# Patient Record
Sex: FEMALE | Race: BLACK OR AFRICAN AMERICAN | NOT HISPANIC OR LATINO | Employment: FULL TIME | URBAN - METROPOLITAN AREA
[De-identification: names, ages, dates, MRNs, and addresses within clinical notes are randomized per-mention and may not be internally consistent; named-entity substitution may affect disease eponyms.]

---

## 2017-01-06 ENCOUNTER — TRANSCRIBE ORDERS (OUTPATIENT)
Dept: ADMINISTRATIVE | Facility: HOSPITAL | Age: 47
End: 2017-01-06

## 2017-01-17 ENCOUNTER — TRANSCRIBE ORDERS (OUTPATIENT)
Dept: ADMINISTRATIVE | Facility: HOSPITAL | Age: 47
End: 2017-01-17

## 2017-01-17 DIAGNOSIS — Z12.31 ENCOUNTER FOR SCREENING MAMMOGRAM FOR MALIGNANT NEOPLASM OF BREAST: Primary | ICD-10-CM

## 2017-01-18 ENCOUNTER — HOSPITAL ENCOUNTER (OUTPATIENT)
Dept: MAMMOGRAPHY | Facility: HOSPITAL | Age: 47
Discharge: HOME | End: 2017-01-18
Attending: OBSTETRICS & GYNECOLOGY
Payer: COMMERCIAL

## 2017-01-18 DIAGNOSIS — Z12.31 ENCOUNTER FOR SCREENING MAMMOGRAM FOR MALIGNANT NEOPLASM OF BREAST: ICD-10-CM

## 2017-01-18 PROCEDURE — G0202 SCR MAMMO BI INCL CAD: HCPCS

## 2017-03-09 ENCOUNTER — ALLSCRIPTS OFFICE VISIT (OUTPATIENT)
Dept: OTHER | Facility: OTHER | Age: 47
End: 2017-03-09

## 2017-03-09 DIAGNOSIS — R14.2 ERUCTATION: ICD-10-CM

## 2017-03-09 DIAGNOSIS — R10.9 ABDOMINAL PAIN: ICD-10-CM

## 2017-03-30 ENCOUNTER — ANESTHESIA EVENT (OUTPATIENT)
Dept: GASTROENTEROLOGY | Facility: HOSPITAL | Age: 47
End: 2017-03-30
Payer: COMMERCIAL

## 2017-03-31 ENCOUNTER — GENERIC CONVERSION - ENCOUNTER (OUTPATIENT)
Dept: OTHER | Facility: OTHER | Age: 47
End: 2017-03-31

## 2017-03-31 ENCOUNTER — ANESTHESIA (OUTPATIENT)
Dept: GASTROENTEROLOGY | Facility: HOSPITAL | Age: 47
End: 2017-03-31
Payer: COMMERCIAL

## 2017-03-31 ENCOUNTER — LAB CONVERSION - ENCOUNTER (OUTPATIENT)
Dept: OTHER | Facility: OTHER | Age: 47
End: 2017-03-31

## 2017-03-31 ENCOUNTER — HOSPITAL ENCOUNTER (OUTPATIENT)
Facility: HOSPITAL | Age: 47
Setting detail: OUTPATIENT SURGERY
Discharge: HOME | End: 2017-03-31
Attending: INTERNAL MEDICINE | Admitting: INTERNAL MEDICINE
Payer: COMMERCIAL

## 2017-03-31 VITALS
TEMPERATURE: 97 F | HEART RATE: 65 BPM | HEIGHT: 68 IN | SYSTOLIC BLOOD PRESSURE: 119 MMHG | OXYGEN SATURATION: 100 % | BODY MASS INDEX: 24.06 KG/M2 | RESPIRATION RATE: 20 BRPM | WEIGHT: 158.73 LBS | DIASTOLIC BLOOD PRESSURE: 62 MMHG

## 2017-03-31 DIAGNOSIS — R10.9 ABDOMINAL PAIN: ICD-10-CM

## 2017-03-31 DIAGNOSIS — R14.2 BELCHING: ICD-10-CM

## 2017-03-31 LAB — EXT PREGNANCY TEST URINE: NORMAL

## 2017-03-31 PROCEDURE — 88342 IMHCHEM/IMCYTCHM 1ST ANTB: CPT | Performed by: INTERNAL MEDICINE

## 2017-03-31 PROCEDURE — 88305 TISSUE EXAM BY PATHOLOGIST: CPT | Performed by: INTERNAL MEDICINE

## 2017-03-31 PROCEDURE — 81025 URINE PREGNANCY TEST: CPT | Performed by: STUDENT IN AN ORGANIZED HEALTH CARE EDUCATION/TRAINING PROGRAM

## 2017-03-31 RX ORDER — OMEPRAZOLE 40 MG/1
40 CAPSULE, DELAYED RELEASE ORAL DAILY
Status: ON HOLD | COMMUNITY
End: 2018-05-14 | Stop reason: ALTCHOICE

## 2017-03-31 RX ORDER — GLYCOPYRROLATE 0.2 MG/ML
INJECTION INTRAMUSCULAR; INTRAVENOUS AS NEEDED
Status: DISCONTINUED | OUTPATIENT
Start: 2017-03-31 | End: 2017-03-31 | Stop reason: SURG

## 2017-03-31 RX ORDER — LIDOCAINE HYDROCHLORIDE 10 MG/ML
INJECTION, SOLUTION INFILTRATION; PERINEURAL AS NEEDED
Status: DISCONTINUED | OUTPATIENT
Start: 2017-03-31 | End: 2017-03-31 | Stop reason: SURG

## 2017-03-31 RX ORDER — SODIUM CHLORIDE, SODIUM LACTATE, POTASSIUM CHLORIDE, CALCIUM CHLORIDE 600; 310; 30; 20 MG/100ML; MG/100ML; MG/100ML; MG/100ML
100 INJECTION, SOLUTION INTRAVENOUS CONTINUOUS
Status: DISCONTINUED | OUTPATIENT
Start: 2017-03-31 | End: 2017-03-31 | Stop reason: HOSPADM

## 2017-03-31 RX ORDER — ACETAMINOPHEN AND CODEINE PHOSPHATE 120; 12 MG/5ML; MG/5ML
1 SOLUTION ORAL
COMMUNITY
End: 2021-11-17

## 2017-03-31 RX ORDER — PROPOFOL 10 MG/ML
INJECTION, EMULSION INTRAVENOUS AS NEEDED
Status: DISCONTINUED | OUTPATIENT
Start: 2017-03-31 | End: 2017-03-31 | Stop reason: SURG

## 2017-03-31 RX ADMIN — PROPOFOL 50 MG: 10 INJECTION, EMULSION INTRAVENOUS at 10:52

## 2017-03-31 RX ADMIN — PROPOFOL 100 MG: 10 INJECTION, EMULSION INTRAVENOUS at 10:51

## 2017-03-31 RX ADMIN — PROPOFOL 50 MG: 10 INJECTION, EMULSION INTRAVENOUS at 10:54

## 2017-03-31 RX ADMIN — PROPOFOL 50 MG: 10 INJECTION, EMULSION INTRAVENOUS at 10:53

## 2017-03-31 RX ADMIN — PROPOFOL 50 MG: 10 INJECTION, EMULSION INTRAVENOUS at 10:55

## 2017-03-31 RX ADMIN — LIDOCAINE HYDROCHLORIDE 50 MG: 10 INJECTION, SOLUTION INFILTRATION; PERINEURAL at 10:51

## 2017-03-31 RX ADMIN — SODIUM CHLORIDE, SODIUM LACTATE, POTASSIUM CHLORIDE, AND CALCIUM CHLORIDE: .6; .31; .03; .02 INJECTION, SOLUTION INTRAVENOUS at 09:53

## 2017-03-31 RX ADMIN — GLYCOPYRROLATE 0.1 MG: 0.2 INJECTION INTRAMUSCULAR; INTRAVENOUS at 10:49

## 2017-04-01 ENCOUNTER — GENERIC CONVERSION - ENCOUNTER (OUTPATIENT)
Dept: OTHER | Facility: OTHER | Age: 47
End: 2017-04-01

## 2017-04-07 ENCOUNTER — GENERIC CONVERSION - ENCOUNTER (OUTPATIENT)
Dept: OTHER | Facility: OTHER | Age: 47
End: 2017-04-07

## 2017-05-21 ENCOUNTER — APPOINTMENT (EMERGENCY)
Dept: CT IMAGING | Facility: HOSPITAL | Age: 47
End: 2017-05-21
Payer: COMMERCIAL

## 2017-05-21 ENCOUNTER — HOSPITAL ENCOUNTER (EMERGENCY)
Facility: HOSPITAL | Age: 47
Discharge: HOME | End: 2017-05-21
Attending: EMERGENCY MEDICINE
Payer: COMMERCIAL

## 2017-05-21 VITALS
SYSTOLIC BLOOD PRESSURE: 99 MMHG | RESPIRATION RATE: 18 BRPM | HEART RATE: 80 BPM | OXYGEN SATURATION: 99 % | TEMPERATURE: 98.2 F | DIASTOLIC BLOOD PRESSURE: 61 MMHG

## 2017-05-21 DIAGNOSIS — D21.9 FIBROIDS: ICD-10-CM

## 2017-05-21 DIAGNOSIS — R10.9 ABDOMINAL PAIN: Primary | ICD-10-CM

## 2017-05-21 LAB
ANION GAP SERPL CALCULATED.3IONS-SCNC: 8 MMOL/L (ref 4–13)
BASOPHILS # BLD AUTO: 0.02 THOUSANDS/ΜL (ref 0–0.1)
BASOPHILS NFR BLD AUTO: 0 % (ref 0–1)
BUN SERPL-MCNC: 15 MG/DL (ref 5–25)
CALCIUM SERPL-MCNC: 8.7 MG/DL (ref 8.3–10.1)
CHLORIDE SERPL-SCNC: 106 MMOL/L (ref 100–108)
CO2 SERPL-SCNC: 28 MMOL/L (ref 21–32)
CREAT SERPL-MCNC: 1.02 MG/DL (ref 0.6–1.3)
EOSINOPHIL # BLD AUTO: 0.17 THOUSAND/ΜL (ref 0–0.61)
EOSINOPHIL NFR BLD AUTO: 3 % (ref 0–6)
ERYTHROCYTE [DISTWIDTH] IN BLOOD BY AUTOMATED COUNT: 12.1 % (ref 11.6–15.1)
EXT BILIRUBIN, UA: NORMAL
EXT BLOOD URINE: NORMAL
EXT GLUCOSE, UA: NORMAL
EXT KETONES: NORMAL
EXT NITRITE, UA: NORMAL
EXT PH, UA: 8.5
EXT PROTEIN, UA: NORMAL
EXT SPECIFIC GRAVITY, UA: 1005
EXT UROBILINOGEN: NORMAL
GFR SERPL CREATININE-BSD FRML MDRD: >60 ML/MIN/1.73SQ M
GLUCOSE SERPL-MCNC: 84 MG/DL (ref 65–140)
HCG UR QL: NEGATIVE
HCT VFR BLD AUTO: 37.9 % (ref 34.8–46.1)
HGB BLD-MCNC: 12.2 G/DL (ref 11.5–15.4)
LYMPHOCYTES # BLD AUTO: 2.14 THOUSANDS/ΜL (ref 0.6–4.47)
LYMPHOCYTES NFR BLD AUTO: 34 % (ref 14–44)
MCH RBC QN AUTO: 28.4 PG (ref 26.8–34.3)
MCHC RBC AUTO-ENTMCNC: 32.2 G/DL (ref 31.4–37.4)
MCV RBC AUTO: 88 FL (ref 82–98)
MONOCYTES # BLD AUTO: 0.64 THOUSAND/ΜL (ref 0.17–1.22)
MONOCYTES NFR BLD AUTO: 10 % (ref 4–12)
NEUTROPHILS # BLD AUTO: 3.4 THOUSANDS/ΜL (ref 1.85–7.62)
NEUTS SEG NFR BLD AUTO: 53 % (ref 43–75)
PLATELET # BLD AUTO: 252 THOUSANDS/UL (ref 149–390)
PMV BLD AUTO: 12 FL (ref 8.9–12.7)
POTASSIUM SERPL-SCNC: 3.8 MMOL/L (ref 3.5–5.3)
RBC # BLD AUTO: 4.29 MILLION/UL (ref 3.81–5.12)
SODIUM SERPL-SCNC: 142 MMOL/L (ref 136–145)
WBC # BLD AUTO: 6.37 THOUSAND/UL (ref 4.31–10.16)
WBC # BLD EST: NORMAL 10*3/UL

## 2017-05-21 PROCEDURE — 80048 BASIC METABOLIC PNL TOTAL CA: CPT | Performed by: EMERGENCY MEDICINE

## 2017-05-21 PROCEDURE — 96361 HYDRATE IV INFUSION ADD-ON: CPT

## 2017-05-21 PROCEDURE — 99284 EMERGENCY DEPT VISIT MOD MDM: CPT

## 2017-05-21 PROCEDURE — 81025 URINE PREGNANCY TEST: CPT | Performed by: EMERGENCY MEDICINE

## 2017-05-21 PROCEDURE — 36415 COLL VENOUS BLD VENIPUNCTURE: CPT | Performed by: EMERGENCY MEDICINE

## 2017-05-21 PROCEDURE — 74177 CT ABD & PELVIS W/CONTRAST: CPT

## 2017-05-21 PROCEDURE — 85025 COMPLETE CBC W/AUTO DIFF WBC: CPT | Performed by: EMERGENCY MEDICINE

## 2017-05-21 PROCEDURE — 96374 THER/PROPH/DIAG INJ IV PUSH: CPT

## 2017-05-21 PROCEDURE — 81002 URINALYSIS NONAUTO W/O SCOPE: CPT | Performed by: EMERGENCY MEDICINE

## 2017-05-21 RX ORDER — KETOROLAC TROMETHAMINE 30 MG/ML
30 INJECTION, SOLUTION INTRAMUSCULAR; INTRAVENOUS ONCE
Status: COMPLETED | OUTPATIENT
Start: 2017-05-21 | End: 2017-05-21

## 2017-05-21 RX ADMIN — SODIUM CHLORIDE 1000 ML: 0.9 INJECTION, SOLUTION INTRAVENOUS at 16:55

## 2017-05-21 RX ADMIN — KETOROLAC TROMETHAMINE 30 MG: 30 INJECTION, SOLUTION INTRAMUSCULAR at 16:54

## 2017-05-21 RX ADMIN — IOHEXOL 100 ML: 350 INJECTION, SOLUTION INTRAVENOUS at 18:02

## 2018-01-11 NOTE — RESULT NOTES
Message      Gastric biopsies came back as benign and negative for H  pylori  Patient to continue PPI and call for follow-up office visit if symptoms persist or PRN  Left message  CS     Discussed results  CS             Verified Results  (1) TISSUE EXAM [id number] 10:55AM Genette Box     Test Name Result Flag Reference   LAB AP CASE REPORT (Report)     Surgical Pathology Report             Case: [id number]                   Authorizing Provider: Khari Zhang MD     Collected:      03/31/2017 1055        Ordering Location:   [facility]    Received:      03/31/2017 [facility]                               Pathologist:      Yenni Calles MD                              Specimen:  Stomach, gastric body   LAB AP FINAL DIAGNOSIS (Report)     A  Stomach, body, biopsy:        - Oxyntic mucosa with chronic inactive gastritis  - No Helicobacter pylori organisms are identified on the   immunohistochemical stain, performed with an appropriate positive control         - No intestinal metaplasia, dysplasia or malignancy is   identified  Interpretation performed at , [address]   Electronically signed by Yenni Calles MD on 4/4/2017 at 9:35 AM   LAB AP SURGICAL ADDITIONAL INFORMATION (Report)     These tests were developed and their performance characteristics   determined by [facility] or [facility]  They may not be cleared or approved by the [place]  Food and   Drug Administration  The FDA has determined that such clearance or   approval is not necessary  These tests are used for clinical purposes  They should not be regarded as investigational or for research  This   laboratory has been approved by CLIA 88, designated as a high-complexity   laboratory and is qualified to perform these tests  LAB AP GROSS DESCRIPTION (Report)     A   The specimen is received in formalin, labeled with the patient's name   and hospital number, and is designated gastric body, are two   irregularly shaped fragments of tan-red soft tissue each measuring 0 3 cm   in greatest dimension  Entirely submitted  One cassette  Note: The estimated total formalin fixation time based upon information   provided by the submitting clinician and the standard processing schedule   is 17 5 hours        RLR   LAB AP CLINICAL INFORMATION R/o h pylori     R/o h pylori

## 2018-01-16 NOTE — CONSULTS
Assessment    1  Abdominal pain (789 00) (R10 9)   2  Belching (787 3) (R14 2)    Plan  Abdominal pain, Belching    · (1) HELICOBACTER PYLORI IGG; Status:Active; Requested MQD:[id number];    Perform:[facility]; TJX:[id number]; Ordered; For:Abdominal pain, Belching; Ordered By:Crystal Lacey;   · (1) HELICOBACTER PYLORI, IGM; Status:Active; Requested IUE:[id number];    Perform:[facility]; ANJ:[id number]; Ordered; For:Abdominal pain, Belching; Ordered By:Crystal Lacey;   · EGD; Status:Hold For - Scheduling; Requested CMW:[id number];    Perform:[facility]; MALAVE:[id number];AGTARNP; For:Abdominal pain, Belching; Ordered By:Crystal aLcey;   · (1) CELIAC DISEASE AB PROFILE; Status:Active; Requested HGE:[id number];    Perform:[facility]; UHR:[id number]; Ordered; For:Abdominal pain, Belching; Ordered By:Crystal Lacey; Discussion/Summary  Discussion Summary:     ASSESSMENT:    #1  Dyspepsia and belching- possible from peptic ulcer disease or gastric erosions  Rule out H  pylori gastritis  Also consider food intolerance, rule out celiac disease  Doubt for biliary pathology  PLAN:    #1  Schedule for upper endoscopy    #2  Continue omeprazole    #3  Patient was explained about the lifestyle and dietary modifications  Advised to avoid fatty foods, chocolates, caffeine, alcohol and any other triggering foods  Avoid eating for at least 3 hours before going to bed  #4  Check the H  pylori antibodies and celiac disease panel    #5  Advised patient to get ultrasound results from her PMD    #6  Patient was explained about the risks and benefits of the procedure  Risks including but not limited to bleeding, infection, perforation were explained in detail  Also explained about less than 100% sensitivity with the exam and other alternatives          Counseling Documentation With Imm: The patient was counseled regarding instructions for management, risk factor reductions, patient and family education, risks and benefits of treatment options, importance of compliance with treatment  Chief Complaint  Chief Complaint Free Text Note Form:   Abdominal pain and belching      History of Present Illness  HPI:   75-year-old female with no significant past medical history reports having episodes of epigastric pain  She had a bad episode about a month ago that she describes as severe pain in the epigastric area  Denies any nausea and vomiting  She was seen by her PMD and was started on omeprazole and Mylanta  She stopped Mylanta because of diarrhea with improvement  She denies any more abdominal pain but complains about belching and burping  She takes ibuprofen for menstrual cramping and headaches  Good appetite, no recent weight loss  Regular bowel movements, denies any blood or mucus in the stool  She reports having an ultrasound done but hasn't had back from her PMD yet  History Reviewed: The history was obtained today from the patient and I agree with the documented history  Review of Systems  Complete-Female GI Adult:   Constitutional: No fever, no chills, feels well, no tiredness, no recent weight gain or weight loss  Eyes: No complaints of eye pain, no red eyes, no eyesight problems, no discharge, no dry eyes, no itching of eyes  ENT: no complaints of earache, no loss of hearing, no nose bleeds, no nasal discharge, no sore throat, no hoarseness  Cardiovascular: No complaints of slow heart rate, no fast heart rate, no chest pain, no palpitations, no leg claudication, no lower extremity edema  Respiratory: No complaints of shortness of breath, no wheezing, no cough, no SOB on exertion, no orthopnea, no PND  Gastrointestinal: as noted in HPI  Genitourinary: No complaints of dysuria, no incontinence, no pelvic pain, no dysmenorrhea, no vaginal discharge or bleeding     Musculoskeletal: No complaints of arthralgias, no myalgias, no joint swelling or stiffness, no limb pain or swelling  Integumentary: No complaints of skin rash or lesions, no itching, no skin wounds, no breast pain or lump  Neurological: No complaints of headache, no confusion, no convulsions, no numbness, no dizziness or fainting, no tingling, no limb weakness, no difficulty walking  Psychiatric: Not suicidal, no sleep disturbance, no anxiety or depression, no change in personality, no emotional problems  Endocrine: No complaints of proptosis, no hot flashes, no muscle weakness, no deepening of the voice, no feelings of weakness  Hematologic/Lymphatic: No complaints of swollen glands, no swollen glands in the neck, does not bleed easily, does not bruise easily  Active Problems    1  Fibrocystic breast disease, unspecified laterality (610 1) (N60 19)   2  Lump of right breast (611 72) (V03)    Past Medical History  Active Problems And Past Medical History Reviewed: The active problems and past medical history were reviewed and updated today  Surgical History    1  History of  Section   2  History of Hand Surgery  Surgical History Reviewed: The surgical history was reviewed and updated today  Family History  Family History Reviewed: The family history was reviewed and updated today  Social History    · Denied: History of Drug use   · Never a smoker   · Social alcohol use (Z78 9)  Social History Reviewed: The social history was reviewed and updated today  The social history was reviewed and is unchanged  Current Meds   1  Biotin 1000 MCG Oral Tablet; TAKE AS DIRECTED; Therapy: (Recorded:2017) to Recorded   2  [employer] Vitamin C 1000 MG Oral Tablet; TAKE 1 TABLET DAILY; Therapy: (Recorded:2017) to Recorded   3  Fish Oil CAPS; take 1 capsule daily; Therapy: (Recorded:2017) to Recorded   4  Omeprazole TBEC; Take 1 tablet daily; Therapy: (Recorded:2017) to Recorded   5  Probiotic CAPS; TAKE AS DIRECTED; Therapy: (Recorded:2017) to Recorded   6  Vitamin D (Cholecalciferol) 400 UNIT Oral Tablet Chewable; CHEW 1 TABLET DAILY; Therapy: (Recorded:09Mar2017) to Recorded  Medication List Reviewed: The medication list was reviewed and updated today  Allergies    1  No Known Drug Allergies    2  No Known Environmental Allergies   3  No Known Food Allergies    Vitals  Vital Signs    Recorded: 65MNT1526 09:46AM   Temperature 97 3 F   Heart Rate 83   Respiration 16   Systolic 547   Diastolic 70   Height 5 ft 6 in   Weight 159 lb 6 oz   BMI Calculated 25 72   BSA Calculated 1 82   O2 Saturation 98     Physical Exam    Constitutional   General appearance: No acute distress, well appearing and well nourished  Eyes   Conjunctiva and lids: No swelling, erythema or discharge  Pupils and irises: Equal, round and reactive to light  Ears, Nose, Mouth, and Throat   External inspection of ears and nose: Normal     Oropharynx: Normal with no erythema, edema, exudate or lesions  Pulmonary   Respiratory effort: No increased work of breathing or signs of respiratory distress  Auscultation of lungs: Clear to auscultation  Cardiovascular   Palpation of heart: Normal PMI, no thrills  Auscultation of heart: Normal rate and rhythm, normal S1 and S2, without murmurs  Examination of extremities for edema and/or varicosities: Normal     Carotid pulses: Normal     Abdomen   Abdomen: Non-tender, no masses  Liver and spleen: No hepatomegaly or splenomegaly  Lymphatic   Palpation of lymph nodes in neck: No lymphadenopathy  Musculoskeletal   Gait and station: Normal     Digits and nails: Normal without clubbing or cyanosis  Inspection/palpation of joints, bones, and muscles: Normal     Skin   Skin and subcutaneous tissue: Normal without rashes or lesions      Psychiatric   Orientation to person, place, and time: Normal     Mood and affect: Normal          Signatures   Electronically signed by : JOSE Franklin ; Mar  9 2017 10:17AM EST (Author)

## 2018-01-17 NOTE — RESULT NOTES
Message    Stool for H  pylori antigen came back as negative  left message to call back  CS    Discussed results with patient  CS             Verified Results  (Q) HELICOBACTER PYLORI AG, [place], STOOL [id number] 06:30PM Faye Rebolledo   REPORT COMMENT:  FASTING:NO     Test Name Result Flag Reference   HELICOBACTER PYLORI AG,$EIA, STOOL      HELICOBACTER PYLORI AG, EIA, STOOL         MICRO NUMBER:      [id number]    TEST STATUS:       FINAL    SPECIMEN SOURCE:   STOOL    SPECIMEN QUALITY:  ADEQUATE    RESULT:            Not Detected                                               Antimicrobials, proton pump inhibitors, and                       bismuth preparations inhibit H  pylori and                       ingestion up to two weeks prior to testing may                       cause false negative results  If clinically                       indicated the test should be repeated on a new                       specimen obtained two weeks after discontinuing                       treatment

## 2018-01-22 VITALS
WEIGHT: 159.38 LBS | DIASTOLIC BLOOD PRESSURE: 70 MMHG | HEART RATE: 83 BPM | HEIGHT: 66 IN | BODY MASS INDEX: 25.61 KG/M2 | TEMPERATURE: 97.3 F | OXYGEN SATURATION: 98 % | RESPIRATION RATE: 16 BRPM | SYSTOLIC BLOOD PRESSURE: 110 MMHG

## 2018-04-26 ENCOUNTER — TELEPHONE (OUTPATIENT)
Dept: GASTROENTEROLOGY | Facility: CLINIC | Age: 48
End: 2018-04-26

## 2018-04-26 NOTE — TELEPHONE ENCOUNTER
Aminata Avilez  1970  [address]  [facility] [id number] [phone]  Cell Phone     Screened by: [ Cally Tucker ]    Referring Dr :     Pre- Screening:   Has patient been referred for a routine screening Colonoscopy? yes  Is the patient over 48years of age? yes    If the answer is YES to both questions, proceed to the medical questions  Do you have any of the following symptoms? Have you had a coronary or vascular stent within the last year? no    Have you had a heart attack or stroke in the last 6 months? no    Have you had intestinal surgery in the last 3 months? no    Do you have problems with:    Do you use:  Oxygen no  CPAP/BiPAP no    Have you been hospitalized in the last Month? no    Have you been diagnosed with a bleeding disorder or anemia? no    Have you had chest pain (angina) or breathing problems  (COPD) in the last 3 months? no     Do you have any difficulty walking up a flight of stairs? no    Have you had Kidney failure or insufficiency? no    Have you had heart valve surgery? no    Are you confined to a wheelchair? no    Do you take     Do you take insulin for Diabetes no  Name of medication:    : If patient answers NO to medical questions, then schedule procedure  If patient answers YES to medical questions, then schedule office appointment  Previous Colonoscopy no   (if yes) Date and Facility of last colonoscopy? Patient scheduled for procedure:   Scheduled by:     Time:   Provider:   Location:     Insurance: [address] [place] [address]  Referral Required? Were instructions Mailed? Were [email or web address]:   Was the prep sent to Pharmacy?      Comments: [ Tania Mcclellan ]/ xfer to procedure line

## 2018-04-27 ENCOUNTER — PREP FOR PROCEDURE (OUTPATIENT)
Dept: GASTROENTEROLOGY | Facility: AMBULARY SURGERY CENTER | Age: 48
End: 2018-04-27

## 2018-04-27 DIAGNOSIS — Z12.11 COLON CANCER SCREENING: Primary | ICD-10-CM

## 2018-05-02 ENCOUNTER — ANESTHESIA EVENT (OUTPATIENT)
Dept: PERIOP | Facility: AMBULARY SURGERY CENTER | Age: 48
End: 2018-05-02
Payer: COMMERCIAL

## 2018-05-10 ENCOUNTER — TELEPHONE (OUTPATIENT)
Dept: GASTROENTEROLOGY | Facility: CLINIC | Age: 48
End: 2018-05-10

## 2018-05-10 NOTE — TELEPHONE ENCOUNTER
Dr Krish Adams pt    Pt called in to discuss procedure prep   She is scheduled on 5/14 for a colonoscopy  [phone]

## 2018-05-11 DIAGNOSIS — Z12.11 COLON CANCER SCREENING: Primary | ICD-10-CM

## 2018-05-14 ENCOUNTER — ANESTHESIA (OUTPATIENT)
Dept: PERIOP | Facility: AMBULARY SURGERY CENTER | Age: 48
End: 2018-05-14
Payer: COMMERCIAL

## 2018-05-14 ENCOUNTER — HOSPITAL ENCOUNTER (OUTPATIENT)
Facility: AMBULARY SURGERY CENTER | Age: 48
Setting detail: OUTPATIENT SURGERY
Discharge: HOME | End: 2018-05-14
Attending: INTERNAL MEDICINE | Admitting: INTERNAL MEDICINE
Payer: COMMERCIAL

## 2018-05-14 VITALS
HEIGHT: 68 IN | WEIGHT: 152 LBS | RESPIRATION RATE: 18 BRPM | SYSTOLIC BLOOD PRESSURE: 107 MMHG | BODY MASS INDEX: 23.04 KG/M2 | TEMPERATURE: 97.8 F | OXYGEN SATURATION: 100 % | HEART RATE: 65 BPM | DIASTOLIC BLOOD PRESSURE: 66 MMHG

## 2018-05-14 LAB — EXT PREGNANCY TEST URINE: NEGATIVE

## 2018-05-14 PROCEDURE — G0121 COLON CA SCRN NOT HI RSK IND: HCPCS | Performed by: INTERNAL MEDICINE

## 2018-05-14 PROCEDURE — 81025 URINE PREGNANCY TEST: CPT | Performed by: ANESTHESIOLOGY

## 2018-05-14 RX ORDER — PROPOFOL 10 MG/ML
INJECTION, EMULSION INTRAVENOUS AS NEEDED
Status: DISCONTINUED | OUTPATIENT
Start: 2018-05-14 | End: 2018-05-14 | Stop reason: SURG

## 2018-05-14 RX ORDER — SODIUM CHLORIDE 9 MG/ML
125 INJECTION, SOLUTION INTRAVENOUS CONTINUOUS
Status: DISCONTINUED | OUTPATIENT
Start: 2018-05-14 | End: 2018-05-14 | Stop reason: HOSPADM

## 2018-05-14 RX ADMIN — PROPOFOL 150 MG: 10 INJECTION, EMULSION INTRAVENOUS at 13:11

## 2018-05-14 RX ADMIN — PROPOFOL 180 MG: 10 INJECTION, EMULSION INTRAVENOUS at 13:17

## 2018-05-14 RX ADMIN — SODIUM CHLORIDE: 0.9 INJECTION, SOLUTION INTRAVENOUS at 13:08

## 2018-05-14 RX ADMIN — SODIUM CHLORIDE: 0.9 INJECTION, SOLUTION INTRAVENOUS at 13:07

## 2018-05-14 NOTE — ANESTHESIA POSTPROCEDURE EVALUATION
Post-Op Assessment Note      CV Status:  Stable    Mental Status:  Lethargic and somnolent    Hydration Status:  Euvolemic    PONV Controlled:  Controlled    Airway Patency:  Patent    Post Op Vitals Reviewed: Yes          Staff: CRNA           BP      Temp      Pulse     Resp      SpO2

## 2018-05-14 NOTE — ANESTHESIA PREPROCEDURE EVALUATION
Review of Systems/Medical History  Patient summary reviewed  Chart reviewed      Cardiovascular  Negative cardio ROS    Pulmonary  Negative pulmonary ROS        GI/Hepatic    GERD well controlled,        Negative  ROS        Endo/Other  Negative endo/other ROS      GYN  Negative gynecology ROS          Hematology  Negative hematology ROS      Musculoskeletal  Negative musculoskeletal ROS        Neurology  Negative neurology ROS      Psychology   Negative psychology ROS              Physical Exam    Airway    Mallampati score: II  TM Distance: >3 FB  Neck ROM: full     Dental   No notable dental hx     Cardiovascular  Comment: Negative ROS, Cardiovascular exam normal    Pulmonary  Pulmonary exam normal     Other Findings        Anesthesia Plan  ASA Score- 2     Anesthesia Type- IV sedation with anesthesia with ASA Monitors  Additional Monitors:   Airway Plan:         Plan Factors-  Patient did not smoke on day of surgery  Induction- intravenous  Postoperative Plan-     Informed Consent- Anesthetic plan and risks discussed with patient  I personally reviewed this patient with the CRNA  Discussed and agreed on the Anesthesia Plan with the CRNA  Carmita Morrison

## 2018-05-14 NOTE — OP NOTE
COLONOSCOPY    PROCEDURE: Colonoscopy    INDICATIONS: Screening for Colon Cancer    POST-OP DIAGNOSIS: See the impression below    SEDATION: Monitored anesthesia care, check anesthesia records    PHYSICAL EXAM:    /67   Pulse 78   Temp 99 6 °F (37 6 °C) (Temporal)   Resp 18   Ht 5' 8" (1 727 m)   Wt 68 9 kg (152 lb)   SpO2 100%   BMI 23 11 kg/m²   Body mass index is 23 11 kg/m²  General: NAD  Heart: S1 & S2 normal, RRR  Lungs: CTA, No rales or rhonchi  Abdomen: Soft, nontender, nondistended, good bowel sounds    CONSENT:  Informed consent was obtained for the procedure, including sedation after explaining the risks and benefits of the procedure  Risks including but not limited to bleeding, perforation, infection, aspiration were discussed in detail  Also explained about less than 100%$ sensitivity with the exam and other alternatives  PREPARATION:   EKG tracing, pulse oximetry, blood pressure were monitored throughout the procedure  Patient was identified by myself both verbally and by visual inspection of ID band  DESCRIPTION:   Patient was placed in the left lateral decubitus position and was sedated with the above medication  Digital rectal examination was performed  The colonoscope was introduced in to the anal canal and advanced up to cecum, which was identified by the appendiceal orifice and IC valve  A careful inspection was made as the colonoscope was withdrawn, including a retroflexed view of the rectum; findings and interventions are described below  Appropriate photodocumentation was obtained  The quality of the colonic preparation was adequate  FINDINGS:    1  Cecum and cecal valve-normal mucosa    2  Some liquid stool was washed off and suctioned and had good visualization of the underlying mucosa  3    Internal hemorrhoids         IMPRESSIONS:      As above    RECOMMENDATIONS:    Next colonoscopy in 5 years    COMPLICATIONS:  None; patient tolerated the procedure well     DISPOSITION: PACU           CONDITION: Stable

## 2018-05-14 NOTE — H&P
History and Physical - [facility]  Nikolas Ernesto 46 y o  female MRN: [id number]        HPI:  47-year-old female with history of GERD was referred for screening colonoscopy  Patient has regular bowel movements and denies any blood or mucus in the stool  Appetite is good and denies any recent weight loss  Denies any abdominal pain, nausea, or vomiting  Has no heartburn or acid reflux  Denies any difficulty swallowing  Historical Information   Past Medical History:   Diagnosis Date    Gastritis     GERD (gastroesophageal reflux disease)      Past Surgical History:   Procedure Laterality Date     SECTION      HAND SURGERY Left     Left pinky tendon repair    NM ESOPHAGOGASTRODUODENOSCOPY TRANSORAL DIAGNOSTIC N/A 3/31/2017    Procedure: ESOPHAGOGASTRODUODENOSCOPY (EGD); Surgeon: Salvador Fuentes MD;  Location: [facility]; Service: Gastroenterology     Social History   History   Alcohol Use    4 2 oz/week    7 Glasses of wine per week     History   Drug Use No     History   Smoking Status    Never Smoker   Smokeless Tobacco    Never Used     History reviewed  No pertinent family history  Meds/Allergies     Prescriptions Prior to Admission   Medication    norethindrone (MICRONOR) 0 35 MG tablet    BIOTIN 5000 PO    Cholecalciferol (VITAMIN D PO)    KRILL OIL PO    Omega-3 Fatty Acids (FISH OIL PO)       No Known Allergies    Objective     Blood pressure 110/67, pulse 78, temperature 99 6 °F (37 6 °C), temperature source Temporal, resp  rate 18, height 5' 8" (1 727 m), weight 68 9 kg (152 lb), SpO2 100 %      PHYSICAL EXAM:    Gen: NAD  CV: S1 & S2 normal, RRR  CHEST: Clear to auscultate  ABD: soft, NT/ND, good bowel sounds  EXT: no edema    ASSESSMENT:     Screening for colon cancer    PLAN:    Colonoscopy

## 2018-07-12 ENCOUNTER — APPOINTMENT (EMERGENCY)
Dept: RADIOLOGY | Facility: HOSPITAL | Age: 48
End: 2018-07-12
Payer: COMMERCIAL

## 2018-07-12 ENCOUNTER — HOSPITAL ENCOUNTER (EMERGENCY)
Facility: HOSPITAL | Age: 48
Discharge: HOME | End: 2018-07-12
Attending: EMERGENCY MEDICINE | Admitting: EMERGENCY MEDICINE
Payer: COMMERCIAL

## 2018-07-12 VITALS
HEART RATE: 82 BPM | OXYGEN SATURATION: 100 % | SYSTOLIC BLOOD PRESSURE: 113 MMHG | BODY MASS INDEX: 23.04 KG/M2 | DIASTOLIC BLOOD PRESSURE: 56 MMHG | WEIGHT: 152 LBS | TEMPERATURE: 98.1 F | HEIGHT: 68 IN | RESPIRATION RATE: 18 BRPM

## 2018-07-12 DIAGNOSIS — R10.13 EPIGASTRIC PAIN: Primary | ICD-10-CM

## 2018-07-12 LAB
ALBUMIN SERPL BCP-MCNC: 3.4 G/DL (ref 3.5–5)
ALP SERPL-CCNC: 38 U/L (ref 46–116)
ALT SERPL W P-5'-P-CCNC: 20 U/L (ref 12–78)
ANION GAP SERPL CALCULATED.3IONS-SCNC: 10 MMOL/L (ref 4–13)
AST SERPL W P-5'-P-CCNC: 11 U/L (ref 5–45)
ATRIAL RATE: 71 BPM
BASOPHILS # BLD AUTO: 0.02 THOUSANDS/ΜL (ref 0–0.1)
BASOPHILS NFR BLD AUTO: 0 % (ref 0–1)
BILIRUB SERPL-MCNC: 0.4 MG/DL (ref 0.2–1)
BUN SERPL-MCNC: 17 MG/DL (ref 5–25)
CALCIUM SERPL-MCNC: 8.4 MG/DL (ref 8.3–10.1)
CHLORIDE SERPL-SCNC: 103 MMOL/L (ref 100–108)
CO2 SERPL-SCNC: 24 MMOL/L (ref 21–32)
CREAT SERPL-MCNC: 0.77 MG/DL (ref 0.6–1.3)
EOSINOPHIL # BLD AUTO: 0.15 THOUSAND/ΜL (ref 0–0.61)
EOSINOPHIL NFR BLD AUTO: 3 % (ref 0–6)
ERYTHROCYTE [DISTWIDTH] IN BLOOD BY AUTOMATED COUNT: 11.8 % (ref 11.6–15.1)
GFR SERPL CREATININE-BSD FRML MDRD: 104 ML/MIN/1.73SQ M
GLUCOSE SERPL-MCNC: 101 MG/DL (ref 65–140)
HCT VFR BLD AUTO: 36.5 % (ref 34.8–46.1)
HGB BLD-MCNC: 12 G/DL (ref 11.5–15.4)
LIPASE SERPL-CCNC: 135 U/L (ref 73–393)
LYMPHOCYTES # BLD AUTO: 2.24 THOUSANDS/ΜL (ref 0.6–4.47)
LYMPHOCYTES NFR BLD AUTO: 38 % (ref 14–44)
MCH RBC QN AUTO: 29.3 PG (ref 26.8–34.3)
MCHC RBC AUTO-ENTMCNC: 32.9 G/DL (ref 31.4–37.4)
MCV RBC AUTO: 89 FL (ref 82–98)
MONOCYTES # BLD AUTO: 0.78 THOUSAND/ΜL (ref 0.17–1.22)
MONOCYTES NFR BLD AUTO: 13 % (ref 4–12)
NEUTROPHILS # BLD AUTO: 2.76 THOUSANDS/ΜL (ref 1.85–7.62)
NEUTS SEG NFR BLD AUTO: 47 % (ref 43–75)
P AXIS: 70 DEGREES
PLATELET # BLD AUTO: 252 THOUSANDS/UL (ref 149–390)
PMV BLD AUTO: 12 FL (ref 8.9–12.7)
POTASSIUM SERPL-SCNC: 4.1 MMOL/L (ref 3.5–5.3)
PR INTERVAL: 142 MS
PROT SERPL-MCNC: 6.8 G/DL (ref 6.4–8.2)
QRS AXIS: 19 DEGREES
QRSD INTERVAL: 72 MS
QT INTERVAL: 360 MS
QTC INTERVAL: 391 MS
RBC # BLD AUTO: 4.1 MILLION/UL (ref 3.81–5.12)
SODIUM SERPL-SCNC: 137 MMOL/L (ref 136–145)
T WAVE AXIS: 29 DEGREES
VENTRICULAR RATE: 71 BPM
WBC # BLD AUTO: 5.95 THOUSAND/UL (ref 4.31–10.16)

## 2018-07-12 PROCEDURE — 93010 ELECTROCARDIOGRAM REPORT: CPT | Performed by: INTERNAL MEDICINE

## 2018-07-12 PROCEDURE — 85025 COMPLETE CBC W/AUTO DIFF WBC: CPT | Performed by: EMERGENCY MEDICINE

## 2018-07-12 PROCEDURE — C9113 INJ PANTOPRAZOLE SODIUM, VIA: HCPCS | Performed by: EMERGENCY MEDICINE

## 2018-07-12 PROCEDURE — 83690 ASSAY OF LIPASE: CPT | Performed by: EMERGENCY MEDICINE

## 2018-07-12 PROCEDURE — 36415 COLL VENOUS BLD VENIPUNCTURE: CPT | Performed by: EMERGENCY MEDICINE

## 2018-07-12 PROCEDURE — 93005 ELECTROCARDIOGRAM TRACING: CPT

## 2018-07-12 PROCEDURE — 80053 COMPREHEN METABOLIC PANEL: CPT | Performed by: EMERGENCY MEDICINE

## 2018-07-12 PROCEDURE — 71046 X-RAY EXAM CHEST 2 VIEWS: CPT

## 2018-07-12 PROCEDURE — 96374 THER/PROPH/DIAG INJ IV PUSH: CPT

## 2018-07-12 PROCEDURE — 99284 EMERGENCY DEPT VISIT MOD MDM: CPT

## 2018-07-12 PROCEDURE — 96361 HYDRATE IV INFUSION ADD-ON: CPT

## 2018-07-12 RX ORDER — PANTOPRAZOLE SODIUM 40 MG/1
40 INJECTION, POWDER, FOR SOLUTION INTRAVENOUS ONCE
Status: COMPLETED | OUTPATIENT
Start: 2018-07-12 | End: 2018-07-12

## 2018-07-12 RX ORDER — MAGNESIUM HYDROXIDE/ALUMINUM HYDROXICE/SIMETHICONE 120; 1200; 1200 MG/30ML; MG/30ML; MG/30ML
20 SUSPENSION ORAL ONCE
Status: COMPLETED | OUTPATIENT
Start: 2018-07-12 | End: 2018-07-12

## 2018-07-12 RX ADMIN — SODIUM CHLORIDE 1000 ML: 0.9 INJECTION, SOLUTION INTRAVENOUS at 01:08

## 2018-07-12 RX ADMIN — ALUMINUM HYDROXIDE, MAGNESIUM HYDROXIDE, AND SIMETHICONE 20 ML: 200; 200; 20 SUSPENSION ORAL at 00:59

## 2018-07-12 RX ADMIN — PANTOPRAZOLE SODIUM 40 MG: 40 INJECTION, POWDER, FOR SOLUTION INTRAVENOUS at 01:08

## 2018-07-12 NOTE — ED PROVIDER NOTES
History  Chief Complaint   Patient presents with    Abdominal Pain     Pt states RUQ abd pain since about 2pm   Has hx gastritis, has tried mylanta without relief  69-year-old female presents today complaining of right upper quadrant abdominal pain that started a 10 hours ago  States she has a history of gastritis and normally takes Mylanta and Phazyme which relieves her symptoms  Today they did not  Mild nausea  No vomiting or diarrhea  No fever  P o  intake has been normal         History provided by:  Patient  Abdominal Pain   Pain location:  RUQ and epigastric  Pain quality: bloating and pressure    Pain radiates to:  Does not radiate  Pain severity:  Mild  Onset quality:  Sudden  Timing:  Constant  Chronicity:  Recurrent  Relieved by:  Nothing  Worsened by:  Nothing  Ineffective treatments:  Belching, antacids and OTC medications  Associated symptoms: nausea    Associated symptoms: no chest pain, no chills, no fatigue, no fever and no shortness of breath    Risk factors: no alcohol abuse, has not had multiple surgeries, no NSAID use and no recent hospitalization        Prior to Admission Medications   Prescriptions Last Dose Informant Patient Reported? Taking? BIOTIN 5000 PO   Yes No   Sig: Take by mouth   Cholecalciferol (VITAMIN D PO)   Yes No   Sig: Take by mouth   KRILL OIL PO   Yes No   Sig: Take by mouth   Omega-3 Fatty Acids (FISH OIL PO)   Yes No   Sig: Take by mouth   norethindrone (MICRONOR) 0 35 MG tablet   Yes No   Sig: Take 1 tablet by mouth daily      Facility-Administered Medications: None       Past Medical History:   Diagnosis Date    Gastritis     GERD (gastroesophageal reflux disease)        Past Surgical History:   Procedure Laterality Date     SECTION      HAND SURGERY Left     Left pinky tendon repair    CO COLONOSCOPY FLX DX W/COLLJ SPEC WHEN PFRMD N/A 2018    Procedure: COLONOSCOPY;  Surgeon: Cristina Weiss MD;  Location: [facility];   Service: Gastroenterology    [place] ESOPHAGOGASTRODUODENOSCOPY TRANSORAL DIAGNOSTIC N/A 3/31/2017    Procedure: ESOPHAGOGASTRODUODENOSCOPY (EGD); Surgeon: Marianela Moreira MD;  Location: [facility]; Service: Gastroenterology       History reviewed  No pertinent family history  I have reviewed and agree with the history as documented  Social History   Substance Use Topics    Smoking status: Never Smoker    Smokeless tobacco: Never Used    Alcohol use 4 2 oz/week     7 Glasses of wine per week        Review of Systems   Constitutional: Negative for chills, diaphoresis, fatigue and fever  HENT: Negative for congestion  Eyes: Negative for visual disturbance  Respiratory: Negative for chest tightness and shortness of breath  Cardiovascular: Negative for chest pain  Gastrointestinal: Positive for abdominal pain and nausea  Genitourinary: Negative for difficulty urinating  Musculoskeletal: Negative for back pain  Skin: Negative for pallor, rash and wound  Allergic/Immunologic: Negative for immunocompromised state  Psychiatric/Behavioral: Negative for confusion  Physical Exam  Physical Exam   Constitutional: She is oriented to person, place, and time  She appears well-developed and well-nourished  Constant belching   HENT:   Head: Normocephalic and atraumatic  Mouth/Throat: Uvula is midline, oropharynx is clear and moist and mucous membranes are normal  No tonsillar exudate  Eyes: Pupils are equal, round, and reactive to light  Neck: Normal range of motion  Neck supple  Cardiovascular: Normal rate and regular rhythm  Pulmonary/Chest: Effort normal and breath sounds normal    Abdominal: Soft  Bowel sounds are normal  There is no tenderness  There is no rebound and no guarding  Musculoskeletal: Normal range of motion  Neurological: She is alert and oriented to person, place, and time  Patient moving all extremities equally, no focal neuro deficits noted         Skin: Skin is warm and dry    Psychiatric: She has a normal mood and affect  Nursing note and vitals reviewed  Vital Signs  ED Triage Vitals [07/12/18 0006]   Temperature Pulse Respirations Blood Pressure SpO2   98 1 °F (36 7 °C) 77 18 113/50 99 %      Temp src Heart Rate Source Patient Position - Orthostatic VS BP Location FiO2 (%)   -- -- -- -- --      Pain Score       1           Vitals:    07/12/18 0006 07/12/18 0215   BP: 113/50 113/56   Pulse: 77 82       Visual Acuity      ED Medications  Medications   sodium chloride 0 9 % bolus 1,000 mL (1,000 mL Intravenous New Bag 7/12/18 0108)   pantoprazole (PROTONIX) injection 40 mg (40 mg Intravenous Given 7/12/18 0108)   aluminum-magnesium hydroxide-simethicone (MYLANTA) 200-200-20 mg/5 mL oral suspension 20 mL (20 mL Oral Given 7/12/18 0059)       Diagnostic Studies  Results Reviewed     Procedure Component Value Units Date/Time    Comprehensive metabolic panel [[id number]]  (Abnormal) Collected:  07/12/18 0103    Lab Status:  Final result Specimen:  Blood from Arm, Right Updated:  07/12/18 0134     Sodium 137 mmol/L      Potassium 4 1 mmol/L      Chloride 103 mmol/L      CO2 24 mmol/L      Anion Gap 10 mmol/L      BUN 17 mg/dL      Creatinine 0 77 mg/dL      Glucose 101 mg/dL      Calcium 8 4 mg/dL      AST 11 U/L      ALT 20 U/L      Alkaline Phosphatase 38 (L) U/L      Total Protein 6 8 g/dL      Albumin 3 4 (L) g/dL      Total Bilirubin 0 40 mg/dL      eGFR 104 ml/min/1 73sq m     Narrative:         National Kidney Disease Education Program recommendations are as follows:  GFR calculation is accurate only with a steady state creatinine  Chronic Kidney disease less than 60 ml/min/1 73 sq  meters  Kidney failure less than 15 ml/min/1 73 sq  meters      Lipase [[id number]]  (Normal) Collected:  07/12/18 0103    Lab Status:  Final result Specimen:  Blood from Arm, Right Updated:  07/12/18 0134     Lipase 135 u/L     CBC and differential [[id number]]  (Abnormal) Collected:  07/12/18 0103 Lab Status:  Final result Specimen:  Blood from Arm, Right Updated:  07/12/18 0118     WBC 5 95 Thousand/uL      RBC 4 10 Million/uL      Hemoglobin 12 0 g/dL      Hematocrit 36 5 %      MCV 89 fL      MCH 29 3 pg      MCHC 32 9 g/dL      RDW 11 8 %      MPV 12 0 fL      Platelets 276 Thousands/uL      Neutrophils Relative 47 %      Lymphocytes Relative 38 %      Monocytes Relative 13 (H) %      Eosinophils Relative 3 %      Basophils Relative 0 %      Neutrophils Absolute 2 76 Thousands/µL      Lymphocytes Absolute 2 24 Thousands/µL      Monocytes Absolute 0 78 Thousand/µL      Eosinophils Absolute 0 15 Thousand/µL      Basophils Absolute 0 02 Thousands/µL                  XR chest 2 views    (Results Pending)              Procedures  ECG 12 Lead Documentation  Date/Time: 7/12/2018 1:14 AM  Performed by: Claudio Gongora  Authorized by: Claudio Gongora     Indications / Diagnosis:  Abdominal pain  ECG reviewed by me, the ED Provider: yes    Patient location:  ED  Previous ECG:     Previous ECG:  Unavailable  Comments:      Normal sinus rhythm at 71 beats per minute  Normal axis, normal intervals, no ST T wave abnormalities suggestive of ischemia  No old available for comparison  Phone Contacts  ED Phone Contact    ED Course                               MDM  Number of Diagnoses or Management Options  Epigastric pain: new and requires workup  Diagnosis management comments: 2:31 AM  Patient reports feeling much better  Labs and xray unremarkable  Patient is stable for discharge  RTED instructions discussed  Will d/c          Amount and/or Complexity of Data Reviewed  Clinical lab tests: ordered and reviewed  Tests in the radiology section of CPT®: ordered and reviewed  Tests in the medicine section of CPT®: reviewed and ordered  Decide to obtain previous medical records or to obtain history from someone other than the patient: yes  Review and summarize past medical records: yes  Independent visualization of images, tracings, or specimens: yes    Risk of Complications, Morbidity, and/or Mortality  Presenting problems: high  Diagnostic procedures: high  Management options: high    Patient Progress  Patient progress: improved    CritCare Time    Disposition  Final diagnoses:   Epigastric pain     Time reflects when diagnosis was documented in both MDM as applicable and the Disposition within this note     Time User Action Codes Description Comment    7/12/2018  1:20 AM Chrystal Goodwin Po Add [R10 13] Epigastric pain       ED Disposition     ED Disposition Condition Comment    Discharge  Marino Au discharge to home/self care  Condition at discharge: Stable        Follow-up Information     Follow up With Specialties Details Why Contact Info Additional Information    María Mcgowan MD Family Medicine Schedule an appointment as soon as possible for a visit  [address] [facility]       Mike Lucio MD Gastroenterology Schedule an appointment as soon as possible for a visit  [address]  [address] [facility]  [phone]       [facility] Emergency Department Emergency Medicine  If symptoms worsen [address] [facility] [address] [phone] [facility], [address], [place], [address] [facility], [address]          Patient's Medications   Discharge Prescriptions    No medications on file     No discharge procedures on file      ED Provider  Electronically Signed by           Sally Payne DO  07/12/18 0235

## 2018-07-12 NOTE — ED NOTES
Discharged with instructions  Verbalized understanding  No distress at this time       Neymar Godinez RN  07/12/18 2225

## 2018-07-12 NOTE — DISCHARGE INSTRUCTIONS
Abdominal Pain   WHAT YOU NEED TO KNOW:   Abdominal pain can be dull, achy, or sharp  You may have pain in one area of your abdomen, or in your entire abdomen  Your pain may be caused by a condition such as constipation, food sensitivity or poisoning, infection, or a blockage  Abdominal pain can also be from a hernia, appendicitis, or an ulcer  Liver, gallbladder, or kidney conditions can also cause abdominal pain  The cause of your abdominal pain may be unknown  DISCHARGE INSTRUCTIONS:   Return to the emergency department if:   · You have new chest pain or shortness of breath  · You have pulsing pain in your upper abdomen or lower back that suddenly becomes constant  · Your pain is in the right lower abdominal area and worsens with movement  · You have a fever over 100 4°F (38°C) or shaking chills  · You are vomiting and cannot keep food or liquids down  · Your pain does not improve or gets worse over the next 8 to 12 hours  · You see blood in your vomit or bowel movements, or they look black and tarry  · Your skin or the whites of your eyes turn yellow  · You are a woman and have a large amount of vaginal bleeding that is not your monthly period  Contact your healthcare provider if:   · You have pain in your lower back  · You are a man and have pain in your testicles  · You have pain when you urinate  · You have questions or concerns about your condition or care  Follow up with your healthcare provider within 24 hours or as directed:  Write down your questions so you remember to ask them during your visits  Medicines:   · Medicines  may be given to calm your stomach and prevent vomiting or to decrease pain  Ask how to take pain medicine safely  · Take your medicine as directed  Contact your healthcare provider if you think your medicine is not helping or if you have side effects  Tell him of her if you are allergic to any medicine   Keep a list of the medicines, vitamins, and herbs you take  Include the amounts, and when and why you take them  Bring the list or the pill bottles to follow-up visits  Carry your medicine list with you in case of an emergency  © 2017 [employer] Information is for End User's use only and may not be sold, redistributed or otherwise used for commercial purposes  All illustrations and images included in CareNotes® are the copyrighted property of [employer]  or [employer]  The above information is an  only  It is not intended as medical advice for individual conditions or treatments  Talk to your doctor, nurse or pharmacist before following any medical regimen to see if it is safe and effective for you

## 2018-07-13 ENCOUNTER — TELEPHONE (OUTPATIENT)
Dept: GASTROENTEROLOGY | Facility: AMBULARY SURGERY CENTER | Age: 48
End: 2018-07-13

## 2018-07-13 DIAGNOSIS — R10.9 ABDOMINAL CRAMPING: Primary | ICD-10-CM

## 2018-07-13 RX ORDER — DICYCLOMINE HYDROCHLORIDE 10 MG/1
10 CAPSULE ORAL 3 TIMES DAILY PRN
Qty: 90 CAPSULE | Refills: 1 | Status: SHIPPED | OUTPATIENT
Start: 2018-07-13 | End: 2019-09-25

## 2018-07-13 NOTE — TELEPHONE ENCOUNTER
Dr Lacey's pt called stating that she is having gas with severe pain she went to ER  Pt wants to know if the doctor can prescribe her a medication that can get rid of the gas  Pt is sched for an appt 08/07/18   Please call pt to advise pt can be reached at [phone]

## 2018-07-13 NOTE — TELEPHONE ENCOUNTER
Please call patient  Patient can take OTC Gas-X or Beano as needed  I will also order Bentyl as needed for abdominal cramping  She should avoid a lot of dairy, ruffage like salads and vegetables, and beans as well

## 2018-07-13 NOTE — TELEPHONE ENCOUNTER
Pt returned call stating that she has already take OTC gas meds and it does not help  Pt stated that the pain is in her rib cage  Patient would like a call to discuss her symptoms   Please call patient to advise Patient can be reached at [phone]

## 2018-07-16 NOTE — TELEPHONE ENCOUNTER
I called patient back in regard to her symptoms Davis Marcos had tried last Friday but could not reach patient)  Patient advised she saw her primary physician today who made some suggestions  She will call us back if she needs further advice and/or appointment

## 2019-01-26 ENCOUNTER — APPOINTMENT (EMERGENCY)
Dept: CT IMAGING | Facility: HOSPITAL | Age: 49
End: 2019-01-26
Payer: COMMERCIAL

## 2019-01-26 ENCOUNTER — HOSPITAL ENCOUNTER (EMERGENCY)
Facility: HOSPITAL | Age: 49
Discharge: HOME | End: 2019-01-26
Attending: EMERGENCY MEDICINE | Admitting: EMERGENCY MEDICINE
Payer: COMMERCIAL

## 2019-01-26 VITALS
SYSTOLIC BLOOD PRESSURE: 100 MMHG | BODY MASS INDEX: 25.78 KG/M2 | HEART RATE: 66 BPM | TEMPERATURE: 98.1 F | DIASTOLIC BLOOD PRESSURE: 59 MMHG | RESPIRATION RATE: 17 BRPM | WEIGHT: 169.53 LBS | OXYGEN SATURATION: 100 %

## 2019-01-26 DIAGNOSIS — D25.9 FIBROID, UTERINE: ICD-10-CM

## 2019-01-26 DIAGNOSIS — R10.9 ACUTE LEFT FLANK PAIN: Primary | ICD-10-CM

## 2019-01-26 LAB
ALBUMIN SERPL BCP-MCNC: 3.7 G/DL (ref 3.5–5)
ALP SERPL-CCNC: 45 U/L (ref 46–116)
ALT SERPL W P-5'-P-CCNC: 17 U/L (ref 12–78)
ANION GAP SERPL CALCULATED.3IONS-SCNC: 9 MMOL/L (ref 4–13)
AST SERPL W P-5'-P-CCNC: 13 U/L (ref 5–45)
BACTERIA UR QL AUTO: ABNORMAL /HPF
BASOPHILS # BLD AUTO: 0.02 THOUSANDS/ΜL (ref 0–0.1)
BASOPHILS NFR BLD AUTO: 0 % (ref 0–1)
BILIRUB SERPL-MCNC: 0.6 MG/DL (ref 0.2–1)
BILIRUB UR QL STRIP: NEGATIVE
BUN SERPL-MCNC: 12 MG/DL (ref 5–25)
CALCIUM SERPL-MCNC: 8.8 MG/DL (ref 8.3–10.1)
CHLORIDE SERPL-SCNC: 104 MMOL/L (ref 100–108)
CLARITY UR: CLEAR
CO2 SERPL-SCNC: 25 MMOL/L (ref 21–32)
COLOR UR: YELLOW
CREAT SERPL-MCNC: 0.78 MG/DL (ref 0.6–1.3)
EOSINOPHIL # BLD AUTO: 0.1 THOUSAND/ΜL (ref 0–0.61)
EOSINOPHIL NFR BLD AUTO: 2 % (ref 0–6)
ERYTHROCYTE [DISTWIDTH] IN BLOOD BY AUTOMATED COUNT: 11.9 % (ref 11.6–15.1)
GFR SERPL CREATININE-BSD FRML MDRD: 103 ML/MIN/1.73SQ M
GLUCOSE SERPL-MCNC: 83 MG/DL (ref 65–140)
GLUCOSE UR STRIP-MCNC: NEGATIVE MG/DL
HCT VFR BLD AUTO: 41.1 % (ref 34.8–46.1)
HGB BLD-MCNC: 13.1 G/DL (ref 11.5–15.4)
HGB UR QL STRIP.AUTO: ABNORMAL
IMM GRANULOCYTES # BLD AUTO: 0.01 THOUSAND/UL (ref 0–0.2)
IMM GRANULOCYTES NFR BLD AUTO: 0 % (ref 0–2)
KETONES UR STRIP-MCNC: NEGATIVE MG/DL
LEUKOCYTE ESTERASE UR QL STRIP: NEGATIVE
LYMPHOCYTES # BLD AUTO: 1.57 THOUSANDS/ΜL (ref 0.6–4.47)
LYMPHOCYTES NFR BLD AUTO: 33 % (ref 14–44)
MCH RBC QN AUTO: 29.2 PG (ref 26.8–34.3)
MCHC RBC AUTO-ENTMCNC: 31.9 G/DL (ref 31.4–37.4)
MCV RBC AUTO: 92 FL (ref 82–98)
MONOCYTES # BLD AUTO: 0.45 THOUSAND/ΜL (ref 0.17–1.22)
MONOCYTES NFR BLD AUTO: 10 % (ref 4–12)
NEUTROPHILS # BLD AUTO: 2.6 THOUSANDS/ΜL (ref 1.85–7.62)
NEUTS SEG NFR BLD AUTO: 55 % (ref 43–75)
NITRITE UR QL STRIP: NEGATIVE
NON-SQ EPI CELLS URNS QL MICRO: ABNORMAL /HPF
NRBC BLD AUTO-RTO: 0 /100 WBCS
PH UR STRIP.AUTO: 5.5 [PH] (ref 4.5–8)
PLATELET # BLD AUTO: 235 THOUSANDS/UL (ref 149–390)
PMV BLD AUTO: 12.3 FL (ref 8.9–12.7)
POTASSIUM SERPL-SCNC: 3.7 MMOL/L (ref 3.5–5.3)
PROT SERPL-MCNC: 7.5 G/DL (ref 6.4–8.2)
PROT UR STRIP-MCNC: NEGATIVE MG/DL
RBC # BLD AUTO: 4.48 MILLION/UL (ref 3.81–5.12)
RBC #/AREA URNS AUTO: ABNORMAL /HPF
SODIUM SERPL-SCNC: 138 MMOL/L (ref 136–145)
SP GR UR STRIP.AUTO: 1.01 (ref 1–1.03)
UROBILINOGEN UR QL STRIP.AUTO: 0.2 E.U./DL
WBC # BLD AUTO: 4.75 THOUSAND/UL (ref 4.31–10.16)
WBC #/AREA URNS AUTO: ABNORMAL /HPF

## 2019-01-26 PROCEDURE — 81001 URINALYSIS AUTO W/SCOPE: CPT | Performed by: EMERGENCY MEDICINE

## 2019-01-26 PROCEDURE — 96361 HYDRATE IV INFUSION ADD-ON: CPT

## 2019-01-26 PROCEDURE — 99284 EMERGENCY DEPT VISIT MOD MDM: CPT

## 2019-01-26 PROCEDURE — 74176 CT ABD & PELVIS W/O CONTRAST: CPT

## 2019-01-26 PROCEDURE — 96374 THER/PROPH/DIAG INJ IV PUSH: CPT

## 2019-01-26 PROCEDURE — 36415 COLL VENOUS BLD VENIPUNCTURE: CPT | Performed by: EMERGENCY MEDICINE

## 2019-01-26 PROCEDURE — 87086 URINE CULTURE/COLONY COUNT: CPT | Performed by: EMERGENCY MEDICINE

## 2019-01-26 PROCEDURE — 96376 TX/PRO/DX INJ SAME DRUG ADON: CPT

## 2019-01-26 PROCEDURE — 85025 COMPLETE CBC W/AUTO DIFF WBC: CPT | Performed by: EMERGENCY MEDICINE

## 2019-01-26 PROCEDURE — 80053 COMPREHEN METABOLIC PANEL: CPT | Performed by: EMERGENCY MEDICINE

## 2019-01-26 RX ORDER — MORPHINE SULFATE 4 MG/ML
4 INJECTION, SOLUTION INTRAMUSCULAR; INTRAVENOUS ONCE
Status: DISCONTINUED | OUTPATIENT
Start: 2019-01-26 | End: 2019-01-26 | Stop reason: HOSPADM

## 2019-01-26 RX ORDER — ONDANSETRON 2 MG/ML
4 INJECTION INTRAMUSCULAR; INTRAVENOUS ONCE
Status: DISCONTINUED | OUTPATIENT
Start: 2019-01-26 | End: 2019-01-26 | Stop reason: HOSPADM

## 2019-01-26 RX ORDER — CYCLOBENZAPRINE HCL 10 MG
10 TABLET ORAL 3 TIMES DAILY PRN
Qty: 20 TABLET | Refills: 0 | Status: SHIPPED | OUTPATIENT
Start: 2019-01-26 | End: 2019-09-25

## 2019-01-26 RX ORDER — KETOROLAC TROMETHAMINE 30 MG/ML
15 INJECTION, SOLUTION INTRAMUSCULAR; INTRAVENOUS ONCE
Status: COMPLETED | OUTPATIENT
Start: 2019-01-26 | End: 2019-01-26

## 2019-01-26 RX ORDER — NAPROXEN 500 MG/1
500 TABLET ORAL 2 TIMES DAILY WITH MEALS
Qty: 30 TABLET | Refills: 0 | Status: SHIPPED | OUTPATIENT
Start: 2019-01-26 | End: 2019-09-25

## 2019-01-26 RX ADMIN — KETOROLAC TROMETHAMINE 15 MG: 30 INJECTION, SOLUTION INTRAMUSCULAR at 10:17

## 2019-01-26 RX ADMIN — KETOROLAC TROMETHAMINE 15 MG: 30 INJECTION, SOLUTION INTRAMUSCULAR at 08:19

## 2019-01-26 RX ADMIN — SODIUM CHLORIDE 1000 ML: 0.9 INJECTION, SOLUTION INTRAVENOUS at 08:16

## 2019-01-26 NOTE — ED NOTES
When asked about pt's pain scale, pt reported a 10/10 pain  Offered the morphine that was ordered previously, pt denied  Offered to speak with provider regarding other options, pt denied  Instructed pt to utilize call bell if pt decides otherwise       Azucena Chowdary RN  01/26/19 7614

## 2019-01-26 NOTE — ED PROVIDER NOTES
History  Chief Complaint   Patient presents with    Flank Pain     pt reports L flank pain starting last night on drive from home  Pt reports radiates towards groin  denies urinary s/s      57-year-old female presents to the emergency department for evaluation of left-sided flank pain  Patient states that she was standing at a gas pump last night when she had acute onset of left-sided pain  She attempted to stretch the area without relief  The pain radiates into the left lower quadrant  She has not had associated nausea or vomiting  No fevers or chills  Patient states that she was up all night due to pain and she did notice that she was urinating more than normal   She denies hematuria  No history of kidney stones  She does have a history of 2 prior kidney infections, last infection was 1 year ago  Patient does still have a normal menstrual cycle and had menstrual cycle this month she will abnormal vaginal discharge  No change in bowel habits  No cough chest pain or shortness of breath  History provided by:  Patient and medical records   used: No    Flank Pain   Pain location:  L flank  Pain quality: sharp    Pain radiates to:  Groin  Pain severity:  Moderate  Onset quality:  Sudden  Duration:  1 day  Timing:  Constant  Progression:  Unchanged  Chronicity:  New  Context: not awakening from sleep, not previous surgeries, not sick contacts and not trauma    Relieved by:  Nothing  Worsened by:  Nothing  Ineffective treatments:  Position changes  Associated symptoms: no anorexia, no cough, no diarrhea, no dysuria, no fever, no hematuria, no nausea, no vaginal bleeding, no vaginal discharge and no vomiting        Prior to Admission Medications   Prescriptions Last Dose Informant Patient Reported? Taking?    BIOTIN 5000 PO   Yes Yes   Sig: Take by mouth   Cholecalciferol (VITAMIN D PO)   Yes Yes   Sig: Take by mouth   KRILL OIL PO Not Taking at Unknown time  Yes No   Sig: Take by mouth   Omega-3 Fatty Acids (FISH OIL PO) Not Taking at Unknown time  Yes No   Sig: Take by mouth   dicyclomine (BENTYL) 10 mg capsule Not Taking at Unknown time  No No   Sig: Take 1 capsule (10 mg total) by mouth 3 (three) times a day as needed (abdominal cramping)   Patient not taking: Reported on 2019    norethindrone (MICRONOR) 0 35 MG tablet   Yes Yes   Sig: Take 1 tablet by mouth daily      Facility-Administered Medications: None       Past Medical History:   Diagnosis Date    Gastritis     GERD (gastroesophageal reflux disease)        Past Surgical History:   Procedure Laterality Date     SECTION      HAND SURGERY Left     Left pinky tendon repair    [place] COLONOSCOPY FLX DX W/COLLJ SPEC WHEN PFRMD N/A 2018    Procedure: COLONOSCOPY;  Surgeon: Joe Sheppard MD;  Location: [facility]; Service: Gastroenterology    AZ ESOPHAGOGASTRODUODENOSCOPY TRANSORAL DIAGNOSTIC N/A 3/31/2017    Procedure: ESOPHAGOGASTRODUODENOSCOPY (EGD); Surgeon: Joe Shepaprd MD;  Location: [facility]; Service: Gastroenterology       History reviewed  No pertinent family history  I have reviewed and agree with the history as documented  Social History   Substance Use Topics    Smoking status: Never Smoker    Smokeless tobacco: Never Used    Alcohol use 4 2 oz/week     7 Glasses of wine per week      Comment: socially        Review of Systems   Constitutional: Negative for fever  Respiratory: Negative for cough  Gastrointestinal: Negative for anorexia, diarrhea, nausea and vomiting  Genitourinary: Positive for flank pain  Negative for dysuria, hematuria, vaginal bleeding and vaginal discharge  All other systems reviewed and are negative  Physical Exam  Physical Exam   Constitutional: She is oriented to person, place, and time  She appears well-developed and well-nourished  No distress  HENT:   Head: Normocephalic     Nose: Nose normal    Mouth/Throat: Oropharynx is clear and moist  No oropharyngeal exudate  Eyes: Pupils are equal, round, and reactive to light  Conjunctivae and EOM are normal    Neck: Normal range of motion  Neck supple  Cardiovascular: Normal rate, regular rhythm, normal heart sounds and intact distal pulses  Pulmonary/Chest: Effort normal and breath sounds normal    Abdominal: Soft  Bowel sounds are normal  She exhibits no distension  There is no tenderness  There is no rebound and no guarding  Musculoskeletal: Normal range of motion  She exhibits no edema or deformity  Thoracic back: She exhibits tenderness  Back:    Lymphadenopathy:     She has no cervical adenopathy  Neurological: She is alert and oriented to person, place, and time  She has normal strength and normal reflexes  No cranial nerve deficit or sensory deficit  She exhibits normal muscle tone  Coordination and gait normal    Skin: Skin is warm, dry and intact  No rash noted  Psychiatric: She has a normal mood and affect  Her behavior is normal  Judgment and thought content normal    Nursing note and vitals reviewed        Vital Signs  ED Triage Vitals   Temperature Pulse Respirations Blood Pressure SpO2   01/26/19 0728 01/26/19 0727 01/26/19 0727 01/26/19 0727 01/26/19 0727   98 1 °F (36 7 °C) 88 18 107/69 100 %      Temp Source Heart Rate Source Patient Position - Orthostatic VS BP Location FiO2 (%)   01/26/19 0728 01/26/19 0727 01/26/19 0727 01/26/19 0727 --   Oral Monitor Lying Right arm       Pain Score       01/26/19 0727       Worst Possible Pain           Vitals:    01/26/19 0727 01/26/19 0955   BP: 107/69 100/59   Pulse: 88 66   Patient Position - Orthostatic VS: Lying        Visual Acuity      ED Medications  Medications   sodium chloride 0 9 % bolus 1,000 mL (0 mL Intravenous Stopped 1/26/19 1019)   ketorolac (TORADOL) injection 15 mg (15 mg Intravenous Given 1/26/19 0819)   ketorolac (TORADOL) injection 15 mg (15 mg Intravenous Given 1/26/19 1017)       Diagnostic Studies  Results Reviewed     Procedure Component Value Units Date/Time    Urine culture [[id number]] Collected:  01/26/19 0819    Lab Status:  Final result Specimen:  Urine from Urine, Clean Catch Updated:  01/28/19 0904     Urine Culture 20,000-29,000 cfu/ml     Narrative:       Resembling mixed skin and/or urogenital steven    Comprehensive metabolic panel [[id number]]  (Abnormal) Collected:  01/26/19 0926    Lab Status:  Final result Specimen:  Blood from Arm, Left Updated:  01/26/19 0948     Sodium 138 mmol/L      Potassium 3 7 mmol/L      Chloride 104 mmol/L      CO2 25 mmol/L      ANION GAP 9 mmol/L      BUN 12 mg/dL      Creatinine 0 78 mg/dL      Glucose 83 mg/dL      Calcium 8 8 mg/dL      AST 13 U/L      ALT 17 U/L      Alkaline Phosphatase 45 (L) U/L      Total Protein 7 5 g/dL      Albumin 3 7 g/dL      Total Bilirubin 0 60 mg/dL      eGFR 103 ml/min/1 73sq m     Narrative:         National Kidney Disease Education Program recommendations are as follows:  GFR calculation is accurate only with a steady state creatinine  Chronic Kidney disease less than 60 ml/min/1 73 sq  meters  Kidney failure less than 15 ml/min/1 73 sq  meters      CBC and differential [[id number]] Collected:  01/26/19 0926    Lab Status:  Final result Specimen:  Blood from Arm, Left Updated:  01/26/19 0934     WBC 4 75 Thousand/uL      RBC 4 48 Million/uL      Hemoglobin 13 1 g/dL      Hematocrit 41 1 %      MCV 92 fL      MCH 29 2 pg      MCHC 31 9 g/dL      RDW 11 9 %      MPV 12 3 fL      Platelets 168 Thousands/uL      nRBC 0 /100 WBCs      Neutrophils Relative 55 %      Immat GRANS % 0 %      Lymphocytes Relative 33 %      Monocytes Relative 10 %      Eosinophils Relative 2 %      Basophils Relative 0 %      Neutrophils Absolute 2 60 Thousands/µL      Immature Grans Absolute 0 01 Thousand/uL      Lymphocytes Absolute 1 57 Thousands/µL      Monocytes Absolute 0 45 Thousand/µL      Eosinophils Absolute 0 10 Thousand/µL      Basophils Absolute 0 02 Thousands/µL     Urine Microscopic [[id number]]  (Abnormal) Collected:  01/26/19 0819    Lab Status:  Final result Specimen:  Urine from Urine, Clean Catch Updated:  01/26/19 0846     RBC, UA None Seen /hpf      WBC, UA 0-1 (A) /hpf      Epithelial Cells Occasional /hpf      Bacteria, UA Occasional /hpf     UA w Reflex to Microscopic w Reflex to Culture [[id number]]  (Abnormal) Collected:  01/26/19 0819    Lab Status:  Final result Specimen:  Urine from Urine, Clean Catch Updated:  01/26/19 0829     Color, UA Yellow     Clarity, UA Clear     Specific Gravity, UA 1 010     pH, UA 5 5     Leukocytes, UA Negative     Nitrite, UA Negative     Protein, UA Negative mg/dl      Glucose, UA Negative mg/dl      Ketones, UA Negative mg/dl      Urobilinogen, UA 0 2 E U /dl      Bilirubin, UA Negative     Blood, UA Trace-Intact (A)                 CT renal stone study abdomen pelvis without contrast   Final Result by Luca Wyatt MD (01/26 8825)         1  No urinary tract calculi     2   No significant change in 3 5 x 3 2 cm right ovarian cyst   Globular appearance of the uterus likely indicates underlying fibroids             Workstation performed: [id number]                    Procedures  Procedures       Phone Contacts  ED Phone Contact    ED Course                               MDM  Number of Diagnoses or Management Options  Acute left flank pain: new and requires workup  Fibroid, uterine: established and worsening     Amount and/or Complexity of Data Reviewed  Clinical lab tests: ordered and reviewed  Tests in the radiology section of CPT®: ordered and reviewed  Tests in the medicine section of CPT®: ordered and reviewed  Decide to obtain previous medical records or to obtain history from someone other than the patient: yes  Independent visualization of images, tracings, or specimens: yes    Risk of Complications, Morbidity, and/or Mortality  General comments: 57-year-old female presents with 1 day history of left-sided flank pain  Patient has a normal CT scan, no evidence of nephrolithiasis  Urine is clean without signs of infection  Suspect this may be musculoskeletal in nature  The patient was noted to have fibroids on CT scan, she did note that these were present  Patient instructed to take NSAIDs as needed for pain  Discussed signs and symptoms to return to the emergency department  Patient Progress  Patient progress: stable    CritCare Time    Disposition  Final diagnoses:   Acute left flank pain   Fibroid, uterine     Time reflects when diagnosis was documented in both MDM as applicable and the Disposition within this note     Time User Action Codes Description Comment    1/26/2019 10:15 AM Orin Martinez Add [R10 9] Acute left flank pain     1/31/2019 11:58 PM Orin Bustillo Add [D25 9] Fibroid, uterine       ED Disposition     ED Disposition Condition Date/Time Comment    Discharge  Sat Jan 26, 2019 10:15 AM Lynne Lr discharge to home/self care      Condition at discharge: Stable        Follow-up Information     Follow up With Specialties Details Why Contact Info    Shraddha Hughes MD Family Medicine Schedule an appointment as soon as possible for a visit in 2 days For recheck of current symptoms [place] [id number] [address]  [phone]            Discharge Medication List as of 1/26/2019 10:17 AM      START taking these medications    Details   cyclobenzaprine (FLEXERIL) 10 mg tablet Take 1 tablet (10 mg total) by mouth 3 (three) times a day as needed for muscle spasms, Starting Sat 1/26/2019, Print      naproxen (NAPROSYN) 500 mg tablet Take 1 tablet (500 mg total) by mouth 2 (two) times a day with meals, Starting Sat 1/26/2019, Normal         CONTINUE these medications which have NOT CHANGED    Details   BIOTIN 5000 PO Take by mouth, Until Discontinued, Historical Med      Cholecalciferol (VITAMIN D PO) Take by mouth, Until Discontinued, Historical Med norethindrone (MICRONOR) 0 35 MG tablet Take 1 tablet by mouth daily, Until Discontinued, Historical Med      dicyclomine (BENTYL) 10 mg capsule Take 1 capsule (10 mg total) by mouth 3 (three) times a day as needed (abdominal cramping), Starting Fri 7/13/2018, Normal      KRILL OIL PO Take by mouth, Until Discontinued, Historical Med      Omega-3 Fatty Acids (FISH OIL PO) Take by mouth, Until Discontinued, Historical Med           No discharge procedures on file      ED Provider  Electronically Signed by           Pete Navarro DO  01/31/19 5951

## 2019-01-26 NOTE — DISCHARGE INSTRUCTIONS
Flank Pain   AMBULATORY CARE:   Flank pain  is felt in the area below your ribcage and above your hip bones, often in the lower back  Your pain may be dull or so severe that you cannot get comfortable  The pain may stay in one area or radiate to another area  It may worsen and lighten in waves  Flank pain is often a sign of problems with your urinary tract, such as a kidney stone or infection  Seek care immediately if:   · You have a fever  · Your heart is fluttering or jumping  · You see blood in your urine  · Your pain radiates into your lower abdomen and genital area  · You have intense pain in your low back next to your spine  · You are much more tired than usual and have no desire to eat  · You have a headache and your muscles jerk  Contact your healthcare provider if:   · You have an upset stomach and are vomiting  · You have to urinate more often, and with urgency  · Your pain worsens or does not improve, and you cannot get comfortable  · You pass a stone when you urinate  · You have questions or concerns about your condition or care  Treatment for flank pain  may include medicine to decrease pain or treat a bacterial infection  Follow up with your healthcare provider as directed:  Write down your questions so you remember to ask them during your visits  © 2017 [address] Information is for End User's use only and may not be sold, redistributed or otherwise used for commercial purposes  All illustrations and images included in CareNotes® are the copyrighted property of [employer]  or [employer]  The above information is an  only  It is not intended as medical advice for individual conditions or treatments  Talk to your doctor, nurse or pharmacist before following any medical regimen to see if it is safe and effective for you

## 2019-01-28 LAB — BACTERIA UR CULT: NORMAL

## 2019-02-27 ENCOUNTER — OFFICE VISIT (OUTPATIENT)
Dept: OBGYN CLINIC | Facility: CLINIC | Age: 49
End: 2019-02-27

## 2019-02-27 VITALS
HEART RATE: 84 BPM | DIASTOLIC BLOOD PRESSURE: 80 MMHG | BODY MASS INDEX: 25.46 KG/M2 | SYSTOLIC BLOOD PRESSURE: 114 MMHG | HEIGHT: 68 IN | WEIGHT: 168 LBS

## 2019-02-27 DIAGNOSIS — B37.3 YEAST INFECTION OF THE VAGINA: Primary | ICD-10-CM

## 2019-02-27 DIAGNOSIS — N89.8 VAGINA ITCHING: ICD-10-CM

## 2019-02-27 PROBLEM — B37.31 YEAST INFECTION OF THE VAGINA: Status: ACTIVE | Noted: 2019-02-27

## 2019-02-27 LAB
BV WHIFF TEST VAG QL: ABNORMAL
CLUE CELLS SPEC QL WET PREP: NEGATIVE
PH SMN: 4 [PH]
SL AMB POCT WET MOUNT: ABNORMAL
T VAGINALIS VAG QL WET PREP: ABNORMAL
YEAST VAG QL WET PREP: ABNORMAL

## 2019-02-27 PROCEDURE — 87210 SMEAR WET MOUNT SALINE/INK: CPT | Performed by: NURSE PRACTITIONER

## 2019-02-27 PROCEDURE — 99202 OFFICE O/P NEW SF 15 MIN: CPT | Performed by: NURSE PRACTITIONER

## 2019-02-27 RX ORDER — ACETAMINOPHEN AND CODEINE PHOSPHATE 120; 12 MG/5ML; MG/5ML
0.35 SOLUTION ORAL
COMMUNITY
Start: 2018-12-03 | End: 2019-09-25

## 2019-02-27 RX ORDER — FLUCONAZOLE 150 MG/1
150 TABLET ORAL ONCE
Qty: 1 TABLET | Refills: 1 | Status: SHIPPED | OUTPATIENT
Start: 2019-02-27 | End: 2019-02-27

## 2019-02-27 NOTE — PROGRESS NOTES
Assessment/Plan:       Diagnoses and all orders for this visit:    Yeast infection of the vagina  -     fluconazole (DIFLUCAN) 150 mg tablet; Take 1 tablet (150 mg total) by mouth once for 1 dose    Vagina itching  -     POCT wet mount  -     fluconazole (DIFLUCAN) 150 mg tablet; Take 1 tablet (150 mg total) by mouth once for 1 dose    Other orders  -     norethindrone (MICRONOR) 0 35 MG tablet; Take 0 35 mg by mouth      return to office if symptoms not resolved for yeast infection  Annual gyn exam due 2019  Subjective:      Patient ID: Spencer Santa is a 44 y o  female  HPI   49-year-old  new patient here with reports of external vaginal itching  Patient reports when she made her appointment here about 2 weeks at that point she was having symptoms of bacterial vaginosis but that since has cleared  Today she denies any vaginal discharge, she denies any odor or pelvic pain  She reports frequent bacteria vaginosis  She reports since she has been  for 2 years she has noticed this and previously was not sexually active  Reviewed with patient to shower after sexual activity, bathe  with mild soap  She does not douche  She does have history of fibroids  She is on norethindrone daily for history of extended menses  Her last annual gyn exam was 2018 and was negative with negative high-risk HPV        The following portions of the patient's history were reviewed and updated as appropriate: allergies, current medications, past family history, past medical history, past social history, past surgical history and problem list     Review of Systems   Constitutional: Negative  Respiratory: Negative  Cardiovascular: Negative  Genitourinary: Negative for dysuria, pelvic pain, urgency and vaginal discharge  Psychiatric/Behavioral: Negative            Objective:      /80 (BP Location: Left arm, Patient Position: Sitting, Cuff Size: Adult)   Pulse 84   Ht 5' 8" (1 727 m)   Wt 76 2 kg (168 lb)   LMP 02/05/2019   BMI 25 54 kg/m²          Physical Exam   Cardiovascular: Normal rate, regular rhythm and normal heart sounds  Pulmonary/Chest: Effort normal and breath sounds normal    Abdominal: Soft  There is no tenderness  Skin: Skin is warm and dry  external genitalia-no lesions, no erythema  Vagina-small amount thick white discharge  Cervix-no lesions, negative CMT  Uterus-normal size but firm possible with fibroid, nontender  Adnexa-no masses nontender    Wet mount KOH-positive for yeast, negative for BV, negative for trich    PH 4 0

## 2019-02-27 NOTE — PATIENT INSTRUCTIONS
Vulvovaginal Candidiasis   AMBULATORY CARE:   Vulvovaginal candidiasis,  or yeast infection, is a common vaginal infection  Vulvovaginal candidiasis is caused by a fungus, or yeast-like germ  Fungi are normally found in your vagina  When there are too many fungi, it can cause an infection  Seek care immediately if:   · You have fever and chills  · You are bleeding from your vagina and it is not your monthly period  · You develop abdominal or pelvic pain  Contact your healthcare provider if:   · Your signs and symptoms get worse, even after treatment  · You have questions or concerns about your condition or care  Signs and symptoms:   · Thick, white, cheese-like discharge from your vagina    · Itching, swelling, or redness in your vagina    · Burning when you urinate  Treatment for vulvovaginal candidiasis  includes medicines to treat the fungal infection and decrease inflammation  The medicine may be a pill, topical cream, or vaginal suppository  Manage your symptoms:   · Wear cotton underwear  · Keep the vaginal area clean and dry  · Do not have sex until your symptoms are gone  · Do not douche  · Do not use feminine hygiene sprays, powders, or bubble bath  Prevent another infection:   · Take showers instead of baths  · Eat yogurt  · Limit the amount of alcohol you drink  · Control your blood sugar if you are diabetic  · Limit your time in hot tubs  Follow up with your healthcare provider as directed:  Write down your questions so you remember to ask them during your visits  © 2017 [address] Information is for End User's use only and may not be sold, redistributed or otherwise used for commercial purposes  All illustrations and images included in CareNotes® are the copyrighted property of [employer]  or [employer]  The above information is an  only   It is not intended as medical advice for individual conditions or treatments  Talk to your doctor, nurse or pharmacist before following any medical regimen to see if it is safe and effective for you

## 2019-05-16 ENCOUNTER — OFFICE VISIT (OUTPATIENT)
Dept: GASTROENTEROLOGY | Facility: CLINIC | Age: 49
End: 2019-05-16
Payer: COMMERCIAL

## 2019-05-16 VITALS
HEART RATE: 84 BPM | WEIGHT: 168 LBS | BODY MASS INDEX: 25.46 KG/M2 | SYSTOLIC BLOOD PRESSURE: 120 MMHG | DIASTOLIC BLOOD PRESSURE: 80 MMHG | HEIGHT: 68 IN

## 2019-05-16 DIAGNOSIS — Z12.11 SCREENING FOR COLON CANCER: ICD-10-CM

## 2019-05-16 DIAGNOSIS — R14.0 BLOATING: Primary | ICD-10-CM

## 2019-05-16 PROCEDURE — 99214 OFFICE O/P EST MOD 30 MIN: CPT | Performed by: INTERNAL MEDICINE

## 2019-05-17 ENCOUNTER — TELEPHONE (OUTPATIENT)
Dept: GASTROENTEROLOGY | Facility: CLINIC | Age: 49
End: 2019-05-17

## 2019-05-24 ENCOUNTER — HOSPITAL ENCOUNTER (OUTPATIENT)
Dept: RADIOLOGY | Facility: HOSPITAL | Age: 49
Discharge: HOME | End: 2019-05-24
Payer: COMMERCIAL

## 2019-05-24 DIAGNOSIS — R14.0 BLOATING: ICD-10-CM

## 2019-05-24 PROCEDURE — 74249 HB CONTRST X-RAY UPPR GI TRACT (SMALL INTESTINE FOLLOW-THROUGH): CPT

## 2019-06-13 ENCOUNTER — CLINICAL SUPPORT (OUTPATIENT)
Dept: GASTROENTEROLOGY | Facility: CLINIC | Age: 49
End: 2019-06-13
Payer: COMMERCIAL

## 2019-06-13 DIAGNOSIS — K63.89 SMALL INTESTINAL BACTERIAL OVERGROWTH: ICD-10-CM

## 2019-06-13 DIAGNOSIS — R14.0 BLOATING: Primary | ICD-10-CM

## 2019-06-13 PROCEDURE — 91065 BREATH HYDROGEN/METHANE TEST: CPT | Performed by: INTERNAL MEDICINE

## 2019-06-17 RX ORDER — AMOXICILLIN AND CLAVULANATE POTASSIUM 875; 125 MG/1; MG/1
1 TABLET, FILM COATED ORAL EVERY 12 HOURS SCHEDULED
Qty: 14 TABLET | Refills: 0 | Status: SHIPPED | OUTPATIENT
Start: 2019-06-17 | End: 2019-06-24

## 2019-09-25 ENCOUNTER — OFFICE VISIT (OUTPATIENT)
Dept: FAMILY MEDICINE CLINIC | Facility: CLINIC | Age: 49
End: 2019-09-25
Payer: COMMERCIAL

## 2019-09-25 VITALS
RESPIRATION RATE: 16 BRPM | BODY MASS INDEX: 25.76 KG/M2 | WEIGHT: 170 LBS | OXYGEN SATURATION: 98 % | HEIGHT: 68 IN | DIASTOLIC BLOOD PRESSURE: 68 MMHG | HEART RATE: 79 BPM | SYSTOLIC BLOOD PRESSURE: 106 MMHG

## 2019-09-25 DIAGNOSIS — Z00.00 ANNUAL PHYSICAL EXAM: Primary | ICD-10-CM

## 2019-09-25 DIAGNOSIS — Z76.89 ENCOUNTER TO ESTABLISH CARE: ICD-10-CM

## 2019-09-25 DIAGNOSIS — Z23 NEED FOR INFLUENZA VACCINATION: ICD-10-CM

## 2019-09-25 DIAGNOSIS — E66.3 OVERWEIGHT: ICD-10-CM

## 2019-09-25 PROBLEM — R19.4 CHANGE IN BOWEL HABITS: Status: ACTIVE | Noted: 2017-03-09

## 2019-09-25 PROBLEM — R10.9 ABDOMINAL PAIN: Status: ACTIVE | Noted: 2017-03-09

## 2019-09-25 PROBLEM — K21.9 GERD (GASTROESOPHAGEAL REFLUX DISEASE): Status: ACTIVE | Noted: 2017-03-09

## 2019-09-25 PROBLEM — R14.2 BELCHING: Status: ACTIVE | Noted: 2017-03-09

## 2019-09-25 PROCEDURE — 90471 IMMUNIZATION ADMIN: CPT | Performed by: FAMILY MEDICINE

## 2019-09-25 PROCEDURE — 90682 RIV4 VACC RECOMBINANT DNA IM: CPT | Performed by: FAMILY MEDICINE

## 2019-09-25 PROCEDURE — 99386 PREV VISIT NEW AGE 40-64: CPT | Performed by: FAMILY MEDICINE

## 2019-09-25 NOTE — PATIENT INSTRUCTIONS
Gastroesophageal Reflux Disease   AMBULATORY CARE:   Gastroesophageal reflux  reflux occurs when acid and food in the stomach back up into the esophagus  Gastroesophageal reflux disease (GERD) is reflux that occurs more than twice a week for a few weeks  It usually causes heartburn and other symptoms  GERD can cause other health problems over time if it is not treated  Common symptoms include:  Heartburn is the most common symptom of GERD  You may feel burning pain in your chest or below the breast bone  This usually occurs after meals and spreads to your neck, jaw, or shoulder  The pain gets better when you change positions  You may also have any of the following:  · Bitter or acid taste in your mouth    · Dry cough    · Trouble swallowing or pain with swallowing    · Hoarseness or sore throat    · Frequent burping or hiccups    · Feeling of fullness soon after you start eating  Seek care immediately if:  · You feel full and cannot burp or vomit  · You have severe chest pain and sudden trouble breathing  · Your bowel movements are black, bloody, or tarry-looking  · Your vomit looks like coffee grounds or has blood in it  Contact your healthcare provider if:   · You vomit large amounts, or you vomit often  · You have trouble breathing after you vomit  · You have trouble swallowing, or pain with swallowing  · You are losing weight without trying  · Your symptoms get worse or do not improve with treatment  · You have questions or concerns about your condition or care  Treatment for GERD:  Your healthcare provider may prescribe medicine to decrease stomach acid  He may also prescribe medicine that help your esophagus and stomach move food and liquid to your intestines  Surgery may be done if other treatments do not work  You may need surgery to wrap the upper part of the stomach around the esophageal sphincter  This will strengthen the sphincter and prevent reflux     Manage GERD: · Do not have foods or drinks that may increase heartburn  These include chocolate, peppermint, fried or fatty foods, drinks that contain caffeine, or carbonated drinks (soda)  Other foods include spicy foods, onions, tomatoes, and tomato-based foods  Do not have foods or drinks that can irritate your esophagus, such as citrus fruits, juices, and alcohol  · Do not eat large meals  When you eat a lot of food at one time, your stomach needs more acid to digest it  Eat 6 small meals each day instead of 3 large ones, and eat slowly  Do not eat meals 2 to 3 hours before bedtime  · Elevate the head of your bed  Place 6-inch blocks under the head of your bed frame  You may also use more than one pillow under your head and shoulders while you sleep  · Maintain a healthy weight  If you are overweight, weight loss may help relieve symptoms of GERD  · Do not smoke  Smoking weakens the lower esophageal sphincter and increases the risk of GERD  Ask your healthcare provider for information if you currently smoke and need help to quit  E-cigarettes or smokeless tobacco still contain nicotine  Talk to your healthcare provider before you use these products  · Do not wear clothing that is tight around your waist   Tight clothing can put pressure on your stomach and cause or worsen GERD symptoms  Follow up with your healthcare provider as directed:  Write down your questions so you remember to ask them during your visits  © 2017 [employer] Information is for End User's use only and may not be sold, redistributed or otherwise used for commercial purposes  All illustrations and images included in CareNotes® are the copyrighted property of [employer]  or [employer]  The above information is an  only  It is not intended as medical advice for individual conditions or treatments   Talk to your doctor, nurse or pharmacist before following any medical regimen to see if it is safe and effective for you  Wellness Visit for Adults   AMBULATORY CARE:   A wellness visit  is when you see your healthcare provider to get screened for health problems  You can also get advice on how to stay healthy  Write down your questions so you remember to ask them  Ask your healthcare provider how often you should have a wellness visit  What happens at a wellness visit:  Your healthcare provider will ask about your health, and your family history of health problems  This includes high blood pressure, heart disease, and cancer  He or she will ask if you have symptoms that concern you, if you smoke, and about your mood  You may also be asked about your intake of medicines, supplements, food, and alcohol  Any of the following may be done:  · Your weight  will be checked  Your height may also be checked so your body mass index (BMI) can be calculated  Your BMI shows if you are at a healthy weight  · Your blood pressure  and heart rate will be checked  Your temperature may also be checked  · Blood and urine tests  may be done  Blood tests may be done to check your cholesterol levels  Abnormal cholesterol levels increase your risk for heart disease and stroke  You may also need a blood or urine test to check for diabetes if you are at increased risk  Urine tests may be done to look for signs of an infection or kidney disease  · A physical exam  includes checking your heartbeat and lungs with a stethoscope  Your healthcare provider may also check your skin to look for sun damage  · Screening tests  may be recommended  A screening test is done to check for diseases that may not cause symptoms  The screening tests you may need depend on your age, gender, family history, and lifestyle habits  For example, colorectal screening may be recommended if you are 48years old or older  Screening tests you need if you are a woman:   · A Pap smear  is used to screen for cervical cancer   Pap smears are usually done every 3 to 5 years depending on your age  You may need them more often if you have had abnormal Pap smear test results in the past  Ask your healthcare provider how often you should have a Pap smear  · A mammogram  is an x-ray of your breasts to screen for breast cancer  Experts recommend mammograms every 2 years starting at age 48 years  You may need a mammogram at age 52 years or younger if you have an increased risk for breast cancer  Talk to your healthcare provider about when you should start having mammograms and how often you need them  Vaccines you may need:   · Get an influenza vaccine  every year  The influenza vaccine protects you from the flu  Several types of viruses cause the flu  The viruses change over time, so new vaccines are made each year  · Get a tetanus-diphtheria (Td) booster vaccine  every 10 years  This vaccine protects you against tetanus and diphtheria  Tetanus is a severe infection that may cause painful muscle spasms and lockjaw  Diphtheria is a severe bacterial infection that causes a thick covering in the back of your mouth and throat  · Get a human papillomavirus (HPV) vaccine  if you are female and aged 23 to 32 or male 23 to 24 and never received it  This vaccine protects you from HPV infection  HPV is the most common infection spread by sexual contact  HPV may also cause vaginal, penile, and anal cancers  · Get a pneumococcal vaccine  if you are aged 72 years or older  The pneumococcal vaccine is an injection given to protect you from pneumococcal disease  Pneumococcal disease is an infection caused by pneumococcal bacteria  The infection may cause pneumonia, meningitis, or an ear infection  · Get a shingles vaccine  if you are aged 61 or older, even if you have had shingles before  The shingles vaccine is an injection to protect you from the varicella-zoster virus  This is the same virus that causes chickenpox   Shingles is a painful rash that develops in people who had chickenpox or have been exposed to the virus  How to eat healthy:  My Plate is a model for planning healthy meals  It shows the types and amounts of foods that should go on your plate  Fruits and vegetables make up about half of your plate, and grains and protein make up the other half  A serving of dairy is included on the side of your plate  The amount of calories and serving sizes you need depends on your age, gender, weight, and height  Examples of healthy foods are listed below:  · Eat a variety of vegetables  such as dark green, red, and orange vegetables  You can also include canned vegetables low in sodium (salt) and frozen vegetables without added butter or sauces  · Eat a variety of fresh fruits , canned fruit in 100% juice, frozen fruit, and dried fruit  · Include whole grains  At least half of the grains you eat should be whole grains  Examples include whole-wheat bread, wheat pasta, brown rice, and whole-grain cereals such as oatmeal     · Eat a variety of protein foods such as seafood (fish and shellfish), lean meat, and poultry without skin (turkey and chicken)  Examples of lean meats include pork leg, shoulder, or tenderloin, and beef round, sirloin, tenderloin, and extra lean ground beef  Other protein foods include eggs and egg substitutes, beans, peas, soy products, nuts, and seeds  · Choose low-fat dairy products such as skim or 1% milk or low-fat yogurt, cheese, and cottage cheese  · Limit unhealthy fats  such as butter, hard margarine, and shortening  Exercise:  Exercise at least 30 minutes per day on most days of the week  Some examples of exercise include walking, biking, dancing, and swimming  You can also fit in more physical activity by taking the stairs instead of the elevator or parking farther away from stores  Include muscle strengthening activities 2 days each week  Regular exercise provides many health benefits   It helps you manage your weight, and decreases your risk for type 2 diabetes, heart disease, stroke, and high blood pressure  Exercise can also help improve your mood  Ask your healthcare provider about the best exercise plan for you  General health and safety guidelines:   · Do not smoke  Nicotine and other chemicals in cigarettes and cigars can cause lung damage  Ask your healthcare provider for information if you currently smoke and need help to quit  E-cigarettes or smokeless tobacco still contain nicotine  Talk to your healthcare provider before you use these products  · Limit alcohol  A drink of alcohol is 12 ounces of beer, 5 ounces of wine, or 1½ ounces of liquor  · Lose weight, if needed  Being overweight increases your risk of certain health conditions  These include heart disease, high blood pressure, type 2 diabetes, and certain types of cancer  · Protect your skin  Do not sunbathe or use tanning beds  Use sunscreen with a SPF 15 or higher  Apply sunscreen at least 15 minutes before you go outside  Reapply sunscreen every 2 hours  Wear protective clothing, hats, and sunglasses when you are outside  · Drive safely  Always wear your seatbelt  Make sure everyone in your car wears a seatbelt  A seatbelt can save your life if you are in an accident  Do not use your cell phone when you are driving  This could distract you and cause an accident  Pull over if you need to make a call or send a text message  · Practice safe sex  Use latex condoms if are sexually active and have more than one partner  Your healthcare provider may recommend screening tests for sexually transmitted infections (STIs)  · Wear helmets, lifejackets, and protective gear  Always wear a helmet when you ride a bike or motorcycle, go skiing, or play sports that could cause a head injury  Wear protective equipment when you play sports  Wear a lifejacket when you are on a boat or doing water sports    © 2017 [employer] Information is for End User's use only and may not be sold, redistributed or otherwise used for commercial purposes  All illustrations and images included in CareNotes® are the copyrighted property of [employer]  or [employer]  The above information is an  only  It is not intended as medical advice for individual conditions or treatments  Talk to your doctor, nurse or pharmacist before following any medical regimen to see if it is safe and effective for you

## 2019-09-25 NOTE — PROGRESS NOTES
Assessment/Plan:   Diagnoses and all orders for this visit:    Annual physical exam  -     Cranberry 1000 MG CAPS; Take by mouth  Encounter to establish care  - reviewed PMHx, PSHx, Meds, allergies, FHx, Soc Hx and Sexual Hx   - UTD with Tdap and will get Flu vaccine in the office today   - UTD with screening labs - reviewed nml Lipid, CMP, CBC and Vit D from 2019  - UTD with annual GYN exam/PAP - due again in 2019   - UTD with Mammo (2019) - annual f/u recommended  - UTD with Cscope (2018) - 5yr f/u recommended - due again in    - declined STD screening in the office today   - RTO PRN/1yr for annual exam - pt aware and agreeable   Need for influenza vaccination  -     influenza vaccine, 5360-4116, quadrivalent, recombinant, PF, 0 5 mL, for patients 18 yr+ (FLUBLOK)    Overweight  -     Ambulatory referral to Nutrition Services; Future  - discussed diet and encouraged exercise  - educated that it takes 3500 ledy to lose 1lb  - advised to cut down on carbs, 5 servings of fruits/veggies/day, increase water intake (65-80oz/water/day)   - appropriate weight loss goal for women = 0 5-1lb/week  - per the Heart Association - 150mins/exercise/week, but to lose and maintain weight 200-300mins/exercise/week         Subjective:    Patient ID: Shannon Aguilar is a 44 y o  female  Shannon Aguilar is a 44 y o  female who presents to the office to establish care and for an annual exam  - prior PCP: Pajel-Sio, Ebb Duverney, MD    - Specialists: GI  - PMHx: GERD, internal hemorrhoids,  x2   - allergies: NKDA  - Meds: Biotin, Vit D, Cranberry caps, Micronor, Fish Oil   - PSHx: , hand surgery    - FHx: M (Breast Ca - dx at 63yo, Dementia, borderline HL, Parkinsonism), F (Prostatitis, Cataracts), B (Prostate Ca), denies FHx of HTN, DM, Colon/Cervical/Ovarian/Uterine Ca  - Immunizations:  Tdap in , interested in the Flu vaccine   - GYN Hx: [facility] ([facility]), last OB annual was 2018, on OCPs, FDLMP: 9/9/2019   - Hm: EGD/Cscope 5/14/2018 - repeat in 5yrs (2023); Mammo 4/2019 (annual f/u recommended)   - diet/exercise: "I need to exercise", diet: "not bad"   - social: social EtOH, denies tob/illicits   - sexual Hx: active with spouse, declined STD screening   - last vision: does wear glasses, [employer], last Optho was earlier this year   - last dental: June 2019, goes Q6months   - ROS: today in the office pt denies F/C/N/V/HA/visual changes/CP/palpitations/SOB/wheezing/abd pain/D/LE edema or calf tenderness  - reviewed nml Lipid Panel, CMP, CBC, Vit D from 5/2019       The following portions of the patient's history were reviewed and updated as appropriate: allergies, current medications, past family history, past medical history, past social history, past surgical history and problem list     Review of Systems  as per HPI    Objective:  /68   Pulse 79   Resp 16   Ht 5' 8" (1 727 m)   Wt 77 1 kg (170 lb)   SpO2 98%   BMI 25 85 kg/m²    Physical Exam   Constitutional: She is oriented to person, place, and time  She appears well-developed and well-nourished  She is active  No distress  HENT:   Head: Normocephalic and atraumatic  Right Ear: Tympanic membrane, external ear and ear canal normal  No drainage, swelling or tenderness  No middle ear effusion  Left Ear: Tympanic membrane, external ear and ear canal normal  No drainage, swelling or tenderness  No middle ear effusion  Nose: Nose normal  No rhinorrhea or septal deviation  Mouth/Throat: Uvula is midline, oropharynx is clear and moist and mucous membranes are normal  No oral lesions  Normal dentition  No uvula swelling  No oropharyngeal exudate, posterior oropharyngeal edema, posterior oropharyngeal erythema or tonsillar abscesses  Eyes: Pupils are equal, round, and reactive to light  Conjunctivae, EOM and lids are normal  Right eye exhibits no discharge  Left eye exhibits no discharge  No scleral icterus     Neck: Trachea normal and normal range of motion  Neck supple  No tracheal deviation present  Cardiovascular: Normal rate, regular rhythm, S1 normal, S2 normal and normal heart sounds  Exam reveals no gallop and no friction rub  No murmur heard  Pulmonary/Chest: Effort normal and breath sounds normal  No accessory muscle usage or stridor  No respiratory distress  She has no wheezes  She has no rhonchi  She has no rales  Abdominal: Soft  Normal appearance and bowel sounds are normal  She exhibits no distension  There is no tenderness  BMI 25 85   Musculoskeletal: Normal range of motion  She exhibits no edema, tenderness or deformity  Lymphadenopathy:     She has no cervical adenopathy  Neurological: She is alert and oriented to person, place, and time  She has normal strength  No sensory deficit  Coordination and gait normal    Skin: Skin is warm  No rash noted  She is not diaphoretic  No erythema  No pallor  Psychiatric: She has a normal mood and affect  Her speech is normal and behavior is normal  Judgment and thought content normal  Cognition and memory are normal    Vitals reviewed  BMI Counseling: Body mass index is 25 85 kg/m²  The BMI is above normal  Nutrition recommendations include reducing portion sizes, decreasing overall calorie intake, 3-5 servings of fruits/vegetables daily, consuming healthier snacks and moderation in carbohydrate intake  Exercise recommendations include exercising 3-5 times per week  Patient referred to nutritionist due to patient being overweight

## 2019-10-07 ENCOUNTER — OFFICE VISIT (OUTPATIENT)
Dept: FAMILY MEDICINE CLINIC | Facility: CLINIC | Age: 49
End: 2019-10-07
Payer: COMMERCIAL

## 2019-10-07 VITALS
HEART RATE: 92 BPM | WEIGHT: 167.8 LBS | BODY MASS INDEX: 25.43 KG/M2 | TEMPERATURE: 98.1 F | SYSTOLIC BLOOD PRESSURE: 110 MMHG | RESPIRATION RATE: 16 BRPM | DIASTOLIC BLOOD PRESSURE: 70 MMHG | HEIGHT: 68 IN | OXYGEN SATURATION: 98 %

## 2019-10-07 DIAGNOSIS — J01.00 ACUTE NON-RECURRENT MAXILLARY SINUSITIS: Primary | ICD-10-CM

## 2019-10-07 DIAGNOSIS — B37.9 YEAST INFECTION: ICD-10-CM

## 2019-10-07 DIAGNOSIS — R05.9 COUGH: ICD-10-CM

## 2019-10-07 PROCEDURE — 99214 OFFICE O/P EST MOD 30 MIN: CPT | Performed by: NURSE PRACTITIONER

## 2019-10-07 RX ORDER — FLUTICASONE PROPIONATE 50 MCG
1 SPRAY, SUSPENSION (ML) NASAL DAILY
Qty: 1 BOTTLE | Refills: 0 | Status: SHIPPED | OUTPATIENT
Start: 2019-10-07 | End: 2020-10-14 | Stop reason: ALTCHOICE

## 2019-10-07 RX ORDER — FLUCONAZOLE 150 MG/1
150 TABLET ORAL ONCE
Qty: 1 TABLET | Refills: 0 | Status: SHIPPED | OUTPATIENT
Start: 2019-10-07 | End: 2019-10-07

## 2019-10-07 RX ORDER — AMOXICILLIN AND CLAVULANATE POTASSIUM 875; 125 MG/1; MG/1
1 TABLET, FILM COATED ORAL EVERY 12 HOURS SCHEDULED
Qty: 14 TABLET | Refills: 0 | Status: SHIPPED | OUTPATIENT
Start: 2019-10-07 | End: 2019-10-14

## 2019-10-07 NOTE — PROGRESS NOTES
Assessment/Plan:      Diagnoses and all orders for this visit:    Acute non-recurrent maxillary sinusitis  -     fluticasone (FLONASE) 50 mcg/act nasal spray; 1 spray into each nostril daily  -     amoxicillin-clavulanate (AUGMENTIN) 875-125 mg per tablet; Take 1 tablet by mouth every 12 (twelve) hours for 7 days    Discussed with the patient that her symptoms are most likely caused by acute sinusitis  Discussion on acute sinusitis and treatment  Augmentin and flonase prescribed  Medication information and side effects reviewed  Patient instructed to drink plenty of fluids  Cough  Lungs are clear  Discussed with the patient that her cough is most likely caused by post nasal drip  Flonase prescribed  Yeast infection  -     fluconazole (DIFLUCAN) 150 mg tablet; Take 1 tablet (150 mg total) by mouth once for 1 dose  Patient reports that she usually gets yeast infections while on antibiotics  Diflucan prescribed  Medication information and side effects reviewed  Patient instructed to only take the diflucan if she gets a yeast infection  Patient instructed to follow-up if symptoms get worse or do not get better  Subjective:     Patient ID: Dana Campbell is a 44 y o  female  Patient reports a sinus HA for the past 2 days  Patient reports bad sinus pressure  Patient also reports nasal congestion and occasional dry cough for the past 4 days  Denies any fever, wheezing, SOB, sore throat, vomiting, or diarrhea  Patient reports that she tried airborne and cough medicine OTC  Patient reports that her symptoms are not going away  Review of Systems   Constitutional: Negative for chills and fever  HENT:        As noted in HPI  Eyes: Negative for pain, discharge and redness  Respiratory:        As noted in HPI  Cardiovascular: Negative for chest pain  Gastrointestinal: Negative for abdominal pain, diarrhea, nausea and vomiting     Genitourinary: Negative for dysuria, frequency, hematuria and urgency  Musculoskeletal: Negative for neck pain  Skin: Negative for rash  Neurological: Positive for headaches (sinus HAs)  Negative for dizziness, seizures and syncope  Objective:     Physical Exam   Constitutional: She is oriented to person, place, and time  No distress  Patient appears tired  HENT:   Right Ear: External ear normal    Left Ear: External ear normal    Nose: Right sinus exhibits maxillary sinus tenderness  Left sinus exhibits maxillary sinus tenderness  Mouth/Throat: Oropharynx is clear and moist    Tympanic membranes and ear canals wnl  Posterior pharynx wnl  Yellow nasal discharge noted  Eyes: Pupils are equal, round, and reactive to light  Conjunctivae are normal  Right eye exhibits no discharge  Left eye exhibits no discharge  Neck: Normal range of motion  Cardiovascular: Normal rate, regular rhythm and normal heart sounds  Pulmonary/Chest: Effort normal and breath sounds normal  She has no wheezes  No crackles noted  Musculoskeletal:   Gait wnl  Lymphadenopathy:     She has no cervical adenopathy  Neurological: She is alert and oriented to person, place, and time  Skin: No rash noted  Psychiatric: She has a normal mood and affect  Vitals reviewed

## 2019-10-09 ENCOUNTER — CLINICAL SUPPORT (OUTPATIENT)
Dept: NUTRITION | Facility: HOSPITAL | Age: 49
End: 2019-10-09
Payer: COMMERCIAL

## 2019-10-09 ENCOUNTER — TELEPHONE (OUTPATIENT)
Dept: FAMILY MEDICINE CLINIC | Facility: CLINIC | Age: 49
End: 2019-10-09

## 2019-10-09 VITALS — BODY MASS INDEX: 25.27 KG/M2 | WEIGHT: 166.2 LBS

## 2019-10-09 DIAGNOSIS — R05.9 COUGH: Primary | ICD-10-CM

## 2019-10-09 DIAGNOSIS — E66.3 OVERWEIGHT: ICD-10-CM

## 2019-10-09 PROCEDURE — 97802 MEDICAL NUTRITION INDIV IN: CPT | Performed by: DIETITIAN, REGISTERED

## 2019-10-09 RX ORDER — BENZONATATE 100 MG/1
100 CAPSULE ORAL 3 TIMES DAILY PRN
Qty: 20 CAPSULE | Refills: 0 | Status: SHIPPED | OUTPATIENT
Start: 2019-10-09 | End: 2019-10-18

## 2019-10-09 NOTE — TELEPHONE ENCOUNTER
Pt was seen on Monday by [facility] she states that the over the counter cough medicine is not working and wants to know if a cough medicine can be called in for her ?

## 2019-10-09 NOTE — PROGRESS NOTES
Initial Nutrition Assessment Form    Patient Name: Eugenio Levy    YOB: 1970    Sex: Female     Assessment Date: 10/9/2019  Start Time: 9:48 Stop Time: 10:27 Total Minutes: 40 minutes     Data:  Present at session: self   Parent/Patient Concerns: Pt wants to overall feel better   Medical Dx/Reason for Referral: E66 3 Overweight   Past Medical History:   Diagnosis Date    Bacterial vaginitis     Gastritis     GERD (gastroesophageal reflux disease)     Insomnia        Current Outpatient Medications   Medication Sig Dispense Refill    amoxicillin-clavulanate (AUGMENTIN) 875-125 mg per tablet Take 1 tablet by mouth every 12 (twelve) hours for 7 days 14 tablet 0    benzonatate (TESSALON PERLES) 100 mg capsule Take 1 capsule (100 mg total) by mouth 3 (three) times a day as needed for cough 20 capsule 0    BIOTIN 5000 PO Take by mouth      Cholecalciferol (VITAMIN D PO) Take by mouth      Cranberry 1000 MG CAPS Take by mouth      fluticasone (FLONASE) 50 mcg/act nasal spray 1 spray into each nostril daily 1 Bottle 0    norethindrone (MICRONOR) 0 35 MG tablet Take 1 tablet by mouth daily      Omega-3 Fatty Acids (FISH OIL PO) Take by mouth       No current facility-administered medications for this visit  Additional Meds/Supplements:    Special Learning Needs:    Height: HC Readings from Last 3 Encounters:   No data found for [facility]       Weight: Wt Readings from Last 10 Encounters:   10/09/19 75 4 kg (166 lb 3 2 oz)   10/07/19 76 1 kg (167 lb 12 8 oz)   09/25/19 77 1 kg (170 lb)   05/16/19 76 2 kg (168 lb)   02/27/19 76 2 kg (168 lb)   01/26/19 76 9 kg (169 lb 8 5 oz)   07/12/18 68 9 kg (152 lb)   05/14/18 68 9 kg (152 lb)   03/31/17 72 kg (158 lb 11 7 oz)   03/09/17 72 3 kg (159 lb 6 1 oz)     Estimated body mass index is 25 27 kg/m² as calculated from the following:    Height as of 10/7/19: 5' 8" (1 727 m)  Weight as of this encounter: 75 4 kg (166 lb 3 2 oz)     Recent Weight Change: []Yes     [x]No  Amount:       Energy Needs: No calculations performed for this visit   No Known Allergies    Social History     Substance and Sexual Activity   Alcohol Use Yes    Alcohol/week: 7 0 standard drinks    Types: 7 Glasses of wine per week    Frequency: 2-3 times a week    Comment: socially       Social History     Tobacco Use   Smoking Status Never Smoker   Smokeless Tobacco Never Used       Who shops? patient   Who cooks? patient   Exercise: Was doing the total gym  Now not so much   Prior Counseling? []Yes     [x]No  When:      Why:         Diet Hx:  Breakfast:  a m  Did eat egg or egg white omelet/turkey sausage or matta  Or Oatmeal  Now just having coffee or tea   Lunch: 11 - 12 a m /pm when worked it was 1 - 2 pm  [place] salad  Leftovers from home     Dinner: 5 - 6 p m  when worked 6 - 7pm  [place]/Chicken or occasional beef  Some sort of cooked greens and occasional salad       Snacks: PM - Potato chips or cookies - chips shaniceoy or oreos         Nutrition Diagnosis:   Food and nutrition related knowledge deficit  related to Lack of prior exposure to accurate nutrition related information as evidenced by [employer] inaccurate or incomplete information       Medical Nutrition Therapy Intervention:  []Individualized Meal Plan []Understanding Lab Values   []Basic Pathophysiology of Disease []Food/Medication Interactions   []Food Diary []Exercise   []Lifestyle/Behavior Modification Techniques []Medication, Mechanism of Action   []Label Reading []Self Blood Glucose Monitoring   []Weight/BMI Goals []Other -    Other Notes: Pt states that her goal weight is 140 - 145 lbs but really just wants to feel better going up stairs and in general   She states that she has gastritis and GERD for which she has to avoid certain foods which seem to aggravate it such as starchy foods, carbonated beverages and broccoli    She says she has to soak her rice until the water runs clear in order for her to be able to eat it  Pt states that she does tend to drink sweetened iced tea throughout the day  Discussed the increased calorie content of sweetened beverages  Pt also tends to drink [employer] coffees so brought up web site so calorie content could be dicussed  Pt was very surprised at calorie content of many items listed  She also admits to drinking juice at meals so discussed calorie content of juice  Suggested pt journal intake for at least a month which she states she will use an nikita on her phone  Explained this way she could see what may be bothering her with the gastritis/GERD as well  Pt states that she has been out of work for 2 weeks and feels that she can begin exercising again  Suggested that she aim for 30 minutes 3 times per week  Pt feels that this is reasonable  Provided pt with handout on Total Body Diet and discussed overall health as opposed to just weight loss  Pt feels that she is in a place that she can make changes  Comprehension: []Excellent  [x]Very Good  []Good  []Fair   []Poor    Receptivity: []Excellent  [x]Very Good  []Good  []Fair   []Poor    Expected Compliance: []Excellent  [x]Very Good  []Good  []Fair   []Poor        Goals:  1  Journal intake daily for at least 1 month   2  Find substitute for sweet tea that has zero calories   3  Total Gym 3 times per week for 30 minutes  No follow-ups on file    Labs:  CMP  Lab Results   Component Value Date    K 3 7 01/26/2019     01/26/2019    CO2 25 01/26/2019    BUN 12 01/26/2019    CREATININE 0 78 01/26/2019    CALCIUM 8 8 01/26/2019    AST 13 01/26/2019    ALT 17 01/26/2019    ALKPHOS 45 (L) 01/26/2019    EGFR 103 01/26/2019       BMP  Lab Results   Component Value Date    CALCIUM 8 8 01/26/2019    K 3 7 01/26/2019    CO2 25 01/26/2019     01/26/2019    BUN 12 01/26/2019    CREATININE 0 78 01/26/2019       Lipids  No results found for: CHOL  No results found for: HDL  No results found for: LDLCALC  No results found for: TRIG  No results found for: CHOLHDL    Hemoglobin A1C  No results found for: HGBA1C    Fasting Glucose  No results found for: GLUF    Insulin     Thyroid  No results found for: TSH, I8KCNZN, X3RLBTD, THYROIDAB    Hepatic Function Panel  Lab Results   Component Value Date    ALT 17 01/26/2019    AST 13 01/26/2019    ALKPHOS 45 (L) 01/26/2019       Celiac Disease Antibody Panel  No results found for: ENDOMYSIAL IGA, GLIADIN IGA, GLIADIN IGG, IGA, TISSUE TRANSGLUT AB, TTG IGA   Iron  No results found for: IRON, TIBC, FERRITIN    Vitamins  No results found for: VITAMIN B2   No results found for: NICOTINAMIDE, NICOTINIC ACID   No results found for: VITAMINB6  No results found for: HYGMNKWL11  No results found for: VITB5  No results found for: G2QEUPAX  No results found for: THYROGLB  No results found for: VITAMIN K   No results found for: 25-HYDROXY VIT D   No components found for: VITAMINE     Libby Aguila, MS, RDN, LDN  [address] [id number]

## 2019-10-18 ENCOUNTER — OFFICE VISIT (OUTPATIENT)
Dept: FAMILY MEDICINE CLINIC | Facility: CLINIC | Age: 49
End: 2019-10-18
Payer: COMMERCIAL

## 2019-10-18 VITALS
OXYGEN SATURATION: 98 % | HEART RATE: 79 BPM | BODY MASS INDEX: 24.86 KG/M2 | HEIGHT: 68 IN | SYSTOLIC BLOOD PRESSURE: 110 MMHG | WEIGHT: 164 LBS | DIASTOLIC BLOOD PRESSURE: 68 MMHG | RESPIRATION RATE: 16 BRPM

## 2019-10-18 DIAGNOSIS — M54.50 CHRONIC BILATERAL LOW BACK PAIN, UNSPECIFIED WHETHER SCIATICA PRESENT: Primary | ICD-10-CM

## 2019-10-18 DIAGNOSIS — G89.29 CHRONIC BILATERAL LOW BACK PAIN, UNSPECIFIED WHETHER SCIATICA PRESENT: Primary | ICD-10-CM

## 2019-10-18 DIAGNOSIS — M79.645 THUMB PAIN, LEFT: ICD-10-CM

## 2019-10-18 PROCEDURE — 99213 OFFICE O/P EST LOW 20 MIN: CPT | Performed by: FAMILY MEDICINE

## 2019-10-18 PROCEDURE — 3008F BODY MASS INDEX DOCD: CPT | Performed by: FAMILY MEDICINE

## 2019-10-18 RX ORDER — NAPROXEN 500 MG/1
500 TABLET ORAL 2 TIMES DAILY WITH MEALS
Qty: 28 TABLET | Refills: 0 | Status: SHIPPED | OUTPATIENT
Start: 2019-10-18 | End: 2020-05-27

## 2019-10-18 NOTE — PROGRESS NOTES
Assessment/Plan:   Diagnoses and all orders for this visit:    Chronic bilateral low back pain, unspecified whether sciatica present  - intermittent pain - which pt states is a 1/10 on pain scale   - will cont to monitor and advised OTC NSAIDs PRN   - OK to start/continue exercising - advised to stretch prior     Thumb pain, left  -     naproxen (NAPROSYN) 500 mg tablet; Take 1 tablet (500 mg total) by mouth 2 (two) times a day with meals  - nml EMG per Neuro   - has not tried anything for pain   - advised OTC NSAIDs and script for Naproxen also given if OTC Aleve does not provide adequate pain control  - RTO in 2wks if not better - t/c Ortho referral after trying conservative therapy first - pt aware and agreeable         Subjective:    Patient ID: Ofelia Parker is a 44 y o  female    50yo AAF presents to the office for eval of LBP and L-thumb pain   1) LBP   - has been ongoing for the past month   - "feels tired, like I exercised too much"   - 1/10 pain - more an "annoyance"   - has not tried anything for it for far  - not worse with activity or certain movements  - (+) intermittent, not constant, does radiate to the front on the L-side   - denies loss of bowel or bladder function with this   2) L-thumb pain   - "burning, aching" sensation that starts at thumb joint and radiates to knuckle - states 5/10 on pain scale   - (+) weak, hurts to hold a tall glass   - used to type a lot for work - pain started in May   - did see Neuro - nml EMG (per pt)   - has not tried anything for this   - tender all the time, but the burning sensation is sporadic   - of note, does have a PMHx of hand surgery - L-pinklos     The following portions of the patient's history were reviewed and updated as appropriate: allergies, current medications, past family history, past medical history, past social history, past surgical history and problem list     Review of Systems  as per HPI    Objective:  /68   Pulse 79   Resp 16  5' 8" (1 727 m)   Wt 74 4 kg (164 lb)   LMP 10/07/2019   SpO2 98%   BMI 24 94 kg/m²    Physical Exam   Constitutional: She is oriented to person, place, and time  She appears well-developed and well-nourished  No distress  HENT:   Head: Normocephalic and atraumatic  Right Ear: External ear normal    Left Ear: External ear normal    Nose: Nose normal    Eyes: Conjunctivae and EOM are normal  Right eye exhibits no discharge  Left eye exhibits no discharge  No scleral icterus  Neck: Normal range of motion  Neck supple  Musculoskeletal: Normal range of motion  She exhibits no edema, tenderness or deformity  Lspine nml ROM in F/E/SB/R, no paraspinal tenderness appreciated on exam; L-thumb - not swollen, (+) bony tenderness appreciated, NEG Finkelstein test, (+) DEC  strength noted    Neurological: She is alert and oriented to person, place, and time  Skin: Skin is warm  No rash noted  She is not diaphoretic  No erythema  Psychiatric: She has a normal mood and affect  Her behavior is normal    Vitals reviewed

## 2019-10-18 NOTE — PATIENT INSTRUCTIONS
Swollen Joint, Ambulatory Care   GENERAL INFORMATION:   Joint swelling  may occur in one or more joints  You may have other symptoms, such as pain, tenderness, or stiffness  A swollen joint may be caused by a variety of conditions such as arthritis, pseudogout, gout, tendinitis, or injury  Seek immediate care for the following symptoms:   · You cannot move your joint at all  · You have severe pain that does not get better with medicine  Treatment for a swollen joint  depends on the cause of your swollen joint  Your healthcare provider may recommend any of the following:  · Rest  your swollen joint  Avoid activities that make the swelling or pain worse  You may need to avoid putting weight on your joint while you have pain  Crutches or a walker can be used to avoid putting weight on joints in your lower body  · Apply ice  on your swollen joint for 15 to 20 minutes every hour or as directed  Use an ice pack, or put crushed ice in a plastic bag  Cover it with a towel  Ice helps prevent tissue damage and decreases swelling and pain  · Apply heat  on your swollen joint for 20 to 30 minutes every 2 hours for as many days as directed  Heat helps decrease pain  · Elevate  your swollen joint above the level of your heart as often as you can  This will help decrease swelling and pain  Prop your joint on pillows or blankets to keep it elevated comfortably  · NSAIDs  help decrease swelling and pain or fever  This medicine is available with or without a doctor's order  NSAIDs can cause stomach bleeding or kidney problems in certain people  If you take blood thinner medicine, always ask your healthcare provider if NSAIDs are safe for you  Always read the medicine label and follow directions  Follow up with your healthcare provider as directed:  Write down your questions so you remember to ask them during your visits  CARE AGREEMENT:   You have the right to help plan your care   Learn about your health condition and how it may be treated  Discuss treatment options with your caregivers to decide what care you want to receive  You always have the right to refuse treatment  The above information is an  only  It is not intended as medical advice for individual conditions or treatments  Talk to your doctor, nurse or pharmacist before following any medical regimen to see if it is safe and effective for you  © 2014 [address] is for End User's use only and may not be sold, redistributed or otherwise used for commercial purposes  All illustrations and images included in CareNotes® are the copyrighted property of A D A M , Inc  or Allan Person

## 2019-10-31 ENCOUNTER — TELEPHONE (OUTPATIENT)
Dept: FAMILY MEDICINE CLINIC | Facility: CLINIC | Age: 49
End: 2019-10-31

## 2019-10-31 NOTE — TELEPHONE ENCOUNTER
Guy Gosselin is calling to let Dr Smitha Ortiz know that there is no improvement with her thumb pain  States they discussed if not getting better Dr Smitha Ortiz would give her on order to see ortho

## 2019-11-01 DIAGNOSIS — M79.645 THUMB PAIN, LEFT: Primary | ICD-10-CM

## 2019-11-12 ENCOUNTER — APPOINTMENT (OUTPATIENT)
Dept: RADIOLOGY | Facility: CLINIC | Age: 49
End: 2019-11-12
Payer: COMMERCIAL

## 2019-11-12 ENCOUNTER — CONSULT (OUTPATIENT)
Dept: OBGYN CLINIC | Facility: CLINIC | Age: 49
End: 2019-11-12
Payer: COMMERCIAL

## 2019-11-12 VITALS
BODY MASS INDEX: 24.86 KG/M2 | DIASTOLIC BLOOD PRESSURE: 74 MMHG | HEART RATE: 80 BPM | SYSTOLIC BLOOD PRESSURE: 107 MMHG | WEIGHT: 164 LBS | HEIGHT: 68 IN

## 2019-11-12 DIAGNOSIS — M79.645 THUMB PAIN, LEFT: ICD-10-CM

## 2019-11-12 DIAGNOSIS — S63.602A SPRAIN OF LEFT THUMB, UNSPECIFIED SITE OF FINGER, INITIAL ENCOUNTER: Primary | ICD-10-CM

## 2019-11-12 PROCEDURE — 73140 X-RAY EXAM OF FINGER(S): CPT

## 2019-11-12 PROCEDURE — 99243 OFF/OP CNSLTJ NEW/EST LOW 30: CPT | Performed by: ORTHOPAEDIC SURGERY

## 2019-11-12 NOTE — PROGRESS NOTES
Assessment/Plan:  1  Sprain of left thumb, unspecified site of finger, initial encounter  MRI thumb left wo contrast   2  Thumb pain, left  Ambulatory referral to Orthopedic Surgery    XR thumb left first digit-min 2v       Scribe Attestation    I,:   Ana Powers MA am acting as a scribe while in the presence of the attending physician :        I,:   Aminata Aparicio DO personally performed the services described in this documentation    as scribed in my presence :              I discussed with Eliza Flor that her signs and symptoms are consistent with a UCL injury  She is tender to palpation over the UCL  There is some laxity when compared to the opposite side  An mri was ordered today to evaluate for a UCL tear  She was fitted and provided with a thumb spica brace  Patient was instructed to wear the brace for the next 6 weeks  I will call her with the results of the MRI  Subjective:   Ilana Clancy is a 44 y o  female who presents to the office today as a referral from her PCP for evaluation of left thumb pain  Patient states this has been ongoing for approximately 1 year  She notes a burning type of pain to the MCP joint  She states this is increased with picking objects up  She denies any known injury  She denies any locking or triggering  Review of Systems   Constitutional: Negative for chills and fever  HENT: Negative for drooling and sneezing  Eyes: Negative for redness  Respiratory: Negative for cough and wheezing  Gastrointestinal: Negative for nausea and vomiting  Musculoskeletal: Positive for arthralgias  Negative for joint swelling and myalgias  Neurological: Negative for weakness and numbness  Psychiatric/Behavioral: Negative for behavioral problems  The patient is not nervous/anxious            Past Medical History:   Diagnosis Date    Bacterial vaginitis     Fibroids     Gastritis     GERD (gastroesophageal reflux disease)     Insomnia        Past Surgical Received signed and completed form from Physician's Office. Form faxed by patient care   History:   Procedure Laterality Date     SECTION      HAND SURGERY Left     Left pinky tendon repair    NH COLONOSCOPY FLX DX W/COLLJ SPEC WHEN PFRMD N/A 2018    Procedure: COLONOSCOPY;  Surgeon: Tony Broderick MD;  Location: [facility]; Service: Gastroenterology    NH ESOPHAGOGASTRODUODENOSCOPY TRANSORAL DIAGNOSTIC N/A 3/31/2017    Procedure: ESOPHAGOGASTRODUODENOSCOPY (EGD); Surgeon: Tony Broderick MD;  [facility]; Service: Gastroenterology       Family History   Problem Relation Age of Onset   Rosylvia Kugel Breast cancer Mother         stage [de-identified] @ age 67    Dementia Mother     Hyperlipidemia Mother     Parkinsonism Mother     Fibroids Mother    David Frias Prostatitis Father     Cataracts Father     Substance Abuse Sister     COPD Sister     Prostate cancer Brother     Premature birth Daughter     Substance Abuse Brother     No Known Problems Sister     Dementia Maternal Grandmother     Parkinsonism Maternal Grandmother     Fibroids Maternal Grandmother     Dementia Maternal Aunt     Dementia Maternal Aunt     Hypertension Neg Hx     Diabetes Neg Hx     Colon cancer Neg Hx     Ovarian cancer Neg Hx     Uterine cancer Neg Hx     Cervical cancer Neg Hx        Social History     Occupational History    Not on file   Tobacco Use    Smoking status: Never Smoker    Smokeless tobacco: Never Used   Substance and Sexual Activity    Alcohol use:  Yes     Alcohol/week: 7 0 standard drinks     Types: 7 Glasses of wine per week     Frequency: 2-3 times a week     Comment: socially    Drug use: No    Sexual activity: Yes     Partners: Male     Birth control/protection: OCP         Current Outpatient Medications:     BIOTIN 5000 PO, Take by mouth, Disp: , Rfl:     Cholecalciferol (VITAMIN D PO), Take by mouth, Disp: , Rfl:     Cranberry 1000 MG CAPS, Take by mouth, Disp: , Rfl:     fluticasone (FLONASE) 50 mcg/act nasal spray, 1 spray into each nostril daily, Disp: 1 Bottle, Rfl: 0   naproxen (NAPROSYN) 500 mg tablet, Take 1 tablet (500 mg total) by mouth 2 (two) times a day with meals, Disp: 28 tablet, Rfl: 0    norethindrone (MICRONOR) 0 35 MG tablet, Take 1 tablet by mouth daily, Disp: , Rfl:     Omega-3 Fatty Acids (FISH OIL PO), Take by mouth, Disp: , Rfl:     No Known Allergies    Objective:  Vitals:    11/12/19 0816   BP: 107/74   Pulse: 80       Ortho Exam     Left thumb    - tinel's superficial radial nerve  - tinel's median nerve   - ruy's  NTTP 1st dorsal compartment  NTTP thumb CMC  Pain with UCL stress testing  More pain at 0 than 30 with stress  Mild UCL laxity compared to other side  TTP thumb UCL  Compartments soft  Brisk capillary refill  S/m intact median, radial, and ulnar nerve     Physical Exam   Constitutional: She is oriented to person, place, and time  She appears well-developed and well-nourished  HENT:   Head: Normocephalic and atraumatic  Eyes: Conjunctivae are normal  Right eye exhibits no discharge  Left eye exhibits no discharge  Neck: Normal range of motion  Neck supple  Cardiovascular: Normal rate and intact distal pulses  Pulmonary/Chest: Effort normal  No respiratory distress  Musculoskeletal:   As noted in HPI   Neurological: She is alert and oriented to person, place, and time  Skin: Skin is warm and dry  Psychiatric: She has a normal mood and affect  Her behavior is normal  Judgment and thought content normal        I have personally reviewed pertinent films in PACS and my interpretation is as follows:X-ray left thumb performed in the office today demonstrates no osseous abnormalities

## 2019-11-13 ENCOUNTER — OFFICE VISIT (OUTPATIENT)
Dept: FAMILY MEDICINE CLINIC | Facility: CLINIC | Age: 49
End: 2019-11-13
Payer: COMMERCIAL

## 2019-11-13 VITALS
RESPIRATION RATE: 18 BRPM | HEIGHT: 68 IN | OXYGEN SATURATION: 97 % | HEART RATE: 89 BPM | DIASTOLIC BLOOD PRESSURE: 70 MMHG | SYSTOLIC BLOOD PRESSURE: 120 MMHG | BODY MASS INDEX: 25.76 KG/M2 | WEIGHT: 170 LBS | TEMPERATURE: 98 F

## 2019-11-13 DIAGNOSIS — T36.95XA ANTIBIOTIC-INDUCED YEAST INFECTION: ICD-10-CM

## 2019-11-13 DIAGNOSIS — G47.9 SLEEP DISTURBANCE: ICD-10-CM

## 2019-11-13 DIAGNOSIS — M79.645 THUMB PAIN, LEFT: ICD-10-CM

## 2019-11-13 DIAGNOSIS — B37.9 ANTIBIOTIC-INDUCED YEAST INFECTION: ICD-10-CM

## 2019-11-13 DIAGNOSIS — J01.10 ACUTE NON-RECURRENT FRONTAL SINUSITIS: Primary | ICD-10-CM

## 2019-11-13 PROCEDURE — 99214 OFFICE O/P EST MOD 30 MIN: CPT | Performed by: FAMILY MEDICINE

## 2019-11-13 RX ORDER — AMOXICILLIN AND CLAVULANATE POTASSIUM 875; 125 MG/1; MG/1
1 TABLET, FILM COATED ORAL EVERY 12 HOURS SCHEDULED
Qty: 14 TABLET | Refills: 0 | Status: SHIPPED | OUTPATIENT
Start: 2019-11-13 | End: 2019-11-20

## 2019-11-13 RX ORDER — QUETIAPINE FUMARATE 50 MG/1
50 TABLET, FILM COATED ORAL
Qty: 30 TABLET | Refills: 0 | Status: SHIPPED | OUTPATIENT
Start: 2019-11-13 | End: 2019-12-13 | Stop reason: ALTCHOICE

## 2019-11-13 RX ORDER — FLUCONAZOLE 150 MG/1
150 TABLET ORAL ONCE
Qty: 1 TABLET | Refills: 0 | Status: SHIPPED | OUTPATIENT
Start: 2019-11-13 | End: 2019-11-13

## 2019-11-13 NOTE — PROGRESS NOTES
----- Message from Serena Huggins NP sent at 7/3/2018  7:41 AM CDT -----  Delonte Stark. You can continue to receive B12 injections once monthly.   Assessment/Plan:   Diagnoses and all orders for this visit:    Acute non-recurrent frontal sinusitis  -     amoxicillin-clavulanate (AUGMENTIN) 875-125 mg per tablet; Take 1 tablet by mouth every 12 (twelve) hours for 7 days  - advised to take with food  - rest, hydration and frequent hand washing   - flonase and cool mist humidifier - RTO if s/s not resolving/worsening despite abx  Antibiotic-induced yeast infection  -     fluconazole (DIFLUCAN) 150 mg tablet; Take 1 tablet (150 mg total) by mouth once for 1 dose    Thumb pain, left  - follows with Ortho, MRI pending as concerns for UCL injury    Sleep disturbance  -     QUEtiapine (SEROquel) 50 mg tablet; Take 1 tablet (50 mg total) by mouth daily at bedtime          Subjective:    Patient ID: Luz Bui is a 44 y o  female    50yo F presents to the office for eval of sinusitis   - (+) nose and R-side of face pain/pressure, (+) sore throat, HA, N, R-sided facial pain which hurt to talk, runny nose   - started 2days ago   - denies F/C/V/CP/palpitations/SOB/wheezing/abd pain  - has been gargling   - does have relief with nasal spray - Flonase   - HA woke her from sleep last night   - nml appetite   - denies sick contacts   - has been having recurring sleep issue - last time this was an issue was when her Mom passed last year - was taking Seroquel 50mg QHS as prescribed by her former PCP and tolerating it well; has tried Melatonin w/o relief   - L-thumb pain - did see Ortho and due for an MRI for further eval - concerns for a UCL injury      The following portions of the patient's history were reviewed and updated as appropriate: allergies, current medications, past family history, past medical history, past social history, past surgical history and problem list     Review of Systems  as per HPI    Objective:  /70 (BP Location: Left arm, Patient Position: Sitting, Cuff Size: Large)   Pulse 89   Temp 98 °F (36 7 °C)   Resp 18   Ht 5' 8" (1 727 m) Wt 77 1 kg (170 lb)   SpO2 97%   BMI 25 85 kg/m²    Physical Exam   Constitutional: She is oriented to person, place, and time  She appears well-developed and well-nourished  Non-toxic appearance  She does not appear ill  No distress  HENT:   Head: Normocephalic and atraumatic  Right Ear: Ear canal normal  There is tenderness  No drainage or swelling  A middle ear effusion is present  Left Ear: Tympanic membrane and ear canal normal  No drainage, swelling or tenderness  No middle ear effusion  Nose: Rhinorrhea present  Right sinus exhibits maxillary sinus tenderness  Right sinus exhibits no frontal sinus tenderness  Left sinus exhibits no maxillary sinus tenderness and no frontal sinus tenderness  Mouth/Throat: Uvula is midline, oropharynx is clear and moist and mucous membranes are normal  No uvula swelling  No oropharyngeal exudate, posterior oropharyngeal edema, posterior oropharyngeal erythema or tonsillar abscesses  R-occipital tenderness    Eyes: EOM are normal    Neck: Normal range of motion  Neck supple  Cardiovascular: Normal rate, regular rhythm and normal heart sounds  Pulmonary/Chest: Effort normal and breath sounds normal  No stridor  No respiratory distress  She has no wheezes  She has no rhonchi  She has no rales  Abdominal: Soft  Bowel sounds are normal    Lymphadenopathy:     She has no cervical adenopathy  Neurological: She is alert and oriented to person, place, and time  Skin: Skin is warm  Psychiatric: She has a normal mood and affect  Vitals reviewed

## 2019-12-03 ENCOUNTER — TELEPHONE (OUTPATIENT)
Dept: OBGYN CLINIC | Facility: HOSPITAL | Age: 49
End: 2019-12-03

## 2019-12-03 NOTE — TELEPHONE ENCOUNTER
Patient is calling stating that the original brace she had is not helping her so she is wanting to get the brace that the doctor offered her at the office

## 2019-12-05 ENCOUNTER — HOSPITAL ENCOUNTER (OUTPATIENT)
Dept: MRI IMAGING | Facility: HOSPITAL | Age: 49
Discharge: HOME | End: 2019-12-05
Attending: ORTHOPAEDIC SURGERY
Payer: COMMERCIAL

## 2019-12-05 DIAGNOSIS — S63.602A SPRAIN OF LEFT THUMB, UNSPECIFIED SITE OF FINGER, INITIAL ENCOUNTER: ICD-10-CM

## 2019-12-05 PROCEDURE — 73218 MRI UPPER EXTREMITY W/O DYE: CPT

## 2019-12-06 DIAGNOSIS — G47.9 SLEEP DISTURBANCE: ICD-10-CM

## 2019-12-06 RX ORDER — QUETIAPINE FUMARATE 50 MG/1
TABLET, FILM COATED ORAL
Qty: 30 TABLET | Refills: 0 | OUTPATIENT
Start: 2019-12-06

## 2019-12-09 ENCOUNTER — TELEPHONE (OUTPATIENT)
Dept: OBGYN CLINIC | Facility: CLINIC | Age: 49
End: 2019-12-09

## 2019-12-09 NOTE — TELEPHONE ENCOUNTER
Patient called and was wondering if you  Had the results of her mri , if you could call her with them or if she should wait until her appointment on the 26th to get them    Please advise

## 2019-12-13 ENCOUNTER — OFFICE VISIT (OUTPATIENT)
Dept: FAMILY MEDICINE CLINIC | Facility: CLINIC | Age: 49
End: 2019-12-13
Payer: COMMERCIAL

## 2019-12-13 VITALS
BODY MASS INDEX: 26.83 KG/M2 | OXYGEN SATURATION: 98 % | WEIGHT: 177 LBS | RESPIRATION RATE: 16 BRPM | HEIGHT: 68 IN | SYSTOLIC BLOOD PRESSURE: 120 MMHG | DIASTOLIC BLOOD PRESSURE: 70 MMHG | HEART RATE: 92 BPM

## 2019-12-13 DIAGNOSIS — G47.9 SLEEP DISTURBANCE: Primary | ICD-10-CM

## 2019-12-13 PROCEDURE — 1036F TOBACCO NON-USER: CPT | Performed by: FAMILY MEDICINE

## 2019-12-13 PROCEDURE — 99214 OFFICE O/P EST MOD 30 MIN: CPT | Performed by: FAMILY MEDICINE

## 2019-12-13 PROCEDURE — 3008F BODY MASS INDEX DOCD: CPT | Performed by: FAMILY MEDICINE

## 2019-12-13 NOTE — PROGRESS NOTES
Assessment/Plan:   Diagnoses and all orders for this visit:    Sleep disturbance  - discontinue Seroquel   - discussed with pt that this is NOT a PRN sleeping aid  - discussed sleep hygiene - bed for sleep/sex, no screens (laptops, phone) atleast 1hr prior to bed, no liquids 1hr prior to bed, discussed routine/consistency in sleep/wake time, relax with bath or book prior to bed, healthy diet and exercise, cool/comfortable temperature in the room, blackout blinds and not to sleep in a room that faces a noisy street   - RTO in 3months for f/u - pt aware and agreeable         Subjective:    Patient ID: Kaylee Nichols is a 44 y o  female  50yo F presents to the office for f/u of sleep disturbance   - has had "sleep disturbances" since 2017 (Mom passed in 2015)   - got fired in 9/2019 and looking to go back to work   - 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   - goes to bed at [place] and wakes up 5-6AM   - most of the time its restful sleep   - has been using Seroquel 25mg QHS PRN  - was initially prescribed in 2017 by her former PCP = even then was using it on a PRN basis   - is on her phone/laptop prior to bed and has a mobile office/study environment   - has a TV in her room and drinks a glass of water right before bed - when she wakes up to urinate in the middle of the night - has a hard time falling back asleep   - did have MRI of L-hand and due to f/u with Ortho 12/26    The following portions of the patient's history were reviewed and updated as appropriate: allergies, current medications, past family history, past medical history, past social history, past surgical history and problem list     Review of Systems  as per HPI    Objective:  /70   Pulse 92   Resp 16   Ht 5' 8" (1 727 m)   Wt 80 3 kg (177 lb)   SpO2 98%   BMI 26 91 kg/m²    Physical Exam   Constitutional: She is oriented to person, place, and time  She appears well-developed and well-nourished  No distress     HENT: Head: Normocephalic and atraumatic  Right Ear: External ear normal    Left Ear: External ear normal    Nose: Nose normal    Eyes: Conjunctivae and EOM are normal  Right eye exhibits no discharge  Left eye exhibits no discharge  No scleral icterus  Neck: Normal range of motion  Pulmonary/Chest: Effort normal    Musculoskeletal: Normal range of motion  L-forearm in a splint    Neurological: She is alert and oriented to person, place, and time  Skin: Skin is warm  She is not diaphoretic  Psychiatric: She has a normal mood and affect  Her behavior is normal    Vitals reviewed  BMI Counseling: Body mass index is 26 91 kg/m²  The BMI is above normal  Nutrition recommendations include decreasing overall calorie intake, 3-5 servings of fruits/vegetables daily and consuming healthier snacks  Exercise recommendations include joining a gym  I have spent 25 minutes with Patient  today in which greater than 50% of this time was spent in counseling/coordination of care regarding Risks and benefits of tx options, Intructions for management, Patient and family education, Importance of tx compliance, Risk factor reductions and Impressions

## 2019-12-26 ENCOUNTER — OFFICE VISIT (OUTPATIENT)
Dept: OBGYN CLINIC | Facility: CLINIC | Age: 49
End: 2019-12-26
Payer: COMMERCIAL

## 2019-12-26 VITALS
SYSTOLIC BLOOD PRESSURE: 115 MMHG | BODY MASS INDEX: 26.83 KG/M2 | HEIGHT: 68 IN | HEART RATE: 80 BPM | DIASTOLIC BLOOD PRESSURE: 73 MMHG | WEIGHT: 177 LBS

## 2019-12-26 DIAGNOSIS — S63.602A SPRAIN OF LEFT THUMB, UNSPECIFIED SITE OF FINGER, INITIAL ENCOUNTER: Primary | ICD-10-CM

## 2019-12-26 PROCEDURE — 99213 OFFICE O/P EST LOW 20 MIN: CPT | Performed by: ORTHOPAEDIC SURGERY

## 2019-12-26 NOTE — PROGRESS NOTES
Assessment/Plan:  1  Sprain of left thumb, unspecified site of finger, initial encounter         Scribe Attestation    I,:   Ana Powers MA am acting as a scribe while in the presence of the attending physician :        I,:   Patel Bryant DO personally performed the services described in this documentation    as scribed in my presence :              The MRI results were discussed with Bradly Dillardila today which does demonstrate a partial UCL tear without retraction  She is  to palpation over the UCL on exam  There is a firm end point at 0 and 30  Patient was instructed to continue with the use of the thumb spica brace however, she may begin to wean out of the brace  She was advised to use the brace for activities  She was advised to avoid any heavy pinching and gripping  She will follow up in 4 weeks for repeat evaluation  Subjective:   Beka Wu is a 44 y o  female who presents to the office today for follow up evaluation left thumb UCL sprain  Patient states she has been compliant with brace use  She states her pain has improved  She now notes a dull ache to the MCP joint  Patient states she has been cooking and grasping pots and pans with her left hand  An MRI of her thumb was ordered at her last visit to evaluate for a UCL tear  She denies any numbness or tingling  Review of Systems   Constitutional: Negative for chills and fever  HENT: Negative for drooling and sneezing  Eyes: Negative for redness  Respiratory: Negative for cough and wheezing  Gastrointestinal: Negative for nausea and vomiting  Musculoskeletal: Negative for arthralgias, joint swelling and myalgias  Neurological: Negative for weakness and numbness  Psychiatric/Behavioral: Negative for behavioral problems  The patient is not nervous/anxious            Past Medical History:   Diagnosis Date    Bacterial vaginitis     Fibroids     Gastritis     GERD (gastroesophageal reflux disease)     Insomnia        Past Surgical History:   Procedure Laterality Date     SECTION      HAND SURGERY Left     Left pinky tendon repair    [place] COLONOSCOPY FLX DX W/COLLJ SPEC WHEN PFRMD N/A 2018    Procedure: COLONOSCOPY;  Surgeon: Grace Germain MD;  [facility]; Service: Gastroenterology    [place] ESOPHAGOGASTRODUODENOSCOPY TRANSORAL DIAGNOSTIC N/A 3/31/2017    Procedure: ESOPHAGOGASTRODUODENOSCOPY (EGD); Surgeon: Grace Germain MD;  [facility]; Service: Gastroenterology       Family History   Problem Relation Age of Onset   [place] Breast cancer Mother         stage [de-identified] @ age 67    Dementia Mother     Hyperlipidemia Mother     Parkinsonism Mother     Fibroids Mother    [place] Prostatitis Father     Cataracts Father     Substance Abuse Sister     COPD Sister     Prostate cancer Brother     Premature birth Daughter     Substance Abuse Brother     No Known Problems Sister     Dementia Maternal Grandmother     Parkinsonism Maternal Grandmother     Fibroids Maternal Grandmother     Dementia Maternal Aunt     Dementia Maternal Aunt     Hypertension Neg Hx     Diabetes Neg Hx     Colon cancer Neg Hx     Ovarian cancer Neg Hx     Uterine cancer Neg Hx     Cervical cancer Neg Hx        Social History     Occupational History    Not on file   Tobacco Use    Smoking status: Never Smoker    Smokeless tobacco: Never Used   Substance and Sexual Activity    Alcohol use:  Yes     Alcohol/week: 7 0 standard drinks     Types: 7 Glasses of wine per week     Frequency: 2-3 times a week     Comment: socially    Drug use: No    Sexual activity: Yes     Partners: Male     Birth control/protection: OCP         Current Outpatient Medications:     BIOTIN 5000 PO, Take by mouth, Disp: , Rfl:     Cholecalciferol (VITAMIN D PO), Take by mouth, Disp: , Rfl:     Cranberry 1000 MG CAPS, Take by mouth, Disp: , Rfl:     fluticasone (FLONASE) 50 mcg/act nasal spray, 1 spray into each nostril daily, Disp: 1 Bottle, Rfl: 0    naproxen (NAPROSYN) 500 mg tablet, Take 1 tablet (500 mg total) by mouth 2 (two) times a day with meals, Disp: 28 tablet, Rfl: 0    norethindrone (MICRONOR) 0 35 MG tablet, Take 1 tablet by mouth daily, Disp: , Rfl:     Omega-3 Fatty Acids (FISH OIL PO), Take by mouth, Disp: , Rfl:     No Known Allergies    Objective: There were no vitals filed for this visit  Ortho Exam     Left thumb    Firm end point 0 and 30  TTP UCL  No swelling   EPL FPL intact  Compartments soft  Brisk capillary refill  S/m intact median, radial, and ulnar nerve     Physical Exam   Constitutional: She is oriented to person, place, and time  She appears well-developed and well-nourished  HENT:   Head: Normocephalic and atraumatic  Eyes: Conjunctivae are normal  Right eye exhibits no discharge  Left eye exhibits no discharge  Neck: Normal range of motion  Neck supple  Cardiovascular: Normal rate and intact distal pulses  Pulmonary/Chest: Effort normal  No respiratory distress  Musculoskeletal:   As noted in HPI   Neurological: She is alert and oriented to person, place, and time  Skin: Skin is warm and dry  Psychiatric: She has a normal mood and affect  Her behavior is normal  Judgment and thought content normal       I have personally reviewed pertinent films in PACS and my interpretation is as follows:MRI left thumb performed on 12/5/19 demonstrates a partial UCL tear without retraction    No Stener lesion

## 2020-01-27 ENCOUNTER — OFFICE VISIT (OUTPATIENT)
Dept: OBGYN CLINIC | Facility: CLINIC | Age: 50
End: 2020-01-27
Payer: COMMERCIAL

## 2020-01-27 VITALS
DIASTOLIC BLOOD PRESSURE: 79 MMHG | BODY MASS INDEX: 26.37 KG/M2 | HEART RATE: 84 BPM | WEIGHT: 173.4 LBS | SYSTOLIC BLOOD PRESSURE: 112 MMHG

## 2020-01-27 DIAGNOSIS — S63.602A SPRAIN OF LEFT THUMB, UNSPECIFIED SITE OF FINGER, INITIAL ENCOUNTER: Primary | ICD-10-CM

## 2020-01-27 PROCEDURE — 29075 APPL CST ELBW FNGR SHORT ARM: CPT | Performed by: ORTHOPAEDIC SURGERY

## 2020-01-27 PROCEDURE — 99213 OFFICE O/P EST LOW 20 MIN: CPT | Performed by: ORTHOPAEDIC SURGERY

## 2020-01-27 NOTE — PROGRESS NOTES
Assessment/Plan:  1  Sprain of left thumb, unspecified site of finger, initial encounter         Scribe Attestation    I,:   Gila Owusu am acting as a scribe while in the presence of the attending physician :        I,:   Roya Buenrostro DO personally performed the services described in this documentation    as scribed in my presence :              Due to reinjury and return of pain patient is being placed into a thumb spica cast today  Pt understands that she will be in cast for 4 weeks  Pt was advised that she will have some stiffness and require therapy after the cast is removed  Pt was also advised that at this time her right thumb has no anatomical abnormality but if she continues to have pain we may need to consider images  Cast application  Date/Time: 1/27/2020 10:20 AM  Performed by: Roya Buenrostro DO  Authorized by: Roya Buenrostro DO     Consent:     Consent obtained:  Verbal    Consent given by:  Patient  Pre-procedure details:     Sensation:  Normal  Procedure details:     Laterality:  Left    Location:  Hand    Hand:  L handCast type:  Short arm (thumb spica)    Supplies:  Cotton padding and fiberglass  Post-procedure details:     Pain:  Unchanged    Sensation:  Normal    Patient tolerance of procedure: Tolerated well, no immediate complications          Subjective:   Jessica Franklin is a 44 y o  female who presents to the office today for follow-up evaluation of left thumb UCL sprain  MRI was discussed at the last visit on 12/26/19 showing partial ulnar collateral ligament tear without retraction  Pt was first seen in the office 11/12/2019  Where she stated that the pain had been going on for about 1 year  Today patient states that her left thumb pain was almost resolved and she began removing thumb spica splint at home until she picked up her grand daughter last weekend while she was having a temper tantrum  Pt states that her pain increased and is now again resolving   Pt states that her pain is 1/10 today  Patient is concerned the pain returning after recent injury  Patient states she having pain at the MCP joint of her right thumb when she is tired  Patient states that she feels it is from over stressing her right hand due to limiting the use      Review of Systems   Constitutional: Negative for chills, fever and unexpected weight change  HENT: Negative for hearing loss, nosebleeds and sore throat  Eyes: Negative for pain, redness and visual disturbance  Respiratory: Negative for cough, shortness of breath and wheezing  Cardiovascular: Negative for chest pain, palpitations and leg swelling  Gastrointestinal: Negative for abdominal pain, nausea and vomiting  Endocrine: Negative for polydipsia and polyuria  Genitourinary: Negative for dysuria and hematuria  Skin: Negative for rash and wound  Neurological: Negative for dizziness and headaches  Psychiatric/Behavioral: Negative for decreased concentration, dysphoric mood and suicidal ideas  The patient is not nervous/anxious  Past Medical History:   Diagnosis Date    Bacterial vaginitis     Fibroids     Gastritis     GERD (gastroesophageal reflux disease)     Insomnia        Past Surgical History:   Procedure Laterality Date     SECTION      HAND SURGERY Left     Left pinky tendon repair    [place] COLONOSCOPY FLX DX W/COLLJ SPEC WHEN PFRMD N/A 2018    Procedure: COLONOSCOPY;  Surgeon: Kyle Ray MD;  [facility]; Service: Gastroenterology    [place] ESOPHAGOGASTRODUODENOSCOPY TRANSORAL DIAGNOSTIC N/A 3/31/2017    Procedure: ESOPHAGOGASTRODUODENOSCOPY (EGD); Surgeon: Kyle Ray MD;  [facility];   Service: Gastroenterology       Family History   Problem Relation Age of Onset    Breast cancer Mother         stage [de-identified] @ age 67    Dementia Mother     Hyperlipidemia Mother     Parkinsonism Mother     Fibroids Mother     Prostatitis Father     Cataracts Father    Germán Melendrez Substance Abuse Sister     COPD Sister     Prostate cancer Brother     Premature birth Daughter     Substance Abuse Brother     No Known Problems Sister     Dementia Maternal Grandmother     Parkinsonism Maternal Grandmother     Fibroids Maternal Grandmother     Dementia Maternal Aunt     Dementia Maternal Aunt     Hypertension Neg Hx     Diabetes Neg Hx     Colon cancer Neg Hx     Ovarian cancer Neg Hx     Uterine cancer Neg Hx     Cervical cancer Neg Hx        Social History     Occupational History    Not on file   Tobacco Use    Smoking status: Never Smoker    Smokeless tobacco: Never Used   Substance and Sexual Activity    Alcohol use: Yes     Alcohol/week: 7 0 standard drinks     Types: 7 Glasses of wine per week     Frequency: 2-3 times a week     Comment: socially    Drug use: No    Sexual activity: Yes     Partners: Male     Birth control/protection: OCP         Current Outpatient Medications:     BIOTIN 5000 PO, Take by mouth, Disp: , Rfl:     Cholecalciferol (VITAMIN D PO), Take by mouth, Disp: , Rfl:     Cranberry 1000 MG CAPS, Take by mouth, Disp: , Rfl:     fluticasone (FLONASE) 50 mcg/act nasal spray, 1 spray into each nostril daily, Disp: 1 Bottle, Rfl: 0    naproxen (NAPROSYN) 500 mg tablet, Take 1 tablet (500 mg total) by mouth 2 (two) times a day with meals, Disp: 28 tablet, Rfl: 0    norethindrone (MICRONOR) 0 35 MG tablet, Take 1 tablet by mouth daily, Disp: , Rfl:     Omega-3 Fatty Acids (FISH OIL PO), Take by mouth, Disp: , Rfl:     No Known Allergies    Objective: There were no vitals filed for this visit      Ortho Exam     Right thumb  No swelling  No tenderness to palpation over the MCP joint  Full range of motion the thumb    Left thumb  No swelling erythema or ecchymosis  Full range of motion  Mildly tender over the ulnar collateral ligament  Ulnar collateral ligament is stable testing    Physical Exam   Constitutional: She is oriented to person, place, and time  She appears well-developed and well-nourished  HENT:   Head: Normocephalic  Eyes: Conjunctivae are normal    Neck: Normal range of motion  Cardiovascular: Normal rate  Pulmonary/Chest: Effort normal and breath sounds normal    Neurological: She is alert and oriented to person, place, and time  Skin: Skin is warm and dry  Psychiatric: She has a normal mood and affect  Her behavior is normal    Vitals reviewed  I have personally reviewed pertinent films in PACS and my interpretation is as follows:   No new images reviewed today

## 2020-02-24 ENCOUNTER — OFFICE VISIT (OUTPATIENT)
Dept: OBGYN CLINIC | Facility: CLINIC | Age: 50
End: 2020-02-24
Payer: COMMERCIAL

## 2020-02-24 VITALS
BODY MASS INDEX: 26.64 KG/M2 | SYSTOLIC BLOOD PRESSURE: 122 MMHG | HEART RATE: 81 BPM | WEIGHT: 175.8 LBS | DIASTOLIC BLOOD PRESSURE: 74 MMHG | HEIGHT: 68 IN

## 2020-02-24 DIAGNOSIS — S63.602A SPRAIN OF LEFT THUMB, UNSPECIFIED SITE OF FINGER, INITIAL ENCOUNTER: Primary | ICD-10-CM

## 2020-02-24 PROCEDURE — 1036F TOBACCO NON-USER: CPT | Performed by: ORTHOPAEDIC SURGERY

## 2020-02-24 PROCEDURE — 99213 OFFICE O/P EST LOW 20 MIN: CPT | Performed by: ORTHOPAEDIC SURGERY

## 2020-02-24 PROCEDURE — 3008F BODY MASS INDEX DOCD: CPT | Performed by: ORTHOPAEDIC SURGERY

## 2020-02-24 RX ORDER — ACETAMINOPHEN AND CODEINE PHOSPHATE 120; 12 MG/5ML; MG/5ML
SOLUTION ORAL
COMMUNITY
Start: 2020-01-27 | End: 2020-02-24

## 2020-02-24 NOTE — PROGRESS NOTES
Assessment/Plan:  1  Sprain of left thumb, unspecified site of finger, initial encounter       Patient has done well after casting  She has no pain over the UCL of the thumb with palpation  She is negative stress exam   At this point she will wear the brace only during activity  She may be out of the brace otherwise  She will do this for the next 2 weeks  I can see her back in a month for recheck  I do not feel she will need surgery  Patient is aware that if this was not properly care for this can heal in a lengthened position cause chronic pain  Subjective: Follow-up left thumb UCL partial tear mcp    Patient ID: Rk Carrillo is a 44 y o  female  HPI  Patient follows up status post 4 weeks of casting for left thumb UCL partial tear of the MCP joint  He has been compliant with the cast   She claims that wearing the cast was necessary in this did help distal nerve from using the hand  She claims that when she had the splint on she may use the hand and she was supposed to  She claims she has less pain thumb  She has not noted the instability a pain she was having before  He denies any numbness or tingling  She has any cast issues  She denies any wounds  Review of Systems   Constitutional: Positive for activity change  Negative for chills, fever and unexpected weight change  HENT: Negative for hearing loss, nosebleeds and sore throat  Eyes: Negative for pain, redness and visual disturbance  Respiratory: Negative for cough, shortness of breath and wheezing  Cardiovascular: Negative for chest pain, palpitations and leg swelling  Gastrointestinal: Negative for abdominal pain, nausea and vomiting  Endocrine: Negative for polydipsia and polyuria  Genitourinary: Negative for dysuria and hematuria  Musculoskeletal:        See HPI   Skin: Negative for rash and wound  Neurological: Negative for dizziness, numbness and headaches     Psychiatric/Behavioral: Negative for decreased concentration and suicidal ideas  The patient is not nervous/anxious  Past Medical History:   Diagnosis Date    Bacterial vaginitis     Fibroids     Gastritis     GERD (gastroesophageal reflux disease)     Insomnia        Past Surgical History:   Procedure Laterality Date     SECTION      HAND SURGERY Left     Left pinky tendon repair    [place] COLONOSCOPY FLX DX W/COLLJ SPEC WHEN PFRMD N/A 2018    Procedure: COLONOSCOPY;  Surgeon: Dana Stafford MD;  [facility]; Service: Gastroenterology    [place] ESOPHAGOGASTRODUODENOSCOPY TRANSORAL DIAGNOSTIC N/A 3/31/2017    Procedure: ESOPHAGOGASTRODUODENOSCOPY (EGD); Surgeon: Dana Stafford MD;  [facility]; Service: Gastroenterology       Family History   Problem Relation Age of Onset   [facility] Breast cancer Mother         stage [de-identified] @ age 67    Dementia Mother     Hyperlipidemia Mother     Parkinsonism Mother     Fibroids Mother    [facility] Prostatitis Father     Cataracts Father     Substance Abuse Sister     COPD Sister     Prostate cancer Brother     Premature birth Daughter     Substance Abuse Brother     No Known Problems Sister     Dementia Maternal Grandmother     Parkinsonism Maternal Grandmother     Fibroids Maternal Grandmother     Dementia Maternal Aunt     Dementia Maternal Aunt     Hypertension Neg Hx     Diabetes Neg Hx     Colon cancer Neg Hx     Ovarian cancer Neg Hx     Uterine cancer Neg Hx     Cervical cancer Neg Hx        Social History     Occupational History    Not on file   Tobacco Use    Smoking status: Never Smoker    Smokeless tobacco: Never Used   Substance and Sexual Activity    Alcohol use:  Yes     Alcohol/week: 7 0 standard drinks     Types: 7 Glasses of wine per week     Frequency: 2-3 times a week     Comment: socially    Drug use: No    Sexual activity: Yes     Partners: Male     Birth control/protection: OCP         Current Outpatient Medications:     BIOTIN 5000 PO, Take by mouth, Disp: , Rfl:     Cholecalciferol (VITAMIN D PO), Take by mouth, Disp: , Rfl:     Cranberry 1000 MG CAPS, Take by mouth, Disp: , Rfl:     fluticasone (FLONASE) 50 mcg/act nasal spray, 1 spray into each nostril daily, Disp: 1 Bottle, Rfl: 0    naproxen (NAPROSYN) 500 mg tablet, Take 1 tablet (500 mg total) by mouth 2 (two) times a day with meals, Disp: 28 tablet, Rfl: 0    norethindrone (MICRONOR) 0 35 MG tablet, Take 1 tablet by mouth daily, Disp: , Rfl:     Omega-3 Fatty Acids (FISH OIL PO), Take by mouth, Disp: , Rfl:     No Known Allergies    Objective:  Vitals:    02/24/20 0842   BP: 122/74   Pulse: 81       Body mass index is 26 73 kg/m²  Ortho Exam    Left thumb  Firm endpoint at 0 and 30  Nontender over the UCL  No edema  Compartment soft  Near full range of motion of the thumb MCP  Can fully oppose--  5/5 strength      Physical Exam   Constitutional: She is oriented to person, place, and time  She appears well-developed and well-nourished  HENT:   Head: Normocephalic and atraumatic  Eyes: Pupils are equal, round, and reactive to light  Conjunctivae are normal    Neck: Normal range of motion  Neck supple  Cardiovascular: Normal rate and intact distal pulses  Pulmonary/Chest: Effort normal  No respiratory distress  Musculoskeletal:   As noted in HPI   Neurological: She is alert and oriented to person, place, and time  Skin: Skin is warm and dry  Psychiatric: She has a normal mood and affect  Her behavior is normal    Nursing note and vitals reviewed

## 2020-03-20 ENCOUNTER — TELEMEDICINE (OUTPATIENT)
Dept: FAMILY MEDICINE CLINIC | Facility: CLINIC | Age: 50
End: 2020-03-20
Payer: COMMERCIAL

## 2020-03-20 DIAGNOSIS — B97.89 ACUTE VIRAL SINUSITIS: Primary | ICD-10-CM

## 2020-03-20 DIAGNOSIS — J01.90 ACUTE VIRAL SINUSITIS: Primary | ICD-10-CM

## 2020-03-20 PROCEDURE — 99213 OFFICE O/P EST LOW 20 MIN: CPT | Performed by: FAMILY MEDICINE

## 2020-03-20 RX ORDER — FLUTICASONE PROPIONATE 50 MCG
1 SPRAY, SUSPENSION (ML) NASAL DAILY
Qty: 1 BOTTLE | Refills: 2 | Status: SHIPPED | OUTPATIENT
Start: 2020-03-20 | End: 2020-05-27

## 2020-03-20 NOTE — PROGRESS NOTES
Virtual Brief Visit    Reason for visit is sick visit   Encounter provider Cezar Torres DO    Provider located at [address] [place] [address]   [facility] [id number]      Recent Visits  No visits were found meeting these conditions  Showing recent visits within past 7 days and meeting all other requirements     Future Appointments  No visits were found meeting these conditions  Showing future appointments within next 150 days and meeting all other requirements        Patient agrees to participate in a virtual check in via telephone or video visit instead of presenting to the office to address urgent/immediate medical needs  Patient is aware this is a billable service  After connecting through telephone, the patient was identified by name and date of birth  Joann Ramon was informed that this was a telemedicine visit and that the visit is being conducted through telephone which may not be secure and therefore might not be HIPAA-compliant  My office door was closed  No one else was in the room  She acknowledged consent and understanding of privacy and security of the virtual check-in visit  I informed the patient that I have reviewed her record in Epic and presented the opportunity for her to ask any questions regarding the visit today  The patient initiated communication and agreed to participate      Subjective  Joann Ramon is a 44 y o  female who called the office for a sick visit   - of note went to [place] for a  on Wednesday (3/18/2020)   - since then has had nasal and forehead pressure (which has been mildly resolved with Ibuprofen) and a "tickly in my throat" for the past 2days   - denies F/C/N/V/dizziness/earache/sore throat/CP/palpitations/SOB/wheezing  - nml appetite   - has not tried anything else besides Ibuprofen   - no seasonal allergies  - NKDA     Past Medical History:   Diagnosis Date    Bacterial vaginitis     Fibroids     Gastritis     GERD (gastroesophageal reflux disease)     Insomnia        Past Surgical History:   Procedure Laterality Date     SECTION      HAND SURGERY Left     Left pinky tendon repair    [place] COLONOSCOPY FLX DX W/COLLJ SPEC WHEN PFRMD N/A 2018    Procedure: COLONOSCOPY;  Surgeon: Sarina Cerda MD;  [facility]; Service: Gastroenterology    [place] ESOPHAGOGASTRODUODENOSCOPY TRANSORAL DIAGNOSTIC N/A 3/31/2017    Procedure: ESOPHAGOGASTRODUODENOSCOPY (EGD); Surgeon: Sarina Cerda MD;  [facility]; Service: Gastroenterology       Current Outpatient Medications   Medication Sig Dispense Refill    BIOTIN 5000 PO Take by mouth      Cholecalciferol (VITAMIN D PO) Take by mouth      Cranberry 1000 MG CAPS Take by mouth      fluticasone (FLONASE) 50 mcg/act nasal spray 1 spray into each nostril daily 1 Bottle 0    fluticasone (FLONASE) 50 mcg/act nasal spray 1 spray into each nostril daily 1 Bottle 2    naproxen (NAPROSYN) 500 mg tablet Take 1 tablet (500 mg total) by mouth 2 (two) times a day with meals 28 tablet 0    norethindrone (MICRONOR) 0 35 MG tablet Take 1 tablet by mouth daily      Omega-3 Fatty Acids (FISH OIL PO) Take by mouth       No current facility-administered medications for this visit  No Known Allergies    Assessment    [facility] telephone assessment is viral sinusitis    Disposition:    Viral Sinusitis  - advised supportive care with Flonase, Mucinex, Sherice Pot and cool mist humidifier   - encouraged fluids, hot tea with honey to soothe the throat  - educated that can take 7-10days to resolve  - frequent hand-washing to prevent the spread of infection   - advised to call the office IF have a fever >101, cough not improving    I spent 15 minutes with the patient during this virtual check-in visit

## 2020-05-27 ENCOUNTER — OFFICE VISIT (OUTPATIENT)
Dept: FAMILY MEDICINE CLINIC | Facility: CLINIC | Age: 50
End: 2020-05-27
Payer: COMMERCIAL

## 2020-05-27 VITALS
WEIGHT: 178 LBS | SYSTOLIC BLOOD PRESSURE: 110 MMHG | BODY MASS INDEX: 26.98 KG/M2 | HEART RATE: 70 BPM | DIASTOLIC BLOOD PRESSURE: 70 MMHG | HEIGHT: 68 IN | RESPIRATION RATE: 16 BRPM | TEMPERATURE: 98 F

## 2020-05-27 DIAGNOSIS — Z13.220 SCREENING CHOLESTEROL LEVEL: ICD-10-CM

## 2020-05-27 DIAGNOSIS — H81.12 BENIGN PAROXYSMAL POSITIONAL VERTIGO OF LEFT EAR: Primary | ICD-10-CM

## 2020-05-27 DIAGNOSIS — E66.3 OVERWEIGHT: ICD-10-CM

## 2020-05-27 PROCEDURE — 99214 OFFICE O/P EST MOD 30 MIN: CPT | Performed by: FAMILY MEDICINE

## 2020-05-27 PROCEDURE — 1036F TOBACCO NON-USER: CPT | Performed by: FAMILY MEDICINE

## 2020-05-27 PROCEDURE — 3008F BODY MASS INDEX DOCD: CPT | Performed by: FAMILY MEDICINE

## 2020-05-29 ENCOUNTER — EVALUATION (OUTPATIENT)
Dept: PHYSICAL THERAPY | Facility: CLINIC | Age: 50
End: 2020-05-29
Payer: COMMERCIAL

## 2020-05-29 DIAGNOSIS — H81.12 BENIGN PAROXYSMAL POSITIONAL VERTIGO OF LEFT EAR: ICD-10-CM

## 2020-05-29 PROCEDURE — 97162 PT EVAL MOD COMPLEX 30 MIN: CPT | Performed by: PHYSICAL THERAPIST

## 2020-05-31 LAB
BASOPHILS # BLD AUTO: 0 X10E3/UL (ref 0–0.2)
BASOPHILS NFR BLD AUTO: 1 %
BUN SERPL-MCNC: 12 MG/DL (ref 6–24)
BUN/CREAT SERPL: 13 (ref 9–23)
CALCIUM SERPL-MCNC: 9.2 MG/DL (ref 8.7–10.2)
CHLORIDE SERPL-SCNC: 101 MMOL/L (ref 96–106)
CHOLEST SERPL-MCNC: 203 MG/DL (ref 100–199)
CHOLEST/HDLC SERPL: 2.9 RATIO (ref 0–4.4)
CO2 SERPL-SCNC: 21 MMOL/L (ref 20–29)
CREAT SERPL-MCNC: 0.9 MG/DL (ref 0.57–1)
EOSINOPHIL # BLD AUTO: 0.1 X10E3/UL (ref 0–0.4)
EOSINOPHIL NFR BLD AUTO: 3 %
ERYTHROCYTE [DISTWIDTH] IN BLOOD BY AUTOMATED COUNT: 11.7 % (ref 11.7–15.4)
GLUCOSE SERPL-MCNC: 87 MG/DL (ref 65–99)
HCT VFR BLD AUTO: 38.3 % (ref 34–46.6)
HDLC SERPL-MCNC: 71 MG/DL
HGB BLD-MCNC: 12.6 G/DL (ref 11.1–15.9)
IMM GRANULOCYTES # BLD: 0 X10E3/UL (ref 0–0.1)
IMM GRANULOCYTES NFR BLD: 0 %
LDLC SERPL CALC-MCNC: 122 MG/DL (ref 0–99)
LYMPHOCYTES # BLD AUTO: 1.8 X10E3/UL (ref 0.7–3.1)
LYMPHOCYTES NFR BLD AUTO: 37 %
MCH RBC QN AUTO: 28.7 PG (ref 26.6–33)
MCHC RBC AUTO-ENTMCNC: 32.9 G/DL (ref 31.5–35.7)
MCV RBC AUTO: 87 FL (ref 79–97)
MONOCYTES # BLD AUTO: 0.5 X10E3/UL (ref 0.1–0.9)
MONOCYTES NFR BLD AUTO: 11 %
NEUTROPHILS # BLD AUTO: 2.3 X10E3/UL (ref 1.4–7)
NEUTROPHILS NFR BLD AUTO: 48 %
PLATELET # BLD AUTO: 266 X10E3/UL (ref 150–450)
POTASSIUM SERPL-SCNC: 4.4 MMOL/L (ref 3.5–5.2)
RBC # BLD AUTO: 4.39 X10E6/UL (ref 3.77–5.28)
SL AMB EGFR AFRICAN AMERICAN: 85 ML/MIN/1.73
SL AMB EGFR NON AFRICAN AMERICAN: 74 ML/MIN/1.73
SL AMB VLDL CHOLESTEROL CALC: 10 MG/DL (ref 5–40)
SODIUM SERPL-SCNC: 138 MMOL/L (ref 134–144)
TRIGL SERPL-MCNC: 51 MG/DL (ref 0–149)
TSH SERPL DL<=0.005 MIU/L-ACNC: 1.84 UIU/ML (ref 0.45–4.5)
WBC # BLD AUTO: 4.7 X10E3/UL (ref 3.4–10.8)

## 2020-08-19 ENCOUNTER — OFFICE VISIT (OUTPATIENT)
Dept: FAMILY MEDICINE CLINIC | Facility: CLINIC | Age: 50
End: 2020-08-19
Payer: COMMERCIAL

## 2020-08-19 VITALS
SYSTOLIC BLOOD PRESSURE: 102 MMHG | RESPIRATION RATE: 14 BRPM | HEIGHT: 68 IN | DIASTOLIC BLOOD PRESSURE: 60 MMHG | WEIGHT: 176 LBS | BODY MASS INDEX: 26.67 KG/M2 | HEART RATE: 78 BPM

## 2020-08-19 DIAGNOSIS — M25.551 HIP PAIN, CHRONIC, RIGHT: ICD-10-CM

## 2020-08-19 DIAGNOSIS — G89.29 HIP PAIN, CHRONIC, RIGHT: ICD-10-CM

## 2020-08-19 DIAGNOSIS — E78.00 ELEVATED LDL CHOLESTEROL LEVEL: ICD-10-CM

## 2020-08-19 DIAGNOSIS — N60.02 BREAST CYST, LEFT: ICD-10-CM

## 2020-08-19 DIAGNOSIS — M25.561 CHRONIC PAIN OF RIGHT KNEE: Primary | ICD-10-CM

## 2020-08-19 DIAGNOSIS — G89.29 CHRONIC PAIN OF RIGHT KNEE: Primary | ICD-10-CM

## 2020-08-19 PROCEDURE — 1036F TOBACCO NON-USER: CPT | Performed by: FAMILY MEDICINE

## 2020-08-19 PROCEDURE — 99214 OFFICE O/P EST MOD 30 MIN: CPT | Performed by: FAMILY MEDICINE

## 2020-08-19 PROCEDURE — 3008F BODY MASS INDEX DOCD: CPT | Performed by: FAMILY MEDICINE

## 2020-08-19 RX ORDER — CHOLESTEROL
POWDER (GRAM) MISCELLANEOUS
COMMUNITY
End: 2021-09-10

## 2020-08-19 NOTE — PROGRESS NOTES
Assessment/Plan:   Diagnoses and all orders for this visit:    Chronic pain of right knee  -     Ambulatory referral to Physical Therapy; Future  - benign exam   - discussed postural concerns - pt wears 3-4" heels, carries her 2yo granddaughter on R-side   - advised to use Tylenol/Motrin in the short-term and to f/u with PT - pt aware and agreeable  - f/u in 2months when also due for her annual exam - pt aware and agreeable   Hip pain, chronic, right    Breast cyst, left  -     US breast left limited (diagnostic); Future  - pt follows with Ob/Gyn at [facility] and states [facility] with Mammo but needs additional imaging given h/o breast cysts     Elevated LDL cholesterol level  -     Cholesterol POWD; by Does not apply route  -     Lipid panel; Future  -     Lipid panel    Other orders  -     Cancel: Mammo screening bilateral w 3d & cad; Future        Subjective:    Patient ID: Micaela Aragon is a 44 y o  female    43yo F presents to the office with her daughter for eval of R-knee and R-hip pain  - (+) Hip pain for the past 2months and R-knee pain on/off for the past 3yrs   - pain worse when walking down the stairs - feels unstable and has to grab the banister  - no pain when walking across flat surface   - has tried Ibuprofen and massaging knee - does not use regularly   - started 21day challenge last week - has lost 4lbs since last week   - has cut down on cheese, red meat, eating egg whites   - does carry her 2yo granddaugher on R-side  - hip pain mostly with standing   - does chronically wear 3-4" heels  - is R-hand dominant       The following portions of the patient's history were reviewed and updated as appropriate: allergies, current medications, past family history, past medical history, past social history, past surgical history and problem list     Review of Systems  as per HPI    Objective:  /60 (BP Location: Left arm, Patient Position: Sitting, Cuff Size: Large)   Pulse 78   Resp 14   Ht 5' 8" (1 727 m)   Wt 79 8 kg (176 lb)   BMI 26 76 kg/m²    Physical Exam  Vitals signs reviewed  Constitutional:       General: She is not in acute distress  Appearance: Normal appearance  She is not ill-appearing, toxic-appearing or diaphoretic  HENT:      Head: Normocephalic and atraumatic  Right Ear: External ear normal       Left Ear: External ear normal    Eyes:      General: No scleral icterus  Right eye: No discharge  Left eye: No discharge  Extraocular Movements: Extraocular movements intact  Conjunctiva/sclera: Conjunctivae normal    Neck:      Musculoskeletal: Normal range of motion  Abdominal:      Palpations: Abdomen is soft  Musculoskeletal: Normal range of motion  General: No swelling, tenderness, deformity or signs of injury  Right lower leg: No edema  Left lower leg: No edema  Comments: nml ROM of R-hip, nml ROM of R-knee, NEG Anterior/Posterior Drawer Test, NEG Levon, no swelling noted, hip and knee not tender to palpation    Skin:     General: Skin is warm  Coloration: Skin is not pale  Findings: No erythema  Neurological:      General: No focal deficit present  Mental Status: She is alert and oriented to person, place, and time  Psychiatric:         Mood and Affect: Mood normal          Behavior: Behavior normal        BMI Counseling: Body mass index is 26 76 kg/m²  The BMI is above normal  Nutrition recommendations include reducing portion sizes and decreasing overall calorie intake  Exercise recommendations include moderate aerobic physical activity for 150 minutes/week, exercising 3-5 times per week and joining a gym

## 2020-08-27 ENCOUNTER — EVALUATION (OUTPATIENT)
Dept: PHYSICAL THERAPY | Facility: CLINIC | Age: 50
End: 2020-08-27
Payer: COMMERCIAL

## 2020-08-27 DIAGNOSIS — M25.551 CHRONIC RIGHT HIP PAIN: ICD-10-CM

## 2020-08-27 DIAGNOSIS — M25.561 CHRONIC PAIN OF RIGHT KNEE: Primary | ICD-10-CM

## 2020-08-27 DIAGNOSIS — G89.29 CHRONIC PAIN OF RIGHT KNEE: Primary | ICD-10-CM

## 2020-08-27 DIAGNOSIS — G89.29 CHRONIC RIGHT HIP PAIN: ICD-10-CM

## 2020-08-27 PROCEDURE — 97162 PT EVAL MOD COMPLEX 30 MIN: CPT | Performed by: PHYSICAL THERAPIST

## 2020-08-27 NOTE — PROGRESS NOTES
PT Evaluation     Today's date: 2020  Patient name: Micaela Aragon  : 1970  MRN: [id number]  Referring provider: Jerzy Ventura DO  Dx:   Encounter Diagnosis     ICD-10-CM    1  Chronic pain of right knee  M25 561     G89 29    2  Chronic right hip pain  M25 551     G89 29        Start Time: 0840  Stop Time: 0915  Total time in clinic (min): 35 minutes    Assessment  Assessment details: Micaela Aragon is a 44 y o  female who presents to the clinic with complaints of right knee and right hip pain  The patient presents with the above listed impairments  She is very tender to palpation at the right iliac crest and has pain with resisted hip flexion, possibly stemming from her hip flexors  The patient's knee pain is primarily present during squatting and descending stairs  This pain limits her ability to complete these activities  She is eager to decrease her pain and should benefit from skilled physical therapy  Thank you for the referral       Impairments: abnormal muscle firing, abnormal or restricted ROM, activity intolerance, impaired physical strength, lacks appropriate home exercise program and pain with function  Understanding of Dx/Px/POC: good   Prognosis: good    Goals  Impairment Goals:  1  Patient will decrease complaints of pain by 50% at worst in 6 weeks  2  Patient will increase R LE strength to 5/5 globally in 6 weeks    Functional Goals:  1  Patient will be independent with HEP by discharge  2  Patient will increase FOTO to average norms by discharge  3  Patient will be able to complete a squat with decreased pain in 6 weeks  4  Patient will be able to descend stairs with a reciprocal pattern and decreased pain in 6 weeks  5  Patient will have decreased pain in her right hip after waking up in the morning in 6 weeks  6    Patient will return to prior level of function by discharge       Plan  Patient would benefit from: skilled physical therapy  Planned modality interventions: cryotherapy, TENS and thermotherapy: hydrocollator packs  Planned therapy interventions: abdominal trunk stabilization, flexibility, functional ROM exercises, graded activity, graded exercise, home exercise program, therapeutic exercise, stretching, strengthening, therapeutic activities, patient education, joint mobilization, manual therapy, Richey taping, massage and neuromuscular re-education  Frequency: 2x week  Duration in weeks: 6  Treatment plan discussed with: patient        Subjective Evaluation    History of Present Illness  Mechanism of injury: HPI:  Patient reports right knee pain over the past "several years"  She notes that her right hip started bothering her ~2 month months ago  She notes that her pain varies, and notes that she has difficulty with descending stairs  The pain continued to get worse and she went to see her PCP  She was then referred to physical therapy        Pain Location:  Anterior right hip and anterior right knee  Occupation:  Student   Prior Functional Limitations:  Multi year knee pain, unable to run   AGG:  Squats, descending stairs, prolonged ambulation  Ease:  No true easing factors  Patient Goals:  "Figure out how to make the pain go away and to start running again"     Pain  Current pain ratin  At best pain ratin  At worst pain ratin  Quality: sharp          Objective     Concurrent Complaints  Negative for night pain, disturbed sleep, bladder dysfunction, bowel dysfunction and saddle (S4) numbness    Tenderness     Right Hip   Tenderness in the ASIS and iliac crest      Active Range of Motion   Left Hip   Normal active range of motion    Right Hip   Normal active range of motion  Left Knee   Normal active range of motion    Right Knee   Normal active range of motion    Additional Active Range of Motion Details  Lumbar Flexion - Full  Lumbar Extension - Full    Strength/Myotome Testing     Left Hip   Planes of Motion   Flexion: 5  Extension: 4  Abduction: 4+    Right Hip   Planes of Motion   Flexion: 3+  Extension: 4-  Abduction: 4+    Left Knee   Extension: 5    Right Knee   Extension: 5    Left Ankle/Foot   Dorsiflexion: 4+    Right Ankle/Foot   Plantar flexion: 4+    Additional Strength Details  Pain with resisted hip flexion on right     Tests     Right Hip   Negative PADMINI and CURTIS Anaya: Positive       General Comments:      Knee Comments  Patient with pain sub-patella during squats  Slight knee valgus during squats               Diagnosis:    Precautions:    Manuals        PROM        Mobs                                There Ex        Bike        Prone Quad Stretch        Hip Flexor Stretch        Leg Press                                Neruo Re-Ed        The Gt HAWKINS Hip ABD        X-Walks        Leg Press        Rebounder                                                                        Ther Act                                         Modalities             CP PRN

## 2020-09-02 ENCOUNTER — OFFICE VISIT (OUTPATIENT)
Dept: PHYSICAL THERAPY | Facility: CLINIC | Age: 50
End: 2020-09-02
Payer: COMMERCIAL

## 2020-09-02 DIAGNOSIS — M25.551 CHRONIC RIGHT HIP PAIN: ICD-10-CM

## 2020-09-02 DIAGNOSIS — G89.29 CHRONIC RIGHT HIP PAIN: ICD-10-CM

## 2020-09-02 DIAGNOSIS — G89.29 CHRONIC PAIN OF RIGHT KNEE: Primary | ICD-10-CM

## 2020-09-02 DIAGNOSIS — M25.561 CHRONIC PAIN OF RIGHT KNEE: Primary | ICD-10-CM

## 2020-09-02 PROCEDURE — 97110 THERAPEUTIC EXERCISES: CPT | Performed by: PHYSICAL THERAPIST

## 2020-09-02 PROCEDURE — 97140 MANUAL THERAPY 1/> REGIONS: CPT | Performed by: PHYSICAL THERAPIST

## 2020-09-02 PROCEDURE — 97112 NEUROMUSCULAR REEDUCATION: CPT | Performed by: PHYSICAL THERAPIST

## 2020-09-02 NOTE — PROGRESS NOTES
Daily Note     Today's date: 2020  Patient name: Clifford Stoddard  : 1970  MRN: [id number]  Referring provider: Netta Gipson DO  Dx:   Encounter Diagnosis     ICD-10-CM    1  Chronic pain of right knee  M25 561     G89 29    2  Chronic right hip pain  M25 551     G89 29        Start Time: 1700  Stop Time: 1742  Total time in clinic (min): 42 minutes    Subjective: "I'm still feeling it in my hip and knee"      Objective: See treatment diary below      Assessment: Tolerated treatment well  Patient would benefit from continued PT  Patient still very tender to hip flexor insertion  Prone hip flexor stretch with a proper under her thigh brought on a stretch in that exact area  Patient limited with SL hip flexor stretch ROM, but able to tolerate stretch  Patient easily gets fatigued  Needs to work on overall hip and LE strength  Progress as able  Plan: Progress treatment as tolerated         Diagnosis:    Precautions:    Manuals        PROM Prone Quad / SL Hip Flexor       Mobs                                There Ex        Bike 5 min       Prone Quad Stretch 20" x 5       Hip Flexor Stretch        Leg Press                                Neruo Re-Ed        Quad Sets        Clamshells Red 2x10       SL Hip ABD        X-Walks Red 1/2 lap       Leg Press 40# 2x10       Rebounder         SLR x3 - held pain       Hamstring Curls PSB 2x10                                                      Ther Act                                         Modalities             CP PRN

## 2020-09-04 ENCOUNTER — OFFICE VISIT (OUTPATIENT)
Dept: PHYSICAL THERAPY | Facility: CLINIC | Age: 50
End: 2020-09-04
Payer: COMMERCIAL

## 2020-09-04 DIAGNOSIS — M25.551 CHRONIC RIGHT HIP PAIN: ICD-10-CM

## 2020-09-04 DIAGNOSIS — M25.561 CHRONIC PAIN OF RIGHT KNEE: Primary | ICD-10-CM

## 2020-09-04 DIAGNOSIS — G89.29 CHRONIC PAIN OF RIGHT KNEE: Primary | ICD-10-CM

## 2020-09-04 DIAGNOSIS — G89.29 CHRONIC RIGHT HIP PAIN: ICD-10-CM

## 2020-09-04 PROCEDURE — 97110 THERAPEUTIC EXERCISES: CPT | Performed by: PHYSICAL THERAPIST

## 2020-09-04 PROCEDURE — 97112 NEUROMUSCULAR REEDUCATION: CPT | Performed by: PHYSICAL THERAPIST

## 2020-09-04 NOTE — PROGRESS NOTES
Daily Note     Today's date: 2020  Patient name: David Doyle  : 1970  MRN: [id number]  Referring provider: Corie Rios DO  Dx:   Encounter Diagnosis     ICD-10-CM    1  Chronic pain of right knee  M25 561     G89 29    2  Chronic right hip pain  M25 551     G89 29        Start Time: 1016  Stop Time: 1100  Total time in clinic (min): 44 minutes    Subjective: "I could feel it after last time  It was sore"      Objective: See treatment diary below      Assessment: Tolerated treatment well  Patient would benefit from continued PT  Patient able to tolerate light IASTM to the hip flexor today  Still held on SLR as patient is having pain with this  Continuing to work on flexibility of the hip flexors as well strengthening of the hip globally  Plan: Progress treatment as tolerated         Diagnosis:    Precautions:    Manuals       PROM        Mobs        IASTM  Hip Flexor                      There Ex        Bike 5 min       Prone Quad Stretch 20" x 5 20" x 5      Hip Flexor Stretch  10" x 5      Leg Press                                Neruo Re-Ed        Quad Sets        Clamshells Red 2x10 Red 3x10      SL Hip ABD  2x10      X-Walks Red 1/2 lap Red 1/2 lap      Leg Press 40# 2x10 40# 3x10      Rebounder         SLR x3 - held pain       Hamstring Curls PSB 2x10 OSB 2x10                                                     Ther Act                                         Modalities             CP PRN

## 2020-09-09 ENCOUNTER — OFFICE VISIT (OUTPATIENT)
Dept: PHYSICAL THERAPY | Facility: CLINIC | Age: 50
End: 2020-09-09
Payer: COMMERCIAL

## 2020-09-09 DIAGNOSIS — M25.551 CHRONIC RIGHT HIP PAIN: ICD-10-CM

## 2020-09-09 DIAGNOSIS — M25.561 CHRONIC PAIN OF RIGHT KNEE: Primary | ICD-10-CM

## 2020-09-09 DIAGNOSIS — G89.29 CHRONIC RIGHT HIP PAIN: ICD-10-CM

## 2020-09-09 DIAGNOSIS — G89.29 CHRONIC PAIN OF RIGHT KNEE: Primary | ICD-10-CM

## 2020-09-09 PROCEDURE — 97140 MANUAL THERAPY 1/> REGIONS: CPT

## 2020-09-09 PROCEDURE — 97112 NEUROMUSCULAR REEDUCATION: CPT

## 2020-09-09 NOTE — PROGRESS NOTES
Daily Note     Today's date: 2020  Patient name: Dianne Kirkland  : 1970  MRN: [id number]  Referring provider: Linda Estrada DO  Dx:   Encounter Diagnosis     ICD-10-CM    1  Chronic pain of right knee  M25 561     G89 29    2  Chronic right hip pain  M25 551     G89 29                   Subjective: Patient reports, "I am a little sore from doing my exercises at home  I was able to wear heels over the weekend though"  Objective: See treatment diary below      Assessment: Tolerated treatment well  Patient would benefit from continued PT  Continued with outlined program without increased pain  Discomfort along hip flexor with SL hip abd, and x-walks  Improvement with IASTM  Able to perform SL on leg press, pain free  Progress strength as able  Plan: Continue per plan of care        Diagnosis:    Precautions:    Manuals      PROM        Mobs        IASTM  Hip Flexor Hip Flexor                      There Ex        Bike 5 min       Prone Quad Stretch 20" x 5 20" x 5 20" x 5     Hip Flexor Stretch  10" x 5 10" x 5     Leg Press                                Neruo Re-Ed        Quad Sets        Clamshells Red 2x10 Red 3x10 Red, 2x10      SL Hip ABD  2x10 2x10      X-Walks Red 1/2 lap Red 1/2 lap Red, 1 lap      Leg Press 40# 2x10 40# 3x10 45#, 1x10   40#, 2x10   SL: 20#, 10x     Rebounder         SLR x3 - held pain       Hamstring Curls PSB 2x10 OSB 2x10 OSB: 2x10                                                     Ther Act                                         Modalities             CP PRN

## 2020-09-11 ENCOUNTER — TELEPHONE (OUTPATIENT)
Dept: FAMILY MEDICINE CLINIC | Facility: CLINIC | Age: 50
End: 2020-09-11

## 2020-09-11 ENCOUNTER — OFFICE VISIT (OUTPATIENT)
Dept: PHYSICAL THERAPY | Facility: CLINIC | Age: 50
End: 2020-09-11
Payer: COMMERCIAL

## 2020-09-11 DIAGNOSIS — G89.29 CHRONIC PAIN OF RIGHT KNEE: Primary | ICD-10-CM

## 2020-09-11 DIAGNOSIS — M25.551 CHRONIC RIGHT HIP PAIN: ICD-10-CM

## 2020-09-11 DIAGNOSIS — M25.561 CHRONIC PAIN OF RIGHT KNEE: Primary | ICD-10-CM

## 2020-09-11 DIAGNOSIS — G89.29 CHRONIC RIGHT HIP PAIN: ICD-10-CM

## 2020-09-11 PROCEDURE — 97112 NEUROMUSCULAR REEDUCATION: CPT | Performed by: PHYSICAL THERAPIST

## 2020-09-11 PROCEDURE — 97110 THERAPEUTIC EXERCISES: CPT | Performed by: PHYSICAL THERAPIST

## 2020-09-11 NOTE — TELEPHONE ENCOUNTER
Patient called in, she said she saw Dr Bruce Nelson about a month ago and she looked in her ears and said she did not see anything however the patient said she is still having a sensation  She wants to know if she can have an order to ENT?  I offered her an appointment w/ Lukas Hua on Monday but she declined because she lost her job and cannot afford another OV to just be sent to a specialist

## 2020-09-11 NOTE — PROGRESS NOTES
Daily Note     Today's date: 2020  Patient name: Judy Burkitt  : 1970  MRN: [id number]  Referring provider: Jackie Araiza DO  Dx:   Encounter Diagnosis     ICD-10-CM    1  Chronic pain of right knee  M25 561     G89 29    2  Chronic right hip pain  M25 551     G89 29        Start Time: 818  Stop Time: 9  Total time in clinic (min): 40 minutes    Subjective: "Yesterday I did a lot of shopping and it really hurt  This morning though it felt great  I don't feel anything unless I really push on it"      Objective: See treatment diary below      Assessment: Tolerated treatment well  Patient would benefit from continued PT  Manuals held today due to pain being diminished  Able to tolerate an increase in her resistance as well as introduction of bridges and single leg bridges  SLR still painful, but able to complete 2 sets of 5 before the beginning of "pain" started  Continue to progress hip strength as able  Plan: Progress treatment as tolerated         Diagnosis:    Precautions:    Manuals     PROM        Mobs        IASTM  Hip Flexor Hip Flexor                      There Ex        Bike 5 min       Prone Quad Stretch 20" x 5 20" x 5 20" x 5 20" x 6    Hip Flexor Stretch  10" x 5 10" x 5 20" x 6    Leg Press                                Neruo Re-Ed        Quad Sets        Clamshells Red 2x10 Red 3x10 Red, 2x10  Green 2x10    SL Hip ABD  2x10 2x10  2x10    X-Walks Red 1/2 lap Red 1/2 lap Red, 1 lap      Leg Press 40# 2x10 40# 3x10 45#, 1x10   40#, 2x10   SL: 20#, 10x 40# x15 DL  SL 20# 2x10    Rebounder         SLR x3 - held pain       Hamstring Curls PSB 2x10 OSB 2x10 OSB: 2x10  OSB 2x15    Bridges    2x10  1x10 SL on R                                            Ther Act                                         Modalities             CP PRN

## 2020-09-14 ENCOUNTER — OFFICE VISIT (OUTPATIENT)
Dept: PHYSICAL THERAPY | Facility: CLINIC | Age: 50
End: 2020-09-14
Payer: COMMERCIAL

## 2020-09-14 DIAGNOSIS — G89.29 CHRONIC PAIN OF RIGHT KNEE: Primary | ICD-10-CM

## 2020-09-14 DIAGNOSIS — M25.551 CHRONIC RIGHT HIP PAIN: ICD-10-CM

## 2020-09-14 DIAGNOSIS — G89.29 CHRONIC RIGHT HIP PAIN: ICD-10-CM

## 2020-09-14 DIAGNOSIS — M25.561 CHRONIC PAIN OF RIGHT KNEE: Primary | ICD-10-CM

## 2020-09-14 PROCEDURE — 97140 MANUAL THERAPY 1/> REGIONS: CPT

## 2020-09-14 PROCEDURE — 97110 THERAPEUTIC EXERCISES: CPT

## 2020-09-14 PROCEDURE — 97112 NEUROMUSCULAR REEDUCATION: CPT

## 2020-09-14 NOTE — PROGRESS NOTES
Daily Note     Today's date: 2020  Patient name: Paco Villegas  : 1970  MRN: [id number]  Referring provider: Eladia Cuadra DO  Dx:   Encounter Diagnosis     ICD-10-CM    1  Chronic pain of right knee  M25 561     G89 29    2  Chronic right hip pain  M25 551     G89 29                   Subjective: Patent reports, 'I am typically sore after my sessions  But, my hip doesn't hurt today  I was able to jog a little bit on Saturday"  Objective: See treatment diary below      Assessment: Tolerated treatment well  Patient would benefit from continued PT  Patient was able to perform all exercises without increased pain  Hip flexor soreness noted after completing exercises; abolished after manuals  Patient was able to jog over the weekend for shortened periods of time, with minimal soreness  Continue to progress hip strength, and potentially initiate Alter G jogging next session        Plan: Continue per plan of care  Diagnosis:    Precautions:    Manuals      PROM        Mobs        IASTM  Hip Flexor Hip Flexor   Hip flexor                    There Ex        Bike        Prone Quad Stretch  20" x 5 20" x 5 20" x 6 20" x 3    Hip Flexor Stretch  10" x 5 10" x 5 20" x 6 20" x 3    Leg Press                                Neruo Re-Ed        Quad Sets        Clamshells  Red 3x10 Red, 2x10  Green 2x10 Green, 2x12    SL Hip ABD  2x10 2x10  2x10 2x10    X-Walks  Red 1/2 lap Red, 1 lap   Red, 1 lap    Leg Press  40# 3x10 45#, 1x10   40#, 2x10   SL: 20#, 10x 40# x15 DL  SL 20# 2x10    Rebounder         SLR        Hamstring Curls  OSB 2x10 OSB: 2x10  OSB 2x15 OSB 2x15   Bridges    2x10  1x10 SL on R  15x   1x10 SL on R                    Alter G  NV?                        Ther Act                                         Modalities             CP PRN

## 2020-09-16 DIAGNOSIS — H81.12 BENIGN PAROXYSMAL POSITIONAL VERTIGO OF LEFT EAR: Primary | ICD-10-CM

## 2020-09-17 ENCOUNTER — OFFICE VISIT (OUTPATIENT)
Dept: PHYSICAL THERAPY | Facility: CLINIC | Age: 50
End: 2020-09-17
Payer: COMMERCIAL

## 2020-09-17 DIAGNOSIS — M25.551 CHRONIC RIGHT HIP PAIN: ICD-10-CM

## 2020-09-17 DIAGNOSIS — M25.561 CHRONIC PAIN OF RIGHT KNEE: Primary | ICD-10-CM

## 2020-09-17 DIAGNOSIS — G89.29 CHRONIC RIGHT HIP PAIN: ICD-10-CM

## 2020-09-17 DIAGNOSIS — G89.29 CHRONIC PAIN OF RIGHT KNEE: Primary | ICD-10-CM

## 2020-09-17 PROCEDURE — 97112 NEUROMUSCULAR REEDUCATION: CPT

## 2020-09-17 PROCEDURE — 97110 THERAPEUTIC EXERCISES: CPT

## 2020-09-17 NOTE — PROGRESS NOTES
Daily Note     Today's date: 2020  Patient name: Magaly Brito  : 1970  MRN: [id number]  Referring provider: Wicho Sherwood DO  Dx:   Encounter Diagnosis     ICD-10-CM    1  Chronic pain of right knee  M25 561     G89 29    2  Chronic right hip pain  M25 551     G89 29                   Subjective: Patient reports, "I am feeling a little sore today  But, no sharp pains"  Objective: See treatment diary below      Assessment: Tolerated treatment well  Patient exhibited good technique with therapeutic exercises  Patient able to complete all tasks today with minimal hip soreness  Still able to report relief with quad/hip flexor stretching  Introduced Alter G jogging this date, with knee soreness at higher speeds  Minimal hip flexor soreness following today's session  Continue to progress strength as able  Plan: Continue per plan of care  Diagnosis:    Precautions:    Manuals     PROM        Mobs        IASTM   Hip Flexor   Hip flexor                    There Ex        Bike        Prone Quad Stretch 30" x 3   20" x 5 20" x 6 20" x 3    Hip Flexor Stretch 30" x 3   10" x 5 20" x 6 20" x 3    Leg Press                                Neuro Re-Ed        [facility] Green, 2x12  Red, 2x10  Green 2x10 Green, 2x12    SL Hip ABD 2x10  (1# NV)  2x10  2x10 2x10    X-Walks Red, 1 lap   Red, 1 lap   Red, 1 lap    Leg Press   45#, 1x10   40#, 2x10   SL: 20#, 10x 40# x15 DL  SL 20# 2x10    Rebounder         SLR 2x5       Hamstring Curls   OSB: 2x10  OSB 2x15 OSB 2x15   Bridges 10x   15x  SL on R    2x10  1x10 SL on R  15x   1x10 SL on R                    Alter G (Size L)  Time: 18 mins total   WB: 60-80%  80%: walk    60%: jog   MPH: 3 0-5 0                      Ther Act                                      Modalities            CP PRN

## 2020-09-21 ENCOUNTER — OFFICE VISIT (OUTPATIENT)
Dept: PHYSICAL THERAPY | Facility: CLINIC | Age: 50
End: 2020-09-21
Payer: COMMERCIAL

## 2020-09-21 DIAGNOSIS — G89.29 CHRONIC PAIN OF RIGHT KNEE: Primary | ICD-10-CM

## 2020-09-21 DIAGNOSIS — G89.29 CHRONIC RIGHT HIP PAIN: ICD-10-CM

## 2020-09-21 DIAGNOSIS — M25.561 CHRONIC PAIN OF RIGHT KNEE: Primary | ICD-10-CM

## 2020-09-21 DIAGNOSIS — M25.551 CHRONIC RIGHT HIP PAIN: ICD-10-CM

## 2020-09-21 PROCEDURE — 97110 THERAPEUTIC EXERCISES: CPT | Performed by: PHYSICAL THERAPIST

## 2020-09-21 PROCEDURE — 97112 NEUROMUSCULAR REEDUCATION: CPT | Performed by: PHYSICAL THERAPIST

## 2020-09-21 NOTE — PROGRESS NOTES
Daily Note     Today's date: 2020  Patient name: Sadi Yadav  : 1970  MRN: [id number]  Referring provider: Clay Candelario DO  Dx:   Encounter Diagnosis     ICD-10-CM    1  Chronic pain of right knee  M25 561     G89 29    2  Chronic right hip pain  M25 551     G89 29        Start Time: 1445  Stop Time: 1531  Total time in clinic (min): 46 minutes    Subjective:  "I was really sore Friday, but I felt great Saturday, , and today  I did my [employer] on Saturday"      Objective: See treatment diary below      Assessment: Tolerated treatment well  Patient would benefit from continued PT  Patient is doing well with physical therapy  Noting overall decrease in her pain in her hip  Notes that she has been able to have consecutive days with no pain  Patient progressing in the right direction  Continue to focus on strength as able  Plan: Progress treatment as tolerated  Diagnosis:    Precautions:    Manuals     PROM        Mobs        IASTM     Hip flexor                    There Ex        Bike        Prone Quad Stretch 30" x 3  30" x 3  20" x 6 20" x 3    Hip Flexor Stretch 30" x 3  Supine off Table w/ strap 30" x 3  20" x 6 20" x 3    Leg Press                                Neuro Re-Ed        [employer], 2x12 Blue 2x10  Green 2x10 Green, 2x12    SL Hip ABD 2x10  1# 2x10  2x10 2x10    X-Walks Red, 1 lap     Red, 1 lap    Leg Press  50# 2x8  40# x15 DL  SL 20# 2x10    Rebounder   Green x15 firm  Green x15 foam      SLR 2x5       Hamstring Curls    OSB 2x15 OSB 2x15   Bridges 10x   15x  SL on R  10x   15x SL on R  15x Sl on R +foam  2x10  1x10 SL on R  15x   1x10 SL on R    Lateral Tap Down  x5 - discomfort in knee      SLS  Black Dyn[employer] 30" x 2                      Alter G (Size L)  Time: 18 mins total   WB: 60-80%  80%: walk    60%: jog   MPH: 3 0-5 0                     Ther Act                                   Modalities CP PRN

## 2020-09-24 ENCOUNTER — EVALUATION (OUTPATIENT)
Dept: PHYSICAL THERAPY | Facility: CLINIC | Age: 50
End: 2020-09-24
Payer: COMMERCIAL

## 2020-09-24 DIAGNOSIS — G89.29 CHRONIC RIGHT HIP PAIN: ICD-10-CM

## 2020-09-24 DIAGNOSIS — M25.561 CHRONIC PAIN OF RIGHT KNEE: Primary | ICD-10-CM

## 2020-09-24 DIAGNOSIS — M25.551 CHRONIC RIGHT HIP PAIN: ICD-10-CM

## 2020-09-24 DIAGNOSIS — G89.29 CHRONIC PAIN OF RIGHT KNEE: Primary | ICD-10-CM

## 2020-09-24 PROCEDURE — 97112 NEUROMUSCULAR REEDUCATION: CPT | Performed by: PHYSICAL THERAPIST

## 2020-09-24 PROCEDURE — 97140 MANUAL THERAPY 1/> REGIONS: CPT | Performed by: PHYSICAL THERAPIST

## 2020-09-24 NOTE — PROGRESS NOTES
PT Re-Evaluation     Today's date: 2020  Patient name: Andria Rojas  : 1970  MRN: [id number]  Referring provider: Deepali Jaeger DO  Dx:   Encounter Diagnosis     ICD-10-CM    1  Chronic pain of right knee  M25 561     G89 29    2  Chronic right hip pain  M25 551     G89 29        Start Time: 0918  Stop Time: 1000  Total time in clinic (min): 42 minutes    Assessment  Assessment details: Andria Rojas is a 44 y o  female who has been consistent with attending and participating in skilled physical therapy sessions  The patient has been able to note a transition of her pain from sharp to soreness  She has been able to ascend/descend stairs with decreased pain  She has been happy with her progress, but notes that she is still having pain in her hip  She notes most of her pain during strenuous exercises and notes her soreness may last 1-2 days  She will continue to benefit from further physical therapy sessions to decrease her pain and increase her functional mobility  Impairments: abnormal muscle firing, abnormal or restricted ROM, activity intolerance, impaired physical strength, lacks appropriate home exercise program and pain with function  Understanding of Dx/Px/POC: good   Prognosis: good    Goals  Impairment Goals:  1  Patient will decrease complaints of pain by 50% at worst in 6 weeks - PARTIALLY MET  2  Patient will increase R LE strength to 5/5 globally in 6 weeks - PARTIALLY MET    Functional Goals:  1  Patient will be independent with HEP by discharge - PARTIALLY MET  2  Patient will increase FOTO to average norms by discharge - PARTIALLY MET  3  Patient will be able to complete a squat with decreased pain in 6 weeks - PARTIALLY MET  4  Patient will be able to descend stairs with a reciprocal pattern and decreased pain in 6 weeks - MET  5  Patient will have decreased pain in her right hip after waking up in the morning in 6 weeks - MET  6    Patient will return to prior level of function by discharge - PARTIALLY MET       Plan  Patient would benefit from: skilled physical therapy  Planned modality interventions: cryotherapy, TENS and thermotherapy: hydrocollator packs  Planned therapy interventions: abdominal trunk stabilization, flexibility, functional ROM exercises, graded activity, graded exercise, home exercise program, therapeutic exercise, stretching, strengthening, therapeutic activities, patient education, joint mobilization, manual therapy, Richey taping, massage and neuromuscular re-education  Frequency: 2x week  Duration in weeks: 6  Treatment plan discussed with: patient        Subjective Evaluation    History of Present Illness  Mechanism of injury: Patient reports that she has improved "50%" since starting physical therapy  The patient reports she is no longer achy all day and notes that she is now able to get out of bed without a limp  She notes that she can go a few days without feeling pain  She notes that she has been able to wear heels while having less pain  She still notes having pain in her hip  She notes having soreness in her hip after strenuous exercises  HPI:  Patient reports right knee pain over the past "several years"  She notes that her right hip started bothering her ~2 month months ago  She notes that her pain varies, and notes that she has difficulty with descending stairs  The pain continued to get worse and she went to see her PCP  She was then referred to physical therapy        Pain Location:  Anterior right hip and anterior right knee  Occupation:  Student   Prior Functional Limitations:  Multi year knee pain, unable to run   AGG:  Squats, descending stairs, prolonged ambulation  Ease:  No true easing factors  Patient Goals:  "Figure out how to make the pain go away and to start running again"     Pain  Current pain ratin  At best pain ratin  At worst pain ratin  Quality: sharp          Objective     Concurrent Complaints  Negative for night pain, disturbed sleep, bladder dysfunction, bowel dysfunction and saddle (S4) numbness    Tenderness     Right Hip   Tenderness in the ASIS and iliac crest      Active Range of Motion   Left Hip   Normal active range of motion    Right Hip   Normal active range of motion  Left Knee   Normal active range of motion    Right Knee   Normal active range of motion    Additional Active Range of Motion Details  Lumbar Flexion - Full  Lumbar Extension - Full    Strength/Myotome Testing     Left Hip   Planes of Motion   Flexion: 5  Extension: 5  Abduction: 4+    Right Hip   Planes of Motion   Flexion: 4+  Extension: 4  Abduction: 4+    Left Knee   Extension: 5    Right Knee   Extension: 5    Left Ankle/Foot   Dorsiflexion: 4+    Right Ankle/Foot   Dorsiflexion: 5    Additional Strength Details  Slight pain with resisted hip flexion on right     Tests     Right Hip   Negative PADMINI and CURTIS Anaya: Positive       General Comments:      Knee Comments  Patient with pain sub-patella during squats  Slight knee valgus during squats       Flowsheet Rows      Most Recent Value   PT/OT G-Codes   Current Score  84   Projected Score  88              Diagnosis:    Precautions:    Manuals 9/17 9/21 9/24 9/14    PROM        Mobs        IASTM     Hip flexor            Re-Evaluation    RT, PT     There Ex        Bike        Prone Quad Stretch 30" x 3  30" x 3   20" x 3    Hip Flexor Stretch 30" x 3  Supine off Table w/ strap 30" x 3   20" x 3    Leg Press                                Neuro Re-Ed        [employer], 2x12 Blue 2x10   Green, 2x12    SL Hip ABD 2x10  1# 2x10   2x10    X-Walks Red, 1 lap     Red, 1 lap    Leg Press  50# 2x8 50# 2x10     Rebounder   Green x15 firm  Green x15 foam      SLR 2x5       Hamstring Curls     OSB 2x15   Bridges 10x   15x  SL on R  10x   15x SL on R  15x Sl on R +foam 15x SL on R   15x   1x10 SL on R    Lateral Tap Down  x5 - discomfort in knee SLS  Black Dynadisc 30" x 2                      Alter G (Size L)  Time: 18 mins total   WB: 60-80%  80%: walk    60%: jog   MPH: 3 0-5 0                     Ther Act                                   Modalities           CP PRN

## 2020-09-28 ENCOUNTER — OFFICE VISIT (OUTPATIENT)
Dept: PHYSICAL THERAPY | Facility: CLINIC | Age: 50
End: 2020-09-28
Payer: COMMERCIAL

## 2020-09-28 DIAGNOSIS — M25.551 CHRONIC RIGHT HIP PAIN: ICD-10-CM

## 2020-09-28 DIAGNOSIS — G89.29 CHRONIC RIGHT HIP PAIN: ICD-10-CM

## 2020-09-28 DIAGNOSIS — M25.561 CHRONIC PAIN OF RIGHT KNEE: Primary | ICD-10-CM

## 2020-09-28 DIAGNOSIS — G89.29 CHRONIC PAIN OF RIGHT KNEE: Primary | ICD-10-CM

## 2020-09-28 PROCEDURE — 97112 NEUROMUSCULAR REEDUCATION: CPT | Performed by: PHYSICAL THERAPIST

## 2020-09-28 PROCEDURE — 97110 THERAPEUTIC EXERCISES: CPT | Performed by: PHYSICAL THERAPIST

## 2020-09-28 NOTE — PROGRESS NOTES
Daily Note     Today's date: 2020  Patient name: Stalin Castrejon  : 1970  MRN: [id number]  Referring provider: Avinash Cain DO  Dx:   Encounter Diagnosis     ICD-10-CM    1  Chronic pain of right knee  M25 561     G89 29    2  Chronic right hip pain  M25 551     G89 29        Start Time: 1500  Stop Time: 1530  Total time in clinic (min): 30 minutes    Subjective: "I did a [employer] workout on Saturday and my hip and knee didn't hurt"      Objective: See treatment diary below      Assessment: Tolerated treatment well  Patient would benefit from continued PT  Patient was 15 minutes late for therapy - was accommodated  Patient reports that her hip is becoming less and less tender  She notes having no pain in her hip today or when she was working out  Patient seems to have turned a corner on her pain  Progress as able  Plan: Progress treatment as tolerated  Diagnosis:    Precautions:    Manuals     PROM        Mobs        IASTM                Re-Evaluation    RT, PT     There Ex        Bike        Prone Quad Stretch 30" x 3  30" x 3  30" x 3    Hip Flexor Stretch 30" x 3  Supine off Table w/ strap 30" x 3  30" x 3 supine off table     Leg Press                                Neuro Re-Ed        [facility] Green, 2x12 Blue 2x10      SL Hip ABD 2x10  1# 2x10      X-Walks Red, 1 lap    Green 1 laps    Leg Press  50# 2x8 50# 2x10 50# 2x10  SL on R 25# 2x10    Rebounder   Green x15 firm  Green x15 foam      SLR 2x5       Hamstring Curls    OSB 2x12    Bridges 10x   15x  SL on R  10x   15x SL on R  15x Sl on R +foam 15x SL on R  2x10 OSB     Lateral Tap Down  x5 - discomfort in knee      SLS  Black Dynadisc 30" x 2      Lunges    1 lap            Alter G (Size L)  Time: 18 mins total   WB: 60-80%  80%: walk    60%: jog   MPH: 3 0-5 0                    Ther Act                                Modalities          CP PRN

## 2020-09-30 ENCOUNTER — OFFICE VISIT (OUTPATIENT)
Dept: PHYSICAL THERAPY | Facility: CLINIC | Age: 50
End: 2020-09-30
Payer: COMMERCIAL

## 2020-09-30 DIAGNOSIS — G89.29 CHRONIC RIGHT HIP PAIN: ICD-10-CM

## 2020-09-30 DIAGNOSIS — G89.29 CHRONIC PAIN OF RIGHT KNEE: Primary | ICD-10-CM

## 2020-09-30 DIAGNOSIS — M25.551 CHRONIC RIGHT HIP PAIN: ICD-10-CM

## 2020-09-30 DIAGNOSIS — M25.561 CHRONIC PAIN OF RIGHT KNEE: Primary | ICD-10-CM

## 2020-09-30 PROCEDURE — 97112 NEUROMUSCULAR REEDUCATION: CPT

## 2020-09-30 PROCEDURE — 97110 THERAPEUTIC EXERCISES: CPT

## 2020-09-30 NOTE — PROGRESS NOTES
Daily Note     Today's date: 2020  Patient name: Rikki Weldon  : 1970  MRN: [id number]  Referring provider: Kelton Moses DO  Dx:   Encounter Diagnosis     ICD-10-CM    1  Chronic pain of right knee  M25 561     G89 29    2  Chronic right hip pain  M25 551     G89 29                   Subjective: Patient reports, "I was a little stiff this morning, after [place] around the mall all day yesterday "       Objective: See treatment diary below      Assessment: Tolerated treatment well  Patient would benefit from continued PT  Progressed strengthening this session without increased pain  Patient had medial knee pressure with lunges; this was significantly diminished with cuing to correct knee valgus  Hip fatigue by the end of the session  Progress strengthening as able  Plan: Continue per plan of care        Diagnosis:    Precautions:    Manuals      PROM        Mobs        IASTM                Re-Evaluation    RT, PT     There Ex        Bike        Prone Quad Stretch  30" x 3  30" x 3 30" x 3   Hip Flexor Stretch  Supine off Table w/ strap 30" x 3  30" x 3 supine off table  30" x 3 supine off table    Leg Press                                Neuro Re-Ed        Quad Sets        [employer]  Blue 2x10   Elevated: red, 2x10    SL Hip ABD  1# 2x10      X-Walks    Green 1 laps Green 1 laps   Leg Press  50# 2x8 50# 2x10 50# 2x10  SL on R 25# 2x10 50# 2x10  SL on R 25# 2x10   Rebounder   Green x15 firm  Green x15 foam   Green x15 firm  Green x15 foam   SLR        Hamstring Curls    OSB 2x12 OSB 2x10    Bridges  10x   15x SL on R  15x Sl on R +foam 15x SL on R  2x10 OSB  OSB 2x10     Lateral Tap Down  x5 - discomfort in knee   4 in, 5x some pressure in knee   SLS  Black Dynadisc 30" x 2      Lunges    1 lap 2 laps- no valgus           Alter G (Size L)                    Ther Act                             Modalities         CP PRN

## 2020-10-05 ENCOUNTER — OFFICE VISIT (OUTPATIENT)
Dept: PHYSICAL THERAPY | Facility: CLINIC | Age: 50
End: 2020-10-05
Payer: COMMERCIAL

## 2020-10-05 DIAGNOSIS — M25.551 CHRONIC RIGHT HIP PAIN: ICD-10-CM

## 2020-10-05 DIAGNOSIS — G89.29 CHRONIC PAIN OF RIGHT KNEE: Primary | ICD-10-CM

## 2020-10-05 DIAGNOSIS — G89.29 CHRONIC RIGHT HIP PAIN: ICD-10-CM

## 2020-10-05 DIAGNOSIS — M25.561 CHRONIC PAIN OF RIGHT KNEE: Primary | ICD-10-CM

## 2020-10-05 PROCEDURE — 97112 NEUROMUSCULAR REEDUCATION: CPT | Performed by: PHYSICAL THERAPIST

## 2020-10-05 PROCEDURE — 97110 THERAPEUTIC EXERCISES: CPT | Performed by: PHYSICAL THERAPIST

## 2020-10-08 ENCOUNTER — EVALUATION (OUTPATIENT)
Dept: PHYSICAL THERAPY | Facility: CLINIC | Age: 50
End: 2020-10-08
Payer: COMMERCIAL

## 2020-10-08 DIAGNOSIS — M25.551 CHRONIC RIGHT HIP PAIN: ICD-10-CM

## 2020-10-08 DIAGNOSIS — M25.561 CHRONIC PAIN OF RIGHT KNEE: Primary | ICD-10-CM

## 2020-10-08 DIAGNOSIS — G89.29 CHRONIC PAIN OF RIGHT KNEE: Primary | ICD-10-CM

## 2020-10-08 DIAGNOSIS — G89.29 CHRONIC RIGHT HIP PAIN: ICD-10-CM

## 2020-10-08 PROCEDURE — 97140 MANUAL THERAPY 1/> REGIONS: CPT | Performed by: PHYSICAL THERAPIST

## 2020-10-08 PROCEDURE — 97110 THERAPEUTIC EXERCISES: CPT | Performed by: PHYSICAL THERAPIST

## 2020-10-12 ENCOUNTER — APPOINTMENT (OUTPATIENT)
Dept: PHYSICAL THERAPY | Facility: CLINIC | Age: 50
End: 2020-10-12
Payer: COMMERCIAL

## 2020-10-14 ENCOUNTER — OFFICE VISIT (OUTPATIENT)
Dept: FAMILY MEDICINE CLINIC | Facility: CLINIC | Age: 50
End: 2020-10-14
Payer: COMMERCIAL

## 2020-10-14 VITALS
TEMPERATURE: 98.2 F | RESPIRATION RATE: 16 BRPM | WEIGHT: 173.8 LBS | DIASTOLIC BLOOD PRESSURE: 76 MMHG | HEIGHT: 68 IN | SYSTOLIC BLOOD PRESSURE: 118 MMHG | BODY MASS INDEX: 26.34 KG/M2

## 2020-10-14 DIAGNOSIS — Z00.00 ANNUAL PHYSICAL EXAM: Primary | ICD-10-CM

## 2020-10-14 DIAGNOSIS — E66.3 OVERWEIGHT: ICD-10-CM

## 2020-10-14 DIAGNOSIS — E78.00 ELEVATED LDL CHOLESTEROL LEVEL: ICD-10-CM

## 2020-10-14 PROCEDURE — 99396 PREV VISIT EST AGE 40-64: CPT | Performed by: FAMILY MEDICINE

## 2020-10-14 PROCEDURE — 1036F TOBACCO NON-USER: CPT | Performed by: FAMILY MEDICINE

## 2020-10-15 ENCOUNTER — APPOINTMENT (OUTPATIENT)
Dept: PHYSICAL THERAPY | Facility: CLINIC | Age: 50
End: 2020-10-15
Payer: COMMERCIAL

## 2020-10-19 ENCOUNTER — APPOINTMENT (OUTPATIENT)
Dept: PHYSICAL THERAPY | Facility: CLINIC | Age: 50
End: 2020-10-19
Payer: COMMERCIAL

## 2020-10-22 ENCOUNTER — APPOINTMENT (OUTPATIENT)
Dept: PHYSICAL THERAPY | Facility: CLINIC | Age: 50
End: 2020-10-22
Payer: COMMERCIAL

## 2020-11-18 ENCOUNTER — OFFICE VISIT (OUTPATIENT)
Dept: OTOLARYNGOLOGY | Facility: CLINIC | Age: 50
End: 2020-11-18
Payer: COMMERCIAL

## 2020-11-18 ENCOUNTER — OFFICE VISIT (OUTPATIENT)
Dept: AUDIOLOGY | Facility: CLINIC | Age: 50
End: 2020-11-18
Payer: COMMERCIAL

## 2020-11-18 VITALS — BODY MASS INDEX: 25.91 KG/M2 | WEIGHT: 171 LBS | TEMPERATURE: 97.4 F | HEIGHT: 68 IN

## 2020-11-18 DIAGNOSIS — H69.81 ETD (EUSTACHIAN TUBE DYSFUNCTION), RIGHT: Primary | ICD-10-CM

## 2020-11-18 DIAGNOSIS — M26.629 TMJ SYNDROME: ICD-10-CM

## 2020-11-18 DIAGNOSIS — R42 VERTIGO: ICD-10-CM

## 2020-11-18 PROBLEM — H69.91 ETD (EUSTACHIAN TUBE DYSFUNCTION), RIGHT: Status: ACTIVE | Noted: 2020-11-18

## 2020-11-18 PROCEDURE — 1036F TOBACCO NON-USER: CPT | Performed by: NURSE PRACTITIONER

## 2020-11-18 PROCEDURE — 99243 OFF/OP CNSLTJ NEW/EST LOW 30: CPT | Performed by: NURSE PRACTITIONER

## 2020-11-18 PROCEDURE — 92567 TYMPANOMETRY: CPT | Performed by: AUDIOLOGIST

## 2020-11-18 PROCEDURE — 3008F BODY MASS INDEX DOCD: CPT | Performed by: NURSE PRACTITIONER

## 2020-11-18 PROCEDURE — 92557 COMPREHENSIVE HEARING TEST: CPT | Performed by: AUDIOLOGIST

## 2020-11-18 RX ORDER — RIBOFLAVIN (VITAMIN B2) 100 MG
100 TABLET ORAL DAILY
COMMUNITY
End: 2021-08-31 | Stop reason: ALTCHOICE

## 2021-02-10 ENCOUNTER — TELEMEDICINE (OUTPATIENT)
Dept: FAMILY MEDICINE CLINIC | Facility: CLINIC | Age: 51
End: 2021-02-10
Payer: COMMERCIAL

## 2021-02-10 DIAGNOSIS — Z20.822 EXPOSURE TO COVID-19 VIRUS: Primary | ICD-10-CM

## 2021-02-10 PROCEDURE — 99212 OFFICE O/P EST SF 10 MIN: CPT | Performed by: FAMILY MEDICINE

## 2021-02-10 NOTE — PROGRESS NOTES
COVID-19 Virtual Visit     Assessment/Plan:  Problem List Items Addressed This Visit     None      Visit Diagnoses     Exposure to COVID-19 virus    -  Primary  - pt was at her daughter's house this wknd   - daughter's fiance tested COVID POS this AM   - pt currently asymptomatic   - educated on incubation period of virus and advised pt to monitor her s/s and to self-quarantine for 14 days post exposure (2/21/2021)   - does not need COVID screening at this time   - if becomes symptomatic - advised to call the office for further eval - pt aware and agreeable          Disposition:     I recommended self-quarantine for 14 days and to call back for worsening symptoms or development of shortness of breath  I have spent 15 minutes directly with the patient  Greater than 50% of this time was spent in counseling/coordination of care regarding: prognosis, risks and benefits of treatment options, instructions for management, patient and family education, importance of treatment compliance, risk factor reductions and impressions  Encounter provider Genevieve Banks DO  Provider located at [address] [place], [place] [id number]    Recent Visits  No visits were found meeting these conditions  Showing recent visits within past 7 days and meeting all other requirements     Today's Visits  Date Type Provider Dept   02/10/21 Telemedicine Silvia Newton DO [facility]   Showing today's visits and meeting all other requirements     Future Appointments  No visits were found meeting these conditions  Showing future appointments within next 150 days and meeting all other requirements      This virtual check-in was done via [employer] and patient was informed that this is a secure, HIPAA-compliant platform  She agrees to proceed      Patient agrees to participate in a virtual check in via telephone or video visit instead of presenting to the office to address urgent/immediate medical needs  Patient is aware this is a billable service  After connecting through [facility], the patient was identified by name and date of birth  Vandana Church was informed that this was a telemedicine visit and that the exam was being conducted confidentially over secure lines  My office door was closed  No one else was in the room  Vandana Church acknowledged consent and understanding of privacy and security of the telemedicine visit  I informed the patient that I have reviewed her record in Epic and presented the opportunity for her to ask any questions regarding the visit today  The patient agreed to participate  Subjective:   Vandana Church is a 44 y o  female who is concerned about COVID-19  Patient is currently asymptomatic  Patient denies fever, chills, fatigue, malaise, congestion, rhinorrhea, sore throat, anosmia, loss of taste, cough, shortness of breath, chest tightness, abdominal pain, nausea, vomiting, diarrhea, myalgias and headaches         Date of exposure: 2/7/2021    Exposure:   Contact with a person who is under investigation (PUI) for or who is positive for COVID-19 within the last 14 days?: Yes    Hospitalized recently for fever and/or lower respiratory symptoms?: No      Currently a healthcare worker that is involved in direct patient care?: No      Works in a special setting where the risk of COVID-19 transmission may be high? (this may include long-term care, correctional and halfway facilities; homeless shelters; assisted-living facilities and group homes ): No      Resident in a special setting where the risk of COVID-19 transmission may be high? (this may include long-term care, correctional and halfway facilities; homeless shelters; assisted-living facilities and group homes ): No      - daughter and her fiance are both RNs and work on the [employer], pt goes to their house to take care of her granddaughter   - Mon (2/8/2021) - daughter's fiance with s/s (cough, PND); he got tested on 2021 and resulted COVID POS this AM   - pt asymptomatic     No results found for: Verónica Maya  Past Medical History:   Diagnosis Date    Bacterial vaginitis     Fibroids     Gastritis     GERD (gastroesophageal reflux disease)     Insomnia      Past Surgical History:   Procedure Laterality Date     SECTION      HAND SURGERY Left     Left pinky tendon repair    OH COLONOSCOPY FLX DX W/COLLJ SPEC WHEN PFRMD N/A 2018    Procedure: COLONOSCOPY;  Surgeon: Yaya Campbell MD;  Location: [facility]; Service: Gastroenterology    OH ESOPHAGOGASTRODUODENOSCOPY TRANSORAL DIAGNOSTIC N/A 3/31/2017    Procedure: ESOPHAGOGASTRODUODENOSCOPY (EGD); Surgeon: Yaya Campbell MD;  [facility]: [facility]; Service: Gastroenterology     Current Outpatient Medications   Medication Sig Dispense Refill    Ascorbic Acid (vitamin C) 100 MG tablet Take 100 mg by mouth daily      BIOTIN 5000 PO Take by mouth      Cholecalciferol (VITAMIN D PO) Take by mouth      Cholesterol POWD by Does not apply route      Cranberry 1000 MG CAPS Take by mouth      norethindrone (MICRONOR) 0 35 MG tablet Take 1 tablet by mouth daily      Omega-3 Fatty Acids (FISH OIL PO) Take by mouth       No current facility-administered medications for this visit  No Known Allergies    Review of Systems   Constitutional: Negative for chills, fatigue and fever  HENT: Negative for congestion, rhinorrhea and sore throat  Respiratory: Negative for cough, chest tightness and shortness of breath  Gastrointestinal: Negative for abdominal pain, diarrhea, nausea and vomiting  Musculoskeletal: Negative for myalgias  Neurological: Negative for headaches  Objective: There were no vitals filed for this visit      Physical Exam   General: AAOx3, NAD  HEENT: NC/AT, EOMI, clear conjunctiva, nml external ear and nose   Cardio: deferred  Pulm: no acute respiratory distress, able to talk in complete sentences w/o getting short of breath   Abd: deferred   Psych: nml mood/affect/behavior     VIRTUAL VISIT DISCLAIMER    Joi Haroonaung acknowledges that she has consented to an online visit or consultation  She understands that the online visit is based solely on information provided by her, and that, in the absence of a face-to-face physical evaluation by the physician, the diagnosis she receives is both limited and provisional in terms of accuracy and completeness  This is not intended to replace a full medical face-to-face evaluation by the physician  Joi Bolivar understands and accepts these terms

## 2021-03-10 ENCOUNTER — OFFICE VISIT (OUTPATIENT)
Dept: FAMILY MEDICINE CLINIC | Facility: CLINIC | Age: 51
End: 2021-03-10
Payer: COMMERCIAL

## 2021-03-10 VITALS
DIASTOLIC BLOOD PRESSURE: 70 MMHG | RESPIRATION RATE: 16 BRPM | OXYGEN SATURATION: 96 % | HEART RATE: 86 BPM | WEIGHT: 179 LBS | HEIGHT: 68 IN | SYSTOLIC BLOOD PRESSURE: 122 MMHG | BODY MASS INDEX: 27.13 KG/M2

## 2021-03-10 DIAGNOSIS — M54.2 CHRONIC NECK PAIN: Primary | ICD-10-CM

## 2021-03-10 DIAGNOSIS — G89.29 CHRONIC NECK PAIN: Primary | ICD-10-CM

## 2021-03-10 DIAGNOSIS — L81.9 HYPOPIGMENTATION: ICD-10-CM

## 2021-03-10 PROCEDURE — 1036F TOBACCO NON-USER: CPT | Performed by: FAMILY MEDICINE

## 2021-03-10 PROCEDURE — 3008F BODY MASS INDEX DOCD: CPT | Performed by: FAMILY MEDICINE

## 2021-03-10 PROCEDURE — 99213 OFFICE O/P EST LOW 20 MIN: CPT | Performed by: FAMILY MEDICINE

## 2021-03-10 RX ORDER — CYCLOBENZAPRINE HCL 5 MG
5 TABLET ORAL
Qty: 10 TABLET | Refills: 0 | Status: SHIPPED | OUTPATIENT
Start: 2021-03-10 | End: 2021-08-31 | Stop reason: ALTCHOICE

## 2021-03-10 RX ORDER — NAPROXEN 500 MG/1
500 TABLET ORAL 2 TIMES DAILY WITH MEALS
Qty: 28 TABLET | Refills: 0 | Status: SHIPPED | OUTPATIENT
Start: 2021-03-10 | End: 2021-08-31 | Stop reason: ALTCHOICE

## 2021-03-10 NOTE — PROGRESS NOTES
Assessment/Plan:   Diagnoses and all orders for this visit:    Chronic neck pain  -     Ambulatory referral to Physical Therapy; Future  -     naproxen (NAPROSYN) 500 mg tablet; Take 1 tablet (500 mg total) by mouth 2 (two) times a day with meals  -     cyclobenzaprine (FLEXERIL) 5 mg tablet; Take 1 tablet (5 mg total) by mouth daily at bedtime    Hypopigmentation  - no recent steroid usage  - advised to discern FHx   - looks like the start of vitiligo         Subjective:    Patient ID: Rk Carrillo is a 46 y o  female  HPI   50yo F presents to the office for eval of chronic neck and shoulder pain   - gets this 1-2x/year  - generally tends to resolve with massages  - bought a home massager which she has been using very frequently   - likely stress induced and has been under a lot of stress recently - daughter, daughter's fiance and 3yo all had COVID; daughter getting  4/10/2021   - also notes hypopigmentation on her b/l legs  - no FHx of vitiligo       The following portions of the patient's history were reviewed and updated as appropriate: allergies, current medications, past family history, past medical history, past social history, past surgical history and problem list     Review of Systems  as per HPI    Objective:  /70   Pulse 86   Resp 16   Ht 5' 8" (1 727 m)   Wt 81 2 kg (179 lb)   SpO2 96%   BMI 27 22 kg/m²    Physical Exam  Vitals signs reviewed  Constitutional:       General: She is not in acute distress  Appearance: Normal appearance  She is not ill-appearing, toxic-appearing or diaphoretic  HENT:      Head: Normocephalic and atraumatic  Right Ear: External ear normal       Left Ear: External ear normal    Eyes:      General: No scleral icterus  Right eye: No discharge  Left eye: No discharge  Extraocular Movements: Extraocular movements intact        Conjunctiva/sclera: Conjunctivae normal    Neck:      Musculoskeletal: Normal range of motion and neck supple  Cardiovascular:      Rate and Rhythm: Normal rate and regular rhythm  Heart sounds: Normal heart sounds  No murmur  No gallop  Pulmonary:      Effort: Pulmonary effort is normal       Breath sounds: Normal breath sounds  Musculoskeletal:      Right lower leg: No edema  Left lower leg: No edema  Comments: Tight traps and paraspinals b/l    Skin:     General: Skin is warm  Comments: (+) small (<0 5cm) scattered patches of hypopigmentation on b/l LE    Neurological:      General: No focal deficit present  Mental Status: She is alert and oriented to person, place, and time  Psychiatric:         Mood and Affect: Mood normal          Behavior: Behavior normal        BMI Counseling: Body mass index is 27 22 kg/m²  The BMI is above normal  Nutrition recommendations include decreasing overall calorie intake  Exercise recommendations include moderate aerobic physical activity for 150 minutes/week

## 2021-04-21 ENCOUNTER — OFFICE VISIT (OUTPATIENT)
Dept: OTOLARYNGOLOGY | Facility: CLINIC | Age: 51
End: 2021-04-21
Payer: COMMERCIAL

## 2021-04-21 VITALS — BODY MASS INDEX: 27.13 KG/M2 | WEIGHT: 179 LBS | HEIGHT: 68 IN | TEMPERATURE: 97.1 F | HEART RATE: 73 BPM

## 2021-04-21 DIAGNOSIS — H60.8X3 CHRONIC REACTIVE OTITIS EXTERNA OF BOTH EARS: Primary | ICD-10-CM

## 2021-04-21 DIAGNOSIS — R42 VERTIGO: ICD-10-CM

## 2021-04-21 PROCEDURE — 99214 OFFICE O/P EST MOD 30 MIN: CPT | Performed by: NURSE PRACTITIONER

## 2021-04-21 RX ORDER — ALPRAZOLAM 0.25 MG/1
TABLET ORAL
COMMUNITY
Start: 2021-04-13 | End: 2021-08-31 | Stop reason: ALTCHOICE

## 2021-04-21 NOTE — ASSESSMENT & PLAN NOTE
Audiogram today indicating normal bilateral hearing with type A tymps, no conductive hearing loss  Reviewed treatment options including nasal steroids, watchful monitoring for up to 3 months, vs surgical interventions

## 2021-04-21 NOTE — ASSESSMENT & PLAN NOTE
Discussed possible causes of vertigo including neurology, cardiac, autoimmune, Otitis media, sinusitis, and inner ear concerns  Audiogram last visit indicating normal bilateral hearing  [facility] with very subtle nystagmus  Tandem walk normal     Treatment options include at home epley's, lab studies, vestibular therapy,  And further testing  After discussion agreed to consider return to PT  Follow up in 6 to 8 weeks to monitor    If no improvement then consider labs

## 2021-04-21 NOTE — PROGRESS NOTES
Assessment/Plan:    Vertigo  Discussed possible causes of vertigo including neurology, cardiac, autoimmune, Otitis media, sinusitis, and inner ear concerns  Audiogram last visit indicating normal bilateral hearing  [facility] with very subtle nystagmus  Tandem walk normal     Treatment options include at home epley's, lab studies, vestibular therapy,  And further testing  After discussion agreed to consider return to PT  Follow up in 6 to 8 weeks to monitor  If no improvement then consider labs         Chronic reactive otitis externa of both ears  Minimal cerumen bilaterally  Reviewed otitis externa bilaterally causing itching sensation  On exam noted mild irritation of bilateral eac  Discussed natural process of oil and cerumen within the eac and restoring moisture  Reviewed options for treatment including watchful monitoring, hydrocortisone cream to the eac, oil based products, decreased q-tip usage  Agreed to Cortisporin ear drops for one week, and HC cream to ears at bedtime         Diagnoses and all orders for this visit:    Chronic reactive otitis externa of both ears  -     neomycin-polymyxin-hydrocortisone (CORTISPORIN) otic solution; Administer 4 drops into both ears 2 (two) times a day    Vertigo  -     Ambulatory referral to Physical Therapy; Future    Other orders  -     ALPRAZolam (XANAX) 0 25 mg tablet          Subjective:      Patient ID: Arsh Dean is a 46 y o  female  Presents today as a follow up due to vertigo  Prior visit regarding ear pain  Improved over winter  Ache improved  Ears feel wet and itch  Uses q-tips  No otorrhea  But feels has foul odor in ears  Wears ear buds often  No current hearing aids  No prior ear surgery  Vertigo episode earlier in summer but has reoccurred  Occurred while rolling over in bed, sensation of falling  Lasted a couple of hours  Treated at PT with improvement  Since then still feels occasional dizziness    Increased episode of vertigo  Occurs with movement of eyes  No spinning  Notes similar episode two years ago on right ear and flushed ear at urgent care          The following portions of the patient's history were reviewed and updated as appropriate: allergies, current medications, past family history, past medical history, past social history, past surgical history and problem list     Review of Systems   Constitutional: Negative  HENT: Positive for ear pain  Negative for congestion, ear discharge, hearing loss, nosebleeds, postnasal drip, rhinorrhea, sinus pressure, sinus pain, sore throat, tinnitus and voice change  Eyes: Negative  Respiratory: Negative for chest tightness and shortness of breath  Cardiovascular: Negative  Gastrointestinal: Negative  Endocrine: Negative  Musculoskeletal: Negative  Skin: Negative for color change  Neurological: Positive for dizziness  Negative for numbness and headaches  Psychiatric/Behavioral: Negative  Objective:      Pulse 73   Temp (!) 97 1 °F (36 2 °C)   Ht 5' 8" (1 727 m)   Wt 81 2 kg (179 lb)   BMI 27 22 kg/m²          Physical Exam  Constitutional:       Appearance: She is well-developed  HENT:      Head: Normocephalic  Right Ear: Hearing, tympanic membrane, ear canal and external ear normal  No decreased hearing noted  No drainage or tenderness  Tympanic membrane is not perforated or erythematous  Left Ear: Hearing, tympanic membrane, ear canal and external ear normal  No decreased hearing noted  No drainage or tenderness  Tympanic membrane is not perforated or erythematous  Nose: Nose normal  No nasal deformity or septal deviation  Mouth/Throat:      Mouth: Mucous membranes are not pale and not dry  No oral lesions  Dentition: Normal dentition  Pharynx: Uvula midline  No oropharyngeal exudate  Neck:      Musculoskeletal: Full passive range of motion without pain, normal range of motion and neck supple  Trachea: No tracheal deviation  Cardiovascular:      Rate and Rhythm: Normal rate  Pulmonary:      Effort: Pulmonary effort is normal  No accessory muscle usage or respiratory distress  Musculoskeletal:      Right shoulder: She exhibits normal range of motion  Lymphadenopathy:      Cervical: No cervical adenopathy  Skin:     General: Skin is warm and dry  Neurological:      Mental Status: She is alert and oriented to person, place, and time  Cranial Nerves: No cranial nerve deficit  Sensory: No sensory deficit  Psychiatric:         Behavior: Behavior is cooperative

## 2021-04-23 NOTE — ASSESSMENT & PLAN NOTE
Minimal cerumen bilaterally  Reviewed otitis externa bilaterally causing itching sensation  On exam noted mild irritation of bilateral eac  Discussed natural process of oil and cerumen within the eac and restoring moisture  Reviewed options for treatment including watchful monitoring, hydrocortisone cream to the eac, oil based products, decreased q-tip usage     Agreed to Cortisporin ear drops for one week, and HC cream to ears at bedtime

## 2021-04-28 ENCOUNTER — TRANSCRIBE ORDERS (OUTPATIENT)
Dept: ADMINISTRATIVE | Facility: HOSPITAL | Age: 51
End: 2021-04-28

## 2021-04-28 DIAGNOSIS — D21.9 BENIGN NEOPLASM OF CONNECTIVE AND OTHER SOFT TISSUE, UNSPECIFIED: Primary | ICD-10-CM

## 2021-04-29 ENCOUNTER — HOSPITAL ENCOUNTER (OUTPATIENT)
Dept: RADIOLOGY | Age: 51
Discharge: HOME | End: 2021-04-29
Payer: COMMERCIAL

## 2021-04-29 DIAGNOSIS — D21.9 BENIGN NEOPLASM OF CONNECTIVE AND OTHER SOFT TISSUE, UNSPECIFIED: ICD-10-CM

## 2021-04-29 PROCEDURE — 72197 MRI PELVIS W/O & W/DYE: CPT

## 2021-04-29 PROCEDURE — A9585 GADOBUTROL INJECTION: HCPCS | Performed by: RADIOLOGY

## 2021-04-29 RX ADMIN — GADOBUTROL 8 ML: 604.72 INJECTION INTRAVENOUS at 11:49

## 2021-05-03 ENCOUNTER — TELEMEDICINE (OUTPATIENT)
Dept: FAMILY MEDICINE CLINIC | Facility: CLINIC | Age: 51
End: 2021-05-03
Payer: COMMERCIAL

## 2021-05-03 VITALS — WEIGHT: 170 LBS | TEMPERATURE: 97.9 F | HEIGHT: 68 IN | BODY MASS INDEX: 25.76 KG/M2

## 2021-05-03 DIAGNOSIS — R05.9 COUGH: Primary | ICD-10-CM

## 2021-05-03 PROCEDURE — 99213 OFFICE O/P EST LOW 20 MIN: CPT | Performed by: FAMILY MEDICINE

## 2021-05-03 PROCEDURE — 1036F TOBACCO NON-USER: CPT | Performed by: FAMILY MEDICINE

## 2021-05-03 PROCEDURE — 3008F BODY MASS INDEX DOCD: CPT | Performed by: FAMILY MEDICINE

## 2021-05-03 PROCEDURE — 3725F SCREEN DEPRESSION PERFORMED: CPT | Performed by: FAMILY MEDICINE

## 2021-05-03 NOTE — PROGRESS NOTES
Virtual Regular Visit      Assessment/Plan:    Problem List Items Addressed This Visit        Other    Cough - Primary     Mostly dry with PND  No systemic symptoms  Patient declines COVID testing  Will try OTC allegra, with flonase  Fup if symptoms persist                       Reason for visit is   Chief Complaint   Patient presents with    Cough    Virtual Regular Visit        Encounter provider Maxim Houser MD    Provider located at [address] [place] [id number]      Recent Visits  No visits were found meeting these conditions  Showing recent visits within past 7 days and meeting all other requirements     Today's Visits  Date Type Provider Dept   05/03/21 Telemedicine Maxim Houser MD [facility]   Showing today's visits and meeting all other requirements     Future Appointments  No visits were found meeting these conditions  Showing future appointments within next 150 days and meeting all other requirements        The patient was identified by name and date of birth  Buster Mullen was informed that this is a telemedicine visit and that the visit is being conducted through [address] [place] [address] Now and patient was informed that this is a secure, HIPAA-compliant platform  She agrees to proceed     My office door was closed  No one else was in the room  She acknowledged consent and understanding of privacy and security of the video platform  The patient has agreed to participate and understands they can discontinue the visit at any time  Patient is aware this is a billable service  Subjective  Buster Mullen is a 46 y o  female    Cough  This is a new problem  The current episode started in the past 7 days  The problem has been unchanged  The cough is non-productive  Associated symptoms include postnasal drip   Pertinent negatives include no chest pain, chills, ear pain, fever, headaches, heartburn, myalgias, nasal congestion, rash, rhinorrhea, sore throat, shortness of breath or wheezing  The symptoms are aggravated by lying down  Treatments tried: Mucinex  The treatment provided no relief  Her past medical history is significant for environmental allergies  Past Medical History:   Diagnosis Date    Bacterial vaginitis     Fibroids     Gastritis     GERD (gastroesophageal reflux disease)     Insomnia        Past Surgical History:   Procedure Laterality Date     SECTION      HAND SURGERY Left     Left pinky tendon repair    OH COLONOSCOPY FLX DX W/COLLJ SPEC WHEN PFRMD N/A 2018    Procedure: COLONOSCOPY;  Surgeon: Randall Washington MD;  [facility]; Service: Gastroenterology    [place] ESOPHAGOGASTRODUODENOSCOPY TRANSORAL DIAGNOSTIC N/A 3/31/2017    Procedure: ESOPHAGOGASTRODUODENOSCOPY (EGD); Surgeon: Randall Washington MD;  [facility]; Service: Gastroenterology       Current Outpatient Medications   Medication Sig Dispense Refill    Ascorbic Acid (vitamin C) 100 MG tablet Take 100 mg by mouth daily      BIOTIN 5000 PO Take by mouth      Cholecalciferol (VITAMIN D PO) Take by mouth      Cranberry 1000 MG CAPS Take by mouth      neomycin-polymyxin-hydrocortisone (CORTISPORIN) otic solution Administer 4 drops into both ears 2 (two) times a day 10 mL 3    norethindrone (MICRONOR) 0 35 MG tablet Take 1 tablet by mouth daily      Omega-3 Fatty Acids (FISH OIL PO) Take by mouth      ALPRAZolam (XANAX) 0 25 mg tablet       Cholesterol POWD by Does not apply route      cyclobenzaprine (FLEXERIL) 5 mg tablet Take 1 tablet (5 mg total) by mouth daily at bedtime (Patient not taking: Reported on 2021) 10 tablet 0    naproxen (NAPROSYN) 500 mg tablet Take 1 tablet (500 mg total) by mouth 2 (two) times a day with meals (Patient not taking: Reported on 2021) 28 tablet 0     No current facility-administered medications for this visit           No Known Allergies    Review of Systems Constitutional: Negative  Negative for chills and fever  HENT: Positive for postnasal drip  Negative for ear pain, rhinorrhea and sore throat  Respiratory: Positive for cough  Negative for shortness of breath and wheezing  Cardiovascular: Negative  Negative for chest pain and palpitations  Gastrointestinal: Negative for heartburn  Musculoskeletal: Negative for myalgias  Skin: Negative for rash  Allergic/Immunologic: Positive for environmental allergies  Neurological: Negative  Negative for headaches  Psychiatric/Behavioral: Negative  Video Exam    Vitals:    05/03/21 0917   Weight: 77 1 kg (170 lb)   Height: 5' 8" (1 727 m)       Physical Exam  Constitutional:       General: She is not in acute distress  Pulmonary:      Effort: Pulmonary effort is normal  No respiratory distress  Neurological:      Mental Status: She is alert and oriented to person, place, and time  Psychiatric:         Behavior: Behavior normal          Thought Content: Thought content normal          Judgment: Judgment normal           I spent 15 minutes with patient today in which greater than 50% of the time was spent in counseling/coordination of care regarding Management of symptoms  Suggesting treatment options      VIRTUAL VISIT DISCLAIMER    Rikki Christiansons acknowledges that she has consented to an online visit or consultation  She understands that the online visit is based solely on information provided by her, and that, in the absence of a face-to-face physical evaluation by the physician, the diagnosis she receives is both limited and provisional in terms of accuracy and completeness  This is not intended to replace a full medical face-to-face evaluation by the physician  Rikki Weldon understands and accepts these terms

## 2021-05-03 NOTE — ASSESSMENT & PLAN NOTE
Mostly dry with PND  No systemic symptoms  Patient declines COVID testing  Will try OTC allegra, with flonase    Fup if symptoms persist

## 2021-05-12 ENCOUNTER — TELEMEDICINE (OUTPATIENT)
Dept: FAMILY MEDICINE CLINIC | Facility: CLINIC | Age: 51
End: 2021-05-12
Payer: COMMERCIAL

## 2021-05-12 VITALS — HEIGHT: 68 IN | BODY MASS INDEX: 25.76 KG/M2 | WEIGHT: 170 LBS

## 2021-05-12 DIAGNOSIS — R05.9 COUGH: Primary | ICD-10-CM

## 2021-05-12 DIAGNOSIS — R09.82 PND (POST-NASAL DRIP): ICD-10-CM

## 2021-05-12 PROCEDURE — 99213 OFFICE O/P EST LOW 20 MIN: CPT | Performed by: FAMILY MEDICINE

## 2021-05-12 NOTE — PROGRESS NOTES
Virtual Regular Visit    Assessment/Plan:  Problem List Items Addressed This Visit        Other    Cough - Primary  - 2/2 allergies and PND  - Ok to cont OTC Zyrtec and Flonase   - cool mist humidifier in the room at night  - can use Mucinex QHS   - RTO PRN - due for annual in 10/2021 - pt aware and will schedule       Other Visit Diagnoses     PND (post-nasal drip)               Reason for visit is f/u of cough  Chief Complaint   Patient presents with    Follow-up    Cough    Virtual Regular Visit        Encounter provider Kody Gtz DO  Provider located at [address] [id number]      Recent Visits  No visits were found meeting these conditions  Showing recent visits within past 7 days and meeting all other requirements     Today's Visits  Date Type Provider Dept   05/12/21 Telemedicine Silvia Rowan DO [facility]   Showing today's visits and meeting all other requirements     Future Appointments  No visits were found meeting these conditions  Showing future appointments within next 150 days and meeting all other requirements        The patient was identified by name and date of birth  Francisco Bolivar was informed that this is a telemedicine visit and that the visit is being conducted through [facility] and patient was informed that this is a secure, HIPAA-compliant platform  She agrees to proceed     My office door was closed  No one else was in the room  She acknowledged consent and understanding of privacy and security of the video platform  The patient has agreed to participate and understands they can discontinue the visit at any time  Patient is aware this is a billable service  Subjective  Francisco Bolivar is a 46 y o  female who presents virtually for eval of cough       HPI   - 2wks ago choked on something   - Wed 2wks ago - (+) PND and cough (could barely talk)   - had a Telemed visit and was advised to use Allegra and Flonase   - has drastically cut down the cough but it is not gone  - pt has been using Zyrtec and Flonase QD   - cough intermittently does get worse at night   - denies F/C/N/V/HA/sinus pressure/earache/sore throat     Past Medical History:   Diagnosis Date    Bacterial vaginitis     Fibroids     Gastritis     GERD (gastroesophageal reflux disease)     Insomnia        Past Surgical History:   Procedure Laterality Date     SECTION      HAND SURGERY Left     Left pinky tendon repair    [place] COLONOSCOPY FLX DX W/COLLJ SPEC WHEN PFRMD N/A 2018    Procedure: COLONOSCOPY;  Surgeon: Zelalem Montaño MD;  [facility]; Service: Gastroenterology    [place] ESOPHAGOGASTRODUODENOSCOPY TRANSORAL DIAGNOSTIC N/A 3/31/2017    Procedure: ESOPHAGOGASTRODUODENOSCOPY (EGD); Surgeon: Zelalem Montaño MD;  [facility]; Service: Gastroenterology       Current Outpatient Medications   Medication Sig Dispense Refill    ALPRAZolam (XANAX) 0 25 mg tablet       Ascorbic Acid (vitamin C) 100 MG tablet Take 100 mg by mouth daily      BIOTIN 5000 PO Take by mouth      Cholecalciferol (VITAMIN D PO) Take by mouth      Cholesterol POWD by Does not apply route      Cranberry 1000 MG CAPS Take by mouth      cyclobenzaprine (FLEXERIL) 5 mg tablet Take 1 tablet (5 mg total) by mouth daily at bedtime 10 tablet 0    naproxen (NAPROSYN) 500 mg tablet Take 1 tablet (500 mg total) by mouth 2 (two) times a day with meals 28 tablet 0    neomycin-polymyxin-hydrocortisone (CORTISPORIN) otic solution Administer 4 drops into both ears 2 (two) times a day 10 mL 3    norethindrone (MICRONOR) 0 35 MG tablet Take 1 tablet by mouth daily      Omega-3 Fatty Acids (FISH OIL PO) Take by mouth       No current facility-administered medications for this visit           No Known Allergies    Review of Systems as per HPI    Video Exam    Vitals:    21 1237   Weight: 77 1 kg (170 lb)   Height: 5' 8" (1 727 m) Physical Exam   General: AAOx3, NAD  HEENT: NC/AT, EOMI, clear conjunctiva, nml external ear and nose   Cardio: deferred  Pulm: no acute respiratory distress, able to talk in complete sentences w/o getting short of breath   Abd: deferred   Psych: nml mood/affect/behavior     I spent 15 minutes directly with the patient during this visit      [address] Timur Shelby acknowledges that she has consented to an online visit or consultation  She understands that the online visit is based solely on information provided by her, and that, in the absence of a face-to-face physical evaluation by the physician, the diagnosis she receives is both limited and provisional in terms of accuracy and completeness  This is not intended to replace a full medical face-to-face evaluation by the physician  Ari Drake understands and accepts these terms

## 2021-05-19 ENCOUNTER — OFFICE VISIT (OUTPATIENT)
Dept: OTOLARYNGOLOGY | Facility: CLINIC | Age: 51
End: 2021-05-19
Payer: COMMERCIAL

## 2021-05-19 VITALS — WEIGHT: 170 LBS | TEMPERATURE: 98.5 F | HEIGHT: 68 IN | BODY MASS INDEX: 25.76 KG/M2

## 2021-05-19 DIAGNOSIS — R42 VERTIGO: ICD-10-CM

## 2021-05-19 DIAGNOSIS — E07.89 THYROID FULLNESS: ICD-10-CM

## 2021-05-19 DIAGNOSIS — R05.9 COUGH: ICD-10-CM

## 2021-05-19 DIAGNOSIS — H60.8X3 CHRONIC REACTIVE OTITIS EXTERNA OF BOTH EARS: ICD-10-CM

## 2021-05-19 DIAGNOSIS — K21.9 LARYNGOPHARYNGEAL REFLUX (LPR): Primary | ICD-10-CM

## 2021-05-19 DIAGNOSIS — E55.9 VITAMIN D DEFICIENCY: ICD-10-CM

## 2021-05-19 PROCEDURE — 3008F BODY MASS INDEX DOCD: CPT | Performed by: NURSE PRACTITIONER

## 2021-05-19 PROCEDURE — 99214 OFFICE O/P EST MOD 30 MIN: CPT | Performed by: NURSE PRACTITIONER

## 2021-05-19 PROCEDURE — 1036F TOBACCO NON-USER: CPT | Performed by: NURSE PRACTITIONER

## 2021-05-19 RX ORDER — OMEPRAZOLE 40 MG/1
40 CAPSULE, DELAYED RELEASE ORAL DAILY
Qty: 30 CAPSULE | Refills: 4 | Status: SHIPPED | OUTPATIENT
Start: 2021-05-19 | End: 2021-08-09

## 2021-05-19 RX ORDER — FAMOTIDINE 40 MG/1
40 TABLET, FILM COATED ORAL
Qty: 30 TABLET | Refills: 4 | Status: SHIPPED | OUTPATIENT
Start: 2021-05-19

## 2021-05-19 NOTE — PATIENT INSTRUCTIONS
Hydrocortisone cream to both ears as needed for itching  PT for vestibular therapy  Lab studies  Famotidine and Omeprazole for cough

## 2021-05-19 NOTE — PROGRESS NOTES
Assessment/Plan:    Vertigo  Discussed possible causes of vertigo including neurology, cardiac, autoimmune, Otitis media, sinusitis, and inner ear concerns     Audiogram last visit indicating normal bilateral hearing  Treatment options include at home epley's, lab studies, vestibular therapy,  And further testing     After discussion agreed to consider return to PT and lab studies  Follow up in 8 to 12 weeks to monitor  Laryngopharyngeal reflux (LPR)  Discussed nature of chronic cough and may be multi-factorial   Discussed post nasal drip, reflux, vs pulmonary processes that may impact cough  Reviewed options including voice rest, reflux medication therapy, Restech study, consult with GI  Reviewed side effects and action of reflux therapy medications  After discussion agreed to PPI and H2 blockers  Follow up in 8 to 12 weeks to monitor      Chronic reactive otitis externa of both ears  Discussed concerns with itching and odor from ears  No otitis externa noted on exam today  May use HC cream to ears prn         Diagnoses and all orders for this visit:    Laryngopharyngeal reflux (LPR)  -     omeprazole (PriLOSEC) 40 MG capsule; Take 1 capsule (40 mg total) by mouth daily  -     famotidine (PEPCID) 40 MG tablet; Take 1 tablet (40 mg total) by mouth daily at bedtime  -     RIMA Screen w/ Reflex to Titer/Pattern; Future  -     RF Screen w/ Reflex to Titer; Future  -     TSH, 3rd generation with Free T4 reflex; Future  -     T4; Future  -     Vitamin D 25 hydroxy; Future  -     C-reactive protein; Future  -     Sedimentation rate, automated; Future  -     Comprehensive metabolic panel; Future  -     PTH, intact;  Future  -     CBC and differential; Future  -     TSH, 3rd generation with Free T4 reflex  -     T4  -     Vitamin D 25 hydroxy  -     C-reactive protein  -     Sedimentation rate, automated  -     Comprehensive metabolic panel  -     PTH, intact  -     CBC and differential    Chronic reactive otitis externa of both ears  -     RIMA Screen w/ Reflex to Titer/Pattern; Future  -     RF Screen w/ Reflex to Titer; Future  -     TSH, 3rd generation with Free T4 reflex; Future  -     T4; Future  -     Vitamin D 25 hydroxy; Future  -     C-reactive protein; Future  -     Sedimentation rate, automated; Future  -     Comprehensive metabolic panel; Future  -     PTH, intact; Future  -     CBC and differential; Future  -     TSH, 3rd generation with Free T4 reflex  -     T4  -     Vitamin D 25 hydroxy  -     C-reactive protein  -     Sedimentation rate, automated  -     Comprehensive metabolic panel  -     PTH, intact  -     CBC and differential    Vertigo  -     RIMA Screen w/ Reflex to Titer/Pattern; Future  -     RF Screen w/ Reflex to Titer; Future  -     TSH, 3rd generation with Free T4 reflex; Future  -     T4; Future  -     Vitamin D 25 hydroxy; Future  -     C-reactive protein; Future  -     Sedimentation rate, automated; Future  -     Comprehensive metabolic panel; Future  -     PTH, intact; Future  -     CBC and differential; Future  -     TSH, 3rd generation with Free T4 reflex  -     T4  -     Vitamin D 25 hydroxy  -     C-reactive protein  -     Sedimentation rate, automated  -     Comprehensive metabolic panel  -     PTH, intact  -     CBC and differential    Vitamin D deficiency  -     RIMA Screen w/ Reflex to Titer/Pattern; Future  -     RF Screen w/ Reflex to Titer; Future  -     TSH, 3rd generation with Free T4 reflex; Future  -     T4; Future  -     Vitamin D 25 hydroxy; Future  -     C-reactive protein; Future  -     Sedimentation rate, automated; Future  -     Comprehensive metabolic panel; Future  -     PTH, intact;  Future  -     CBC and differential; Future  -     TSH, 3rd generation with Free T4 reflex  -     T4  -     Vitamin D 25 hydroxy  -     C-reactive protein  -     Sedimentation rate, automated  -     Comprehensive metabolic panel  -     PTH, intact  -     CBC and differential    Thyroid fullness  -     TSH, 3rd generation with Free T4 reflex; Future  -     T4; Future  -     TSH, 3rd generation with Free T4 reflex  -     T4    Cough          Subjective:      Patient ID: Buster Mullen is a 46 y o  female  Presents today for follow up due to vertigo and cough  No change in vertigo  Allergies worsened 3 weeks ago and had increased cough  So delayed PT  Taking Zyrtec for symptoms  Tickle in throat, slight post nasal drip  More so while lying down  No nasal congestion or sinus pressure  Frequent throat clearing  No change in voice  No globus sensation  Ears continue with occasional odor  Notes more so when use of ear buds  The following portions of the patient's history were reviewed and updated as appropriate: allergies, current medications, past family history, past medical history, past social history, past surgical history and problem list     Review of Systems   Constitutional: Negative  HENT: Positive for ear discharge  Negative for congestion, ear pain, hearing loss, nosebleeds, postnasal drip, rhinorrhea, sinus pressure, sinus pain, sore throat, tinnitus and voice change  Eyes: Negative  Respiratory: Positive for cough  Negative for chest tightness and shortness of breath  Cardiovascular: Negative  Gastrointestinal: Negative  Endocrine: Negative  Musculoskeletal: Negative  Skin: Negative for color change  Neurological: Positive for dizziness  Negative for numbness and headaches  Psychiatric/Behavioral: Negative  Objective:      Temp 98 5 °F (36 9 °C) (Temporal)   Ht 5' 8" (1 727 m)   Wt 77 1 kg (170 lb)   BMI 25 85 kg/m²          Physical Exam  Constitutional:       Appearance: She is well-developed  HENT:      Head: Normocephalic  Right Ear: Hearing, tympanic membrane, ear canal and external ear normal  No decreased hearing noted  No drainage or tenderness  Tympanic membrane is not perforated or erythematous        Left Ear: Hearing, tympanic membrane, ear canal and external ear normal  No decreased hearing noted  No drainage or tenderness  Tympanic membrane is not perforated or erythematous  Nose: Nose normal  No nasal deformity or septal deviation  Mouth/Throat:      Mouth: Mucous membranes are not pale and not dry  No oral lesions  Dentition: Normal dentition  Pharynx: Uvula midline  No oropharyngeal exudate  Neck:      Musculoskeletal: Full passive range of motion without pain, normal range of motion and neck supple  Trachea: No tracheal deviation  Cardiovascular:      Rate and Rhythm: Normal rate  Pulmonary:      Effort: Pulmonary effort is normal  No accessory muscle usage or respiratory distress  Musculoskeletal:      Right shoulder: She exhibits normal range of motion  Lymphadenopathy:      Cervical: No cervical adenopathy  Skin:     General: Skin is warm and dry  Neurological:      Mental Status: She is alert and oriented to person, place, and time  Cranial Nerves: No cranial nerve deficit  Sensory: No sensory deficit  Psychiatric:         Behavior: Behavior is cooperative

## 2021-05-20 NOTE — ASSESSMENT & PLAN NOTE
Discussed nature of chronic cough and may be multi-factorial   Discussed post nasal drip, reflux, vs pulmonary processes that may impact cough  Reviewed options including voice rest, reflux medication therapy, Restech study, consult with GI  Reviewed side effects and action of reflux therapy medications  After discussion agreed to PPI and H2 blockers    Follow up in 8 to 12 weeks to monitor

## 2021-05-20 NOTE — ASSESSMENT & PLAN NOTE
Discussed possible causes of vertigo including neurology, cardiac, autoimmune, Otitis media, sinusitis, and inner ear concerns     Audiogram last visit indicating normal bilateral hearing  Treatment options include at home epley's, lab studies, vestibular therapy,  And further testing     After discussion agreed to consider return to PT and lab studies  Follow up in 8 to 12 weeks to monitor

## 2021-05-20 NOTE — ASSESSMENT & PLAN NOTE
Discussed concerns with itching and odor from ears  No otitis externa noted on exam today    May use HC cream to ears prn

## 2021-05-21 LAB
25(OH)D3+25(OH)D2 SERPL-MCNC: 61.9 NG/ML (ref 30–100)
ALBUMIN SERPL-MCNC: 4.1 G/DL (ref 3.8–4.9)
ALBUMIN/GLOB SERPL: 1.4 {RATIO} (ref 1.2–2.2)
ALP SERPL-CCNC: 50 IU/L (ref 48–121)
ALT SERPL-CCNC: 10 IU/L (ref 0–32)
AST SERPL-CCNC: 12 IU/L (ref 0–40)
BASOPHILS # BLD AUTO: 0 X10E3/UL (ref 0–0.2)
BASOPHILS NFR BLD AUTO: 1 %
BILIRUB SERPL-MCNC: 0.4 MG/DL (ref 0–1.2)
BUN SERPL-MCNC: 16 MG/DL (ref 6–24)
BUN/CREAT SERPL: 20 (ref 9–23)
CALCIUM SERPL-MCNC: 9.3 MG/DL (ref 8.7–10.2)
CHLORIDE SERPL-SCNC: 103 MMOL/L (ref 96–106)
CO2 SERPL-SCNC: 22 MMOL/L (ref 20–29)
CREAT SERPL-MCNC: 0.81 MG/DL (ref 0.57–1)
CRP SERPL-MCNC: 1 MG/L (ref 0–10)
EOSINOPHIL # BLD AUTO: 0.2 X10E3/UL (ref 0–0.4)
EOSINOPHIL NFR BLD AUTO: 4 %
ERYTHROCYTE [DISTWIDTH] IN BLOOD BY AUTOMATED COUNT: 11.7 % (ref 11.7–15.4)
ERYTHROCYTE [SEDIMENTATION RATE] IN BLOOD BY WESTERGREN METHOD: 27 MM/HR (ref 0–40)
GLOBULIN SER-MCNC: 2.9 G/DL (ref 1.5–4.5)
GLUCOSE SERPL-MCNC: 85 MG/DL (ref 65–99)
HCT VFR BLD AUTO: 37.9 % (ref 34–46.6)
HGB BLD-MCNC: 12.3 G/DL (ref 11.1–15.9)
IMM GRANULOCYTES # BLD: 0 X10E3/UL (ref 0–0.1)
IMM GRANULOCYTES NFR BLD: 0 %
LYMPHOCYTES # BLD AUTO: 1.6 X10E3/UL (ref 0.7–3.1)
LYMPHOCYTES NFR BLD AUTO: 40 %
MCH RBC QN AUTO: 28.2 PG (ref 26.6–33)
MCHC RBC AUTO-ENTMCNC: 32.5 G/DL (ref 31.5–35.7)
MCV RBC AUTO: 87 FL (ref 79–97)
MONOCYTES # BLD AUTO: 0.5 X10E3/UL (ref 0.1–0.9)
MONOCYTES NFR BLD AUTO: 12 %
NEUTROPHILS # BLD AUTO: 1.8 X10E3/UL (ref 1.4–7)
NEUTROPHILS NFR BLD AUTO: 43 %
PLATELET # BLD AUTO: 286 X10E3/UL (ref 150–450)
POTASSIUM SERPL-SCNC: 4.2 MMOL/L (ref 3.5–5.2)
PROT SERPL-MCNC: 7 G/DL (ref 6–8.5)
PTH-INTACT SERPL-MCNC: 25 PG/ML (ref 15–65)
RBC # BLD AUTO: 4.36 X10E6/UL (ref 3.77–5.28)
SL AMB EGFR AFRICAN AMERICAN: 96 ML/MIN/1.73
SL AMB EGFR NON AFRICAN AMERICAN: 83 ML/MIN/1.73
SODIUM SERPL-SCNC: 137 MMOL/L (ref 134–144)
T4 SERPL-MCNC: 6.9 UG/DL (ref 4.5–12)
TSH SERPL DL<=0.005 MIU/L-ACNC: 1.73 UIU/ML (ref 0.45–4.5)
WBC # BLD AUTO: 4.1 X10E3/UL (ref 3.4–10.8)

## 2021-05-24 ENCOUNTER — TELEPHONE (OUTPATIENT)
Dept: OTOLARYNGOLOGY | Facility: CLINIC | Age: 51
End: 2021-05-24

## 2021-06-11 ENCOUNTER — EVALUATION (OUTPATIENT)
Dept: PHYSICAL THERAPY | Facility: CLINIC | Age: 51
End: 2021-06-11
Payer: COMMERCIAL

## 2021-06-11 DIAGNOSIS — R42 VERTIGO: Primary | ICD-10-CM

## 2021-06-11 PROCEDURE — 97162 PT EVAL MOD COMPLEX 30 MIN: CPT | Performed by: PHYSICAL THERAPIST

## 2021-06-11 NOTE — PROGRESS NOTES
PT Evaluation     Today's date: 2021  Patient name: Judy Burkitt  : 1970  MRN: [id number]  Referring provider: VANCE Kinney  Dx:   Encounter Diagnosis     ICD-10-CM    1  Vertigo  R42 Ambulatory referral to Physical Therapy                  Assessment  Assessment details: Patient is a 46 y o  female who presents with s/s consistent with R/L vestibular hypofunction  Patient appears to demonstrate poor ability for any sort of gaze stability such as with prolonged smooth pursuits and saccades and head thrust maneuvers  When going slow, and fixing eyes pt is better able to do this, but with increased head speed and forcing increased work from vestibular system, pt is unable to maintain gaze stability and demonstrates increased dizziness and a loss of control  No sx provocation with posterior or horizontal canal testing today; pt did report she had some the other week when doctor tested her but today she did not present with any positional nystagmus; therefore, did not perform any canal repositioning today, will further assess this in future sessions  Balance dysfunction with EC and FT as well as with head turns; she would benefit from vestibular therapy to address her impairments and dysfunction with vestibular system  Reviewed Cawthorne cooksey exercises she has been trying and will further progress next week  Discussed risks, benefits, and alternatives to treatment, and answered all patient questions to patient satisfaction  Patient would benefit from skilled PT services to address impairments stated above  These deficits are limiting patient's ability to perform all of her ADLs and recreational activities that involve head turns  Patient appears motivated, agrees with the POC, and is a fair/good candidate for skilled PT at this time   Thank you for the referral     Impairments: activity intolerance, impaired balance, lacks appropriate home exercise program and poor posture     Symptom irritability: moderateUnderstanding of Dx/Px/POC: good   Prognosis: fair    Goals  Impairment Goals:   - In 6 weeks, pt will report no greater than a 1-2/10  or less with dizziness during all ADLs  - In 6 weeks, pt will demonstrate improved ability to rotate head when standing and maintain balance   - In 6 weeks, pt will demonstrate improved ability to maintain gaze with smooth pursuits testing for 20 seconds or more at speeds appropriate for testing  - In 6 weeks, pt will be able to demonstrate normative values on FGA testing to demonstrate a decrease in fall risk       Functional Goals:   - By DC, pt will score a 75% or better on FOTO to demonstrate improved functional level   - By DC, pt will be able to perform all household chores involving turning head without having any dizziness episodes for a period of 2 or more weeks    - By DC, pt will be able to maintain balance EC for 45 seconds or longer and maintain this position with EC  - By DC, pt will demonstrate IND and compliance with HEP for improved carryover at home        Plan  Patient would benefit from: skilled physical therapy  Planned therapy interventions: manual therapy, joint mobilization, activity modification, ADL retraining, neuromuscular re-education, balance, balance/weight bearing training, behavior modification, body mechanics training, patient education, postural training, canalith repositioning, strengthening, stretching, therapeutic activities, therapeutic exercise, therapeutic training, home exercise program, gait training, functional ROM exercises and flexibility  Frequency: 2x week  Duration in weeks: 6  Plan of Care beginning date: 6/11/2021  Plan of Care expiration date: 7/11/2021  Treatment plan discussed with: patient        Subjective Evaluation    History of Present Illness  Mechanism of injury: Pt states that she was in therapy in the past year where she has had vertigo and did feel like it helped   Pt states that she has already had little episodes of vertigo over the past several years  She states that soometimes it gets really bad and she has to grab on the wall to secure herself  Pt states her severity with dizziness has not been as much as it was when she first came to therapy  She states her dizziness is more of a shift and sometimes spinning and swooning; she feels that she often gets it when she is doing soemthing involving moving her head  Pt states that the duration of dizziness varies  Pt states she has always had weird things happen with her R ear, pt reports not being sick; pt reports sinus problems and lots of allergies          Recurrent probem    Quality of life: good    Pain  Current pain ratin  At best pain ratin  At worst pain ratin  Aggravating factors: walking  Progression: no change    Social Support  Lives in: multiple-level home    Employment status: not working  Treatments  Previous treatment: physical therapy  Patient Goals  Patient goals for therapy: independence with ADLs/IADLs and return to sport/leisure activities  Patient goal: get rid of spinning and swooning        Objective     Concurrent Complaints  Negative for headaches  Neuro Exam:     Exacerbating factors  Positive for bending over, rolling in bed, looking up, walking and turning head  Headaches   Patient reports headaches: No      Cervical exam   Ligament Laxity Testing   Alar ligament: WNL  Sharp Tigre:  WNL  Modified VBI   Left: asymptomatic  Right: asymptomatic  Seated posture: forward head posture    Oculomotor exam   Oculomotor ROM: diminished  Resting nystagmus: not present   Gaze holding nystagmus: present left and present right  Smooth pursuits: saccadic smooth pursuit  Vertical saccades: hypometric  Horizontal saccades: hypometric   Convergence: abnormal  Head thrust: left abnormal and right abnormal  Dynamic visual acuity: abnormal    Positional testing   Tarlton-Hallpike   Left posterior canal: WNL  Right posterior canal: WNL  Roll test   Left horizontal canal: WNL  Right horizontal canal: WNL  Positional testing comment: Further assess with airex pad NV    Sensation   Light touch LE: left WNL and right WNL    Functional outcomes   Functional outcome comment: Will further assess NV  Functional outcome gait comment: Normal when not having acute vestibular flareup      Balance assessments   MCTSIB   Eyes open level surface: 30  Eyes closed level surface: 20             Diagnosis: vestibular dysfunction    Precautions: when flared up, fall risk   Primary goals: get rid of dizziness    Asterisks next to exercises = provided for patient HEP   Manual Therapy 6/11                                               Exercise Diary         Smooth pursuits reviewed       Head turns Reviewed, start seated                                                                                               education WJB: on vestibular system, POC, initial HEP               Ther Act                                        Modalities

## 2021-06-15 ENCOUNTER — OFFICE VISIT (OUTPATIENT)
Dept: PHYSICAL THERAPY | Facility: CLINIC | Age: 51
End: 2021-06-15
Payer: COMMERCIAL

## 2021-06-15 DIAGNOSIS — R42 VERTIGO: Primary | ICD-10-CM

## 2021-06-15 PROCEDURE — 97112 NEUROMUSCULAR REEDUCATION: CPT | Performed by: PHYSICAL THERAPIST

## 2021-06-15 NOTE — PROGRESS NOTES
Received call from pt  Undergoing PT and they informed her unlikely BPPV and possible nerve cause to vertigo  Recommend evaluation with neurology and pt agreed    Continue PT

## 2021-06-15 NOTE — PROGRESS NOTES
Daily Note     Today's date: 6/15/2021  Patient name: Ari Drake  : 1970  MRN: [id number]  Referring provider: VANCE Day  Dx:   Encounter Diagnosis     ICD-10-CM    1  Vertigo  R42                   Subjective: pt reports no additional flareups since eval      Objective: See treatment diary below      Assessment: Tolerated treatment poor today  Resumed level I Cawthorne exercises in which she tolerated fair/good as well as saccades and convergence with slight aggravation of dizziness t/o  Following that, trialed slow VOR x1, which was emphasizing COR and she became very flared up and nauseous to the point of needing to stop therapy to avoid further aggravation of sx's  Pt then required rest for 10 minutes before feeling like she could drive again due to this flare-up  Recommended pt to reach out to referring provider regarding the potential of providing additional tx for dizziness modulation and potentially meclizine; will reassess pt this coming session  Cont skilled PT  Plan: Continue per plan of care        Diagnosis: vestibular dysfunction    Precautions: when flared up, fall risk   Primary goals: get rid of dizziness    Asterisks next to exercises = provided for patient HEP   Manual Therapy 6/11 6/15      MFR  WJB: upper traps and SO                                      Exercise Diary         Smooth pursuits reviewed       Head turns Reviewed, start seated       Ocular AROM  2x10 up/down and side/side      convergence push ups  7x      saccades  Side/side and up/down 2x20'' ea      VOR x1  2x20'' w/ flareup afterwards      Upper trap stretch  5x10'' ea way                                                      education WJB: on vestibular system, POC, initial HEP WJB: dizziness sx's and rating, discussing other dizziness modulation options with referring provider/PCP              Ther Act                                        Modalities

## 2021-06-16 ENCOUNTER — IMMUNIZATIONS (OUTPATIENT)
Dept: FAMILY MEDICINE CLINIC | Facility: HOSPITAL | Age: 51
End: 2021-06-16

## 2021-06-16 DIAGNOSIS — Z23 ENCOUNTER FOR IMMUNIZATION: Primary | ICD-10-CM

## 2021-06-16 PROCEDURE — 0001A SARS-COV-2 / COVID-19 MRNA VACCINE (PFIZER-BIONTECH) 30 MCG: CPT

## 2021-06-16 PROCEDURE — 91300 SARS-COV-2 / COVID-19 MRNA VACCINE (PFIZER-BIONTECH) 30 MCG: CPT

## 2021-06-17 ENCOUNTER — OFFICE VISIT (OUTPATIENT)
Dept: PHYSICAL THERAPY | Facility: CLINIC | Age: 51
End: 2021-06-17
Payer: COMMERCIAL

## 2021-06-17 DIAGNOSIS — R42 VERTIGO: Primary | ICD-10-CM

## 2021-06-17 PROCEDURE — 97110 THERAPEUTIC EXERCISES: CPT | Performed by: PHYSICAL THERAPIST

## 2021-06-17 PROCEDURE — 97112 NEUROMUSCULAR REEDUCATION: CPT | Performed by: PHYSICAL THERAPIST

## 2021-06-17 NOTE — PROGRESS NOTES
Daily Note     Today's date: 2021  Patient name: Shanae Huber  : 1970  MRN: [id number]  Referring provider: VANCE Boone  Dx:   Encounter Diagnosis     ICD-10-CM    1  Vertigo  R42                   Subjective: pt reports feeling okay after sig flareup last session, did talk to doctor and pt is going to see a neurologist and she is going to schedule to see one      Objective: See treatment diary below      Assessment: Tolerated treatment better than previous session; able to perform more oculomotor exercises as well as neck AROM without fixating gaze on any specific object  Pt was notably challenged by standing balance with EC on airex pad isolating vestibular system in which she will continue to require ongoing training in upcoming sessions  No significant increase in dizziness t/o today's session  Cont skilled PT  Plan: Continue per plan of care        Diagnosis: vestibular dysfunction    Precautions: when flared up, fall risk   Primary goals: get rid of dizziness    Asterisks next to exercises = provided for patient HEP   Manual Therapy 6/11 6/15 6/17     MFR  WJB: upper traps and SO                                      Exercise Diary         UBE   2 5/2 5     Smooth pursuits reviewed       Head turns Reviewed, start seated  Ext: 10x  ROT: R/L 10x     Ocular AROM  2x10 up/down and side/side 2x10 up/down and side/side     convergence push ups  7x      saccades  Side/side and up/down 2x20'' ea Side/side 3x20''     VOR x1  2x20'' w/ flareup afterwards HOLD on for now*     Upper trap stretch  5x10'' ea way 10x5'' ea way             balance   FT firm surface: EC head turns 10x up/down and side/side;   FA EC on airex 3x30'' SBA                                     education WJB: on vestibular system, POC, initial HEP WJB: dizziness sx's and rating, discussing other dizziness modulation options with referring provider/PCP WJB: updated HEP             Ther Act Modalities

## 2021-06-22 ENCOUNTER — OFFICE VISIT (OUTPATIENT)
Dept: PHYSICAL THERAPY | Facility: CLINIC | Age: 51
End: 2021-06-22
Payer: COMMERCIAL

## 2021-06-22 DIAGNOSIS — R42 VERTIGO: Primary | ICD-10-CM

## 2021-06-22 PROCEDURE — 97112 NEUROMUSCULAR REEDUCATION: CPT | Performed by: PHYSICAL THERAPIST

## 2021-06-22 NOTE — PROGRESS NOTES
Daily Note     Today's date: 2021  Patient name: Nelson Sweeney  : 1970  MRN: [id number]  Referring provider: VANCE Akbar  Dx:   Encounter Diagnosis     ICD-10-CM    1  Vertigo  R42                   Subjective: pt reports no dizzy episodes over the weekend      Objective: See treatment diary below      Assessment: Tolerated treatment session well today; pt continues to demonstrate very gradual improvements with exercises tolerance regarding vestibular and eye movement related exercises  Slow head turns appear to be mildly symptomatic at this time while quicker head turns still seem to aggravate pt's overall dizziness; however this has been getting better since starting  Able to tolerate slow VOR cancellation however does complain of busy clinic background also aggravate dizziness at times  Cont skilled PT  Plan: Continue per plan of care        Diagnosis: vestibular dysfunction    Precautions: when flared up, fall risk   Primary goals: get rid of dizziness    Asterisks next to exercises = provided for patient HEP   Manual Therapy 6/11 6/15 6/17 6/22    MFR  WJB: upper traps and SO                                      Exercise Diary         UBE   2 5/2 5 2 5/2 5    Smooth pursuits reviewed   Lateral motions only 5x ea way    Head turns Reviewed, start seated  Ext: 10x  ROT: R/L 10x Flex/ext: 10x ea way  ROT R/L: 10x ea    Ocular AROM  2x10 up/down and side/side 2x10 up/down and side/side 2x10 ea way    convergence push ups  7x      saccades  Side/side and up/down 2x20'' ea Side/side 3x20'' Side/Side  3x20''    VOR x1  2x20'' w/ flareup afterwards HOLD on for now* VOR cancellation 5x    Upper trap stretch  5x10'' ea way 10x5'' ea way 10x ea way; 3'' holds            balance   FT firm surface: EC head turns 10x up/down and side/side;   FA EC on airex 3x30'' SBA FT firm surface EC 2x10 up/down and side/side    FA EC on airex 5x30''                                    education WJB: on vestibular system, POC, initial HEP WJB: dizziness sx's and rating, discussing other dizziness modulation options with referring provider/PCP WJB: updated HEP WJB            Ther Act                                        Modalities

## 2021-06-24 ENCOUNTER — OFFICE VISIT (OUTPATIENT)
Dept: PHYSICAL THERAPY | Facility: CLINIC | Age: 51
End: 2021-06-24
Payer: COMMERCIAL

## 2021-06-24 DIAGNOSIS — R42 VERTIGO: Primary | ICD-10-CM

## 2021-06-24 PROCEDURE — 97112 NEUROMUSCULAR REEDUCATION: CPT | Performed by: PHYSICAL THERAPIST

## 2021-06-24 PROCEDURE — 97110 THERAPEUTIC EXERCISES: CPT | Performed by: PHYSICAL THERAPIST

## 2021-06-24 NOTE — PROGRESS NOTES
Daily Note     Today's date: 2021  Patient name: Francisco Bolivar  : 1970  MRN: [id number]  Referring provider: VANCE Sewell  Dx:   Encounter Diagnosis     ICD-10-CM    1  Vertigo  R42                   Subjective: pt reports no dizzy episodes over the weekend      Objective: See treatment diary below      Assessment: Tolerated treatment session well today; progressed to slightly higher vestibular exercises today including seated ball toss hand to hand and looking down and reaching towards the floor  Most challenged with smooth pursuits with J tracing as this flared up her dizziness most so  She does continue to be slightly progressing  Cont skilled PT  Plan: Continue per plan of care        Diagnosis: vestibular dysfunction    Precautions: when flared up, fall risk   Primary goals: get rid of dizziness    Asterisks next to exercises = provided for patient HEP   Manual Therapy 6/11 6/15 6/17 6/22 6/24   MFR  WJB: upper traps and SO                                      Exercise Diary         UBE   2 5/2 5 2 5/2 5 2 5/2 5   Smooth pursuits reviewed   Lateral motions only 5x ea way J motions 5x ea way   Head turns Reviewed, start seated  Ext: 10x  ROT: R/L 10x Flex/ext: 10x ea way  ROT R/L: 10x ea 20x ea way sitting;   Standing on airex pad with slight tandem 10x ea way   Ocular AROM  2x10 up/down and side/side 2x10 up/down and side/side 2x10 ea way    convergence push ups  7x   10x   saccades  Side/side and up/down 2x20'' ea Side/side 3x20'' Side/Side  3x20''    VOR x1  2x20'' w/ flareup afterwards HOLD on for now* VOR cancellation 5x    Upper trap stretch  5x10'' ea way 10x5'' ea way 10x ea way; 3'' holds            balance   FT firm surface: EC head turns 10x up/down and side/side;   FA EC on airex 3x30'' SBA FT firm surface EC 2x10 up/down and side/side    FA EC on airex 5x30'' Tandem on foam EO 10x head turns ea way w/ busy background behind   Reaching towards floor     2x10 eyes fixed Seated ball toss      2x10 hand to hand                    education WJB: on vestibular system, POC, initial HEP WJB: dizziness sx's and rating, discussing other dizziness modulation options with referring provider/PCP WJB: updated HEP WJB WJB           Ther Act                                        Modalities

## 2021-06-29 ENCOUNTER — OFFICE VISIT (OUTPATIENT)
Dept: PHYSICAL THERAPY | Facility: CLINIC | Age: 51
End: 2021-06-29
Payer: COMMERCIAL

## 2021-06-29 DIAGNOSIS — R42 VERTIGO: Primary | ICD-10-CM

## 2021-06-29 PROCEDURE — 97112 NEUROMUSCULAR REEDUCATION: CPT | Performed by: PHYSICAL THERAPIST

## 2021-06-29 NOTE — PROGRESS NOTES
Daily Note     Today's date: 2021  Patient name: Warner Cheatham  : 1970  MRN: [id number]  Referring provider: VANCE James  Dx:   Encounter Diagnosis     ICD-10-CM    1  Vertigo  R42                   Subjective: pt reports no episodes since last session, she did feel like one episode was coming on but was able to self manage and get it to calm down      Objective: See treatment diary below      Assessment: Tolerated treatment session well today  Progressed to slow speed VOR cancellation with crowed environment behind and she could tolerate for 3-5 reps before needing to take a break due to dizziness; gave pt periodic rest breaks 30 sec or more depending on severity  Trialed nose to knee in seated position which flared up pt notably when looking at pattern on floor and returning to sitting up position  Will revisit this more NV  Will perform reassessment NV  Plan: Continue per plan of care        Diagnosis: vestibular dysfunction    Precautions: when flared up, fall risk   Primary goals: get rid of dizziness    Asterisks next to exercises = provided for patient HEP   Manual Therapy 6/29 6/15 6/17 6/22 6/24   MFR  WJB: upper traps and SO                                      Exercise Diary         UBE 2 5/2 5  2 5/2 5 2 5/2 5 2 5/2 5   Smooth pursuits Lateral movements crowded environment 10x   Lateral motions only 5x ea way J motions 5x ea way   Head turns Standing EC:   FT on airex 10x up/down and side/side  Ext: 10x  ROT: R/L 10x Flex/ext: 10x ea way  ROT R/L: 10x ea 20x ea way sitting;   Standing on airex pad with slight tandem 10x ea way   Ocular AROM  2x10 up/down and side/side 2x10 up/down and side/side 2x10 ea way    convergence push ups     10x   saccades   Side/side 3x20'' Side/Side  3x20''    VOR x1 5x  HOLD on for now* VOR cancellation 5x    Upper trap stretch   10x5'' ea way 10x ea way; 3'' holds    VOR cancellation 2x5 w/ crowded environment        balance   FT firm surface: EC head turns 10x up/down and side/side;   FA EC on airex 3x30'' SBA FT firm surface EC 2x10 up/down and side/side    FA EC on airex 5x30'' Tandem on foam EO 10x head turns ea way w/ busy background behind   Reaching towards floor  10x   2x10 eyes fixed    Seated ball toss      2x10 hand to hand    Nose to knee seated 1x L knee, flareup of sx's, will revisit NV               education WJB: on vestibular system, POC, initial HEP WJB: dizziness sx's, self progression WJB: updated HEP WJB WJB           Ther Act                                        Modalities

## 2021-07-01 ENCOUNTER — APPOINTMENT (OUTPATIENT)
Dept: PHYSICAL THERAPY | Facility: CLINIC | Age: 51
End: 2021-07-01
Payer: COMMERCIAL

## 2021-07-07 ENCOUNTER — EVALUATION (OUTPATIENT)
Dept: PHYSICAL THERAPY | Facility: CLINIC | Age: 51
End: 2021-07-07
Payer: COMMERCIAL

## 2021-07-07 ENCOUNTER — IMMUNIZATIONS (OUTPATIENT)
Dept: FAMILY MEDICINE CLINIC | Facility: HOSPITAL | Age: 51
End: 2021-07-07

## 2021-07-07 ENCOUNTER — TELEPHONE (OUTPATIENT)
Dept: NEUROLOGY | Facility: CLINIC | Age: 51
End: 2021-07-07

## 2021-07-07 DIAGNOSIS — R42 VERTIGO: Primary | ICD-10-CM

## 2021-07-07 DIAGNOSIS — Z23 ENCOUNTER FOR IMMUNIZATION: Primary | ICD-10-CM

## 2021-07-07 PROCEDURE — 91300 SARS-COV-2 / COVID-19 MRNA VACCINE (PFIZER-BIONTECH) 30 MCG: CPT

## 2021-07-07 PROCEDURE — 97112 NEUROMUSCULAR REEDUCATION: CPT | Performed by: PHYSICAL THERAPIST

## 2021-07-07 PROCEDURE — 0002A SARS-COV-2 / COVID-19 MRNA VACCINE (PFIZER-BIONTECH) 30 MCG: CPT

## 2021-07-07 NOTE — PROGRESS NOTES
PT Re-Evaluation     Today's date: 2021  Patient name: Marcela Campoverde  : 1970  MRN: [id number]  Referring provider: Margi Robert DO  Dx:   Encounter Diagnosis     ICD-10-CM    1  Vertigo  R42                   Assessment  Assessment details: Patient is a 46 y o  female who presents with s/s consistent with R/L vestibular hypofunction  Patient appears to demonstrate poor ability for any sort of gaze stability such as with prolonged smooth pursuits and saccades and head thrust maneuvers  When going slow, and fixing eyes pt is better able to do this, but with increased head speed and forcing increased work from vestibular system, pt is unable to maintain gaze stability and demonstrates increased dizziness and a loss of control  This has improved since IE as she was able to tolerate faster head turns and more dynamic gaze stability and continue to progress with habituation exercises t/o sessions in which she continues to gradually tolerate better  Improvement eye movement with head thrust today with no dizziness reported t/o  Was able to perform standing head turns including standing VOR which will continue to be progressed in upcoming sessions  She would continue to benefit from skilled PT for another 3-4 weeks to progress further with vestibular control to facilitate return to all ADLs      Impairments: activity intolerance, impaired balance, lacks appropriate home exercise program and poor posture     Symptom irritability: moderateUnderstanding of Dx/Px/POC: good   Prognosis: fair    Goals  Impairment Goals: all PROGRESSING as of   - In 6 weeks, pt will report no greater than a 1-2/10  or less with dizziness during all ADLs  - In 6 weeks, pt will demonstrate improved ability to rotate head when standing and maintain balance   - In 6 weeks, pt will demonstrate improved ability to maintain gaze with smooth pursuits testing for 20 seconds or more at speeds appropriate for testing  - In 6 weeks, pt will be able to demonstrate normative values on FGA testing to demonstrate a decrease in fall risk       Functional Goals: all PROGRESSING as of 7/7  - By DC, pt will score a 75% or better on FOTO to demonstrate improved functional level   - By DC, pt will be able to perform all household chores involving turning head without having any dizziness episodes for a period of 2 or more weeks    - By DC, pt will be able to maintain balance EC for 45 seconds or longer and maintain this position with EC  - By DC, pt will demonstrate IND and compliance with HEP for improved carryover at home        Plan  Patient would benefit from: skilled physical therapy  Planned therapy interventions: manual therapy, joint mobilization, activity modification, ADL retraining, neuromuscular re-education, balance, balance/weight bearing training, behavior modification, body mechanics training, patient education, postural training, canalith repositioning, strengthening, stretching, therapeutic activities, therapeutic exercise, therapeutic training, home exercise program, gait training, functional ROM exercises and flexibility  Frequency: 2x week  Duration in weeks: 6  Plan of Care beginning date: 6/11/2021  Plan of Care expiration date: 7/11/2021  Treatment plan discussed with: patient        Subjective Evaluation    History of Present Illness  Mechanism of injury: Pt states that she was in therapy in the past year where she has had vertigo and did feel like it helped  Pt states that she has already had little episodes of vertigo over the past several years  She states that soometimes it gets really bad and she has to grab on the wall to secure herself  Pt states her severity with dizziness has not been as much as it was when she first came to therapy  She states her dizziness is more of a shift and sometimes spinning and swooning; she feels that she often gets it when she is doing soemthing involving moving her head   Pt states that the duration of dizziness varies  Pt states she has always had weird things happen with her R ear, pt reports not being sick; pt reports sinus problems and lots of allergies    Re-Eval: Pt states she is about 50% better at this time; her overall episode intensity has greatly reduced since IE; has been doing a lot of the exercises  Overall things have been been getting better overall           Recurrent probem    Quality of life: good    Pain  Current pain ratin  At best pain ratin  At worst pain ratin  Aggravating factors: walking  Progression: no change    Social Support  Lives in: multiple-level home    Employment status: not working  Treatments  Previous treatment: physical therapy  Patient Goals  Patient goals for therapy: independence with ADLs/IADLs and return to sport/leisure activities  Patient goal: get rid of spinning and swooning        Objective     Concurrent Complaints  Negative for headaches  Neuro Exam:     Exacerbating factors  Positive for bending over, rolling in bed, looking up, walking, turning head and walking in busy environment  Headaches   Patient reports headaches: No      Cervical exam   Ligament Laxity Testing   Alar ligament: WNL  Sharp Tigre:  WNL  Modified VBI   Left: asymptomatic  Right: asymptomatic  Seated posture: forward head posture    Oculomotor exam   Oculomotor ROM: diminished  Resting nystagmus: not present   Gaze holding nystagmus: present left and present right  Smooth pursuits: within normal limits and strained but no sx's at reassessment  Vertical saccades: hypometric and normal  Horizontal saccades: hypometric  and normal  Convergence: abnormal  Crossover test: head thrust gaze stability has improved since starting PT  Head thrust: left abnormal and right abnormal  Dynamic visual acuity: abnormal    Positional testing   Felicia-Hallpike   Left posterior canal: WNL  Right posterior canal: WNL  Roll test   Left horizontal canal: WNL  Right horizontal canal: WNL  Positional testing comment: Further assess with airex pad NV    Sensation   Light touch LE: left WNL and right WNL    Functional outcomes   Functional outcome comment: Will further assess NV  Functional outcome gait comment: Normal when not having acute vestibular flareup      Balance assessments   MCTSIB   Eyes open level surface: 30  Eyes open foam surface: 30  Eyes closed level surface: 30  Eyes closed foam surface: 30                Diagnosis: vestibular dysfunction    Precautions: when flared up, fall risk   Primary goals: get rid of dizziness    Asterisks next to exercises = provided for patient HEP   Manual Therapy 6/29 7/7 6/17 6/22 6/24   MFR                                Re-Eval  WJB      Exercise Diary         UBE 2 5/2 5 2 5/2 5 2 5/2 5 2 5/2 5 2 5/2 5   Smooth pursuits Lateral movements crowded environment 10x Assessed for re-eval  Lateral motions only 5x ea way J motions 5x ea way   Head turns Standing EC:   FT on airex 10x up/down and side/side Standing letter on wall 3x20'';  Ext: 10x  ROT: R/L 10x Flex/ext: 10x ea way  ROT R/L: 10x ea 20x ea way sitting;   Standing on airex pad with slight tandem 10x ea way   Ocular AROM  assessed 2x10 up/down and side/side 2x10 ea way    convergence push ups     10x   saccades  assessed Side/side 3x20'' Side/Side  3x20''    VOR x1 5x Standing VOR 3x20'' HOLD on for now* VOR cancellation 5x    Upper trap stretch   10x5'' ea way 10x ea way; 3'' holds    VOR cancellation 2x5 w/ crowded environment        balance  EC on airex FT 30 sec FT firm surface: EC head turns 10x up/down and side/side;   FA EC on airex 3x30'' SBA FT firm surface EC 2x10 up/down and side/side    FA EC on airex 5x30'' Tandem on foam EO 10x head turns ea way w/ busy background behind   Reaching towards floor  10x and up to ceiling    2x10 eyes fixed    Seated ball toss      2x10 hand to hand    Nose to knee seated 1x L knee, flareup of sx's, will revisit NV 10x to R knee, too flared up to do L knee              education WJB: on vestibular system, POC, initial HEP WJB: dizziness sx's, self progression, updated HEP WJB: updated HEP WJB WJB           Ther Act                                        Modalities

## 2021-07-07 NOTE — TELEPHONE ENCOUNTER
Best contact number for patient:  [phone]  Emergency Contact name and number:  Fede-spouse: [phone]  Referring provider and telephone number:  [facility] Provider Name and if affiliated with [facility]:     Reason for Appointment/Dx:vertigo    Have you seen and followed up with a pediatric Neurologist for this disease in the past?      No (If yes ok to schedule with Dr Sirisha Redmond)    Neurology Location patient would like to be seen:    Order received? Yes                                                 Records Received? Yes    Have you ever seen another Neurologist?       No    Insurance Information    Insurance Name:    ID/Policy #:    Secondary Insurance:    ID/Policy#: Workman's Comp/ Accident/ School  Information      Workman's Comp/Accident/School related? No    If yes name of Insurance company:    Claim #:    Date of Injury:    Type of Injury:     Name and Telephone Number:    Notes:                   Appointment date: 10/19/2021 @ 2:10pm, w/Dr Andres Muniz in [facility]  Verified address/insurance/phone-all current  Sent appt details/directions

## 2021-07-13 ENCOUNTER — OFFICE VISIT (OUTPATIENT)
Dept: PHYSICAL THERAPY | Facility: CLINIC | Age: 51
End: 2021-07-13
Payer: COMMERCIAL

## 2021-07-13 DIAGNOSIS — R42 VERTIGO: Primary | ICD-10-CM

## 2021-07-13 PROCEDURE — 97110 THERAPEUTIC EXERCISES: CPT | Performed by: PHYSICAL THERAPIST

## 2021-07-13 PROCEDURE — 97112 NEUROMUSCULAR REEDUCATION: CPT | Performed by: PHYSICAL THERAPIST

## 2021-07-13 NOTE — PROGRESS NOTES
Daily Note     Today's date: 2021  Patient name: Marcia Lopes  : 1970  MRN: [id number]  Referring provider: Manisha Marvin DO  Dx:   Encounter Diagnosis     ICD-10-CM    1  Vertigo  R42                   Subjective: I still have some trouble with nose to knee when I go too fast but overall I have been doing okay       Objective: See treatment diary below      Assessment: Tolerated treatment well today  Progressed to standing VOR cancellation with busy background as well as standing VOR at wall with focus on letter A with and without checkered background; pt was most challenged by up/down VOR today with eye quivering noted t/o due to suspected weakness and limited endurance with gaze  Added c/spine retraction to improve proprioception of head as well as DNF endurance/stability to help with positional awareness with eye/head movements  Cont skilled PT  Plan: Continue per plan of care          Diagnosis: vestibular dysfunction    Precautions: when flared up, fall risk   Primary goals: get rid of dizziness    Asterisks next to exercises = provided for patient HEP   Manual Therapy    MFR                                Re-Eval  WJB      Exercise Diary         UBE 2 5/2 5 2 5/2 5 2 5/2 5 2 5/2 5 2 5/2 5   Smooth pursuits Lateral movements crowded environment 10x Assessed for re-eval  Lateral motions only 5x ea way J motions 5x ea way   C/spine retraction    2x10 w/ cues                     Head turns Standing EC:   FT on airex 10x up/down and side/side Standing letter on wall 3x20'';  Ext: 10x  ROT: R/L 10x Flex/ext: 10x ea way  ROT R/L: 10x ea 20x ea way sitting;   Standing on airex pad with slight tandem 10x ea way   Ocular AROM  assessed Trunk ROT side/side 10x ea 2x10 ea way    convergence push ups     10x   saccades  assessed Near/far 10x ea hand in front Side/Side  3x20''    VOR x1 5x Standing VOR 3x20'' Standing letter "A" 8 feet away 2x30'' up/down and side/side; as well as 1x30'' ea for A w/ checkered background VOR cancellation 5x    Upper trap stretch    10x ea way; 3'' holds    VOR cancellation 2x5 w/ crowded environment   Standing w/ busy background 2x5 ea way     balance  EC on airex FT 30 sec  FT firm surface EC 2x10 up/down and side/side    FA EC on airex 5x30'' Tandem on foam EO 10x head turns ea way w/ busy background behind   Reaching towards floor  10x and up to ceiling  10x floor to ceiling  2x10 eyes fixed    Seated ball toss      2x10 hand to hand    Nose to knee seated 1x L knee, flareup of sx's, will revisit NV 10x to R knee, too flared up to do L knee 2x5 ea way              education WJB: on vestibular system, POC, initial HEP WJB: dizziness sx's, self progression, updated HEP WJB: sx management WJB WJB           Ther Act                                        Modalities

## 2021-07-15 ENCOUNTER — OFFICE VISIT (OUTPATIENT)
Dept: PHYSICAL THERAPY | Facility: CLINIC | Age: 51
End: 2021-07-15
Payer: COMMERCIAL

## 2021-07-15 DIAGNOSIS — R42 VERTIGO: Primary | ICD-10-CM

## 2021-07-15 PROCEDURE — 97110 THERAPEUTIC EXERCISES: CPT | Performed by: PHYSICAL THERAPIST

## 2021-07-15 PROCEDURE — 97112 NEUROMUSCULAR REEDUCATION: CPT | Performed by: PHYSICAL THERAPIST

## 2021-07-15 NOTE — PROGRESS NOTES
Daily Note     Today's date: 7/15/2021  Patient name: Katherine Jackson  : 1970  MRN: [id number]  Referring provider: Miki Contreras DO  Dx:   Encounter Diagnosis     ICD-10-CM    1  Vertigo  R42                   Subjective: I have not had any dizziness since last time I have been feeling pretty good      Objective: See treatment diary below      Assessment: Tolerated treatment well today  She was able to perform standing head turns on airex with EC with notable improvement as compared to previous attempts with previous sessions  Improved control with VOR stability and control, but does demonstrate more difficulty with head turns up/down; as well as quick turns right or left when turning whole body as well as VOR cancellation w/ busy background which did appear to flare up her sx's more so  Cont skilled PT  Plan: Continue per plan of care          Diagnosis: vestibular dysfunction    Precautions: when flared up, fall risk   Primary goals: get rid of dizziness    Asterisks next to exercises = provided for patient HEP   Manual Therapy 6/29 7/7 7/13 7/15 6/24   MFR                                Re-Eval  WJB      Exercise Diary         UBE 2 5/2 5 2 5/2 5 2 5/2 5 2 5/2 5 standing 2 5/2 5   Smooth pursuits Lateral movements crowded environment 10x Assessed for re-eval   J motions 5x ea way   C/spine retraction    2x10 w/ cues 2x10                    Head turns Standing EC:   FT on airex 10x up/down and side/side Standing letter on wall 3x20'';  Ext: 10x  ROT: R/L 10x Flex/ext: 10x ea way  ROT R/L: 10x ea 20x ea way sitting;   Standing on airex pad with slight tandem 10x ea way   Ocular AROM  assessed Trunk ROT side/side 10x ea     convergence push ups     10x   saccades  assessed Near/far 10x ea hand in front     VOR x1 5x Standing VOR 3x20'' Standing letter "A" 8 feet away 2x30'' up/down and side/side; as well as 1x30'' ea for A w/ checkered background Standing:   Letter A w/ checkered background 2x10 up/down and side/side as well as 5x walking    Upper trap stretch        VOR cancellation 2x5 w/ crowded environment   Standing w/ busy background 2x5 ea way VOR cancellation 5x busy background    balance  EC on airex FT 30 sec  EO FT head turns 10x ea way w/ airexpad;   Feet slightly apart w/ EC head turns 20x ea way Tandem on foam EO 10x head turns ea way w/ busy background behind                   Reaching towards floor  10x and up to ceiling  10x floor to ceiling 10x floor to ceiling 2x10 eyes fixed    Seated ball toss     Seated and standing pink ball toss to self 20x ea way 2x10 hand to hand    Nose to knee seated 1x L knee, flareup of sx's, will revisit NV 10x to R knee, too flared up to do L knee 2x5 ea way  2x5 ea way    rebounder    Red med ball:   10x fwd and side/side 5x ea way    education WJB: on vestibular system, POC, initial HEP WJB: dizziness sx's, self progression, updated HEP WJB: sx management WJB WJB           Ther Act                                        Modalities

## 2021-07-22 ENCOUNTER — OFFICE VISIT (OUTPATIENT)
Dept: PHYSICAL THERAPY | Facility: CLINIC | Age: 51
End: 2021-07-22
Payer: COMMERCIAL

## 2021-07-22 ENCOUNTER — VBI (OUTPATIENT)
Dept: ADMINISTRATIVE | Facility: OTHER | Age: 51
End: 2021-07-22

## 2021-07-22 DIAGNOSIS — R42 VERTIGO: Primary | ICD-10-CM

## 2021-07-22 PROCEDURE — 97112 NEUROMUSCULAR REEDUCATION: CPT | Performed by: PHYSICAL THERAPIST

## 2021-07-22 NOTE — PROGRESS NOTES
Daily Note     Today's date: 2021  Patient name: Satish Ervin  : 1970  MRN: [id number]  Referring provider: Tiny Perera DO  Dx:   Encounter Diagnosis     ICD-10-CM    1  Vertigo  R42                   Subjective: I had car problems on Tuesday I was not able to make it to therapy; I did have one episode while I was in the kitchen yesterday when turning my head, it was not as bad as it had been though      Objective: See treatment diary below      Assessment: Tolerated treatment well today  Pt still challenged by VOR up/down and has to really limit head speed w/ these movements; also still aggravated but general head ROT  Pt was not as dizzy with EC head turns even being more brisk indicating when not using VOR reflex less aggravated overall; overall balance control with FT EC is still more challenged t/o and pt has to really take her time with motions in order to maintain balance control  Following these exercises pt trialed nose to knee but flared up her sx's more so to the point pt needed to stop and could not continue PT due to increased dizziness  Will reassess NV  Cont skilled PT  Plan: Continue per plan of care          Diagnosis: vestibular dysfunction    Precautions: when flared up, fall risk   Primary goals: get rid of dizziness    Asterisks next to exercises = provided for patient HEP   Manual Therapy 6/29 7/7 7/13 7/15 7/22   MFR                                Re-Eval  WJB      Exercise Diary         UBE 2 5/2 5 2 5/2 5 2 5/2 5 2 5/2 5 standing 2 5/2 5   Smooth pursuits Lateral movements crowded environment 10x Assessed for re-eval      C/spine retraction    2x10 w/ cues 2x10 Limited due to increased dizziness at end                   Head turns Standing EC:   FT on airex 10x up/down and side/side Standing letter on wall 3x20'';  Ext: 10x  ROT: R/L 10x Flex/ext: 10x ea way  ROT R/L: 10x ea Slightly faster up/down and side/side 20x ea EC on airex pad   Ocular AROM  assessed Trunk ROT side/side 10x ea     convergence push ups        saccades  assessed Near/far 10x ea hand in front     VOR x1 5x Standing VOR 3x20'' Standing letter "A" 8 feet away 2x30'' up/down and side/side; as well as 1x30'' ea for A w/ checkered background Standing:   Letter A w/ checkered background 2x10 up/down and side/side as well as 5x walking Standing:   Letter A w/ checkered background 2x10 up/down and side/side as well as 5x walking   Upper trap stretch        VOR cancellation 2x5 w/ crowded environment   Standing w/ busy background 2x5 ea way VOR cancellation 5x busy background    balance  EC on airex FT 30 sec  EO FT head turns 10x ea way w/ airexpad;   Feet slightly apart w/ EC head turns 20x ea way                     Reaching towards floor  10x and up to ceiling  10x floor to ceiling 10x floor to ceiling 2x10 eyes fixed    Seated ball toss     Seated and standing pink ball toss to self 20x ea way Standing self ball toss 10x as well as walking 4 laps self ball toss   Nose to knee seated 1x L knee, flareup of sx's, will revisit NV 10x to R knee, too flared up to do L knee 2x5 ea way  2x5 ea way 2x unable to do more from dizziness after other exercises   rebounder    Red med ball:   10x fwd and side/side 5x ea way Red tandem 20x ea foot forward    education WJB: on vestibular system, POC, initial HEP WJB: dizziness sx's, self progression, updated HEP WJB: sx management WJB WJB           Ther Act                                        Modalities

## 2021-07-27 ENCOUNTER — OFFICE VISIT (OUTPATIENT)
Dept: PHYSICAL THERAPY | Facility: CLINIC | Age: 51
End: 2021-07-27
Payer: COMMERCIAL

## 2021-07-27 DIAGNOSIS — R42 VERTIGO: Primary | ICD-10-CM

## 2021-07-27 PROCEDURE — 97112 NEUROMUSCULAR REEDUCATION: CPT | Performed by: PHYSICAL THERAPIST

## 2021-07-27 NOTE — PROGRESS NOTES
Daily Note     Today's date: 2021  Patient name: Shelton Rascon  : 1970  MRN: [id number]  Referring provider: Barbara Herrera DO  Dx:   Encounter Diagnosis     ICD-10-CM    1  Vertigo  R42                   Subjective: I did my exercises every day over the weekend and I felt really good      Objective: See treatment diary below      Assessment: Tolerated treatment well today  Ongoing progression of all vestibular exercises and pt continues to gradually increase head speed in all directions  Overall gaze control continues to improve  Sx's continue to be worse with VOR up/down and she needs additional breaks t/o especially with checkered background  Added head turns with EC for relocation of letter to further challenge vestibular system in which pt was slightly challenged by  Cont skilled PT  Pt arrived late to therapy today    Plan: Continue per plan of care          Diagnosis: vestibular dysfunction    Precautions: when flared up, fall risk   Primary goals: get rid of dizziness    Asterisks next to exercises = provided for patient HEP   Manual Therapy 7/27 7/7 7/13 7/15 7/22   MFR                                Re-Eval  WJB      Exercise Diary         UBE 2 5/2 5 2 5/2 5 2 5/2 5 2 5/2 5 standing 2 5/2 5   Smooth pursuits  Assessed for re-eval      C/spine retraction    2x10 w/ cues 2x10 Limited due to increased dizziness at end                   Head turns  Standing letter on wall 3x20'';  Ext: 10x  ROT: R/L 10x Flex/ext: 10x ea way  ROT R/L: 10x ea Slightly faster up/down and side/side 20x ea EC on airex pad   Ocular AROM  assessed Trunk ROT side/side 10x ea     convergence push ups        saccades  assessed Near/far 10x ea hand in front     VOR x1 2x10 up/down and side/side with pen standing;   2x10 up/down and side/side w/ letter A checkered background  Standing VOR 3x20'' Standing letter "A" 8 feet away 2x30'' up/down and side/side; as well as 1x30'' ea for A w/ checkered background Standing: Letter A w/ checkered background 2x10 up/down and side/side as well as 5x walking Standing:   Letter A w/ checkered background 2x10 up/down and side/side as well as 5x walking   Upper trap stretch        VOR cancellation 2x10 w/ crowded environment   Standing w/ busy background 2x5 ea way VOR cancellation 5x busy background    balance  EC on airex FT 30 sec  EO FT head turns 10x ea way w/ airexpad;   Feet slightly apart w/ EC head turns 20x ea way                     Reaching towards floor  10x and up to ceiling  10x floor to ceiling 10x floor to ceiling 2x10 eyes fixed    Seated ball toss  Standing/walking self ball toss 5 laps   Seated and standing pink ball toss to self 20x ea way Standing self ball toss 10x as well as walking 4 laps self ball toss   Nose to knee seated 15x ea side 10x to R knee, too flared up to do L knee 2x5 ea way  2x5 ea way 2x unable to do more from dizziness after other exercises   Vestibular relocation 10x up/down and side/side letter A w/ checkered background       rebounder    Red med ball:   10x fwd and side/side 5x ea way Red tandem 20x ea foot forward    education WJB WJB: dizziness sx's, self progression, updated HEP WJB: sx management WJB WJB           Ther Act                                        Modalities

## 2021-07-30 ENCOUNTER — APPOINTMENT (OUTPATIENT)
Dept: PHYSICAL THERAPY | Facility: CLINIC | Age: 51
End: 2021-07-30
Payer: COMMERCIAL

## 2021-08-09 DIAGNOSIS — K21.9 LARYNGOPHARYNGEAL REFLUX (LPR): ICD-10-CM

## 2021-08-09 RX ORDER — OMEPRAZOLE 40 MG/1
CAPSULE, DELAYED RELEASE ORAL
Qty: 90 CAPSULE | Refills: 1 | Status: SHIPPED | OUTPATIENT
Start: 2021-08-09

## 2021-08-11 ENCOUNTER — TELEPHONE (OUTPATIENT)
Dept: VASCULAR SURGERY | Facility: CLINIC | Age: 51
End: 2021-08-11

## 2021-08-16 NOTE — PROGRESS NOTES
Discharge Note     Today's date: 2021  Patient name: Kirk De Los Santos  : 1970  MRN: [id number]  Referring provider: Netta Morejon DO  Dx:   Encounter Diagnosis     ICD-10-CM    1  Vertigo  R42        Pt did not schedule any further PT appts and did not return phone calls to reschedule PT appts  Pt was nearing completion of long term goals  Will see pt in future again if needed  DC to HEP at this time

## 2021-08-29 ENCOUNTER — APPOINTMENT (EMERGENCY)
Dept: CT IMAGING | Facility: HOSPITAL | Age: 51
End: 2021-08-29
Payer: COMMERCIAL

## 2021-08-29 ENCOUNTER — HOSPITAL ENCOUNTER (EMERGENCY)
Facility: HOSPITAL | Age: 51
Discharge: HOME | End: 2021-08-29
Attending: EMERGENCY MEDICINE | Admitting: EMERGENCY MEDICINE
Payer: COMMERCIAL

## 2021-08-29 VITALS
DIASTOLIC BLOOD PRESSURE: 60 MMHG | SYSTOLIC BLOOD PRESSURE: 120 MMHG | OXYGEN SATURATION: 98 % | HEART RATE: 100 BPM | RESPIRATION RATE: 18 BRPM | TEMPERATURE: 99.8 F

## 2021-08-29 DIAGNOSIS — R10.9 ABDOMINAL PAIN: ICD-10-CM

## 2021-08-29 DIAGNOSIS — M54.9 BACK PAIN: Primary | ICD-10-CM

## 2021-08-29 LAB
ALBUMIN SERPL BCP-MCNC: 4.2 G/DL (ref 3.4–4.8)
ALP SERPL-CCNC: 44.5 U/L (ref 35–140)
ALT SERPL W P-5'-P-CCNC: 13 U/L (ref 5–54)
ANION GAP SERPL CALCULATED.3IONS-SCNC: 10 MMOL/L (ref 4–13)
AST SERPL W P-5'-P-CCNC: 14 U/L (ref 15–41)
BACTERIA UR QL AUTO: ABNORMAL /HPF
BASOPHILS # BLD AUTO: 0.02 THOUSANDS/ΜL (ref 0–0.1)
BASOPHILS NFR BLD AUTO: 0 % (ref 0–1)
BILIRUB SERPL-MCNC: 0.58 MG/DL (ref 0.3–1.2)
BILIRUB UR QL STRIP: NEGATIVE
BUN SERPL-MCNC: 10 MG/DL (ref 6–20)
CALCIUM SERPL-MCNC: 9.2 MG/DL (ref 8.4–10.2)
CHLORIDE SERPL-SCNC: 101 MMOL/L (ref 96–108)
CLARITY UR: CLEAR
CO2 SERPL-SCNC: 23 MMOL/L (ref 22–33)
COLOR UR: YELLOW
CREAT SERPL-MCNC: 0.94 MG/DL (ref 0.4–1.1)
EOSINOPHIL # BLD AUTO: 0 THOUSAND/ΜL (ref 0–0.61)
EOSINOPHIL NFR BLD AUTO: 0 % (ref 0–6)
ERYTHROCYTE [DISTWIDTH] IN BLOOD BY AUTOMATED COUNT: 13 % (ref 11.6–15.1)
EXT PREG TEST URINE: NEGATIVE
EXT. CONTROL ED NAV: NORMAL
GFR SERPL CREATININE-BSD FRML MDRD: 80 ML/MIN/1.73SQ M
GLUCOSE SERPL-MCNC: 101 MG/DL (ref 65–140)
GLUCOSE UR STRIP-MCNC: NEGATIVE MG/DL
HCT VFR BLD AUTO: 38.2 % (ref 34.8–46.1)
HGB BLD-MCNC: 12.6 G/DL (ref 11.5–15.4)
HGB UR QL STRIP.AUTO: ABNORMAL
IMM GRANULOCYTES # BLD AUTO: 0.04 THOUSAND/UL (ref 0–0.2)
IMM GRANULOCYTES NFR BLD AUTO: 0 % (ref 0–2)
KETONES UR STRIP-MCNC: ABNORMAL MG/DL
LEUKOCYTE ESTERASE UR QL STRIP: NEGATIVE
LYMPHOCYTES # BLD AUTO: 0.81 THOUSANDS/ΜL (ref 0.6–4.47)
LYMPHOCYTES NFR BLD AUTO: 5 % (ref 14–44)
MCH RBC QN AUTO: 27.9 PG (ref 26.8–34.3)
MCHC RBC AUTO-ENTMCNC: 33 G/DL (ref 31.4–37.4)
MCV RBC AUTO: 85 FL (ref 82–98)
MONOCYTES # BLD AUTO: 0.96 THOUSAND/ΜL (ref 0.17–1.22)
MONOCYTES NFR BLD AUTO: 6 % (ref 4–12)
MUCOUS THREADS UR QL AUTO: ABNORMAL
NEUTROPHILS # BLD AUTO: 13.9 THOUSANDS/ΜL (ref 1.85–7.62)
NEUTS SEG NFR BLD AUTO: 89 % (ref 43–75)
NITRITE UR QL STRIP: NEGATIVE
NON-SQ EPI CELLS URNS QL MICRO: ABNORMAL /HPF
PH UR STRIP.AUTO: 5.5 [PH]
PLATELET # BLD AUTO: 265 THOUSANDS/UL (ref 149–390)
PMV BLD AUTO: 12.6 FL (ref 8.9–12.7)
POTASSIUM SERPL-SCNC: 3.6 MMOL/L (ref 3.5–5)
PROT SERPL-MCNC: 7.7 G/DL (ref 6.4–8.3)
PROT UR STRIP-MCNC: NEGATIVE MG/DL
RBC # BLD AUTO: 4.51 MILLION/UL (ref 3.81–5.12)
RBC #/AREA URNS AUTO: ABNORMAL /HPF
SODIUM SERPL-SCNC: 134 MMOL/L (ref 133–145)
SP GR UR STRIP.AUTO: 1.02 (ref 1–1.03)
UROBILINOGEN UR QL STRIP.AUTO: 0.2 E.U./DL
WBC # BLD AUTO: 15.73 THOUSAND/UL (ref 4.31–10.16)
WBC #/AREA URNS AUTO: ABNORMAL /HPF

## 2021-08-29 PROCEDURE — 96361 HYDRATE IV INFUSION ADD-ON: CPT

## 2021-08-29 PROCEDURE — 81001 URINALYSIS AUTO W/SCOPE: CPT | Performed by: EMERGENCY MEDICINE

## 2021-08-29 PROCEDURE — 81003 URINALYSIS AUTO W/O SCOPE: CPT | Performed by: EMERGENCY MEDICINE

## 2021-08-29 PROCEDURE — 99284 EMERGENCY DEPT VISIT MOD MDM: CPT | Performed by: EMERGENCY MEDICINE

## 2021-08-29 PROCEDURE — 74176 CT ABD & PELVIS W/O CONTRAST: CPT

## 2021-08-29 PROCEDURE — 80053 COMPREHEN METABOLIC PANEL: CPT | Performed by: EMERGENCY MEDICINE

## 2021-08-29 PROCEDURE — 81025 URINE PREGNANCY TEST: CPT | Performed by: EMERGENCY MEDICINE

## 2021-08-29 PROCEDURE — 85025 COMPLETE CBC W/AUTO DIFF WBC: CPT | Performed by: EMERGENCY MEDICINE

## 2021-08-29 PROCEDURE — 36415 COLL VENOUS BLD VENIPUNCTURE: CPT | Performed by: EMERGENCY MEDICINE

## 2021-08-29 PROCEDURE — 96374 THER/PROPH/DIAG INJ IV PUSH: CPT

## 2021-08-29 PROCEDURE — 99284 EMERGENCY DEPT VISIT MOD MDM: CPT

## 2021-08-29 RX ORDER — MORPHINE SULFATE 4 MG/ML
4 INJECTION, SOLUTION INTRAMUSCULAR; INTRAVENOUS ONCE
Status: DISCONTINUED | OUTPATIENT
Start: 2021-08-29 | End: 2021-08-29 | Stop reason: HOSPADM

## 2021-08-29 RX ORDER — KETOROLAC TROMETHAMINE 30 MG/ML
30 INJECTION, SOLUTION INTRAMUSCULAR; INTRAVENOUS ONCE
Status: COMPLETED | OUTPATIENT
Start: 2021-08-29 | End: 2021-08-29

## 2021-08-29 RX ORDER — IBUPROFEN 800 MG/1
800 TABLET ORAL EVERY 6 HOURS PRN
Qty: 15 TABLET | Refills: 0 | Status: SHIPPED | OUTPATIENT
Start: 2021-08-29 | End: 2021-12-14 | Stop reason: ALTCHOICE

## 2021-08-29 RX ORDER — ONDANSETRON 2 MG/ML
4 INJECTION INTRAMUSCULAR; INTRAVENOUS ONCE
Status: DISCONTINUED | OUTPATIENT
Start: 2021-08-29 | End: 2021-08-29 | Stop reason: HOSPADM

## 2021-08-29 RX ADMIN — SODIUM CHLORIDE 500 ML: 0.9 INJECTION, SOLUTION INTRAVENOUS at 10:28

## 2021-08-29 RX ADMIN — KETOROLAC TROMETHAMINE 30 MG: 30 INJECTION, SOLUTION INTRAMUSCULAR; INTRAVENOUS at 10:30

## 2021-08-29 NOTE — ED PROVIDER NOTES
History  Chief Complaint   Patient presents with    Back Pain     after BM yesterday started with left lower back pain and abdoinal pain, reports back hurts today, abdomin usually hurts prior to mentstral cycle and has fibroids     To er with left flank pain and suprapubic pain that started about two days ago  She just started her menses  She reported she has hx of fibroids and gets bad pain and mensis, she is working with OB to have hysterectomy she states  She reported the left back /flank pain is a bit unusual for her  She denies trauma, reticular sx, fever, chills, uti sx  No stone hx  Motrin has been helping her pain at home  She denies reticular sx, incontience, LE weakness  Prior to Admission Medications   Prescriptions Last Dose Informant Patient Reported? Taking?    ALPRAZolam (XANAX) 0 25 mg tablet   Yes No   Ascorbic Acid (vitamin C) 100 MG tablet  Self Yes No   Sig: Take 100 mg by mouth daily   BIOTIN 5000 PO   Yes No   Sig: Take by mouth   Cholecalciferol (VITAMIN D PO)   Yes No   Sig: Take by mouth   Cholesterol POWD  Self Yes No   Sig: by Does not apply route   Cranberry 1000 MG CAPS   Yes No   Sig: Take by mouth   Omega-3 Fatty Acids (FISH OIL PO)   Yes No   Sig: Take by mouth   cyclobenzaprine (FLEXERIL) 5 mg tablet   No No   Sig: Take 1 tablet (5 mg total) by mouth daily at bedtime   Patient not taking: Reported on 5/19/2021   famotidine (PEPCID) 40 MG tablet   No No   Sig: Take 1 tablet (40 mg total) by mouth daily at bedtime   naproxen (NAPROSYN) 500 mg tablet   No No   Sig: Take 1 tablet (500 mg total) by mouth 2 (two) times a day with meals   Patient not taking: Reported on 5/19/2021   neomycin-polymyxin-hydrocortisone (CORTISPORIN) otic solution   No No   Sig: Administer 4 drops into both ears 2 (two) times a day   norethindrone (MICRONOR) 0 35 MG tablet   Yes No   Sig: Take 1 tablet by mouth daily   omeprazole (PriLOSEC) 40 MG capsule   No No   Sig: TAKE 1 CAPSULE BY MOUTH EVERY DAY Facility-Administered Medications: None       Past Medical History:   Diagnosis Date    Bacterial vaginitis     Fibroids     Gastritis     GERD (gastroesophageal reflux disease)     Insomnia        Past Surgical History:   Procedure Laterality Date     SECTION      HAND SURGERY Left     Left pinky tendon repair    AK COLONOSCOPY FLX DX W/COLLJ SPEC WHEN PFRMD N/A 2018    Procedure: COLONOSCOPY;  Surgeon: Lashanda Carter MD;  [facility]; Service: Gastroenterology    AK ESOPHAGOGASTRODUODENOSCOPY TRANSORAL DIAGNOSTIC N/A 3/31/2017    Procedure: ESOPHAGOGASTRODUODENOSCOPY (EGD); Surgeon: Lashanda Carter MD;  [facility]; Service: Gastroenterology       Family History   Problem Relation Age of Onset   [place]va [place] Breast cancer Mother         stage [de-identified] @ age 67    Dementia Mother     Hyperlipidemia Mother     Parkinsonism Mother     Fibroids Mother    Elva [place] Prostatitis Father     Cataracts Father     Substance Abuse Sister     COPD Sister     Prostate cancer Brother     Premature birth Daughter     Substance Abuse Brother     No Known Problems Sister     Dementia Maternal Grandmother     Parkinsonism Maternal Grandmother     Fibroids Maternal Grandmother     Dementia Maternal Aunt     Dementia Maternal Aunt     Hypertension Neg Hx     Diabetes Neg Hx     Colon cancer Neg Hx     Ovarian cancer Neg Hx     Uterine cancer Neg Hx     Cervical cancer Neg Hx      I have reviewed and agree with the history as documented  E-Cigarette/Vaping    E-Cigarette Use Never User      E-Cigarette/Vaping Substances    Nicotine No     THC No     CBD No     Flavoring No     Other No     Unknown No      Social History     Tobacco Use    Smoking status: Never Smoker    Smokeless tobacco: Never Used   Vaping Use    Vaping Use: Never used   Substance Use Topics    Alcohol use:  Yes     Alcohol/week: 7 0 standard drinks     Types: 7 Glasses of wine per week     Comment: socially  Drug use: No       Review of Systems   All other systems reviewed and are negative  Physical Exam  Physical Exam  Vitals and nursing note reviewed  Constitutional:       General: She is not in acute distress  Appearance: Normal appearance  She is well-developed  She is not ill-appearing, toxic-appearing or diaphoretic  HENT:      Head: Normocephalic and atraumatic  Right Ear: External ear normal       Left Ear: External ear normal       Nose: Nose normal       Mouth/Throat:      Mouth: Mucous membranes are moist    Eyes:      General:         Right eye: No discharge  Left eye: No discharge  Conjunctiva/sclera: Conjunctivae normal       Pupils: Pupils are equal, round, and reactive to light  Neck:      Vascular: No JVD  Cardiovascular:      Rate and Rhythm: Normal rate and regular rhythm  Pulses: Normal pulses  Heart sounds: Normal heart sounds  No murmur heard  No friction rub  No gallop  Pulmonary:      Effort: Pulmonary effort is normal  No respiratory distress  Breath sounds: Normal breath sounds  No stridor  No wheezing, rhonchi or rales  Chest:      Chest wall: No tenderness  Abdominal:      General: Abdomen is flat  Bowel sounds are normal  There is no distension  Palpations: Abdomen is soft  There is no mass  Tenderness: There is abdominal tenderness  There is no right CVA tenderness, left CVA tenderness, guarding or rebound  Hernia: No hernia is present  Comments: Suprapubic tenderness, mild  No peritoneal signs  Musculoskeletal:         General: No swelling, tenderness, deformity or signs of injury  Normal range of motion  Cervical back: Normal range of motion and neck supple  Right lower leg: No edema  Left lower leg: No edema  Comments: Spine is NT  There is mild left paravert spasm that is mildly tender  Strong motor to the LE  Skin:     General: Skin is warm and dry        Capillary Refill: Capillary refill takes less than 2 seconds  Coloration: Skin is not jaundiced or pale  Findings: No bruising, erythema, lesion or rash  Neurological:      General: No focal deficit present  Mental Status: She is alert and oriented to person, place, and time  Cranial Nerves: No cranial nerve deficit  Sensory: No sensory deficit  Motor: No weakness or abnormal muscle tone        Coordination: Coordination normal       Gait: Gait normal       Deep Tendon Reflexes: Reflexes normal    Psychiatric:         Mood and Affect: Mood normal          Vital Signs  ED Triage Vitals [08/29/21 0924]   Temperature Pulse Respirations Blood Pressure SpO2   99 8 °F (37 7 °C) (!) 107 18 127/71 98 %      Temp Source Heart Rate Source Patient Position - Orthostatic VS BP Location FiO2 (%)   Oral Monitor -- Right arm --      Pain Score       5           Vitals:    08/29/21 0924 08/29/21 1052   BP: 127/71 120/60   Pulse: (!) 107 100         Visual Acuity      ED Medications  Medications   sodium chloride 0 9 % bolus 500 mL (0 mL Intravenous Stopped 8/29/21 1122)   ketorolac (TORADOL) injection 30 mg (30 mg Intravenous Given 8/29/21 1030)       Diagnostic Studies  Results Reviewed     Procedure Component Value Units Date/Time    Comprehensive metabolic panel [[id number]]  (Abnormal) Collected: 08/29/21 1015    Lab Status: Final result Specimen: Blood from Arm, Right Updated: 08/29/21 1038     Sodium 134 mmol/L      Potassium 3 6 mmol/L      Chloride 101 mmol/L      CO2 23 mmol/L      ANION GAP 10 mmol/L      BUN 10 mg/dL      Creatinine 0 94 mg/dL      Glucose 101 mg/dL      Calcium 9 2 mg/dL      AST 14 U/L      ALT 13 U/L      Alkaline Phosphatase 44 5 U/L      Total Protein 7 7 g/dL      Albumin 4 2 g/dL      Total Bilirubin 0 58 mg/dL      eGFR 80 ml/min/1 73sq m     Narrative:      [place] guidelines for Chronic Kidney Disease (CKD):     Stage 1 with normal or high GFR (GFR > 90 mL/min/1 73 square meters)    Stage 2 Mild CKD (GFR = 60-89 mL/min/1 73 square meters)    Stage 3A Moderate CKD (GFR = 45-59 mL/min/1 73 square meters)    Stage 3B Moderate CKD (GFR = 30-44 mL/min/1 73 square meters)    Stage 4 Severe CKD (GFR = 15-29 mL/min/1 73 square meters)    Stage 5 End Stage CKD (GFR <15 mL/min/1 73 square meters)  Note: GFR calculation is accurate only with a steady state creatinine    Urine Microscopic [[id number]]  (Abnormal) Collected: 08/29/21 1015    Lab Status: Final result Specimen: Urine, Clean Catch Updated: 08/29/21 1033     RBC, UA 0-5 /hpf      WBC, UA 0-1 /hpf      Epithelial Cells Occasional /hpf      Bacteria, UA Occasional /hpf      MUCUS THREADS Innumerable    UA w Reflex to Microscopic w Reflex to Culture [[id number]]  (Abnormal) Collected: 08/29/21 1015    Lab Status: Final result Specimen: Urine, Clean Catch Updated: 08/29/21 1025     Color, UA Yellow     Clarity, UA Clear     Specific Gravity, UA 1 025     pH, UA 5 5     Leukocytes, UA Negative     Nitrite, UA Negative     Protein, UA Negative mg/dl      Glucose, UA Negative mg/dl      Ketones, UA 15 (1+) mg/dl      Urobilinogen, UA 0 2 E U /dl      Bilirubin, UA Negative     Blood, UA 2+    CBC and differential [[id number]]  (Abnormal) Collected: 08/29/21 1015    Lab Status: Final result Specimen: Blood from Arm, Right Updated: 08/29/21 1022     WBC 15 73 Thousand/uL      RBC 4 51 Million/uL      Hemoglobin 12 6 g/dL      Hematocrit 38 2 %      MCV 85 fL      MCH 27 9 pg      MCHC 33 0 g/dL      RDW 13 0 %      MPV 12 6 fL      Platelets 412 Thousands/uL      Neutrophils Relative 89 %      Immat GRANS % 0 %      Lymphocytes Relative 5 %      Monocytes Relative 6 %      Eosinophils Relative 0 %      Basophils Relative 0 %      Neutrophils Absolute 13 90 Thousands/µL      Immature Grans Absolute 0 04 Thousand/uL      Lymphocytes Absolute 0 81 Thousands/µL      Monocytes Absolute 0 96 Thousand/µL      Eosinophils Absolute 0 00 Thousand/µL      Basophils Absolute 0 02 Thousands/µL     POCT pregnancy, urine [[id number]]  (Normal) Resulted: 08/29/21 1022    Lab Status: Final result Updated: 08/29/21 1022     EXT PREG TEST UR (Ref: Negative) Negative     Control Valid                 CT abdomen pelvis wo contrast   Final Result by Anne-Marie Brown MD (08/29 1039)      No abnormality identified to account for flank pain  Leiomyomatous uterus and stable right adnexal cystic lesion  2nd cystic lesion seen posterior to the uterus not identified previously  Nonemergent pelvic ultrasound follow-up recommended  Workstation performed: [id number]                    Procedures  Procedures         ED Course                                           MDM  Number of Diagnoses or Management Options  Abdominal pain  Back pain  Diagnosis management comments: To er with mild back pain and suprapubic pain reported as contraction like  Hx of fibroids and she reported very painful mensis in the past, this is what she feels is causing her sx, she is following with ob for hysterectomy  She has no spinal tenderness on exam  Her sx started with onset of her mensis  Sp toradol she is feeling much better  She has ob fu this week  She is feeling better and requesting dc  I have addressed all red flags, need for fu and when to return to er and she has voiced understanding  Ct noted, post cyst, possible etiology of her back pain  Wbc noted, she is aware and will return with new sx  She denies fever chills  Again, she will fu with ob this week  And is aware to return to er if not improved or she has any unusual sx of her abd pain          Disposition  Final diagnoses:   Back pain   Abdominal pain     Time reflects when diagnosis was documented in both MDM as applicable and the Disposition within this note     Time User Action Codes Description Comment    8/29/2021 11:06 AM Anh Dhaliwal Add [M54 9] Back pain     8/29/2021 11:06 AM Jairo Christopher Add [R10 9] Abdominal pain       ED Disposition     ED Disposition Condition Date/Time Comment    Discharge Stable Sun Aug 29, 2021 11:06 AM Fili Sousa discharge to home/self care              Follow-up Information     Follow up With Specialties Details Why Contact Sae Tomas DO Family Medicine Schedule an appointment as soon as possible for a visit in 3 days  [address] [facility] [address]  [phone]            Discharge Medication List as of 8/29/2021 11:07 AM      START taking these medications    Details   ibuprofen (MOTRIN) 800 mg tablet Take 1 tablet (800 mg total) by mouth every 6 (six) hours as needed for mild pain for up to 15 doses, Starting Sun 8/29/2021, Normal         CONTINUE these medications which have NOT CHANGED    Details   ALPRAZolam (XANAX) 0 25 mg tablet Starting Tue 4/13/2021, Historical Med      Ascorbic Acid (vitamin C) 100 MG tablet Take 100 mg by mouth daily, Historical Med      BIOTIN 5000 PO Take by mouth, Until Discontinued, Historical Med      Cholecalciferol (VITAMIN D PO) Take by mouth, Until Discontinued, Historical Med      Cholesterol POWD by Does not apply route, Historical Med      Cranberry 1000 MG CAPS Take by mouth, Historical Med      cyclobenzaprine (FLEXERIL) 5 mg tablet Take 1 tablet (5 mg total) by mouth daily at bedtime, Starting Wed 3/10/2021, Normal      famotidine (PEPCID) 40 MG tablet Take 1 tablet (40 mg total) by mouth daily at bedtime, Starting Wed 5/19/2021, Normal      naproxen (NAPROSYN) 500 mg tablet Take 1 tablet (500 mg total) by mouth 2 (two) times a day with meals, Starting Wed 3/10/2021, Normal      neomycin-polymyxin-hydrocortisone (CORTISPORIN) otic solution Administer 4 drops into both ears 2 (two) times a day, Starting Wed 4/21/2021, Normal      norethindrone (MICRONOR) 0 35 MG tablet Take 1 tablet by mouth daily, Until Discontinued, Historical Med      Omega-3 Fatty Acids (FISH OIL PO) Take by mouth, Until Discontinued, Historical Med      omeprazole (PriLOSEC) 40 MG capsule TAKE 1 CAPSULE BY MOUTH EVERY DAY, Normal           No discharge procedures on file      PDMP Review     None          ED Provider  Electronically Signed by           Greg Abraham MD  08/30/21 8873

## 2021-08-29 NOTE — DISCHARGE INSTRUCTIONS
Follow up with pcp and OB  Return to er with fever, chills, uncontrolled pain, weakness to the legs, sensation changes or with incontinence / new symptoms

## 2021-08-29 NOTE — ED NOTES
Returned from testing, call bell within reach, bed low position     Pancho Bellamy, [place]  08/29/21 8233

## 2021-08-31 RX ORDER — ESTRADIOL 1 MG/1
1 TABLET ORAL DAILY
COMMUNITY
Start: 2021-07-16 | End: 2021-09-10

## 2021-08-31 RX ORDER — MEDROXYPROGESTERONE ACETATE 150 MG/ML
INJECTION, SUSPENSION INTRAMUSCULAR
COMMUNITY
Start: 2021-05-26 | End: 2021-09-10

## 2021-08-31 RX ORDER — MEGESTROL ACETATE 40 MG/1
TABLET ORAL
COMMUNITY
Start: 2021-06-30 | End: 2021-09-10

## 2021-09-01 ENCOUNTER — TELEMEDICINE (OUTPATIENT)
Dept: FAMILY MEDICINE CLINIC | Facility: CLINIC | Age: 51
End: 2021-09-01
Payer: COMMERCIAL

## 2021-09-01 DIAGNOSIS — R61 NIGHT SWEATS: Primary | ICD-10-CM

## 2021-09-01 PROCEDURE — 99213 OFFICE O/P EST LOW 20 MIN: CPT | Performed by: FAMILY MEDICINE

## 2021-09-01 NOTE — PROGRESS NOTES
Virtual Regular Visit    Verification of patient location:  Patient is located in the following state in which I hold an active license [place]    Assessment/Plan:  Problem List Items Addressed This Visit     None      Visit Diagnoses     Night sweats    -  Primary  - started after Ob/Gyn appt on 8/30/2021 but got better the following night   - does have fibroids and in the process of scheduling hysterectomy   - CBC with elevated WBC on 8/29/2021 but nml CMP   - could be 2/2 pain from fibroid and her current menses   - cont to monitor   - advised to f/u if worsens - pt aware and agreeable            Reason for visit is night sweats   Chief Complaint   Patient presents with    Night Sweats    Virtual Regular Visit        Encounter provider Navdeep Sosa DO  Provider located at [address] [address]  [address] [place] [id number]      Recent Visits  Date Type Provider Dept   09/01/21 Telemedicine Silvia Newton DO [facility]   Showing recent visits within past 7 days and meeting all other requirements  Future Appointments  No visits were found meeting these conditions  Showing future appointments within next 150 days and meeting all other requirements       The patient was identified by name and date of birth  Ranadll Guthrie was informed that this is a telemedicine visit and that the visit is being conducted through [facility] and patient was informed that this is a secure, HIPAA-compliant platform  She agrees to proceed     My office door was closed  No one else was in the room  She acknowledged consent and understanding of privacy and security of the video platform  The patient has agreed to participate and understands they can discontinue the visit at any time  Patient is aware this is a billable service  Subjective  Randall Guthrie is a 46 y o  female who presents virtually for ER f/u       HPI   - went to the ER on 8/29/2021 2/2 "severe" stomach and back pain  - on CTAP: "Leiomyomatous uterus and stable right adnexal cystic lesion  2nd cystic lesion seen posterior to the uterus not identified previously  Nonemergent pelvic ultrasound follow-up recommended"   - FDLMP earlier this week   - did f/u with Ob/Gyn on 2021 and in the process of scheduling a hysterectomy  - (+) night sweats - have been "really bad" on 2021, somewhat better on 2021   - has been taking Ibuprofen for menstrual cramps       Past Medical History:   Diagnosis Date    Bacterial vaginitis     Fibroids     Gastritis     GERD (gastroesophageal reflux disease)     Insomnia        Past Surgical History:   Procedure Laterality Date     SECTION      HAND SURGERY Left     Left pinky tendon repair    MD COLONOSCOPY FLX DX W/COLLJ SPEC WHEN PFRMD N/A 2018    Procedure: COLONOSCOPY;  Surgeon: Rand Neri MD;  [facility]; Service: Gastroenterology    MD ESOPHAGOGASTRODUODENOSCOPY TRANSORAL DIAGNOSTIC N/A 3/31/2017    Procedure: ESOPHAGOGASTRODUODENOSCOPY (EGD); Surgeon: Rand Neri MD;  [facility];   Service: Gastroenterology       Current Outpatient Medications   Medication Sig Dispense Refill    Cranberry 1000 MG CAPS Take by mouth      famotidine (PEPCID) 40 MG tablet Take 1 tablet (40 mg total) by mouth daily at bedtime 30 tablet 4    ibuprofen (MOTRIN) 800 mg tablet Take 1 tablet (800 mg total) by mouth every 6 (six) hours as needed for mild pain for up to 15 doses 15 tablet 0    Multiple Vitamins-Minerals (CENTRUM SILVER 50+WOMEN PO)       norethindrone (MICRONOR) 0 35 MG tablet Take 1 tablet by mouth daily      Omega-3 Fatty Acids (FISH OIL PO) Take by mouth      omeprazole (PriLOSEC) 40 MG capsule TAKE 1 CAPSULE BY MOUTH EVERY DAY 90 capsule 1    Cholesterol POWD by Does not apply route (Patient not taking: Reported on 2021)      estradiol (ESTRACE) 1 mg tablet Take 1 mg by mouth daily (Patient not taking: Reported on 9/1/2021)      medroxyPROGESTERone (DEPO-PROVERA) 150 mg/mL injection INJECT 1 ML (150 MG TOTAL) INJECT INTO THE MUSCLE EVERY 3 (THREE) MONTHS  (Patient not taking: Reported on 9/1/2021)      megestrol (MEGACE) 40 mg tablet TAKE 1 TABLET (40 MG TOTAL) BY MOUTH DAILY  TAKE THIS MEDICINE AT BEDTIME (Patient not taking: Reported on 9/1/2021)      neomycin-polymyxin-hydrocortisone (CORTISPORIN) otic solution Administer 4 drops into both ears 2 (two) times a day (Patient not taking: Reported on 9/1/2021) 10 mL 3     No current facility-administered medications for this visit  No Known Allergies    Review of Systems as per HPI     Video Exam  There were no vitals filed for this visit  Physical Exam   General: AAOx3, NAD  HEENT: NC/AT, EOMI, clear conjunctiva, nml external ear and nose   Cardio: deferred  Pulm: no acute respiratory distress, able to talk in complete sentences w/o getting short of breath   Abd: deferred   Psych: nml mood/affect/behavior     I spent 15 minutes directly with the patient during this visit         [address] verbally agrees to participate in [employer]  Pt is aware that [employer] could be limited without vital signs or the ability to perform a full hands-on physical Clovis Garcia understands she or the provider may request at any time to terminate the video visit and request the patient to seek care or treatment in person

## 2021-09-10 ENCOUNTER — OFFICE VISIT (OUTPATIENT)
Dept: FAMILY MEDICINE CLINIC | Facility: CLINIC | Age: 51
End: 2021-09-10
Payer: COMMERCIAL

## 2021-09-10 VITALS
SYSTOLIC BLOOD PRESSURE: 118 MMHG | HEIGHT: 68 IN | DIASTOLIC BLOOD PRESSURE: 70 MMHG | WEIGHT: 177 LBS | HEART RATE: 98 BPM | RESPIRATION RATE: 16 BRPM | BODY MASS INDEX: 26.83 KG/M2

## 2021-09-10 DIAGNOSIS — D25.9 UTERINE LEIOMYOMA, UNSPECIFIED LOCATION: Primary | ICD-10-CM

## 2021-09-10 DIAGNOSIS — Z23 NEED FOR INFLUENZA VACCINATION: ICD-10-CM

## 2021-09-10 DIAGNOSIS — R10.2 PELVIC PAIN IN FEMALE: ICD-10-CM

## 2021-09-10 DIAGNOSIS — R61 NIGHT SWEATS: ICD-10-CM

## 2021-09-10 PROCEDURE — 99214 OFFICE O/P EST MOD 30 MIN: CPT | Performed by: FAMILY MEDICINE

## 2021-09-10 PROCEDURE — 3008F BODY MASS INDEX DOCD: CPT | Performed by: OBSTETRICS & GYNECOLOGY

## 2021-09-10 PROCEDURE — 90471 IMMUNIZATION ADMIN: CPT | Performed by: FAMILY MEDICINE

## 2021-09-10 PROCEDURE — 90682 RIV4 VACC RECOMBINANT DNA IM: CPT | Performed by: FAMILY MEDICINE

## 2021-09-10 RX ORDER — NAPROXEN 500 MG/1
500 TABLET ORAL 2 TIMES DAILY WITH MEALS
Qty: 28 TABLET | Refills: 0 | Status: SHIPPED | OUTPATIENT
Start: 2021-09-10 | End: 2021-11-17

## 2021-09-10 NOTE — PATIENT INSTRUCTIONS
Uterine Fibroids   WHAT YOU NEED TO KNOW:   What are uterine fibroids? Uterine fibroids are growths found inside your uterus  Uterine fibroids also may be called tumors or leiomyomas  Uterine fibroids often appear in groups, or you may have only one  They can be small or large, and they can grow  They are almost always benign (not cancer) and likely will not spread to other parts of your body  They may grow when you are pregnant and shrink after you no longer have a monthly period  What increases my risk for uterine fibroids? The cause of uterine fibroids is not clear  Ask your healthcare provider about these and other risk factors for uterine fibroids:  · A family history of uterine fibroids    · Too many female hormones, especially estrogen    · Menstrual periods starting before age 15    · Too much body weight    · Not having children    · Drinking alcohol    What are the signs and symptoms of uterine fibroids? You may have no signs or symptoms  Symptoms depend on the size, type, and number of fibroids you have  Symptoms also depend on where the fibroids are inside your uterus:  · Heavy or painful menstrual bleeding    · Pelvic pressure and pain    · Increased pelvic pain during sex    · Constipation or pain when you have a bowel movement    · Need to urinate often    How are uterine fibroids diagnosed? Your healthcare provider will examine you and ask about your symptoms  Tell the provider if any women if your family have had uterine fibroids  You may also need any of the following:  · A pelvic exam  is also called an internal or vaginal exam  During a pelvic exam, your healthcare provider gently puts a speculum into your vagina  A speculum is a tool that opens your vagina  This lets your healthcare provider see your cervix (bottom part of your uterus)  With gloved hands, your healthcare provider will check the size and shape of your uterus and ovaries       · A vaginal ultrasound  is used to see inside your uterus and to check your ovaries  During this test, your healthcare provider places a small wand in your vagina  Sound waves from the wand show pictures of the uterus and ovaries  · A biopsy  is a tissue sample of a fibroid that your healthcare provider takes from your uterus for testing  How are uterine fibroids treated? You may not need treatment for your fibroids if you do not have symptoms  The following treatments may shrink your fibroids and help your pain:  · Hormones  may help shrink your fibroids  · Contraceptives  help prevent pregnancy  They also may help shrink your fibroids  · NSAIDs  help decrease swelling and pain or fever  This medicine is available with or without a doctor's order  NSAIDs can cause stomach bleeding or kidney problems in certain people  If you take blood thinner medicine, always ask your healthcare provider if NSAIDs are safe for you  Always read the medicine label and follow directions  · Surgery  may be used to remove your uterine fibroids  Surgery may instead be used to block or slow the flow of blood to the fibroid  This may make your fibroids shrink or disappear  Surgery called a hysterectomy may be needed if your fibroids are severe  For this surgery, your healthcare provider removes your entire uterus  After this surgery, you will no longer be able to have children  What can I do to prevent uterine fibroids? · Maintain a healthy weight  Extra weight can cause fibroids to grow  Talk to your healthcare provider about a healthy weight for you  He or she can help you create a healthy weight loss plan if you are overweight  · Eat a variety of healthy foods  Healthy foods include fruits, vegetables, lean meats, fish, low-fat dairy foods, cooked beans, and whole-grain breads and cereals  Fruits and vegetables are especially important for helping lower the risk for fibroids   Your healthcare provider or a dietitian can help you create a healthy meal plan          · Limit or do not drink alcohol as directed  Alcohol can increase your risk for fibroids  A drink of alcohol is 12 ounces of beer, 1½ ounces of liquor, or 5 ounces of wine  Ask your healthcare provider for information if you need help to quit drinking alcohol  When should I seek immediate care? · Your heart begins to race, and you feel faint  · You begin to pass large blood clots from your vagina  When should I call my doctor or gynecologist?   · Your symptoms, such as heavy bleeding, pain, or pelvic pressure, worsen  · You feel weak and are more tired than usual     · You do not feel like your bladder is empty after you urinate  You also may urinate small amounts more often  · You have questions or concerns about your condition or care  CARE AGREEMENT:   You have the right to help plan your care  Learn about your health condition and how it may be treated  Discuss treatment options with your healthcare providers to decide what care you want to receive  You always have the right to refuse treatment  The above information is an  only  It is not intended as medical advice for individual conditions or treatments  Talk to your doctor, nurse or pharmacist before following any medical regimen to see if it is safe and effective for you  © Copyright [employer] 2021 Information is for End User's use only and may not be sold, redistributed or otherwise used for commercial purposes   All illustrations and images included in CareNotes® are the copyrighted property of A D A M , [employer]  or [address] [place], [place] [address]

## 2021-09-10 NOTE — PROGRESS NOTES
Assessment/Plan:   Diagnoses and all orders for this visit:    Uterine leiomyoma, unspecified location  -     naproxen (NAPROSYN) 500 mg tablet; Take 1 tablet (500 mg total) by mouth 2 (two) times a day with meals  - pt with pelvic pain caused by uterine fibroids  - reviewed ER records from 8/29/2021   - did f/u with Ob/Gyn on 8/30/2021 and in the process of scheduling a hysterectomy - Ob/Gyn at [facility] is in network but [facility] is not and her current Ob/Gyn unable to perform surgery; pt in the process of establishing with new Ob/Gyn   - (+) cold chills and night sweats when pain aggravated  - advised alternating btw NSAIDS and Tylenol for pain control   - eRx for Naproxen sent  - f/u PRN   Pelvic pain in female  Night sweats    Need for influenza vaccination  -     influenza vaccine, quadrivalent, recombinant, PF, 0 5 mL, for patients 18 yr+ (FLUBLOK)  Other orders  -     Cancel: Hepatitis C antibody; Future  -     Cancel: influenza vaccine, quadrivalent, recombinant, PF, 0 5 mL, for patients 18 yr+ (FLUBLOK)          Subjective:    Patient ID: Avery Malin is a 46 y o  female    HPI   48yo F presents to the office with her  for f/u   - went to the ER on 8/29/2021 2/2 "severe" stomach and back pain  - on CTAP: "Leiomyomatous uterus and stable right adnexal cystic lesion   2nd cystic lesion seen posterior to the uterus not identified previously   Nonemergent pelvic ultrasound follow-up recommended"   - did f/u with Ob/Gyn on 8/30/2021 and in the process of scheduling a hysterectomy  - Ob/Gyn at [facility] is in network but [facility] is not and her current Ob/Gyn unable to perform surgery   - (+) cold chills and night sweats when pain aggravated  - yesterday took Ibuprofen 600mg at 6am and then 800mg at noon and then took 2 OTC Tylenol   - (+) intermittent sharp and dull pain   - was on Depo w/o relief   - has an appt scheduled with new Ob/Gyn on 10/21/2021 - surgery TBD   - interested in Flu vaccine and will get in the office today       The following portions of the patient's history were reviewed and updated as appropriate: allergies, current medications, past family history, past medical history, past social history, past surgical history and problem list     Review of Systems  as per HPI    Objective:  /70   Pulse 98   Resp 16   Ht 5' 8" (1 727 m)   Wt 80 3 kg (177 lb)   BMI 26 91 kg/m²    Physical Exam  Vitals reviewed  Constitutional:       Appearance: She is not ill-appearing, toxic-appearing or diaphoretic  HENT:      Head: Normocephalic and atraumatic  Right Ear: External ear normal       Left Ear: External ear normal    Eyes:      General: No scleral icterus  Right eye: No discharge  Left eye: No discharge  Extraocular Movements: Extraocular movements intact  Conjunctiva/sclera: Conjunctivae normal    Cardiovascular:      Rate and Rhythm: Normal rate and regular rhythm  Heart sounds: Normal heart sounds  No murmur heard  No friction rub  No gallop  Pulmonary:      Effort: Pulmonary effort is normal  No respiratory distress  Breath sounds: Normal breath sounds  No stridor  No wheezing, rhonchi or rales  Abdominal:      General: There is distension  Tenderness: There is abdominal tenderness  Musculoskeletal:         General: Normal range of motion  Cervical back: Normal range of motion  Right lower leg: No edema  Left lower leg: No edema  Skin:     General: Skin is warm  Neurological:      General: No focal deficit present  Mental Status: She is alert and oriented to person, place, and time  Psychiatric:         Mood and Affect: Mood normal          Behavior: Behavior normal          BMI Counseling: Body mass index is 26 91 kg/m²  The BMI is above normal  Nutrition recommendations include reducing portion sizes and decreasing overall calorie intake   Exercise recommendations include moderate aerobic physical activity for 150 minutes/week, exercising 3-5 times per week, joining a gym and strength training exercises

## 2021-09-24 ENCOUNTER — OFFICE VISIT (OUTPATIENT)
Dept: OBGYN CLINIC | Facility: CLINIC | Age: 51
End: 2021-09-24
Payer: COMMERCIAL

## 2021-09-24 VITALS — SYSTOLIC BLOOD PRESSURE: 112 MMHG | WEIGHT: 176 LBS | DIASTOLIC BLOOD PRESSURE: 76 MMHG | BODY MASS INDEX: 26.76 KG/M2

## 2021-09-24 DIAGNOSIS — D25.1 INTRAMURAL, SUBMUCOUS, AND SUBSEROUS LEIOMYOMA OF UTERUS: Primary | ICD-10-CM

## 2021-09-24 DIAGNOSIS — N94.6 DYSMENORRHEA: ICD-10-CM

## 2021-09-24 DIAGNOSIS — D25.2 INTRAMURAL, SUBMUCOUS, AND SUBSEROUS LEIOMYOMA OF UTERUS: Primary | ICD-10-CM

## 2021-09-24 DIAGNOSIS — N93.9 ABNORMAL VAGINAL BLEEDING: ICD-10-CM

## 2021-09-24 DIAGNOSIS — D25.0 INTRAMURAL, SUBMUCOUS, AND SUBSEROUS LEIOMYOMA OF UTERUS: Primary | ICD-10-CM

## 2021-09-24 PROBLEM — J01.00 ACUTE NON-RECURRENT MAXILLARY SINUSITIS: Status: RESOLVED | Noted: 2019-10-07 | Resolved: 2021-09-24

## 2021-09-24 PROBLEM — E66.3 OVERWEIGHT: Status: RESOLVED | Noted: 2019-09-25 | Resolved: 2021-09-24

## 2021-09-24 PROBLEM — N89.8 VAGINA ITCHING: Status: RESOLVED | Noted: 2019-02-27 | Resolved: 2021-09-24

## 2021-09-24 PROBLEM — R14.2 BELCHING: Status: RESOLVED | Noted: 2017-03-09 | Resolved: 2021-09-24

## 2021-09-24 PROBLEM — B37.31 YEAST INFECTION OF THE VAGINA: Status: RESOLVED | Noted: 2019-02-27 | Resolved: 2021-09-24

## 2021-09-24 PROBLEM — Z12.11 COLON CANCER SCREENING: Status: RESOLVED | Noted: 2018-04-27 | Resolved: 2021-09-24

## 2021-09-24 PROBLEM — B37.3 YEAST INFECTION OF THE VAGINA: Status: RESOLVED | Noted: 2019-02-27 | Resolved: 2021-09-24

## 2021-09-24 PROBLEM — R05.9 COUGH: Status: RESOLVED | Noted: 2019-10-07 | Resolved: 2021-09-24

## 2021-09-24 PROBLEM — R10.9 ABDOMINAL PAIN: Status: RESOLVED | Noted: 2017-03-09 | Resolved: 2021-09-24

## 2021-09-24 PROBLEM — B37.9 YEAST INFECTION: Status: RESOLVED | Noted: 2019-10-07 | Resolved: 2021-09-24

## 2021-09-24 PROCEDURE — 99204 OFFICE O/P NEW MOD 45 MIN: CPT | Performed by: OBSTETRICS & GYNECOLOGY

## 2021-09-24 PROCEDURE — 1036F TOBACCO NON-USER: CPT | Performed by: OBSTETRICS & GYNECOLOGY

## 2021-09-24 NOTE — PROGRESS NOTES
Hamida Cottrell was seen today for consult  Diagnoses and all orders for this visit:    Intramural, submucous, and subserous leiomyoma of uterus    Dysmenorrhea    Abnormal vaginal bleeding         Plan   Patient has had appropriate workup, will schedule for TLH-BS, cystoscopy  Discussed ovarian conservation  Reviewed that sometimes pain does not improve with hysterectomy though I suspect in this case it will as her pain is only associated with her menses  Discussed postop course  Will reach out to surgery scheduler to get on the books, return for preop  Will have another period between now and her surgery - discussed use of naproxen, heating pad for pain management  Deepika Mayo is a 46 y o  female here for a transfer of care visit  She was scheduled for TLH with [facility] but then her insurance denied coverage of her case d/t it being an out of network provider  She has a longstanding history of dysmenorrhea and a known fibroid uterus  She has noticed over the past year that her periods have gotten heavier, significantly more painful, irregular  Tried depo provera and POPs without improvement  Considered embolization but decided against it at radiology's recommendation with concerns for ongoing pain  Her primary concern is the pain she experiences with her bleeding which has lately become so bad that she has gone to the ER concerned for kidney stones  TVUS done in March 2021 showed a uterus that is 11 80 x 11 73 x 7 92 cm with multiple intramural fibroids and normal appearing ovaries  MRI done in April 2021 with similar findings and a ?  Inclusion cyst on the R ovary    Patient has had the covid vaccine      Patient Active Problem List   Diagnosis    GERD (gastroesophageal reflux disease)    Lump of right breast    Open wound of finger with tendon involvement    Change in bowel habits    Sprain of left thumb    Vertigo    TMJ syndrome    ETD (Eustachian tube dysfunction), right    Chronic reactive otitis externa of both ears    Laryngopharyngeal reflux (LPR)    Vitamin D deficiency         Gynecologic History  No LMP recorded  Contraception: oral progesterone-only contraceptive  Last Pap: 2021  Results were: normal; HPV negative  EMB: 3/2021 Inactive endometrium negative for dysplasia  Last mammogram: 2020  Results: BIRAD 1    Obstetric History  OB History    Para Term  AB Living   3 3 2 1   3   SAB TAB Ectopic Multiple Live Births           3      # Outcome Date GA Lbr Gorge/2nd Weight Sex Delivery Anes PTL Lv   3       CS-Unspec   LUCHO   2 Term      Vag-Spont   LUCHO   1 Term      Vag-Spont   LUCHO     ? Classical uterine incision with c/s - done at 27 wks after 26 wk PPROM, chorio, fetal malpresentation    Past Medical/Surgical/Family/Social History  The following portions of the patient's history were reviewed and updated as appropriate: allergies, current medications, past family history, past medical history, past social history, past surgical history and problem list     Allergies  Patient has no known allergies      Medications    Current Outpatient Medications:     Cranberry 1000 MG CAPS, Take by mouth, Disp: , Rfl:     famotidine (PEPCID) 40 MG tablet, Take 1 tablet (40 mg total) by mouth daily at bedtime, Disp: 30 tablet, Rfl: 4    ibuprofen (MOTRIN) 800 mg tablet, Take 1 tablet (800 mg total) by mouth every 6 (six) hours as needed for mild pain for up to 15 doses, Disp: 15 tablet, Rfl: 0    Multiple Vitamins-Minerals (CENTRUM SILVER 50+WOMEN PO), , Disp: , Rfl:     naproxen (NAPROSYN) 500 mg tablet, Take 1 tablet (500 mg total) by mouth 2 (two) times a day with meals, Disp: 28 tablet, Rfl: 0    norethindrone (MICRONOR) 0 35 MG tablet, Take 1 tablet by mouth daily, Disp: , Rfl:     Omega-3 Fatty Acids (FISH OIL PO), Take by mouth, Disp: , Rfl:     omeprazole (PriLOSEC) 40 MG capsule, TAKE 1 CAPSULE BY MOUTH EVERY DAY, Disp: 90 capsule, Rfl: 1      Review of Systems  Constitutional: no fever, feels well  Pulmonary: no cough, SOB  Gastrointestinal: no complaints of abdominal pain, constipation, nausea  Genitourinary : see HPI       Objective     /76   Wt 79 8 kg (176 lb)   Breastfeeding No   BMI 26 76 kg/m²     General: alert and oriented, in no acute distress  Vulva: normal, Bartholin's, Urethra, [place]'s normal  Vagina: normal mucosa  Cervix:  no cervical motion tenderness  Uterus:  bulky, mobile, non-tender, size consistent with 12 weeks  Adnexa: normal adnexa and no mass, fullness, tenderness

## 2021-09-29 ENCOUNTER — TELEPHONE (OUTPATIENT)
Dept: OBGYN CLINIC | Facility: CLINIC | Age: 51
End: 2021-09-29

## 2021-09-29 NOTE — TELEPHONE ENCOUNTER
Called pts insurance to see if pt needs prior auth for surgery on 11/08/21   Using codes [address] [facility] BS) and Cystoscopy (37286)         NO PA REQUIRED   Call Ref # [id number]

## 2021-10-19 ENCOUNTER — OFFICE VISIT (OUTPATIENT)
Dept: OBGYN CLINIC | Facility: CLINIC | Age: 51
End: 2021-10-19
Payer: COMMERCIAL

## 2021-10-19 ENCOUNTER — CONSULT (OUTPATIENT)
Dept: NEUROLOGY | Facility: CLINIC | Age: 51
End: 2021-10-19
Payer: COMMERCIAL

## 2021-10-19 ENCOUNTER — APPOINTMENT (OUTPATIENT)
Dept: LAB | Facility: AMBULARY SURGERY CENTER | Age: 51
End: 2021-10-19
Attending: OBSTETRICS & GYNECOLOGY
Payer: COMMERCIAL

## 2021-10-19 VITALS
WEIGHT: 180 LBS | DIASTOLIC BLOOD PRESSURE: 62 MMHG | HEIGHT: 68 IN | BODY MASS INDEX: 27.28 KG/M2 | HEART RATE: 107 BPM | SYSTOLIC BLOOD PRESSURE: 116 MMHG | TEMPERATURE: 98 F

## 2021-10-19 VITALS
HEIGHT: 68 IN | DIASTOLIC BLOOD PRESSURE: 74 MMHG | BODY MASS INDEX: 27.01 KG/M2 | WEIGHT: 178.2 LBS | SYSTOLIC BLOOD PRESSURE: 116 MMHG

## 2021-10-19 DIAGNOSIS — R42 VERTIGO: ICD-10-CM

## 2021-10-19 DIAGNOSIS — D75.1 ERYTHROCYTOSIS DUE TO UTERINE MYOMA: ICD-10-CM

## 2021-10-19 DIAGNOSIS — D25.9 ERYTHROCYTOSIS DUE TO UTERINE MYOMA: ICD-10-CM

## 2021-10-19 DIAGNOSIS — R11.0 NAUSEA: ICD-10-CM

## 2021-10-19 DIAGNOSIS — D25.1 INTRAMURAL, SUBMUCOUS, AND SUBSEROUS LEIOMYOMA OF UTERUS: Primary | ICD-10-CM

## 2021-10-19 DIAGNOSIS — N93.9 VAGINAL HEMORRHAGE: ICD-10-CM

## 2021-10-19 DIAGNOSIS — D25.0 INTRAMURAL, SUBMUCOUS, AND SUBSEROUS LEIOMYOMA OF UTERUS: Primary | ICD-10-CM

## 2021-10-19 DIAGNOSIS — D25.2 INTRAMURAL, SUBMUCOUS, AND SUBSEROUS LEIOMYOMA OF UTERUS: Primary | ICD-10-CM

## 2021-10-19 DIAGNOSIS — Z01.818 PREOP EXAMINATION: ICD-10-CM

## 2021-10-19 DIAGNOSIS — H81.10 BENIGN PAROXYSMAL POSITIONAL VERTIGO, UNSPECIFIED LATERALITY: Primary | ICD-10-CM

## 2021-10-19 LAB
EST. AVERAGE GLUCOSE BLD GHB EST-MCNC: 82 MG/DL
HBA1C MFR BLD: 4.5 %

## 2021-10-19 PROCEDURE — 36415 COLL VENOUS BLD VENIPUNCTURE: CPT

## 2021-10-19 PROCEDURE — 99204 OFFICE O/P NEW MOD 45 MIN: CPT | Performed by: PSYCHIATRY & NEUROLOGY

## 2021-10-19 PROCEDURE — 83036 HEMOGLOBIN GLYCOSYLATED A1C: CPT

## 2021-10-19 PROCEDURE — 1036F TOBACCO NON-USER: CPT | Performed by: OBSTETRICS & GYNECOLOGY

## 2021-10-19 PROCEDURE — 99213 OFFICE O/P EST LOW 20 MIN: CPT | Performed by: OBSTETRICS & GYNECOLOGY

## 2021-10-19 RX ORDER — MECLIZINE HYDROCHLORIDE 25 MG/1
25 TABLET ORAL 3 TIMES DAILY PRN
Qty: 30 TABLET | Refills: 0 | Status: SHIPPED | OUTPATIENT
Start: 2021-10-19 | End: 2021-12-14 | Stop reason: ALTCHOICE

## 2021-10-19 RX ORDER — ONDANSETRON 4 MG/1
4 TABLET, FILM COATED ORAL EVERY 8 HOURS PRN
Qty: 20 TABLET | Refills: 0 | Status: SHIPPED | OUTPATIENT
Start: 2021-10-19 | End: 2021-12-14 | Stop reason: ALTCHOICE

## 2021-10-23 ENCOUNTER — APPOINTMENT (OUTPATIENT)
Dept: LAB | Facility: CLINIC | Age: 51
End: 2021-10-23
Payer: COMMERCIAL

## 2021-10-23 DIAGNOSIS — N93.9 VAGINAL HEMORRHAGE: ICD-10-CM

## 2021-10-23 DIAGNOSIS — D25.9 UTERINE LEIOMYOMA, UNSPECIFIED LOCATION: ICD-10-CM

## 2021-10-23 DIAGNOSIS — D75.1 ERYTHROCYTOSIS DUE TO UTERINE MYOMA: ICD-10-CM

## 2021-10-23 DIAGNOSIS — D25.9 ERYTHROCYTOSIS DUE TO UTERINE MYOMA: ICD-10-CM

## 2021-10-23 DIAGNOSIS — D75.1 POLYCYTHEMIA, SECONDARY: ICD-10-CM

## 2021-10-23 LAB
ABO GROUP BLD: NORMAL
BASOPHILS # BLD AUTO: 0.02 THOUSANDS/ΜL (ref 0–0.1)
BASOPHILS NFR BLD AUTO: 0 % (ref 0–1)
BLD GP AB SCN SERPL QL: NEGATIVE
EOSINOPHIL # BLD AUTO: 0.28 THOUSAND/ΜL (ref 0–0.61)
EOSINOPHIL NFR BLD AUTO: 6 % (ref 0–6)
ERYTHROCYTE [DISTWIDTH] IN BLOOD BY AUTOMATED COUNT: 14.3 % (ref 11.6–15.1)
HCT VFR BLD AUTO: 39.9 % (ref 34.8–46.1)
HGB BLD-MCNC: 12.4 G/DL (ref 11.5–15.4)
IMM GRANULOCYTES # BLD AUTO: 0.02 THOUSAND/UL (ref 0–0.2)
IMM GRANULOCYTES NFR BLD AUTO: 0 % (ref 0–2)
LYMPHOCYTES # BLD AUTO: 1.68 THOUSANDS/ΜL (ref 0.6–4.47)
LYMPHOCYTES NFR BLD AUTO: 34 % (ref 14–44)
MCH RBC QN AUTO: 27.7 PG (ref 26.8–34.3)
MCHC RBC AUTO-ENTMCNC: 31.1 G/DL (ref 31.4–37.4)
MCV RBC AUTO: 89 FL (ref 82–98)
MONOCYTES # BLD AUTO: 0.51 THOUSAND/ΜL (ref 0.17–1.22)
MONOCYTES NFR BLD AUTO: 10 % (ref 4–12)
NEUTROPHILS # BLD AUTO: 2.48 THOUSANDS/ΜL (ref 1.85–7.62)
NEUTS SEG NFR BLD AUTO: 50 % (ref 43–75)
NRBC BLD AUTO-RTO: 0 /100 WBCS
PLATELET # BLD AUTO: 258 THOUSANDS/UL (ref 149–390)
PMV BLD AUTO: 12.5 FL (ref 8.9–12.7)
RBC # BLD AUTO: 4.48 MILLION/UL (ref 3.81–5.12)
RH BLD: POSITIVE
SPECIMEN EXPIRATION DATE: NORMAL
WBC # BLD AUTO: 4.99 THOUSAND/UL (ref 4.31–10.16)

## 2021-10-23 PROCEDURE — 86900 BLOOD TYPING SEROLOGIC ABO: CPT

## 2021-10-23 PROCEDURE — 85025 COMPLETE CBC W/AUTO DIFF WBC: CPT

## 2021-10-23 PROCEDURE — 86850 RBC ANTIBODY SCREEN: CPT

## 2021-10-23 PROCEDURE — 86901 BLOOD TYPING SEROLOGIC RH(D): CPT

## 2021-10-23 PROCEDURE — 36415 COLL VENOUS BLD VENIPUNCTURE: CPT

## 2021-10-29 ENCOUNTER — TELEMEDICINE (OUTPATIENT)
Dept: FAMILY MEDICINE CLINIC | Facility: CLINIC | Age: 51
End: 2021-10-29
Payer: COMMERCIAL

## 2021-10-29 ENCOUNTER — TELEPHONE (OUTPATIENT)
Dept: FAMILY MEDICINE CLINIC | Facility: CLINIC | Age: 51
End: 2021-10-29

## 2021-10-29 VITALS — WEIGHT: 180 LBS | BODY MASS INDEX: 27.28 KG/M2 | HEIGHT: 68 IN

## 2021-10-29 DIAGNOSIS — G47.00 INSOMNIA, UNSPECIFIED TYPE: Primary | ICD-10-CM

## 2021-10-29 DIAGNOSIS — Z91.018 FOOD ALLERGY: ICD-10-CM

## 2021-10-29 PROCEDURE — 99214 OFFICE O/P EST MOD 30 MIN: CPT | Performed by: FAMILY MEDICINE

## 2021-10-29 PROCEDURE — 3008F BODY MASS INDEX DOCD: CPT | Performed by: PSYCHIATRY & NEUROLOGY

## 2021-10-29 RX ORDER — ZOLPIDEM TARTRATE 5 MG/1
5 TABLET ORAL
Qty: 10 TABLET | Refills: 0 | Status: SHIPPED | OUTPATIENT
Start: 2021-10-29 | End: 2021-11-17

## 2021-11-03 ENCOUNTER — TELEPHONE (OUTPATIENT)
Dept: OBGYN CLINIC | Facility: CLINIC | Age: 51
End: 2021-11-03

## 2021-11-03 DIAGNOSIS — L81.9 DISCOLORATION OF SKIN: Primary | ICD-10-CM

## 2021-11-07 ENCOUNTER — ANESTHESIA EVENT (OUTPATIENT)
Dept: PERIOP | Facility: HOSPITAL | Age: 51
End: 2021-11-07
Payer: COMMERCIAL

## 2021-11-08 ENCOUNTER — HOSPITAL ENCOUNTER (OUTPATIENT)
Facility: HOSPITAL | Age: 51
Setting detail: OUTPATIENT SURGERY
Discharge: HOME | End: 2021-11-08
Attending: OBSTETRICS & GYNECOLOGY | Admitting: OBSTETRICS & GYNECOLOGY
Payer: COMMERCIAL

## 2021-11-08 ENCOUNTER — ANESTHESIA (OUTPATIENT)
Dept: PERIOP | Facility: HOSPITAL | Age: 51
End: 2021-11-08
Payer: COMMERCIAL

## 2021-11-08 VITALS
OXYGEN SATURATION: 100 % | TEMPERATURE: 97.1 F | BODY MASS INDEX: 27.28 KG/M2 | SYSTOLIC BLOOD PRESSURE: 99 MMHG | DIASTOLIC BLOOD PRESSURE: 55 MMHG | HEART RATE: 68 BPM | RESPIRATION RATE: 17 BRPM | WEIGHT: 180 LBS | HEIGHT: 68 IN

## 2021-11-08 DIAGNOSIS — Z90.710 STATUS POST HYSTERECTOMY: Primary | ICD-10-CM

## 2021-11-08 DIAGNOSIS — D25.9 ABNORMAL UTERINE BLEEDING DUE TO LEIOMYOMA OF UTERUS: ICD-10-CM

## 2021-11-08 DIAGNOSIS — N93.9 ABNORMAL UTERINE BLEEDING DUE TO LEIOMYOMA OF UTERUS: ICD-10-CM

## 2021-11-08 LAB
EXT PREGNANCY TEST URINE: NEGATIVE
EXT. CONTROL: NORMAL
GLUCOSE SERPL-MCNC: 87 MG/DL (ref 65–140)

## 2021-11-08 PROCEDURE — 88307 TISSUE EXAM BY PATHOLOGIST: CPT | Performed by: PATHOLOGY

## 2021-11-08 PROCEDURE — 58571 TLH W/T/O 250 G OR LESS: CPT | Performed by: OBSTETRICS & GYNECOLOGY

## 2021-11-08 PROCEDURE — 82948 REAGENT STRIP/BLOOD GLUCOSE: CPT

## 2021-11-08 PROCEDURE — 81025 URINE PREGNANCY TEST: CPT | Performed by: OBSTETRICS & GYNECOLOGY

## 2021-11-08 RX ORDER — FENTANYL CITRATE/PF 50 MCG/ML
25 SYRINGE (ML) INJECTION
Status: DISCONTINUED | OUTPATIENT
Start: 2021-11-08 | End: 2021-11-08

## 2021-11-08 RX ORDER — GABAPENTIN 100 MG/1
200 CAPSULE ORAL ONCE
Status: COMPLETED | OUTPATIENT
Start: 2021-11-08 | End: 2021-11-08

## 2021-11-08 RX ORDER — OXYCODONE HYDROCHLORIDE 5 MG/1
5 TABLET ORAL EVERY 4 HOURS PRN
Qty: 10 TABLET | Refills: 0 | Status: SHIPPED | OUTPATIENT
Start: 2021-11-08 | End: 2021-11-18

## 2021-11-08 RX ORDER — SODIUM CHLORIDE, SODIUM LACTATE, POTASSIUM CHLORIDE, CALCIUM CHLORIDE 600; 310; 30; 20 MG/100ML; MG/100ML; MG/100ML; MG/100ML
INJECTION, SOLUTION INTRAVENOUS CONTINUOUS PRN
Status: DISCONTINUED | OUTPATIENT
Start: 2021-11-08 | End: 2021-11-08

## 2021-11-08 RX ORDER — SODIUM CHLORIDE 9 MG/ML
INJECTION, SOLUTION INTRAVENOUS CONTINUOUS PRN
Status: DISCONTINUED | OUTPATIENT
Start: 2021-11-08 | End: 2021-11-08

## 2021-11-08 RX ORDER — KETOROLAC TROMETHAMINE 30 MG/ML
INJECTION, SOLUTION INTRAMUSCULAR; INTRAVENOUS AS NEEDED
Status: DISCONTINUED | OUTPATIENT
Start: 2021-11-08 | End: 2021-11-08

## 2021-11-08 RX ORDER — IBUPROFEN 600 MG/1
600 TABLET ORAL EVERY 6 HOURS PRN
Status: DISCONTINUED | OUTPATIENT
Start: 2021-11-08 | End: 2021-11-08 | Stop reason: HOSPADM

## 2021-11-08 RX ORDER — ROCURONIUM BROMIDE 10 MG/ML
INJECTION, SOLUTION INTRAVENOUS AS NEEDED
Status: DISCONTINUED | OUTPATIENT
Start: 2021-11-08 | End: 2021-11-08

## 2021-11-08 RX ORDER — ONDANSETRON 2 MG/ML
4 INJECTION INTRAMUSCULAR; INTRAVENOUS ONCE AS NEEDED
Status: DISCONTINUED | OUTPATIENT
Start: 2021-11-08 | End: 2021-11-08

## 2021-11-08 RX ORDER — MAGNESIUM HYDROXIDE 1200 MG/15ML
LIQUID ORAL AS NEEDED
Status: DISCONTINUED | OUTPATIENT
Start: 2021-11-08 | End: 2021-11-08 | Stop reason: HOSPADM

## 2021-11-08 RX ORDER — ONDANSETRON 2 MG/ML
4 INJECTION INTRAMUSCULAR; INTRAVENOUS EVERY 4 HOURS PRN
Status: DISCONTINUED | OUTPATIENT
Start: 2021-11-08 | End: 2021-11-08 | Stop reason: HOSPADM

## 2021-11-08 RX ORDER — MIDAZOLAM HYDROCHLORIDE 2 MG/2ML
INJECTION, SOLUTION INTRAMUSCULAR; INTRAVENOUS AS NEEDED
Status: DISCONTINUED | OUTPATIENT
Start: 2021-11-08 | End: 2021-11-08

## 2021-11-08 RX ORDER — ACETAMINOPHEN 325 MG/1
650 TABLET ORAL EVERY 6 HOURS PRN
Status: DISCONTINUED | OUTPATIENT
Start: 2021-11-08 | End: 2021-11-08 | Stop reason: HOSPADM

## 2021-11-08 RX ORDER — GLYCOPYRROLATE 0.2 MG/ML
INJECTION INTRAMUSCULAR; INTRAVENOUS AS NEEDED
Status: DISCONTINUED | OUTPATIENT
Start: 2021-11-08 | End: 2021-11-08

## 2021-11-08 RX ORDER — DEXMEDETOMIDINE HYDROCHLORIDE 100 UG/ML
INJECTION, SOLUTION INTRAVENOUS AS NEEDED
Status: DISCONTINUED | OUTPATIENT
Start: 2021-11-08 | End: 2021-11-08

## 2021-11-08 RX ORDER — SODIUM CHLORIDE, SODIUM LACTATE, POTASSIUM CHLORIDE, CALCIUM CHLORIDE 600; 310; 30; 20 MG/100ML; MG/100ML; MG/100ML; MG/100ML
100 INJECTION, SOLUTION INTRAVENOUS CONTINUOUS
Status: DISCONTINUED | OUTPATIENT
Start: 2021-11-08 | End: 2021-11-08 | Stop reason: HOSPADM

## 2021-11-08 RX ORDER — LIDOCAINE HYDROCHLORIDE 10 MG/ML
INJECTION, SOLUTION EPIDURAL; INFILTRATION; INTRACAUDAL; PERINEURAL AS NEEDED
Status: DISCONTINUED | OUTPATIENT
Start: 2021-11-08 | End: 2021-11-08

## 2021-11-08 RX ORDER — FENTANYL CITRATE 50 UG/ML
INJECTION, SOLUTION INTRAMUSCULAR; INTRAVENOUS AS NEEDED
Status: DISCONTINUED | OUTPATIENT
Start: 2021-11-08 | End: 2021-11-08

## 2021-11-08 RX ORDER — ACETAMINOPHEN 325 MG/1
975 TABLET ORAL ONCE
Status: COMPLETED | OUTPATIENT
Start: 2021-11-08 | End: 2021-11-08

## 2021-11-08 RX ORDER — ONDANSETRON 2 MG/ML
INJECTION INTRAMUSCULAR; INTRAVENOUS AS NEEDED
Status: DISCONTINUED | OUTPATIENT
Start: 2021-11-08 | End: 2021-11-08

## 2021-11-08 RX ORDER — BUPIVACAINE HYDROCHLORIDE 2.5 MG/ML
INJECTION, SOLUTION EPIDURAL; INFILTRATION; INTRACAUDAL AS NEEDED
Status: DISCONTINUED | OUTPATIENT
Start: 2021-11-08 | End: 2021-11-08 | Stop reason: HOSPADM

## 2021-11-08 RX ORDER — HYDROMORPHONE HCL 110MG/55ML
PATIENT CONTROLLED ANALGESIA SYRINGE INTRAVENOUS AS NEEDED
Status: DISCONTINUED | OUTPATIENT
Start: 2021-11-08 | End: 2021-11-08

## 2021-11-08 RX ORDER — OXYCODONE HYDROCHLORIDE 5 MG/1
5 TABLET ORAL EVERY 4 HOURS PRN
Status: DISCONTINUED | OUTPATIENT
Start: 2021-11-08 | End: 2021-11-08 | Stop reason: HOSPADM

## 2021-11-08 RX ORDER — DEXAMETHASONE SODIUM PHOSPHATE 10 MG/ML
INJECTION, SOLUTION INTRAMUSCULAR; INTRAVENOUS AS NEEDED
Status: DISCONTINUED | OUTPATIENT
Start: 2021-11-08 | End: 2021-11-08

## 2021-11-08 RX ORDER — CELECOXIB 100 MG/1
200 CAPSULE ORAL ONCE
Status: COMPLETED | OUTPATIENT
Start: 2021-11-08 | End: 2021-11-08

## 2021-11-08 RX ORDER — CEFAZOLIN SODIUM 2 G/50ML
2000 SOLUTION INTRAVENOUS ONCE
Status: COMPLETED | OUTPATIENT
Start: 2021-11-08 | End: 2021-11-08

## 2021-11-08 RX ORDER — PROMETHAZINE HYDROCHLORIDE 25 MG/ML
12.5 INJECTION, SOLUTION INTRAMUSCULAR; INTRAVENOUS ONCE AS NEEDED
Status: DISCONTINUED | OUTPATIENT
Start: 2021-11-08 | End: 2021-11-08

## 2021-11-08 RX ORDER — OXYCODONE HYDROCHLORIDE 5 MG/1
10 TABLET ORAL EVERY 4 HOURS PRN
Status: DISCONTINUED | OUTPATIENT
Start: 2021-11-08 | End: 2021-11-08 | Stop reason: HOSPADM

## 2021-11-08 RX ORDER — HYDROMORPHONE HCL IN WATER/PF 6 MG/30 ML
0.2 PATIENT CONTROLLED ANALGESIA SYRINGE INTRAVENOUS
Status: DISCONTINUED | OUTPATIENT
Start: 2021-11-08 | End: 2021-11-08

## 2021-11-08 RX ORDER — CEFAZOLIN SODIUM 1 G/3ML
INJECTION, POWDER, FOR SOLUTION INTRAMUSCULAR; INTRAVENOUS AS NEEDED
Status: DISCONTINUED | OUTPATIENT
Start: 2021-11-08 | End: 2021-11-08

## 2021-11-08 RX ORDER — EPHEDRINE SULFATE 50 MG/ML
INJECTION INTRAVENOUS AS NEEDED
Status: DISCONTINUED | OUTPATIENT
Start: 2021-11-08 | End: 2021-11-08

## 2021-11-08 RX ORDER — PROPOFOL 10 MG/ML
INJECTION, EMULSION INTRAVENOUS AS NEEDED
Status: DISCONTINUED | OUTPATIENT
Start: 2021-11-08 | End: 2021-11-08

## 2021-11-08 RX ORDER — NEOSTIGMINE METHYLSULFATE 1 MG/ML
INJECTION INTRAVENOUS AS NEEDED
Status: DISCONTINUED | OUTPATIENT
Start: 2021-11-08 | End: 2021-11-08

## 2021-11-08 RX ADMIN — NEOSTIGMINE METHYLSULFATE 2 MG: 1 INJECTION INTRAVENOUS at 12:22

## 2021-11-08 RX ADMIN — SODIUM CHLORIDE: 0.9 INJECTION, SOLUTION INTRAVENOUS at 08:35

## 2021-11-08 RX ADMIN — SODIUM CHLORIDE, SODIUM LACTATE, POTASSIUM CHLORIDE, AND CALCIUM CHLORIDE: .6; .31; .03; .02 INJECTION, SOLUTION INTRAVENOUS at 08:25

## 2021-11-08 RX ADMIN — DEXMEDETOMIDINE HYDROCHLORIDE 8 MCG: 100 INJECTION, SOLUTION INTRAVENOUS at 11:44

## 2021-11-08 RX ADMIN — EPHEDRINE SULFATE 5 MG: 50 INJECTION, SOLUTION INTRAVENOUS at 10:53

## 2021-11-08 RX ADMIN — MIDAZOLAM HYDROCHLORIDE 2 MG: 1 INJECTION, SOLUTION INTRAMUSCULAR; INTRAVENOUS at 08:25

## 2021-11-08 RX ADMIN — CEFAZOLIN SODIUM 2000 MG: 2 SOLUTION INTRAVENOUS at 08:35

## 2021-11-08 RX ADMIN — EPHEDRINE SULFATE 5 MG: 50 INJECTION, SOLUTION INTRAVENOUS at 09:19

## 2021-11-08 RX ADMIN — SODIUM CHLORIDE, SODIUM LACTATE, POTASSIUM CHLORIDE, AND CALCIUM CHLORIDE: .6; .31; .03; .02 INJECTION, SOLUTION INTRAVENOUS at 12:18

## 2021-11-08 RX ADMIN — LIDOCAINE HYDROCHLORIDE 50 MG: 10 INJECTION, SOLUTION EPIDURAL; INFILTRATION; INTRACAUDAL at 08:34

## 2021-11-08 RX ADMIN — DEXAMETHASONE SODIUM PHOSPHATE 4 MG: 10 INJECTION, SOLUTION INTRAMUSCULAR; INTRAVENOUS at 08:45

## 2021-11-08 RX ADMIN — PROPOFOL 150 MG: 10 INJECTION, EMULSION INTRAVENOUS at 08:34

## 2021-11-08 RX ADMIN — HYDROMORPHONE HYDROCHLORIDE 0.5 MG: 2 INJECTION INTRAMUSCULAR; INTRAVENOUS; SUBCUTANEOUS at 09:59

## 2021-11-08 RX ADMIN — EPHEDRINE SULFATE 5 MG: 50 INJECTION, SOLUTION INTRAVENOUS at 11:54

## 2021-11-08 RX ADMIN — KETOROLAC TROMETHAMINE 30 MG: 30 INJECTION, SOLUTION INTRAMUSCULAR at 11:50

## 2021-11-08 RX ADMIN — ONDANSETRON 4 MG: 2 INJECTION INTRAMUSCULAR; INTRAVENOUS at 11:40

## 2021-11-08 RX ADMIN — ROCURONIUM BROMIDE 20 MG: 10 INJECTION, SOLUTION INTRAVENOUS at 09:47

## 2021-11-08 RX ADMIN — CELECOXIB 200 MG: 100 CAPSULE ORAL at 08:01

## 2021-11-08 RX ADMIN — EPHEDRINE SULFATE 5 MG: 50 INJECTION, SOLUTION INTRAVENOUS at 12:27

## 2021-11-08 RX ADMIN — GABAPENTIN 200 MG: 100 CAPSULE ORAL at 08:01

## 2021-11-08 RX ADMIN — FENTANYL CITRATE 50 MCG: 50 INJECTION, SOLUTION INTRAMUSCULAR; INTRAVENOUS at 09:07

## 2021-11-08 RX ADMIN — FENTANYL CITRATE 50 MCG: 50 INJECTION, SOLUTION INTRAMUSCULAR; INTRAVENOUS at 08:34

## 2021-11-08 RX ADMIN — SODIUM CHLORIDE: 0.9 INJECTION, SOLUTION INTRAVENOUS at 09:52

## 2021-11-08 RX ADMIN — EPHEDRINE SULFATE 5 MG: 50 INJECTION, SOLUTION INTRAVENOUS at 11:59

## 2021-11-08 RX ADMIN — GLYCOPYRROLATE 0.2 MG: 0.2 INJECTION, SOLUTION INTRAMUSCULAR; INTRAVENOUS at 12:22

## 2021-11-08 RX ADMIN — CEFAZOLIN SODIUM 2000 MG: 1 INJECTION, POWDER, FOR SOLUTION INTRAMUSCULAR; INTRAVENOUS at 12:30

## 2021-11-08 RX ADMIN — ACETAMINOPHEN 975 MG: 325 TABLET, FILM COATED ORAL at 08:00

## 2021-11-08 RX ADMIN — ROCURONIUM BROMIDE 50 MG: 10 INJECTION, SOLUTION INTRAVENOUS at 08:34

## 2021-11-08 RX ADMIN — DEXMEDETOMIDINE HYDROCHLORIDE 8 MCG: 100 INJECTION, SOLUTION INTRAVENOUS at 09:12

## 2021-11-12 ENCOUNTER — NURSE TRIAGE (OUTPATIENT)
Dept: OTHER | Facility: OTHER | Age: 51
End: 2021-11-12

## 2021-11-15 ENCOUNTER — TELEPHONE (OUTPATIENT)
Dept: OBGYN CLINIC | Facility: CLINIC | Age: 51
End: 2021-11-15

## 2021-11-17 ENCOUNTER — OFFICE VISIT (OUTPATIENT)
Dept: FAMILY MEDICINE CLINIC | Facility: CLINIC | Age: 51
End: 2021-11-17
Payer: COMMERCIAL

## 2021-11-17 ENCOUNTER — TELEPHONE (OUTPATIENT)
Dept: OBGYN CLINIC | Facility: CLINIC | Age: 51
End: 2021-11-17

## 2021-11-17 VITALS
DIASTOLIC BLOOD PRESSURE: 78 MMHG | WEIGHT: 180 LBS | HEIGHT: 68 IN | OXYGEN SATURATION: 100 % | BODY MASS INDEX: 27.28 KG/M2 | RESPIRATION RATE: 16 BRPM | HEART RATE: 88 BPM | SYSTOLIC BLOOD PRESSURE: 118 MMHG

## 2021-11-17 DIAGNOSIS — K21.9 GASTROESOPHAGEAL REFLUX DISEASE, UNSPECIFIED WHETHER ESOPHAGITIS PRESENT: Primary | ICD-10-CM

## 2021-11-17 DIAGNOSIS — Z12.31 ENCOUNTER FOR SCREENING MAMMOGRAM FOR MALIGNANT NEOPLASM OF BREAST: ICD-10-CM

## 2021-11-17 DIAGNOSIS — Z13.220 SCREENING CHOLESTEROL LEVEL: ICD-10-CM

## 2021-11-17 PROCEDURE — 1036F TOBACCO NON-USER: CPT | Performed by: FAMILY MEDICINE

## 2021-11-17 PROCEDURE — 99214 OFFICE O/P EST MOD 30 MIN: CPT | Performed by: FAMILY MEDICINE

## 2021-11-17 PROCEDURE — 3725F SCREEN DEPRESSION PERFORMED: CPT | Performed by: FAMILY MEDICINE

## 2021-11-17 PROCEDURE — 3008F BODY MASS INDEX DOCD: CPT | Performed by: FAMILY MEDICINE

## 2021-11-22 ENCOUNTER — OFFICE VISIT (OUTPATIENT)
Dept: OBGYN CLINIC | Facility: CLINIC | Age: 51
End: 2021-11-22

## 2021-11-22 VITALS — DIASTOLIC BLOOD PRESSURE: 66 MMHG | WEIGHT: 180 LBS | SYSTOLIC BLOOD PRESSURE: 112 MMHG | BODY MASS INDEX: 27.37 KG/M2

## 2021-11-22 DIAGNOSIS — Z09 POSTOPERATIVE EXAMINATION: Primary | ICD-10-CM

## 2021-11-22 PROCEDURE — 99024 POSTOP FOLLOW-UP VISIT: CPT | Performed by: OBSTETRICS & GYNECOLOGY

## 2021-11-24 ENCOUNTER — TELEPHONE (OUTPATIENT)
Dept: OTHER | Facility: OTHER | Age: 51
End: 2021-11-24

## 2021-12-08 ENCOUNTER — OFFICE VISIT (OUTPATIENT)
Dept: FAMILY MEDICINE CLINIC | Facility: CLINIC | Age: 51
End: 2021-12-08
Payer: COMMERCIAL

## 2021-12-08 ENCOUNTER — TELEPHONE (OUTPATIENT)
Dept: OBGYN CLINIC | Facility: CLINIC | Age: 51
End: 2021-12-08

## 2021-12-08 VITALS
DIASTOLIC BLOOD PRESSURE: 74 MMHG | BODY MASS INDEX: 27.28 KG/M2 | OXYGEN SATURATION: 96 % | SYSTOLIC BLOOD PRESSURE: 122 MMHG | HEIGHT: 68 IN | RESPIRATION RATE: 16 BRPM | HEART RATE: 82 BPM | WEIGHT: 180 LBS

## 2021-12-08 DIAGNOSIS — Z11.59 NEED FOR HEPATITIS C SCREENING TEST: ICD-10-CM

## 2021-12-08 DIAGNOSIS — J39.2 IRRITATED THROAT: Primary | ICD-10-CM

## 2021-12-08 DIAGNOSIS — L81.9 DISCOLORATION OF SKIN: ICD-10-CM

## 2021-12-08 PROCEDURE — 99213 OFFICE O/P EST LOW 20 MIN: CPT | Performed by: FAMILY MEDICINE

## 2021-12-14 ENCOUNTER — APPOINTMENT (OUTPATIENT)
Dept: LAB | Facility: CLINIC | Age: 51
End: 2021-12-14
Payer: COMMERCIAL

## 2021-12-14 ENCOUNTER — PATIENT MESSAGE (OUTPATIENT)
Dept: OBGYN CLINIC | Facility: CLINIC | Age: 51
End: 2021-12-14

## 2021-12-14 ENCOUNTER — OFFICE VISIT (OUTPATIENT)
Dept: OBGYN CLINIC | Facility: CLINIC | Age: 51
End: 2021-12-14
Payer: COMMERCIAL

## 2021-12-14 VITALS
DIASTOLIC BLOOD PRESSURE: 70 MMHG | BODY MASS INDEX: 27.28 KG/M2 | HEIGHT: 68 IN | WEIGHT: 180 LBS | SYSTOLIC BLOOD PRESSURE: 120 MMHG

## 2021-12-14 DIAGNOSIS — Z11.59 SCREENING EXAMINATION FOR POLIOMYELITIS: Primary | ICD-10-CM

## 2021-12-14 DIAGNOSIS — N90.89 VULVAR IRRITATION: ICD-10-CM

## 2021-12-14 DIAGNOSIS — B37.3 VAGINAL CANDIDIASIS: Primary | ICD-10-CM

## 2021-12-14 DIAGNOSIS — Z13.220 SCREENING FOR LIPOID DISORDERS: ICD-10-CM

## 2021-12-14 LAB
CHOLEST SERPL-MCNC: 225 MG/DL
HCV AB SER QL: NORMAL
HDLC SERPL-MCNC: 82 MG/DL
LDLC SERPL CALC-MCNC: 131 MG/DL (ref 0–100)
NONHDLC SERPL-MCNC: 143 MG/DL
TRIGL SERPL-MCNC: 58 MG/DL

## 2021-12-14 PROCEDURE — 99213 OFFICE O/P EST LOW 20 MIN: CPT | Performed by: PHYSICIAN ASSISTANT

## 2021-12-14 PROCEDURE — 86803 HEPATITIS C AB TEST: CPT

## 2021-12-14 PROCEDURE — 36415 COLL VENOUS BLD VENIPUNCTURE: CPT

## 2021-12-14 PROCEDURE — 80061 LIPID PANEL: CPT

## 2021-12-14 RX ORDER — FLUCONAZOLE 150 MG/1
TABLET ORAL
COMMUNITY
Start: 2021-12-09 | End: 2021-12-20

## 2021-12-14 RX ORDER — NYSTATIN AND TRIAMCINOLONE ACETONIDE 100000; 1 [USP'U]/G; MG/G
OINTMENT TOPICAL 2 TIMES DAILY
Qty: 30 G | Refills: 0 | Status: SHIPPED | OUTPATIENT
Start: 2021-12-14 | End: 2022-02-08

## 2021-12-14 RX ORDER — FLUCONAZOLE 150 MG/1
150 TABLET ORAL
Qty: 2 TABLET | Refills: 0 | Status: SHIPPED | OUTPATIENT
Start: 2021-12-14 | End: 2021-12-18

## 2021-12-17 ENCOUNTER — OFFICE VISIT (OUTPATIENT)
Dept: FAMILY MEDICINE CLINIC | Facility: CLINIC | Age: 51
End: 2021-12-17
Payer: COMMERCIAL

## 2021-12-17 VITALS
RESPIRATION RATE: 16 BRPM | BODY MASS INDEX: 27.89 KG/M2 | SYSTOLIC BLOOD PRESSURE: 108 MMHG | WEIGHT: 184 LBS | HEART RATE: 72 BPM | DIASTOLIC BLOOD PRESSURE: 78 MMHG | HEIGHT: 68 IN

## 2021-12-17 DIAGNOSIS — E66.3 OVERWEIGHT: ICD-10-CM

## 2021-12-17 DIAGNOSIS — Z00.00 ANNUAL PHYSICAL EXAM: Primary | ICD-10-CM

## 2021-12-17 DIAGNOSIS — Z12.31 ENCOUNTER FOR SCREENING MAMMOGRAM FOR MALIGNANT NEOPLASM OF BREAST: ICD-10-CM

## 2021-12-17 DIAGNOSIS — E78.00 ELEVATED LDL CHOLESTEROL LEVEL: ICD-10-CM

## 2021-12-17 DIAGNOSIS — Z12.11 COLON CANCER SCREENING: ICD-10-CM

## 2021-12-17 DIAGNOSIS — N60.02 BREAST CYST, LEFT: ICD-10-CM

## 2021-12-17 PROBLEM — M54.2 NECK AND SHOULDER PAIN: Status: ACTIVE | Noted: 2021-03-22

## 2021-12-17 PROBLEM — D21.9 FIBROID: Status: ACTIVE | Noted: 2021-12-17

## 2021-12-17 PROBLEM — M25.519 NECK AND SHOULDER PAIN: Status: ACTIVE | Noted: 2021-03-22

## 2021-12-17 PROCEDURE — 99396 PREV VISIT EST AGE 40-64: CPT | Performed by: FAMILY MEDICINE

## 2021-12-17 PROCEDURE — 1036F TOBACCO NON-USER: CPT | Performed by: FAMILY MEDICINE

## 2021-12-17 PROCEDURE — 3725F SCREEN DEPRESSION PERFORMED: CPT | Performed by: FAMILY MEDICINE

## 2021-12-17 PROCEDURE — 3008F BODY MASS INDEX DOCD: CPT | Performed by: FAMILY MEDICINE

## 2021-12-20 ENCOUNTER — OFFICE VISIT (OUTPATIENT)
Dept: OBGYN CLINIC | Facility: CLINIC | Age: 51
End: 2021-12-20

## 2021-12-20 VITALS — DIASTOLIC BLOOD PRESSURE: 64 MMHG | SYSTOLIC BLOOD PRESSURE: 110 MMHG | WEIGHT: 177 LBS | BODY MASS INDEX: 26.91 KG/M2

## 2021-12-20 DIAGNOSIS — Z09 POSTOPERATIVE EXAMINATION: Primary | ICD-10-CM

## 2021-12-20 DIAGNOSIS — L29.2 VULVAR ITCHING: ICD-10-CM

## 2021-12-20 PROCEDURE — 99024 POSTOP FOLLOW-UP VISIT: CPT | Performed by: OBSTETRICS & GYNECOLOGY

## 2022-01-06 ENCOUNTER — OFFICE VISIT (OUTPATIENT)
Dept: OBGYN CLINIC | Facility: CLINIC | Age: 52
End: 2022-01-06

## 2022-01-06 VITALS
HEIGHT: 68 IN | DIASTOLIC BLOOD PRESSURE: 60 MMHG | BODY MASS INDEX: 27.34 KG/M2 | SYSTOLIC BLOOD PRESSURE: 100 MMHG | WEIGHT: 180.4 LBS

## 2022-01-06 DIAGNOSIS — Z09 POSTOPERATIVE EXAMINATION: Primary | ICD-10-CM

## 2022-01-06 PROCEDURE — 99024 POSTOP FOLLOW-UP VISIT: CPT | Performed by: OBSTETRICS & GYNECOLOGY

## 2022-01-06 PROCEDURE — 3008F BODY MASS INDEX DOCD: CPT | Performed by: FAMILY MEDICINE

## 2022-01-06 NOTE — PROGRESS NOTES
Assessment      Doing well postoperatively  Plan     1  Continue any current medications  2  Wound care discussed  3  Activity restrictions: none  4  Anticipated return to work: Monday  5  Follow up: when due for annual well woman exam      Deepika Bae is a 46 y o  y o  female who presents 8 weeks status post TLH-BS, cystoscopy for fibroids and pelvic pain  Eating a regular diet without difficulty  Bowel movements are normal  She continues to have discomfort with prolonged periods of sitting but it is overall improving  She is feeling ready to return to work next week  She and her  had sex which was uncomfortable but overall OK  Does find that her vulva is overall more sentsitive  The following portions of the patient's history were reviewed and updated as appropriate: allergies, current medications, past family history, past medical history, past social history, past surgical history and problem list       Amoxicillin    Current Outpatient Medications:     Cranberry 1000 MG CAPS, Take by mouth, Disp: , Rfl:     famotidine (PEPCID) 40 MG tablet, Take 1 tablet (40 mg total) by mouth daily at bedtime, Disp: 30 tablet, Rfl: 4    Multiple Vitamins-Minerals (CENTRUM SILVER 50+WOMEN PO), , Disp: , Rfl:     Omega-3 Fatty Acids (FISH OIL PO), Take by mouth, Disp: , Rfl:     triamcinolone (KENALOG) 0 1 % ointment, Apply topically 2 (two) times a day, Disp: 30 g, Rfl: 0    nystatin-triamcinolone (MYCOLOG-II) ointment, Apply topically 2 (two) times a day (Patient not taking: Reported on 1/6/2022 ), Disp: 30 g, Rfl: 0    omeprazole (PriLOSEC) 40 MG capsule, TAKE 1 CAPSULE BY MOUTH EVERY DAY (Patient taking differently: Take 40 mg by mouth daily in the early morning ), Disp: 90 capsule, Rfl: 1      Review of Systems  Denies Fevers/chills/N/V/Constipation/Vaginal bleeding/excessive pain/dysuria/frequency of urination  Also as noted in HPI        Objective   /60 (BP Location: Right arm, Patient Position: Sitting, Cuff Size: Standard)   Ht 5' 8" (1 727 m)   Wt 81 8 kg (180 lb 6 4 oz)   LMP 10/05/2021 (Exact Date)   BMI 27 43 kg/m²     GEN: The patient was alert and oriented x3, pleasant well-appearing female in no acute distress     CV: Regular rate  PULM: nonlabored respirations  : Normal appearing external female genitalia  MSK: Normal gait  Skin: warm, dry  Neuro: no focal deficits  Psych: normal affect and judgement, cooperative

## 2022-01-06 NOTE — LETTER
January 6, 2022     Patient: Amada Alaniz   YOB: 1970   Date of Visit: 1/6/2022       To Whom it May Concern:    Amada Alaniz is under my professional care  She was seen in my office on 1/6/2022  She may return to work on 1/10/2022  If you have any questions or concerns, please don't hesitate to call           Sincerely,          Jesica Bartholomew,         CC: No Recipients

## 2022-01-15 ENCOUNTER — IMMUNIZATIONS (OUTPATIENT)
Dept: FAMILY MEDICINE CLINIC | Facility: HOSPITAL | Age: 52
End: 2022-01-15

## 2022-01-15 DIAGNOSIS — Z23 ENCOUNTER FOR IMMUNIZATION: Primary | ICD-10-CM

## 2022-01-15 PROCEDURE — 91300 COVID-19 PFIZER VACC 0.3 ML: CPT

## 2022-01-15 PROCEDURE — 0001A COVID-19 PFIZER VACC 0.3 ML: CPT

## 2022-01-22 ENCOUNTER — HOSPITAL ENCOUNTER (OUTPATIENT)
Dept: MAMMOGRAPHY | Facility: HOSPITAL | Age: 52
Discharge: HOME | End: 2022-01-22
Payer: COMMERCIAL

## 2022-01-22 DIAGNOSIS — Z12.31 ENCOUNTER FOR SCREENING MAMMOGRAM FOR MALIGNANT NEOPLASM OF BREAST: ICD-10-CM

## 2022-01-22 PROCEDURE — 77063 BREAST TOMOSYNTHESIS BI: CPT

## 2022-01-22 PROCEDURE — 77067 SCR MAMMO BI INCL CAD: CPT

## 2022-01-26 ENCOUNTER — TELEPHONE (OUTPATIENT)
Dept: FAMILY MEDICINE CLINIC | Facility: CLINIC | Age: 52
End: 2022-01-26

## 2022-02-08 ENCOUNTER — OFFICE VISIT (OUTPATIENT)
Dept: OBGYN CLINIC | Facility: CLINIC | Age: 52
End: 2022-02-08
Payer: COMMERCIAL

## 2022-02-08 ENCOUNTER — HOSPITAL ENCOUNTER (OUTPATIENT)
Dept: ULTRASOUND IMAGING | Facility: HOSPITAL | Age: 52
Discharge: HOME | End: 2022-02-08
Payer: COMMERCIAL

## 2022-02-08 VITALS — BODY MASS INDEX: 27.37 KG/M2 | SYSTOLIC BLOOD PRESSURE: 118 MMHG | WEIGHT: 180 LBS | DIASTOLIC BLOOD PRESSURE: 68 MMHG

## 2022-02-08 DIAGNOSIS — R31.29 OTHER MICROSCOPIC HEMATURIA: ICD-10-CM

## 2022-02-08 DIAGNOSIS — R30.0 DYSURIA: Primary | ICD-10-CM

## 2022-02-08 LAB
SL AMB  POCT GLUCOSE, UA: NEGATIVE
SL AMB LEUKOCYTE ESTERASE,UA: NEGATIVE
SL AMB POCT BILIRUBIN,UA: NEGATIVE
SL AMB POCT BLOOD,UA: ABNORMAL
SL AMB POCT CLARITY,UA: ABNORMAL
SL AMB POCT COLOR,UA: YELLOW
SL AMB POCT KETONES,UA: NEGATIVE
SL AMB POCT NITRITE,UA: NEGATIVE
SL AMB POCT PH,UA: 6
SL AMB POCT SPECIFIC GRAVITY,UA: 1.03
SL AMB POCT URINE PROTEIN: NEGATIVE
SL AMB POCT UROBILINOGEN: NEGATIVE

## 2022-02-08 PROCEDURE — 99214 OFFICE O/P EST MOD 30 MIN: CPT | Performed by: OBSTETRICS & GYNECOLOGY

## 2022-02-08 PROCEDURE — 81003 URINALYSIS AUTO W/O SCOPE: CPT | Performed by: OBSTETRICS & GYNECOLOGY

## 2022-02-08 PROCEDURE — 76770 US EXAM ABDO BACK WALL COMP: CPT

## 2022-02-08 PROCEDURE — 1036F TOBACCO NON-USER: CPT | Performed by: OBSTETRICS & GYNECOLOGY

## 2022-02-08 NOTE — PROGRESS NOTES
Claudene Flank was seen today for gynecology problem  Diagnoses and all orders for this visit:    Dysuria  -     POCT urine dip auto non-scope  -     Urine culture    Other microscopic hematuria  -     US kidney and bladder; Future         Plan   Pt instructed to take Azo routinely for the next 2 days  Microscopic blood noted on UA with bladder tenderness on exam - will obtain US to evaluate for possible stone  Will call with results  Urine sent for culture, low likelihood of infection given negative leuks and nitrites so will not empirically treat at this time  If no improvement in pain with Azo could consider ditropan or myrbetriq      Subjective     Judy Burkitt is a 46 y o  female here for a problem visit  Patient is complaining of pelvic pressure that started on Friday  She noticed it when she was urinating  Over the weekend had a few episodes that felt like a muscle spasm where she could start urinating then stop, then start again  Today she has not had the spasm symptoms but does continue to have pressure sensation  She denies dysuria, hematuria, urinary incontinence, abdominal pain, nausea, vomiting, vaginal discharge, vaginal bleeding  Typically takes Azo but hasn't recently  Patient Active Problem List   Diagnosis    GERD (gastroesophageal reflux disease)    Lump of right breast    Open wound of finger with tendon involvement    Change in bowel habits    Cough    Sprain of left thumb    Vertigo    TMJ syndrome    ETD (Eustachian tube dysfunction), right    Chronic reactive otitis externa of both ears    Laryngopharyngeal reflux (LPR)    Vitamin D deficiency    Fibroid    Neck and shoulder pain         Gynecologic History  Patient's last menstrual period was 10/05/2021 (exact date)      S/p hysterectomy    Obstetric History  OB History    Para Term  AB Living   4 4 3 1   3   SAB IAB Ectopic Multiple Live Births           3      # Outcome Date GA Lbr Gorge/2nd Weight Sex Delivery Anes PTL Lv   4 Term            3       CS-Unspec   LUCOH   2 Term      Vag-Spont   LUCHO   1 Term      Vag-Spont   LUCHO       Past Medical/Surgical/Family/Social History  The following portions of the patient's history were reviewed and updated as appropriate: allergies, current medications, past family history, past medical history, past social history, past surgical history and problem list     Allergies  Amoxicillin    Medications    Current Outpatient Medications:     Cranberry 1000 MG CAPS, Take by mouth, Disp: , Rfl:     famotidine (PEPCID) 40 MG tablet, Take 1 tablet (40 mg total) by mouth daily at bedtime, Disp: 30 tablet, Rfl: 4    Multiple Vitamins-Minerals (CENTRUM SILVER 50+WOMEN PO), , Disp: , Rfl:     Omega-3 Fatty Acids (FISH OIL PO), Take by mouth, Disp: , Rfl:     omeprazole (PriLOSEC) 40 MG capsule, TAKE 1 CAPSULE BY MOUTH EVERY DAY (Patient taking differently: Take 40 mg by mouth daily in the early morning ), Disp: 90 capsule, Rfl: 1    triamcinolone (KENALOG) 0 1 % ointment, Apply topically 2 (two) times a day, Disp: 30 g, Rfl: 0    nystatin-triamcinolone (MYCOLOG-II) ointment, Apply topically 2 (two) times a day (Patient not taking: Reported on 2022 ), Disp: 30 g, Rfl: 0      Review of Systems  Constitutional: no fever, feels well  Gastrointestinal: no complaints of abdominal pain, constipation, nausea, vomiting  Genitourinary : see HPI       Objective     /68 (BP Location: Right arm, Patient Position: Sitting, Cuff Size: Standard)   Wt 81 6 kg (180 lb)   LMP 10/05/2021 (Exact Date)   BMI 27 37 kg/m²     General: alert and oriented, in no acute distress  Abdomen:soft, nondistended, normal bowel sounds and nontender  Vulva: normal, Bartholin's, Urethra, [place]'s normal  Vagina: normal mucosa, cuff is intact, nontender  Bladder: tender to palpation

## 2022-02-10 LAB
BACTERIA UR CULT: NORMAL
Lab: NO GROWTH

## 2022-03-23 ENCOUNTER — TELEPHONE (OUTPATIENT)
Dept: OBGYN CLINIC | Facility: CLINIC | Age: 52
End: 2022-03-23

## 2022-03-23 DIAGNOSIS — M53.3 SACRAL PAIN: Primary | ICD-10-CM

## 2022-03-23 NOTE — TELEPHONE ENCOUNTER
Certainly something they can evaluate  I think a referral is reasonable, can you please put the order in? Thanks

## 2022-03-23 NOTE — TELEPHONE ENCOUNTER
Hi! This is your pt  Pt  C/o pain in tailbone when sitting for prolonged periods of time since hyst  Requesting referral to women's health PT  Is this something Women's Health PT treats?

## 2022-04-06 ENCOUNTER — OFFICE VISIT (OUTPATIENT)
Dept: FAMILY MEDICINE CLINIC | Facility: CLINIC | Age: 52
End: 2022-04-06
Payer: COMMERCIAL

## 2022-04-06 VITALS
WEIGHT: 178 LBS | HEIGHT: 68 IN | DIASTOLIC BLOOD PRESSURE: 60 MMHG | SYSTOLIC BLOOD PRESSURE: 102 MMHG | BODY MASS INDEX: 26.98 KG/M2 | OXYGEN SATURATION: 98 % | RESPIRATION RATE: 16 BRPM | HEART RATE: 89 BPM

## 2022-04-06 DIAGNOSIS — G47.9 SLEEP DISTURBANCE: Primary | ICD-10-CM

## 2022-04-06 PROCEDURE — 1036F TOBACCO NON-USER: CPT | Performed by: FAMILY MEDICINE

## 2022-04-06 PROCEDURE — 3008F BODY MASS INDEX DOCD: CPT | Performed by: FAMILY MEDICINE

## 2022-04-06 PROCEDURE — 99213 OFFICE O/P EST LOW 20 MIN: CPT | Performed by: FAMILY MEDICINE

## 2022-04-06 PROCEDURE — 3725F SCREEN DEPRESSION PERFORMED: CPT | Performed by: FAMILY MEDICINE

## 2022-05-17 ENCOUNTER — OFFICE VISIT (OUTPATIENT)
Dept: OBGYN CLINIC | Facility: CLINIC | Age: 52
End: 2022-05-17
Payer: COMMERCIAL

## 2022-05-17 VITALS
WEIGHT: 182.4 LBS | SYSTOLIC BLOOD PRESSURE: 112 MMHG | DIASTOLIC BLOOD PRESSURE: 68 MMHG | HEIGHT: 68 IN | BODY MASS INDEX: 27.65 KG/M2

## 2022-05-17 DIAGNOSIS — R30.0 DYSURIA: ICD-10-CM

## 2022-05-17 DIAGNOSIS — R30.9 PAIN EMPTYING BLADDER: Primary | ICD-10-CM

## 2022-05-17 PROBLEM — D21.9 FIBROID: Status: RESOLVED | Noted: 2021-12-17 | Resolved: 2022-05-17

## 2022-05-17 LAB
SL AMB  POCT GLUCOSE, UA: ABNORMAL
SL AMB LEUKOCYTE ESTERASE,UA: ABNORMAL
SL AMB POCT BILIRUBIN,UA: ABNORMAL
SL AMB POCT BLOOD,UA: ABNORMAL
SL AMB POCT CLARITY,UA: CLEAR
SL AMB POCT COLOR,UA: YELLOW
SL AMB POCT KETONES,UA: ABNORMAL
SL AMB POCT NITRITE,UA: ABNORMAL
SL AMB POCT PH,UA: 8
SL AMB POCT SPECIFIC GRAVITY,UA: 1.01
SL AMB POCT URINE PROTEIN: ABNORMAL
SL AMB POCT UROBILINOGEN: ABNORMAL

## 2022-05-17 PROCEDURE — 3008F BODY MASS INDEX DOCD: CPT | Performed by: OBSTETRICS & GYNECOLOGY

## 2022-05-17 PROCEDURE — 99213 OFFICE O/P EST LOW 20 MIN: CPT | Performed by: OBSTETRICS & GYNECOLOGY

## 2022-05-17 PROCEDURE — 81002 URINALYSIS NONAUTO W/O SCOPE: CPT | Performed by: OBSTETRICS & GYNECOLOGY

## 2022-05-17 PROCEDURE — 87086 URINE CULTURE/COLONY COUNT: CPT | Performed by: OBSTETRICS & GYNECOLOGY

## 2022-05-17 RX ORDER — NICOTINE POLACRILEX 2 MG
GUM BUCCAL
COMMUNITY

## 2022-05-17 NOTE — PROGRESS NOTES
Assessment Beni Courtney was seen today for abdominal pain  Diagnoses and all orders for this visit:    Pain emptying bladder    Dysuria  -     POCT urine dip         Plan   Encouraged pt to first start with pelvic floor PT - this pain has only been ongoing since her surgery, questionable if it is related to postoperative changes/adhesive disease in the pelvis  If no improvement after 4-6 sessions will refer to urology  After pt left the office hematuria noted on dip  Will send for culture  If negative culture, will send for urology sooner as pt has had hematuria in the past with negative cultures  Subjective     Felipa Hernandez is a 45 y o  female here for a problem visit  For several months now, pt has been intermittently having suprapubic pain with urinating  Does not notice pain before going to the bathroom  Happens more frequently in the morning or if she has been holding her urine/that her bladder is very full  Denies dysuria, hematuria  Sometimes has a stomach ache for a little while after urinating that eventually goes away on its own  Denies constipation, diarrhea, vaginal bleeding, urinary incontinence  Patient Active Problem List   Diagnosis    GERD (gastroesophageal reflux disease)    Lump of right breast    Open wound of finger with tendon involvement    Change in bowel habits    Cough    Sprain of left thumb    Vertigo    TMJ syndrome    ETD (Eustachian tube dysfunction), right    Chronic reactive otitis externa of both ears    Laryngopharyngeal reflux (LPR)    Vitamin D deficiency    Neck and shoulder pain         Gynecologic History  Patient's last menstrual period was 10/05/2021 (exact date)        Obstetric History  OB History    Para Term  AB Living   4 4 3 1   3   SAB IAB Ectopic Multiple Live Births           3      # Outcome Date GA Lbr Gorge/2nd Weight Sex Delivery Anes PTL Lv   4 Term            3       CS-Unspec   LUCHO   2 Term Vag-Spont   LUCHO   1 Term      Vag-Spont   LUCHO       Past Medical/Surgical/Family/Social History  The following portions of the patient's history were reviewed and updated as appropriate: allergies, current medications, past family history, past medical history, past social history, past surgical history and problem list     Allergies  Amoxicillin    Medications    Current Outpatient Medications:     Biotin 1 MG CAPS, Take by mouth, Disp: , Rfl:     Cranberry 1000 MG CAPS, Take by mouth, Disp: , Rfl:     famotidine (PEPCID) 40 MG tablet, Take 1 tablet (40 mg total) by mouth daily at bedtime, Disp: 30 tablet, Rfl: 4    Multiple Vitamins-Minerals (CENTRUM SILVER 50+WOMEN PO), , Disp: , Rfl:     Omega-3 Fatty Acids (FISH OIL PO), Take by mouth, Disp: , Rfl:     triamcinolone (KENALOG) 0 1 % ointment, Apply topically 2 (two) times a day, Disp: 30 g, Rfl: 0    omeprazole (PriLOSEC) 40 MG capsule, TAKE 1 CAPSULE BY MOUTH EVERY DAY (Patient not taking: Reported on 5/17/2022), Disp: 90 capsule, Rfl: 1      Review of Systems  Constitutional: no fever, feels well  Gastrointestinal: no complaints of constipation, nausea, vomiting, diarrhea   Genitourinary : see HPI       Objective     /68 (BP Location: Right arm, Patient Position: Sitting, Cuff Size: Standard)   Ht 5' 8" (1 727 m)   Wt 82 7 kg (182 lb 6 4 oz)   LMP 10/05/2021 (Exact Date)   Breastfeeding No   BMI 27 73 kg/m²     General: alert and oriented, in no acute distress    Abdomen:soft, non-tender, without masses or organomegaly  No suprapubic tenderness    Vulva: normal, Bartholin's, Urethra, Colquitt's normal  Vagina: normal mucosa  Cervix:  surgically absent  Uterus:  surgically absent  Adnexa: no mass, fullness, tenderness   Bladder: no tenderness to palpation

## 2022-05-19 DIAGNOSIS — R30.9 PAIN EMPTYING BLADDER: ICD-10-CM

## 2022-05-19 DIAGNOSIS — B37.41 YEAST CYSTITIS: Primary | ICD-10-CM

## 2022-05-19 LAB — BACTERIA UR CULT: ABNORMAL

## 2022-05-19 RX ORDER — FLUCONAZOLE 150 MG/1
150 TABLET ORAL ONCE
Qty: 1 TABLET | Refills: 0 | Status: SHIPPED | OUTPATIENT
Start: 2022-05-19 | End: 2022-05-19

## 2022-06-09 ENCOUNTER — EVALUATION (OUTPATIENT)
Dept: PHYSICAL THERAPY | Facility: REHABILITATION | Age: 52
End: 2022-06-09
Payer: COMMERCIAL

## 2022-06-09 DIAGNOSIS — M53.3 SACRAL PAIN: ICD-10-CM

## 2022-06-09 DIAGNOSIS — R30.0 DYSURIA: Primary | ICD-10-CM

## 2022-06-09 PROCEDURE — 97161 PT EVAL LOW COMPLEX 20 MIN: CPT | Performed by: PHYSICAL THERAPIST

## 2022-06-09 NOTE — PROGRESS NOTES
PT Evaluation     Today's date: 2022  Patient name: Kaylee Bowman  : 1970  MRN: [id number]  Referring provider: Kain Hammond DO  Dx:   Encounter Diagnosis     ICD-10-CM    1  Dysuria  R30 0    2  Sacral pain  M53 3 Ambulatory Referral to Physical Therapy       Start Time:   Stop Time:   Total time in clinic (min): 50 minutes    Assessment  Assessment details: The patient is a 45year old female with complaints of dysuria and suprapubic pain with urination  She presents with tenderness to pelvic floor muscles, especially layer 3 with subsequent tension  She also presents with strength deficits and poor muscle relaxation/lengthening  She would benefit from pelvic floor physical therapy to help reduce/manage symptoms, address impairments and maximize function and quality of life upon discharge  She will be given updated HEP throughout episode of care  Patient was provided with bladder and bowel diary to be completed for next visit  Impairments: abnormal muscle firing, abnormal muscle tone, abnormal or restricted ROM, impaired physical strength, lacks appropriate home exercise program and pain with function    Goals  ST  The patient will reduce pain with urination by 25% in 6 to 8 weeks  2  The patient will reduce pelvic floor muscle tone by 50% in 6 to 8 weeks  LT  The patient will urinate with steady stream upon discharge  2  The patient will be able to urinate with minimal to no pain upon discharge  Plan  Plan details: Thank you for opportunity to participate in the care of Kaylee Bowman      Patient would benefit from: skilled physical therapy, PT eval and women's health eval  Planned modality interventions: biofeedback  Planned therapy interventions: abdominal trunk stabilization, breathing training, home exercise program, therapeutic activities, stretching, strengthening, patient education, neuromuscular re-education and manual therapy  Frequency: 1x week  Duration in visits: 10  Plan of Care beginning date: 2022  Treatment plan discussed with: patient        PT Pelvic Floor Subjective:   History of Present Illness:   Patient is a 45 y o  presenting to physical therapy with complaints of pain with urination  She had a hysterectomy performed in 2021 and since then has had pain when emptying her bladder  She reports the pain is suprapubic and feels her muscles are mone/she does not have a steady flow while urinating  After urinating her stomach hurts and she has to lay down until it subsides  The pain is worse when she has been holding her urine for a long time, especially in the morning  She feels like she has recurrent UTIs but when she is tested it is negative  She initially had pelvic floor pain vaginally as well when sitting but that has improved           Social Support:     Lives with:  Spouse    Relationship status: /committed    Work status: employed full time ()  Diet and Exercise:      Exercise type: walking and strengthening exercise program    Exercise frequency: 3-4 times per week    Total gym   Walking on treadmill  OB/ gyn History    Gestational History:     Prior Pregnancy: Yes      Number of prior pregnancies: 5    Number of term pregnancies: 3    Delivery Type: vaginal delivery and  section      Number of vaginal deliveries: 2    Number of caesarian sections: 1    Delivery Complications:  29 yo female - 6 lb 9 oz vaginal - episiotomy   29 yo female - 6 lb 12 oz vaginal - tearing   20 yo female - 1 lb 9 oz emergency      Menstrual History:      Painful periods:  Difficulty managing menstrual pain    Menopausal: no menopause  Bladder Function:     Voiding Difficulties positive for: straining (sometimes feels like she has to push/contract to get last bit of urine out ) and incomplete emptying      Voiding Difficulties negative for: urgency, frequent urination and hesitancy      Voiding Difficulties comments:     Voiding frequency: every 1-2 hours    Urinary leakage: no urine leakage    Nocturia (episodes per night): 1    Painful urination: Yes      Fluid Intake Type:  Tea, water and coffee    Intake (ounces): Intake (ounces) comment: Water: 8 cups   Coffee: 1 cup decaf  Iced tea: powdered iced tea 2-3 cups   Alcohol: red wine occasionally     Bowel Function:     Bowel Function comments:  Gut health vitamin has helped with daily bowel movements - normal consistency     Bowel frequency: daily    Stool softener use: stool softeners (laxative every three months)  Sexual Function:     Sexually Active:  Sexually active    Pain during intercourse: No    Pain:     Current pain ratin    At best pain ratin    At worst pain ratin    Location:  Suprapubic region    Quality:  Pressure and cramping (hard pressure)    Exacerbated by: urination  Duration of symptoms:  Less than 1 hour and brief (15-30 minutes)    Relieving factors:  Change in position (laying down)  Treatments:     None    Patient Goals:     Patient goals for therapy:  Decreased pain, fully empty bladder or bowels, improved bladder or bowel function, improved comfort, relaxation, improved quality of life and improved pain management    Other patient goals:  Have pain go away and stop worrying about potential UTIs      Objective   Pelvic Floor Exam     General Perineum Exam:   perineum intact     Negative for swelling, descent, lesion, rectal irritation, gaping introitus, hemorrhoids, no pelvic organ prolapse at rest and perianal erythema    General perineum exam comments: Palpable "wart-like" nodules at left second layer 4 o'clock with patient reporting that is where tenderness was post-hysterectomy     Visual Inspection of Perineum:   Excursion of perineal body in cephalad direction with contraction of pelvic floor muscles (PFM): weak  Excursion of perineal body in caudal direction with relaxation of pelvic floor muscles (PFM): unable  Involuntary contraction with coughing: yes  PFM Contraction Comments: Pelvic floor verbal consent and written consent signed and in chart    Education provided today:   Pelvic floor anatomy and function  Bowel and Bladder anatomy and function  PT exam and course of treatment  Bladder and Bowel diary       Cotton swab test: non-tender  Cough reflex: cough reflex  Sensation: intact    Pelvic Organ Prolapse   no pelvic organ prolapseno pelvic organ prolapse at rest    Pelvic Floor Muscle Exam     Palpation   No increased muscle tension in the bulbospongiosus and ischiocavernosus  Minimal increased muscle tension in the super transverse perineal and periurethral  Moderate increased muscle tension in the puborectalis, pubococcygeus and iliococcygeus  No tenderness on right in the bulbospongiosus and ischiocavernosus  Minimal tenderness on right in the super transverse perineal, puborectalis, pubococcygeus, iliococcygeus and periurethral  No tenderness on left in the bulbospongiosus, super transverse perineal and periurethral  Moderate tenderness on left in the puborectalis, pubococcygeus and iliococcygeus    Muscle Contraction: well isolated  Pelvic floor muscle relaxation is complete       PERFECT Score   Power right: 2/5  Power left: 2/5  Endurance (seconds to max): 2                       Precautions: Hysterectomy , , Gastritis    [employer] HEP:    Manuals             Scar assessment nv            Abdominal assessment nv            Abdominal soft cupping             Pelvic Floor Muscle Releases nv                                      Patient Education Done            Neuro Re-Ed             SEMG BFB for pelvic floor lengthening nv            TA ADIM             PFMC slow holds             Diaphragmatic Breathing nv            DKC with Diaphragmatic Breathing nv            Child's Pose with DiaphragmaticBreathing nv            Body Scan for PFM release/relaxatoin Pelvic Floor Drops in deep squat                                                                              Ther Ex             PPT             LTR                                                    Ther Activity             EDUCATION/COUNSELING                                       Modalities

## 2022-06-23 ENCOUNTER — APPOINTMENT (OUTPATIENT)
Dept: PHYSICAL THERAPY | Facility: REHABILITATION | Age: 52
End: 2022-06-23
Payer: COMMERCIAL

## 2022-07-06 ENCOUNTER — OFFICE VISIT (OUTPATIENT)
Dept: PHYSICAL THERAPY | Facility: REHABILITATION | Age: 52
End: 2022-07-06
Payer: COMMERCIAL

## 2022-07-06 DIAGNOSIS — M53.3 SACRAL PAIN: ICD-10-CM

## 2022-07-06 DIAGNOSIS — R30.0 DYSURIA: Primary | ICD-10-CM

## 2022-07-06 PROCEDURE — 97112 NEUROMUSCULAR REEDUCATION: CPT

## 2022-07-06 PROCEDURE — 97530 THERAPEUTIC ACTIVITIES: CPT

## 2022-07-06 NOTE — PROGRESS NOTES
Daily Note     Today's date: 2022  Patient name: Arturo Morales  : 1970  MRN: [id number]   Referring provider: Kamille Jensen DO  Dx:   Encounter Diagnosis     ICD-10-CM    1  Dysuria  R30 0    2  Sacral pain  M53 3        Start Time: 6408  Stop Time: 1600  Total time in clinic (min): 43 minutes    Subjective:  Patient reports no changes since previous session, stating she continues to experience pain when urinating stating "some times are worse than others " She states when urination is painful, she still try to stop the flow of urine and attempt to wait until pain subsides  She states "it feels like a cramp almost " Patient brought bladder diary to session today, one day, stating she forgot the remaining two days at home and will be bringing to next session  Objective: See treatment diary below      Assessment: Tolerated treatment well  Patient demonstrated fatigue post treatment, exhibited good technique with therapeutic exercises and would benefit from continued PT Initiated POC this session with patient tolerating well  Initial tactile and verbal cues required from therapist for proper technique with diaphragmatic breathing however improvements noted upon increased duration  Patient reporting significant relaxation with addition of diaphragmatic breathing with all TE performed today  Patient given updated HEP this session with patient reporting understanding  Plan: Continue per plan of care  Progress treatment as tolerated                 Precautions: Hysterectomy , , Gastritis    [employer] HEP:    Manuals            Scar assessment nv nv           Abdominal assessment nv nv           Abdominal soft cupping             Pelvic Floor Muscle Releases nv nv                                     Patient Education Done            Neuro Re-Ed             SEMG BFB for pelvic floor lengthening nv            TA ADIM  10x supine and seated           PFMC slow holds Diaphragmatic Breathing nv Done            DKC with Diaphragmatic Breathing nv 30''x5           Child's Pose with DiaphragmaticBreathing nv 30''x5           Body Scan for PFM release/relaxatoin             Pelvic Floor Drops in deep squat             Pball circles  30x ea cw/ccw           Pball pelvic tils  20x                                                  Ther Ex             PPT             LTR                                                    Ther Activity             EDUCATION/COUNSELING                                       Modalities

## 2022-07-13 ENCOUNTER — OFFICE VISIT (OUTPATIENT)
Dept: PHYSICAL THERAPY | Facility: REHABILITATION | Age: 52
End: 2022-07-13
Payer: COMMERCIAL

## 2022-07-13 DIAGNOSIS — R30.0 DYSURIA: Primary | ICD-10-CM

## 2022-07-13 DIAGNOSIS — M53.3 SACRAL PAIN: ICD-10-CM

## 2022-07-13 PROCEDURE — 97112 NEUROMUSCULAR REEDUCATION: CPT | Performed by: PHYSICAL THERAPIST

## 2022-07-13 PROCEDURE — 97140 MANUAL THERAPY 1/> REGIONS: CPT | Performed by: PHYSICAL THERAPIST

## 2022-07-13 NOTE — PROGRESS NOTES
Daily Note     Today's date: 2022  Patient name: Arturo Morales  : 1970  MRN: [id number]  Referring provider: Kamille Jensen DO  Dx:   Encounter Diagnosis     ICD-10-CM    1  Dysuria  R30 0    2  Sacral pain  M53 3        Start Time: 1410  Stop Time: 1500  Total time in clinic (min): 50 minutes    Subjective: Patient arrived to appointment 10 minutes late  Patient reports she is still having cramping/aching while she is urinating and this has not changed  She reports this happens when her bladder is more full such as first thing in the morning  She forgot to bring bladder diary  Objective: See treatment diary below      Assessment: Tolerated treatment well  Patient reports relief and stretch with all pelvic floor muscle relaxation this visit  Initiated abdominal soft cupping for myofascial release and soft tissue mobilization to area of abdominal muscle cramping and scar tissue from hysterectomy  Good tolerance with patient noting positive response post-treatment  Re-assess symptoms next visit  Patient exhibited good technique with therapeutic exercises and would benefit from continued PT  Plan: Continue per plan of care                Precautions: Hysterectomy , , Gastritis    [employer] HEP:    Manuals           Scar assessment nv nv Done          Abdominal assessment nv nv Done          Abdominal soft cupping   Done 15'          Pelvic Floor Muscle Releases nv nv nv                                    Patient Education Done            Neuro Re-Ed             SEMG BFB for pelvic floor lengthening nv            TA ADIM  10x supine and seated x10 supine           PFMC slow holds             Diaphragmatic Breathing nv Done  3 min          DKC with Diaphragmatic Breathing nv 30''x5 5x30"           Child's Pose with DiaphragmaticBreathing nv 30''x5 3x1'          Body Scan for PFM release/relaxatoin             Pelvic Floor Drops in deep squat             Pball circles CW/CCW  30x ea cw/ccw x30 ea          Pball pelvic tilts  20x 3x10          Prone press ups   10x10"                                    Ther Ex             PPT             LTR                                                    Ther Activity             EDUCATION/COUNSELING                                       Modalities

## 2022-07-20 ENCOUNTER — APPOINTMENT (OUTPATIENT)
Dept: PHYSICAL THERAPY | Facility: REHABILITATION | Age: 52
End: 2022-07-20
Payer: COMMERCIAL

## 2022-07-20 NOTE — PROGRESS NOTES
Daily Note     Today's date: 2022  Patient name: Micaela Aragon  : 1970  MRN: [id number]  Referring provider: Guero Mays DO  Dx:   Encounter Diagnosis     ICD-10-CM    1  Dysuria  R30 0                   Subjective: ***      Objective: See treatment diary below      Assessment: Tolerated treatment {Tolerated treatment :}   Patient {assessment:}      Plan: {PLAN:}             Precautions: Hysterectomy , , Gastritis    [employer] HEP:    Manuals          Scar assessment nv nv Done          Abdominal assessment nv nv Done          Abdominal soft cupping   Done 15'          Pelvic Floor Muscle Releases nv nv nv                                    Patient Education Done            Neuro Re-Ed             SEMG BFB for pelvic floor lengthening nv            TA ADIM  10x supine and seated x10 supine           PFMC slow holds             Diaphragmatic Breathing nv Done  3 min          DKC with Diaphragmatic Breathing nv 30''x5 5x30"           Child's Pose with DiaphragmaticBreathing nv 30''x5 3x1'          Body Scan for PFM release/relaxatoin             Pelvic Floor Drops in deep squat             Pball circles CW/CCW  30x ea cw/ccw x30 ea          Pball pelvic tilts  20x 3x10          Prone press ups   10x10"                                    Ther Ex             PPT             LTR                                                    Ther Activity             EDUCATION/COUNSELING                                       Modalities

## 2022-07-22 ENCOUNTER — OFFICE VISIT (OUTPATIENT)
Dept: FAMILY MEDICINE CLINIC | Facility: CLINIC | Age: 52
End: 2022-07-22
Payer: COMMERCIAL

## 2022-07-22 VITALS
RESPIRATION RATE: 16 BRPM | BODY MASS INDEX: 27.58 KG/M2 | WEIGHT: 182 LBS | HEIGHT: 68 IN | DIASTOLIC BLOOD PRESSURE: 78 MMHG | HEART RATE: 93 BPM | SYSTOLIC BLOOD PRESSURE: 120 MMHG | OXYGEN SATURATION: 98 %

## 2022-07-22 DIAGNOSIS — M54.2 NECK PAIN: ICD-10-CM

## 2022-07-22 DIAGNOSIS — M54.50 ACUTE RIGHT-SIDED LOW BACK PAIN WITHOUT SCIATICA: Primary | ICD-10-CM

## 2022-07-22 PROCEDURE — 99213 OFFICE O/P EST LOW 20 MIN: CPT | Performed by: FAMILY MEDICINE

## 2022-07-22 RX ORDER — NAPROXEN 500 MG/1
500 TABLET ORAL 2 TIMES DAILY WITH MEALS
Qty: 28 TABLET | Refills: 0 | Status: SHIPPED | OUTPATIENT
Start: 2022-07-22

## 2022-07-22 RX ORDER — CYCLOBENZAPRINE HCL 5 MG
TABLET ORAL
Qty: 10 TABLET | Refills: 0 | Status: SHIPPED | OUTPATIENT
Start: 2022-07-22

## 2022-07-22 NOTE — PROGRESS NOTES
Assessment/Plan:   Diagnoses and all orders for this visit:    Acute right-sided low back pain without sciatica  -     naproxen (NAPROSYN) 500 mg tablet; Take 1 tablet (500 mg total) by mouth 2 (two) times a day with meals  -     cyclobenzaprine (FLEXERIL) 5 mg tablet; Take 1-2 tabs QHS PRN spasm  - f/u PRN   - RTO in 12/2022 for annual exam - pt aware and agreeable   Neck pain  -     Ambulatory Referral to Physical Therapy; Future          Subjective:    Patient ID: Goldy Stock is a 45 y o  female  HPI  - has been ongoing for the past 3wks  - (+) R-sided back pain w/o sciatic pain   - (+) neck tightness   - has been using electric massager which provides short term relief   - better than it was 3wks ago but has not resolved  - denies loss of bowel or bladder function       The following portions of the patient's history were reviewed and updated as appropriate: allergies, current medications, past family history, past medical history, past social history, past surgical history and problem list     Review of Systems  as per HPI    Objective:  /78 (BP Location: Left arm, Patient Position: Sitting, Cuff Size: Standard)   Pulse 93   Resp 16   Ht 5' 8" (1 727 m)   Wt 82 6 kg (182 lb)   LMP 10/05/2021 (Exact Date)   SpO2 98%   BMI 27 67 kg/m²    Physical Exam  Vitals reviewed  Constitutional:       General: She is not in acute distress  Appearance: Normal appearance  She is not ill-appearing, toxic-appearing or diaphoretic  HENT:      Head: Normocephalic and atraumatic  Right Ear: External ear normal       Left Ear: External ear normal    Eyes:      General: No scleral icterus  Right eye: No discharge  Left eye: No discharge  Extraocular Movements: Extraocular movements intact  Conjunctiva/sclera: Conjunctivae normal    Neck:      Comments: Tight traps  Pulmonary:      Effort: Pulmonary effort is normal    Musculoskeletal:         General: Tenderness present  Cervical back: Tenderness present  Right lower leg: No edema  Left lower leg: No edema  Comments: Pin-point R-lower back tenderness    Neurological:      General: No focal deficit present  Mental Status: She is alert and oriented to person, place, and time  Psychiatric:         Mood and Affect: Mood normal          Behavior: Behavior normal           BMI Counseling: Body mass index is 27 67 kg/m²  The BMI is above normal  Nutrition recommendations include 3-5 servings of fruits/vegetables daily  Exercise recommendations include exercising 3-5 times per week

## 2022-07-27 ENCOUNTER — OFFICE VISIT (OUTPATIENT)
Dept: PHYSICAL THERAPY | Facility: REHABILITATION | Age: 52
End: 2022-07-27
Payer: COMMERCIAL

## 2022-07-27 DIAGNOSIS — M53.3 SACRAL PAIN: ICD-10-CM

## 2022-07-27 DIAGNOSIS — R30.0 DYSURIA: Primary | ICD-10-CM

## 2022-07-27 PROCEDURE — 97112 NEUROMUSCULAR REEDUCATION: CPT | Performed by: PHYSICAL THERAPIST

## 2022-07-27 PROCEDURE — 97110 THERAPEUTIC EXERCISES: CPT | Performed by: PHYSICAL THERAPIST

## 2022-07-27 PROCEDURE — 97140 MANUAL THERAPY 1/> REGIONS: CPT | Performed by: PHYSICAL THERAPIST

## 2022-07-27 NOTE — PROGRESS NOTES
Daily Note     Today's date: 2022  Patient name: Gina Hobbs  : 1970  MRN: [id number]  Referring provider: Kristen Hsu DO  Dx:   Encounter Diagnosis     ICD-10-CM    1  Dysuria  R30 0    2  Sacral pain  M53 3        Start Time: 1400  Stop Time: 1500  Total time in clinic (min): 60 minutes    Subjective: Patient reports she has noticed "a subtle difference" in terms of decreased frequency of pain noting she did not have pain with her first void this morning  Objective: See treatment diary below      Assessment: Tolerated treatment well  Decreased tension noted to PFM with manual therapy compared to IE  No complaints of tenderness  Patient voided three times during session with twice after manual therapy  Patient demonstrated fatigue post treatment, exhibited good technique with therapeutic exercises and would benefit from continued PT  Plan: Continue per plan of care                Precautions: Hysterectomy , , Gastritis    [employer] HEP:    Manuals          Scar assessment nv nv Done          Abdominal assessment nv nv Done          Abdominal soft cupping   Done 15' Done         Pelvic Floor Muscle Releases nv nv nv Done                                   Patient Education Done            Neuro Re-Ed             SEMG BFB for pelvic floor lengthening nv            TA ADIM  10x supine and seated x10 supine  x10 supine         TA ADIM leg extensions    x10         PFMC slow holds             Diaphragmatic Breathing nv Done  3 min 2 min         DKC with Diaphragmatic Breathing nv 30''x5 5x30"  5x30"          Child's Pose with DiaphragmaticBreathing nv 30''x5 3x1' 3x1'         Body Scan for PFM release/relaxatoin             Pelvic Floor Drops in deep squat             Pball circles CW/CCW  30x ea cw/ccw x30 ea x30 ea         Pball pelvic tilts  20x 3x10 3x10         Prone press ups   10x10" 5x10"                                   Ther Ex PPT             LTR                                                    Ther Activity             EDUCATION/COUNSELING             Bladder training    nv                      Modalities

## 2022-08-01 ENCOUNTER — APPOINTMENT (OUTPATIENT)
Dept: PHYSICAL THERAPY | Facility: REHABILITATION | Age: 52
End: 2022-08-01
Payer: COMMERCIAL

## 2022-08-03 ENCOUNTER — OFFICE VISIT (OUTPATIENT)
Dept: PHYSICAL THERAPY | Facility: REHABILITATION | Age: 52
End: 2022-08-03
Payer: COMMERCIAL

## 2022-08-03 DIAGNOSIS — M53.3 SACRAL PAIN: ICD-10-CM

## 2022-08-03 DIAGNOSIS — R30.0 DYSURIA: Primary | ICD-10-CM

## 2022-08-03 PROCEDURE — 97110 THERAPEUTIC EXERCISES: CPT | Performed by: PHYSICAL THERAPIST

## 2022-08-03 PROCEDURE — 97140 MANUAL THERAPY 1/> REGIONS: CPT | Performed by: PHYSICAL THERAPIST

## 2022-08-03 NOTE — PROGRESS NOTES
Daily Note     Today's date: 8/3/2022  Patient name: Mihir Gonzalez  : 1970  MRN: [id number]  Referring provider: Koreen Hashimoto, DO  Dx:   Encounter Diagnosis     ICD-10-CM    1  Dysuria  R30 0    2  Sacral pain  M53 3        Start Time:   Stop Time:   Total time in clinic (min): 34 minutes    Subjective: Patient reports she had less aching since last visit  It happened three times with urination on Saturday but it was very dull  Otherwise she reports no complaints of pain  Objective: See treatment diary below      Assessment: Tolerated treatment well  Continued soft tissue restrictions noted to left lower abdomen compared to right  No complaints of pain throughout session  Discussed cues for pelvic floor muscle relaxation with urination, especially if patient is having pain  Patient demonstrated fatigue post treatment, exhibited good technique with therapeutic exercises and would benefit from continued PT  Plan: Continue per plan of care                Precautions: Hysterectomy , , Gastritis    [facility] HEP:    Manuals 6/9 7/6 7/13 7/20 8/3        Scar assessment nv nv Done          Abdominal assessment nv nv Done          Abdominal soft cupping   Done 15' Done Done        Pelvic Floor Muscle Releases nv nv nv Done                                   Patient Education Done    Done        Neuro Re-Ed             SEMG BFB for pelvic floor lengthening nv            TA ADIM  10x supine and seated x10 supine  x10 supine x10 supine        TA ADIM leg extensions    x10 x10        PFMC slow holds             Diaphragmatic Breathing nv Done  3 min 2 min         DKC with Diaphragmatic Breathing nv 30''x5 5x30"  5x30"  3x30"        Child's Pose with DiaphragmaticBreathing nv 30''x5 3x1' 3x1' 3' PF lengthening        Body Scan for PFM release/relaxatoin             Pelvic Floor Drops in deep squat             Pball circles CW/CCW  30x ea cw/ccw x30 ea x30 ea x30 ea        Pball pelvic tilts  20x 3x10 3x10 3x10        Prone press ups   10x10" 5x10" np                                  Ther Ex             PPT             LTR                                                    Ther Activity             EDUCATION/COUNSELING             Bladder training    nv                      Modalities

## 2022-08-10 ENCOUNTER — OFFICE VISIT (OUTPATIENT)
Dept: PHYSICAL THERAPY | Facility: REHABILITATION | Age: 52
End: 2022-08-10
Payer: COMMERCIAL

## 2022-08-10 DIAGNOSIS — R30.0 DYSURIA: Primary | ICD-10-CM

## 2022-08-10 DIAGNOSIS — M53.3 SACRAL PAIN: ICD-10-CM

## 2022-08-10 PROCEDURE — 97112 NEUROMUSCULAR REEDUCATION: CPT

## 2022-08-10 PROCEDURE — 97140 MANUAL THERAPY 1/> REGIONS: CPT

## 2022-08-10 NOTE — PROGRESS NOTES
Daily Note     Today's date: 8/10/2022  Patient name: Rikki Weldon  : 1970  MRN: [id number]  Referring provider: Margaret Linda DO  Dx:   Encounter Diagnosis     ICD-10-CM    1  Dysuria  R30 0    2  Sacral pain  M53 3        Start Time: 1400  Stop Time: 3297  Total time in clinic (min): 45 minutes    Subjective: Patient reports overall "things have been better " She reports having a "dull ache on Saturday and slightly stronger ache on Monday but not as bad as what it was before " She reports when she does have the discomfort it is usually a Saturday or Monday  She reports she has been attempting the pelvic floor lengthening techniques at home  Objective: See treatment diary below      Assessment: Tolerated treatment well  Patient demonstrated fatigue post treatment, exhibited good technique with therapeutic exercises and would benefit from continued PT Restrictions noted with manuals today, left side more than right  No reports of pain throughout session today  Discussed again with patient relaxation techniques/pelvic floor lengthening techniques, patient reporting understanding  Plan: Continue per plan of care  Progress treatment as tolerated                 Precautions: Hysterectomy , , Gastritis    [facility] HEP:    Manuals 6/9 7/6 7/13 7/20 8/3 8/10       Scar assessment nv nv Done          Abdominal assessment nv nv Done          Abdominal soft cupping   Done 15' Done Done Done        Pelvic Floor Muscle Releases nv nv nv Done                                   Patient Education Done    Done Done        Neuro Re-Ed             SEMG BFB for pelvic floor lengthening nv            TA ADIM  10x supine and seated x10 supine  x10 supine x10 supine x10 supine       TA ADIM leg extensions    x10 x10 x10       PFMC slow holds             Diaphragmatic Breathing nv Done  3 min 2 min         DKC with Diaphragmatic Breathing nv 30''x5 5x30"  5x30"  3x30" 3x30''        Child's Pose with DiaphragmaticBreathing nv 30''x5 3x1' 3x1' 3' PF lengthening 3' PF lengthing       Body Scan for PFM release/relaxatoin             Pelvic Floor Drops in deep squat             Pball circles CW/CCW  30x ea cw/ccw x30 ea x30 ea x30 ea x30 ea       Pball pelvic tilts  20x 3x10 3x10 3x10 3x10        Prone press ups   10x10" 5x10" np 5x10''                                 Ther Ex             PPT             LTR                                                    Ther Activity             EDUCATION/COUNSELING             Bladder training    nv                      Modalities

## 2022-08-17 ENCOUNTER — OFFICE VISIT (OUTPATIENT)
Dept: PHYSICAL THERAPY | Facility: REHABILITATION | Age: 52
End: 2022-08-17
Payer: COMMERCIAL

## 2022-08-17 DIAGNOSIS — R30.0 DYSURIA: Primary | ICD-10-CM

## 2022-08-17 DIAGNOSIS — M53.3 SACRAL PAIN: ICD-10-CM

## 2022-08-17 PROCEDURE — 97112 NEUROMUSCULAR REEDUCATION: CPT

## 2022-08-17 PROCEDURE — 97530 THERAPEUTIC ACTIVITIES: CPT

## 2022-08-17 NOTE — PROGRESS NOTES
Daily Note     Today's date: 2022  Patient name: Meme Bae  : 1970  MRN: [id number]  Referring provider: Sharlene Stafford DO  Dx:   Encounter Diagnosis     ICD-10-CM    1  Dysuria  R30 0    2  Sacral pain  M53 3        Start Time: 1400  Stop Time: 8255  Total time in clinic (min): 45 minutes    Subjective: Patient reports she had no issues all week in regards to discomfort upon urination  She reports one time on Saturday she "felt as if it was coming" however she completed HEP, with no pain noted  She also reports slight discomfort today, stating "it was very slight "     Objective: See treatment diary below      Assessment: Tolerated treatment well  Patient demonstrated fatigue post treatment, exhibited good technique with therapeutic exercises and would benefit from continued PT      Plan: Patient given updated HEP this session with patient reporting understanding  Patient to transition to HEP and will continue to follow up with patient as appropriate                Precautions: Hysterectomy , , Gastritis    [facility] HEP:    Manuals 6/9 7/6 7/13 7/20 8/3 8/10 8/17      Scar assessment nv nv Done          Abdominal assessment nv nv Done          Abdominal soft cupping   Done 15' Done Done Done        Pelvic Floor Muscle Releases nv nv nv Done                                   Patient Education Done    Done Done  Done       Neuro Re-Ed             SEMG BFB for pelvic floor lengthening nv            TA ADIM  10x supine and seated x10 supine  x10 supine x10 supine x10 supine x10 supine      TA ADIM leg extensions    x10 x10 x10 x10      PFMC slow holds             Diaphragmatic Breathing nv Done  3 min 2 min         DKC with Diaphragmatic Breathing nv 30''x5 5x30"  5x30"  3x30" 3x30''  3x30''       Child's Pose with DiaphragmaticBreathing nv 30''x5 3x1' 3x1' 3' PF lengthening 3' PF lengthing 3' PF lengthening      Body Scan for PFM release/relaxatoin             Pelvic Floor Drops in deep squat             Pball circles CW/CCW  30x ea cw/ccw x30 ea x30 ea x30 ea x30 ea x30 ea      Pball pelvic tilts  20x 3x10 3x10 3x10 3x10  3x10      Prone press ups   10x10" 5x10" np 5x10''                                 Ther Ex             PPT             LTR                                                    Ther Activity             EDUCATION/COUNSELING             Bladder training    nv                      Modalities

## 2022-09-16 ENCOUNTER — OFFICE VISIT (OUTPATIENT)
Dept: OBGYN CLINIC | Facility: CLINIC | Age: 52
End: 2022-09-16
Payer: COMMERCIAL

## 2022-09-16 VITALS — WEIGHT: 179 LBS | DIASTOLIC BLOOD PRESSURE: 68 MMHG | SYSTOLIC BLOOD PRESSURE: 118 MMHG | BODY MASS INDEX: 27.22 KG/M2

## 2022-09-16 DIAGNOSIS — R39.89 BLADDER PAIN: Primary | ICD-10-CM

## 2022-09-16 PROCEDURE — 99213 OFFICE O/P EST LOW 20 MIN: CPT | Performed by: OBSTETRICS & GYNECOLOGY

## 2022-09-16 NOTE — PROGRESS NOTES
Assessment Yanelis Shows was seen today for pelvic pain  Diagnoses and all orders for this visit:    Bladder pain         Plan   Discussed avoiding bladder irritants - coffee, tea, soda/carbonated beverages, alcohol particularly after 6pm  Continue pelvic floor exercises  Continue weight loss program  Discussed meds used for urgency but I do not think this will improve her symptoms and worry that she could end up with retention  No other intervention at this time  F/up for annual        Subjective     Lafrances Dianne is a 45 y o  female here for a problem visit  Patient is complaining of bladder pain  When she is urinating, she feels like her bladder is squeezing too tightly  She has to stop urinating, relax her body, then resume urinating and she feels better  Denies sudden urgency, dysuria, incontinence, hematuria  Symptoms are the worst with the first urination of the morning and then dissipate throughout the day  She urinates typically once around 2am and then again around 4-6am and the later time is when she has the pain  No pain with sex, no issues with BMs  Completed pelvic floor PT and still has symptoms  Continues to use the techniques she learned in PT which does help  Recently started a new weight loss program  Wants to make sure that this is ok and her "new normal"  Patient Active Problem List   Diagnosis    GERD (gastroesophageal reflux disease)    Lump of right breast    Open wound of finger with tendon involvement    Change in bowel habits    Cough    Sprain of left thumb    Vertigo    TMJ syndrome    ETD (Eustachian tube dysfunction), right    Chronic reactive otitis externa of both ears    Laryngopharyngeal reflux (LPR)    Vitamin D deficiency    Neck and shoulder pain         Gynecologic History  Patient's last menstrual period was 10/05/2021 (exact date)        Obstetric History  OB History    Para Term  AB Living   4 3 2 1 1 3   SAB IAB Ectopic Multiple Live Births   1       3      # Outcome Date GA Lbr Gorge/2nd Weight Sex Delivery Anes PTL Lv   4 SAB      SAB      3       CS-Unspec   LUCHO   2 Term      Vag-Spont   LUCHO   1 Term      Vag-Spont   LUCHO      Obstetric Comments   Menarche 13   Post menopausal/hysterectomy 46       Past Medical/Surgical/Family/Social History  The following portions of the patient's history were reviewed and updated as appropriate: allergies, current medications, past family history, past medical history, past social history, past surgical history and problem list     Allergies  Amoxicillin    Medications    Current Outpatient Medications:     Biotin 1 MG CAPS, Take by mouth, Disp: , Rfl:     Cranberry 1000 MG CAPS, Take by mouth, Disp: , Rfl:     cyclobenzaprine (FLEXERIL) 5 mg tablet, Take 1-2 tabs QHS PRN spasm, Disp: 10 tablet, Rfl: 0    famotidine (PEPCID) 40 MG tablet, Take 1 tablet (40 mg total) by mouth daily at bedtime, Disp: 30 tablet, Rfl: 4    Multiple Vitamins-Minerals (CENTRUM SILVER 50+WOMEN PO), , Disp: , Rfl:     naproxen (NAPROSYN) 500 mg tablet, Take 1 tablet (500 mg total) by mouth 2 (two) times a day with meals, Disp: 28 tablet, Rfl: 0    Omega-3 Fatty Acids (FISH OIL PO), Take by mouth, Disp: , Rfl:     omeprazole (PriLOSEC) 40 MG capsule, TAKE 1 CAPSULE BY MOUTH EVERY DAY (Patient not taking: No sig reported), Disp: 90 capsule, Rfl: 1    triamcinolone (KENALOG) 0 1 % ointment, Apply topically 2 (two) times a day (Patient not taking: No sig reported), Disp: 30 g, Rfl: 0      Review of Systems  Constitutional: no fever, feels well  Gastrointestinal: no complaints of abdominal pain, constipation, nausea, vomiting  Genitourinary : see HPI       Objective     /68   Wt 81 2 kg (179 lb)   LMP 10/05/2021 (Exact Date)   Breastfeeding No   BMI 27 22 kg/m²     General: alert and oriented, in no acute distress  Vulva: normal, Bartholin's, Urethra, [place]'s normal  Vagina: normal mucosa, vaginal cuff intact without defect, nontender  No cystocele     Cervix:  surgically absent  Uterus:  surgically absent  Adnexa: normal adnexa

## 2022-09-21 NOTE — PROGRESS NOTES
PT Discharge    Today's date: 2022  Patient name: Stalin Castrejon  : 1970  MRN: [id number]  Referring provider: Angelica Jones DO  Dx:   Encounter Diagnosis     ICD-10-CM    1  Dysuria  R30 0    2  Sacral pain  M53 3        Start Time: 1400  Stop Time: 8480  Total time in clinic (min): 45 minutes    Assessment  Assessment details: Femi Gutierrez has made the following improvements since beginning PT: decreased pain, decreased urinary frequency/urgency  Femi Gutierrez and PT mutually agree to transition to HEP at this time secondary to gains made in PT  She has been given updated HEP with verbalized understanding and compliance  Marjose Gutierrez is encouraged to contact PT with any questions or concerns in the future             Subjective    Objective    Flowsheet Rows    Flowsheet Row Most Recent Value   PT/OT G-Codes    Current Score 0   Projected Score 0

## 2022-10-12 PROBLEM — R05.9 COUGH: Status: RESOLVED | Noted: 2019-10-07 | Resolved: 2022-10-12

## 2022-11-02 ENCOUNTER — TELEMEDICINE (OUTPATIENT)
Dept: FAMILY MEDICINE CLINIC | Facility: CLINIC | Age: 52
End: 2022-11-02

## 2022-11-02 ENCOUNTER — TELEPHONE (OUTPATIENT)
Dept: FAMILY MEDICINE CLINIC | Facility: CLINIC | Age: 52
End: 2022-11-02

## 2022-11-02 DIAGNOSIS — J01.40 ACUTE NON-RECURRENT PANSINUSITIS: Primary | ICD-10-CM

## 2022-11-02 RX ORDER — AZITHROMYCIN 250 MG/1
TABLET, FILM COATED ORAL
Qty: 6 TABLET | Refills: 0 | Status: SHIPPED | OUTPATIENT
Start: 2022-11-02 | End: 2022-11-07

## 2022-11-02 NOTE — PROGRESS NOTES
Virtual Regular Visit    Verification of patient location:  Patient is located in the following state in which I hold an active license [place]    Assessment/Plan:  Problem List Items Addressed This Visit    None     Visit Diagnoses     Acute non-recurrent pansinusitis    -  Primary    Relevant Medications    azithromycin (Zithromax) 250 mg tablet             Reason for visit is   Chief Complaint   Patient presents with   • Virtual Regular Visit        Encounter provider Savannah Dickinson DO  Provider located at [address] [place] [phone]      Recent Visits  No visits were found meeting these conditions  Showing recent visits within past 7 days and meeting all other requirements  Today's Visits  Date Type Provider Dept   11/02/22 Telemedicine Silvia Newton DO [facility]   Showing today's visits and meeting all other requirements  Future Appointments  No visits were found meeting these conditions  Showing future appointments within next 150 days and meeting all other requirements       The patient was identified by name and date of birth  Claudia Liu was informed that this is a telemedicine visit and that the visit is being conducted through the [address] [employer] platform  She agrees to proceed     My office door was closed  No one else was in the room  She acknowledged consent and understanding of privacy and security of the video platform  The patient has agreed to participate and understands they can discontinue the visit at any time  Patient is aware this is a billable service  Subjective  Claudia Liu is a 45 y o  female who presents virtually for eval of sick s/s      HPI   - "I think I have another sinus infection"   - (+) HA/pressure and maxillary pressure which started 2days ago  - (+) "head feels heavy"  - denies F/C/sore throat/chest tightness/SOB/chest congestion   - good PO intake   - Ibuprofen dulls pressure      Past Medical History:   Diagnosis Date   • Bacterial vaginitis    • Fibroids    • Gastritis    • GERD (gastroesophageal reflux disease)    • Insomnia    • Pneumonia        Past Surgical History:   Procedure Laterality Date   •  SECTION     • COLONOSCOPY     • HAND SURGERY Left     Left pinky tendon repair   • HYSTERECTOMY     • OK COLONOSCOPY FLX DX W/COLLJ SPEC WHEN PFRMD N/A 2018    Procedure: COLONOSCOPY;  Surgeon: Juan R Bailey MD;  Location: [facility]; Service: Gastroenterology   • OK CYSTOURETHROSCOPY N/A 2021    Procedure: CYSTOSCOPY;  Surgeon: John Chadwick DO;  Location[facility];  Service: Gynecology   • OK ESOPHAGOGASTRODUODENOSCOPY TRANSORAL DIAGNOSTIC N/A 3/31/2017    Procedure: ESOPHAGOGASTRODUODENOSCOPY (EGD); Surgeon: Juan R Bailey MD;  Location: [facility]; Service: Gastroenterology   • OK LAPAROSCOPY W TOT HYSTERECTUTERUS <=250 GRAM  W TUBE/OVARY N/A 2021    Procedure: HYSTERECTOMY LAPAROSCOPIC TOTAL ([address]) WITH BILATERAL SALPINGECTOMY, LYSIS OF ADHESIONS;  Surgeon: John Chadwick DO;  Location[facility];  Service: Gynecology       Current Outpatient Medications   Medication Sig Dispense Refill   • azithromycin (Zithromax) 250 mg tablet Take 2 tablets (500 mg total) by mouth daily for 1 day, THEN 1 tablet (250 mg total) daily for 4 days   6 tablet 0   • Biotin 1 MG CAPS Take by mouth     • Cranberry 1000 MG CAPS Take by mouth     • cyclobenzaprine (FLEXERIL) 5 mg tablet Take 1-2 tabs QHS PRN spasm 10 tablet 0   • famotidine (PEPCID) 40 MG tablet Take 1 tablet (40 mg total) by mouth daily at bedtime 30 tablet 4   • Multiple Vitamins-Minerals (CENTRUM SILVER 50+WOMEN PO)      • naproxen (NAPROSYN) 500 mg tablet Take 1 tablet (500 mg total) by mouth 2 (two) times a day with meals 28 tablet 0   • Omega-3 Fatty Acids (FISH OIL PO) Take by mouth     • omeprazole (PriLOSEC) 40 MG capsule TAKE 1 CAPSULE BY MOUTH EVERY DAY (Patient not taking: No sig reported) 90 capsule 1 • triamcinolone (KENALOG) 0 1 % ointment Apply topically 2 (two) times a day (Patient not taking: No sig reported) 30 g 0     No current facility-administered medications for this visit  Allergies   Allergen Reactions   • Amoxicillin Itching       Review of Systems  As per HPI     Video Exam  There were no vitals filed for this visit      Physical Exam   General: AAOx3, NAD  HEENT: NC/AT, EOMI, clear conjunctiva, per pt, frontal and maxillary sinus pressure   Cardio: deferred  Pulm: no acute respiratory distress, able to talk in complete sentences w/o getting short of breath   Abd: deferred   Psych: nml mood/affect/behavior       I spent 15 minutes directly with the patient during this visit

## 2022-11-02 NOTE — TELEPHONE ENCOUNTER
Pt called to let Dr Ekaterina Neumann know her COVID test was negative  Pt had a Telehealth today 11/2/22

## 2022-11-28 ENCOUNTER — TELEPHONE (OUTPATIENT)
Dept: FAMILY MEDICINE CLINIC | Facility: CLINIC | Age: 52
End: 2022-11-28

## 2022-11-28 NOTE — TELEPHONE ENCOUNTER
Patient called to request a referral to a chiropractor for her lower back pain    She is scheduled to see Dr Trung Fisher on [facility] on Friday at [address] [place] [address],[address]

## 2022-11-29 ENCOUNTER — CLINICAL SUPPORT (OUTPATIENT)
Dept: FAMILY MEDICINE CLINIC | Facility: CLINIC | Age: 52
End: 2022-11-29

## 2022-11-29 DIAGNOSIS — Z23 NEED FOR VACCINATION: Primary | ICD-10-CM

## 2022-11-30 DIAGNOSIS — M54.50 ACUTE LOW BACK PAIN, UNSPECIFIED BACK PAIN LATERALITY, UNSPECIFIED WHETHER SCIATICA PRESENT: Primary | ICD-10-CM

## 2022-12-07 ENCOUNTER — OFFICE VISIT (OUTPATIENT)
Dept: FAMILY MEDICINE CLINIC | Facility: CLINIC | Age: 52
End: 2022-12-07

## 2022-12-07 VITALS
BODY MASS INDEX: 27.13 KG/M2 | RESPIRATION RATE: 16 BRPM | HEIGHT: 68 IN | WEIGHT: 179 LBS | DIASTOLIC BLOOD PRESSURE: 70 MMHG | SYSTOLIC BLOOD PRESSURE: 120 MMHG | HEART RATE: 78 BPM

## 2022-12-07 DIAGNOSIS — G47.00 INSOMNIA, UNSPECIFIED TYPE: Primary | ICD-10-CM

## 2022-12-07 RX ORDER — TRAZODONE HYDROCHLORIDE 50 MG/1
50 TABLET ORAL
Qty: 30 TABLET | Refills: 1 | Status: SHIPPED | OUTPATIENT
Start: 2022-12-07

## 2022-12-07 NOTE — PROGRESS NOTES
Name: Sadi Yadav      : 1970      MRN: [id number]  Encounter Provider: VANCE Morgan  Encounter Date: 2022   Encounter [place]: [place] [address]     1  Insomnia, unspecified type  -     traZODone (DESYREL) 50 mg tablet; Take 1 tablet (50 mg total) by mouth daily at bedtime  -     Comprehensive metabolic panel; Future  -     CBC and differential; Future     Patient reports difficulty falling asleep and staying asleep for the past 2 weeks  Patient reports increased stress  Importance of relaxation discussed  Proper sleep hygiene reviewed  Discussion on insomnia and treatment  Trazodone prescribed  Medication information and side effects reviewed  Lab work ordered  Patient instructed to follow-up with her PCP, Dr Yumiko Goyal in 2 weeks or sooner prn  Subjective      Patient reports difficulty falling asleep for the past 2 weeks  Patient reports increased stress  Patient reports that she will sleep for 4-6 hours when she does fall asleep  Patient reports that sometimes she wakes up and can't fall back to sleep  Patient reports that she has tried melatonin OTC  Patient reports that her symptoms are getting worse  Review of Systems   Constitutional: Negative for chills and fever  HENT: Negative for congestion, ear pain, sinus pressure and sore throat  Respiratory: Negative for cough, chest tightness and shortness of breath  Cardiovascular: Negative for chest pain  Gastrointestinal: Negative for abdominal pain, diarrhea, nausea and vomiting  Skin: Negative for rash  Neurological: Negative for dizziness, light-headedness and headaches  Psychiatric/Behavioral: Negative for suicidal ideas  Denies any depression          Current Outpatient Medications on File Prior to Visit   Medication Sig   • Biotin 1 MG CAPS Take by mouth   • Cranberry 1000 MG CAPS Take by mouth   • famotidine (PEPCID) 40 MG tablet Take 1 tablet (40 mg total) by mouth daily at bedtime   • Multiple Vitamins-Minerals (CENTRUM SILVER 50+WOMEN PO)    • Omega-3 Fatty Acids (FISH OIL PO) Take by mouth   • [DISCONTINUED] cyclobenzaprine (FLEXERIL) 5 mg tablet Take 1-2 tabs QHS PRN spasm   • [DISCONTINUED] naproxen (NAPROSYN) 500 mg tablet Take 1 tablet (500 mg total) by mouth 2 (two) times a day with meals   • omeprazole (PriLOSEC) 40 MG capsule TAKE 1 CAPSULE BY MOUTH EVERY DAY (Patient not taking: Reported on 5/17/2022)   • [DISCONTINUED] triamcinolone (KENALOG) 0 1 % ointment Apply topically 2 (two) times a day (Patient not taking: Reported on 6/9/2022)       Objective     /70   Pulse 78   Resp 16   Ht 5' 8" (1 727 m)   Wt 81 2 kg (179 lb)   LMP 10/05/2021 (Exact Date)   BMI 27 22 kg/m²     Physical Exam  Constitutional:       General: She is not in acute distress  Appearance: She is not ill-appearing or diaphoretic  HENT:      Right Ear: External ear normal       Left Ear: External ear normal       Nose: Nose normal       Mouth/Throat:      Mouth: Mucous membranes are moist       Pharynx: Oropharynx is clear  No posterior oropharyngeal erythema  Eyes:      Conjunctiva/sclera: Conjunctivae normal       Pupils: Pupils are equal, round, and reactive to light  Cardiovascular:      Rate and Rhythm: Normal rate and regular rhythm  Pulses: Normal pulses  Heart sounds: Normal heart sounds  Pulmonary:      Effort: Pulmonary effort is normal  No respiratory distress  Breath sounds: Normal breath sounds  No wheezing  Musculoskeletal:      Comments: Gait wnl  Skin:     Findings: No rash  Neurological:      Mental Status: She is alert and oriented to person, place, and time     Psychiatric:         Mood and Affect: Mood normal        VANCE Burgos

## 2022-12-21 ENCOUNTER — OFFICE VISIT (OUTPATIENT)
Dept: FAMILY MEDICINE CLINIC | Facility: CLINIC | Age: 52
End: 2022-12-21

## 2022-12-21 VITALS
DIASTOLIC BLOOD PRESSURE: 80 MMHG | BODY MASS INDEX: 28.04 KG/M2 | OXYGEN SATURATION: 99 % | HEART RATE: 68 BPM | WEIGHT: 185 LBS | SYSTOLIC BLOOD PRESSURE: 125 MMHG | HEIGHT: 68 IN

## 2022-12-21 DIAGNOSIS — Z11.3 SCREENING EXAMINATION FOR STD (SEXUALLY TRANSMITTED DISEASE): ICD-10-CM

## 2022-12-21 DIAGNOSIS — G47.00 INSOMNIA, UNSPECIFIED TYPE: ICD-10-CM

## 2022-12-21 DIAGNOSIS — Z12.31 ENCOUNTER FOR SCREENING MAMMOGRAM FOR MALIGNANT NEOPLASM OF BREAST: ICD-10-CM

## 2022-12-21 DIAGNOSIS — Z00.00 ANNUAL PHYSICAL EXAM: Primary | ICD-10-CM

## 2022-12-21 DIAGNOSIS — E78.00 ELEVATED LDL CHOLESTEROL LEVEL: ICD-10-CM

## 2022-12-21 DIAGNOSIS — Z13.29 THYROID DISORDER SCREENING: ICD-10-CM

## 2022-12-21 DIAGNOSIS — Z12.11 COLON CANCER SCREENING: ICD-10-CM

## 2022-12-21 NOTE — PATIENT INSTRUCTIONS

## 2022-12-21 NOTE — PROGRESS NOTES
Assessment/Plan:   Diagnoses and all orders for this visit:    Annual physical exam  - reviewed PMHx, PSHx, meds, allergies, FHx, Soc Hx and Sexual Hx   - UTD with Tdap in 2015  - UTD with COVID IMMs and Boosters x2  - UTD with annual Flu vaccine   - discussed diet and exercise   - follows with [employer] - s/p hysterectomy in 11/2021 - ovaries intact   - interested in STD screening in the office today - script given   - script given for routine screening labs  - UTD with Breast Ca screening (1/22/2022) - script given for Mammo in 2023  - EGD/Cscope 5/14/2018 - repeat in 5yrs (2023)  - UTD with Optho and Dental   - RTO in 1yr for annual exam or sooner if with abnml labs - pt aware and agreeable     Screening examination for STD (sexually transmitted disease)  -     RPR; Future  -     Hepatitis panel, acute; Future  -     Chlamydia/GC amplified DNA by PCR; Future  -     : HIV 1/2 AB/AG w Reflex [facility] for 2 yr old and above; Future  -     RPR  -     Hepatitis panel, acute  -     Chlamydia/GC amplified DNA by PCR    Insomnia, unspecified type  - was started on Trazodone 50mg QHS 2wks ago   - has been taking it PRN and sleeping better   - has CBC and CMP pending   - cont current regimen for now     Elevated LDL cholesterol level  -     Lipid panel;  Future  -     Lipid panel  -    - The 10-year ASCVD risk score (Laurita CAMARILLO, et al , 2019) is: 2%    Values used to calculate the score:      Age: 45 years      Sex: Female      Is Non- : Yes      Diabetic: No      Tobacco smoker: No      Systolic Blood Pressure: 393 mmHg      Is BP treated: No      HDL Cholesterol: 82 mg/dL      Total Cholesterol: 225 mg/dL    Colon cancer screening  - EGD/Cscope 5/14/2018 - repeat in 5yrs (2023)    Thyroid disorder screening  -     TSH, 3rd generation with Free T4 reflex    Encounter for screening mammogram for malignant neoplasm of breast  -     Mammo screening bilateral w 3d & cad; Future Subjective:    Patient ID: Ruslan Becerra is a 45 y o  female  Tristian Gallegos is a 45 y o  female who presents to the office for an annual exam  - Specialists: GI  - PMHx: GERD, LPR, TMJ, internal hemorrhoids, Vit D deficiency, fibroids,  x2   - allergies: Amox - itching   - Meds: see med rec   - PSHx: , hand surgery, hysterectomy (2021 - ovaries intact)    - FHx: M (Breast Ca - dx at 65yo, Dementia, borderline HL, Parkinsonism), F (Prostatitis, Cataracts), B (Prostate Ca), denies FHx of HTN, DM, Colon/Cervical/Ovarian/Uterine Ca  - Immunizations: Tdap in , UTD with COVID IMMs and Boosters x2 and UTD annual Flu vaccine   - GYN Hx: [address]: EGD/Cscope 2018 - repeat in 5yrs (); Mammo 2022 (annual f/u recommended)   - diet/exercise: has been exercising, diet: "not bad"   - social: social EtOH, denies tob/illicits   - sexual Hx: active with spouse, declined STD screening   - last vision: does wear glasses, [employer], last Optho was within the year  - last dental: goes Q6months   - ROS: today in the office pt denies F/C/N/V/HA/visual changes/CP/palpitations/SOB/wheezing/abd pain/D/LE edema  - was started on Trazodone 50mg QHS 2wks ago - has been sleeping better       The following portions of the patient's history were reviewed and updated as appropriate: allergies, current medications, past family history, past medical history, past social history, past surgical history and problem list     Review of Systems  as per HPI    Objective:  /80   Pulse 68   Ht 5' 8" (1 727 m)   Wt 83 9 kg (185 lb)   LMP 10/05/2021 (Exact Date)   SpO2 99%   BMI 28 13 kg/m²    Physical Exam  Vitals reviewed  Constitutional:       General: She is not in acute distress  Appearance: Normal appearance  She is not ill-appearing, toxic-appearing or diaphoretic  HENT:      Head: Normocephalic and atraumatic        Right Ear: External ear normal  Left Ear: External ear normal    Eyes:      General: No scleral icterus  Right eye: No discharge  Left eye: No discharge  Extraocular Movements: Extraocular movements intact  Conjunctiva/sclera: Conjunctivae normal    Cardiovascular:      Rate and Rhythm: Normal rate and regular rhythm  Heart sounds: Normal heart sounds  Pulmonary:      Effort: Pulmonary effort is normal  No respiratory distress  Breath sounds: Normal breath sounds  No stridor  No wheezing, rhonchi or rales  Abdominal:      Palpations: Abdomen is soft  Musculoskeletal:         General: Normal range of motion  Cervical back: Normal range of motion  Right lower leg: No edema  Left lower leg: No edema  Skin:     General: Skin is warm  Neurological:      General: No focal deficit present  Mental Status: She is alert and oriented to person, place, and time  Psychiatric:         Mood and Affect: Mood normal          Behavior: Behavior normal          BMI Counseling: Body mass index is 28 13 kg/m²  The BMI is above normal  Nutrition recommendations include 3-5 servings of fruits/vegetables daily  Exercise recommendations include exercising 3-5 times per week  Depression Screening Follow-up Plan: Patient's depression screening was positive with a PHQ-2 score of 0  Their PHQ-9 score was   Clinically patient does not have depression  No treatment is required

## 2022-12-21 NOTE — PROGRESS NOTES
Assessment/Plan:   Diagnoses and all orders for this visit:    Insomnia, unspecified type  - was started on Trazodone 50mg QHS 2wks ago   - has been taking it PRN and sleeping better   - has CBC and CMP pending   - cont current regimen for now     Elevated LDL cholesterol level  -     Lipid panel; Future  -     Lipid panel  -    - The 10-year ASCVD risk score (Laurita CAMARILLO, et al , 2019) is: 2%    Values used to calculate the score:      Age: 45 years      Sex: Female      Is Non- : Yes      Diabetic: No      Tobacco smoker: No      Systolic Blood Pressure: 541 mmHg      Is BP treated: No      HDL Cholesterol: 82 mg/dL      Total Cholesterol: 225 mg/dL        Subjective:    Patient ID: Debra Evangelista is a 45 y o  female  Anika Licea is a 45 y o  female who presents to the office for an annual exam  - Specialists: GI  - PMHx: GERD, LPR, TMJ, internal hemorrhoids, Vit D deficiency, fibroids,  x2   - allergies: Amox - itching   - Meds: see med rec   - PSHx: , hand surgery, hysterectomy (2021 - ovaries intact)    - FHx: M (Breast Ca - dx at 65yo, Dementia, borderline HL, Parkinsonism), F (Prostatitis, Cataracts), B (Prostate Ca), denies FHx of HTN, DM, Colon/Cervical/Ovarian/Uterine Ca  - Immunizations: Tdap in , UTD with COVID IMMs and Boosters x2 and UTD annual Flu vaccine   - GYN Hx: [address]: EGD/Cscope 2018 - repeat in 5yrs ();  Mammo 2022 (annual f/u recommended)   - diet/exercise: has been exercising, diet: "not bad"   - social: social EtOH, denies tob/illicits   - sexual Hx: active with spouse, declined STD screening   - last vision: does wear glasses, [employer], last Optho was within the year  - last dental: goes Q6months   - ROS: today in the office pt denies F/C/N/V/HA/visual changes/CP/palpitations/SOB/wheezing/abd pain/D/LE edema  - was started on Trazodone 50mg QHS 2wks ago - has been sleeping better       The following portions of the patient's history were reviewed and updated as appropriate: allergies, current medications, past family history, past medical history, past social history, past surgical history and problem list     Review of Systems  as per HPI    Objective:  /80   Pulse 68   Ht 5' 8" (1 727 m)   Wt 83 9 kg (185 lb)   LMP 10/05/2021 (Exact Date)   SpO2 99%   BMI 28 13 kg/m²    Physical Exam  Vitals reviewed  Constitutional:       General: She is not in acute distress  Appearance: Normal appearance  She is not ill-appearing, toxic-appearing or diaphoretic  HENT:      Head: Normocephalic and atraumatic  Right Ear: External ear normal       Left Ear: External ear normal    Eyes:      General: No scleral icterus  Right eye: No discharge  Left eye: No discharge  Extraocular Movements: Extraocular movements intact  Conjunctiva/sclera: Conjunctivae normal    Cardiovascular:      Rate and Rhythm: Normal rate and regular rhythm  Heart sounds: Normal heart sounds  Pulmonary:      Effort: Pulmonary effort is normal  No respiratory distress  Breath sounds: Normal breath sounds  No stridor  No wheezing, rhonchi or rales  Abdominal:      Palpations: Abdomen is soft  Musculoskeletal:         General: Normal range of motion  Cervical back: Normal range of motion  Right lower leg: No edema  Left lower leg: No edema  Skin:     General: Skin is warm  Neurological:      General: No focal deficit present  Mental Status: She is alert and oriented to person, place, and time  Psychiatric:         Mood and Affect: Mood normal          Behavior: Behavior normal          BMI Counseling: Body mass index is 28 13 kg/m²  The BMI is above normal  Nutrition recommendations include 3-5 servings of fruits/vegetables daily  Exercise recommendations include exercising 3-5 times per week       Depression Screening Follow-up Plan: Patient's depression screening was positive with a PHQ-2 score of 0  Their PHQ-9 score was   Clinically patient does not have depression  No treatment is required

## 2022-12-23 ENCOUNTER — APPOINTMENT (OUTPATIENT)
Dept: LAB | Facility: CLINIC | Age: 52
End: 2022-12-23

## 2022-12-23 DIAGNOSIS — Z11.3 SCREENING EXAMINATION FOR STD (SEXUALLY TRANSMITTED DISEASE): ICD-10-CM

## 2022-12-23 LAB
ALBUMIN SERPL BCP-MCNC: 4 G/DL (ref 3.5–5)
ALP SERPL-CCNC: 60 U/L (ref 46–116)
ALT SERPL W P-5'-P-CCNC: 18 U/L (ref 12–78)
ANION GAP SERPL CALCULATED.3IONS-SCNC: 8 MMOL/L (ref 4–13)
AST SERPL W P-5'-P-CCNC: 11 U/L (ref 5–45)
BASOPHILS # BLD AUTO: 0.02 THOUSANDS/ÂΜL (ref 0–0.1)
BASOPHILS NFR BLD AUTO: 1 % (ref 0–1)
BILIRUB SERPL-MCNC: 0.68 MG/DL (ref 0.2–1)
BUN SERPL-MCNC: 17 MG/DL (ref 5–25)
CALCIUM SERPL-MCNC: 9.6 MG/DL (ref 8.3–10.1)
CHLORIDE SERPL-SCNC: 104 MMOL/L (ref 96–108)
CHOLEST SERPL-MCNC: 256 MG/DL
CO2 SERPL-SCNC: 26 MMOL/L (ref 21–32)
CREAT SERPL-MCNC: 0.91 MG/DL (ref 0.6–1.3)
EOSINOPHIL # BLD AUTO: 0.13 THOUSAND/ÂΜL (ref 0–0.61)
EOSINOPHIL NFR BLD AUTO: 3 % (ref 0–6)
ERYTHROCYTE [DISTWIDTH] IN BLOOD BY AUTOMATED COUNT: 12.3 % (ref 11.6–15.1)
GFR SERPL CREATININE-BSD FRML MDRD: 71 ML/MIN/1.73SQ M
GLUCOSE P FAST SERPL-MCNC: 79 MG/DL (ref 65–99)
HAV IGM SER QL: NORMAL
HBV CORE IGM SER QL: NORMAL
HBV SURFACE AG SER QL: NORMAL
HCT VFR BLD AUTO: 42.7 % (ref 34.8–46.1)
HCV AB SER QL: NORMAL
HDLC SERPL-MCNC: 87 MG/DL
HGB BLD-MCNC: 13.2 G/DL (ref 11.5–15.4)
IMM GRANULOCYTES # BLD AUTO: 0 THOUSAND/UL (ref 0–0.2)
IMM GRANULOCYTES NFR BLD AUTO: 0 % (ref 0–2)
LDLC SERPL CALC-MCNC: 157 MG/DL (ref 0–100)
LYMPHOCYTES # BLD AUTO: 1.61 THOUSANDS/ÂΜL (ref 0.6–4.47)
LYMPHOCYTES NFR BLD AUTO: 40 % (ref 14–44)
MCH RBC QN AUTO: 27.7 PG (ref 26.8–34.3)
MCHC RBC AUTO-ENTMCNC: 30.9 G/DL (ref 31.4–37.4)
MCV RBC AUTO: 90 FL (ref 82–98)
MONOCYTES # BLD AUTO: 0.35 THOUSAND/ÂΜL (ref 0.17–1.22)
MONOCYTES NFR BLD AUTO: 9 % (ref 4–12)
NEUTROPHILS # BLD AUTO: 1.92 THOUSANDS/ÂΜL (ref 1.85–7.62)
NEUTS SEG NFR BLD AUTO: 47 % (ref 43–75)
NONHDLC SERPL-MCNC: 169 MG/DL
NRBC BLD AUTO-RTO: 0 /100 WBCS
PLATELET # BLD AUTO: 253 THOUSANDS/UL (ref 149–390)
PMV BLD AUTO: 13 FL (ref 8.9–12.7)
POTASSIUM SERPL-SCNC: 3.9 MMOL/L (ref 3.5–5.3)
PROT SERPL-MCNC: 8.1 G/DL (ref 6.4–8.4)
RBC # BLD AUTO: 4.77 MILLION/UL (ref 3.81–5.12)
RPR SER QL: NORMAL
SODIUM SERPL-SCNC: 138 MMOL/L (ref 135–147)
TRIGL SERPL-MCNC: 60 MG/DL
TSH SERPL DL<=0.05 MIU/L-ACNC: 0.9 UIU/ML (ref 0.45–4.5)
WBC # BLD AUTO: 4.03 THOUSAND/UL (ref 4.31–10.16)

## 2022-12-24 LAB
C TRACH DNA SPEC QL NAA+PROBE: NEGATIVE
HIV 1+2 AB+HIV1 P24 AG SERPL QL IA: NORMAL
HIV 2 AB SERPL QL IA: NORMAL
HIV1 AB SERPL QL IA: NORMAL
HIV1 P24 AG SERPL QL IA: NORMAL
N GONORRHOEA DNA SPEC QL NAA+PROBE: NEGATIVE

## 2022-12-27 ENCOUNTER — TELEPHONE (OUTPATIENT)
Dept: ADMINISTRATIVE | Facility: HOSPITAL | Age: 52
End: 2022-12-27

## 2022-12-27 NOTE — TELEPHONE ENCOUNTER
Hi Dr Daly Goldstein! I reached out to pt to get her mammogram scheduled and as I was talking to pt she explained to me that she feels a lump on her left breast      Can you please place an order for a diagnostic mammogram? Once this is placed, I will reach out to pt and inform her on what to do next  Thank you!

## 2022-12-29 DIAGNOSIS — N63.20 MASS OF LEFT BREAST, UNSPECIFIED QUADRANT: Primary | ICD-10-CM

## 2022-12-29 DIAGNOSIS — E78.00 ELEVATED LDL CHOLESTEROL LEVEL: Primary | ICD-10-CM

## 2022-12-30 ENCOUNTER — TELEPHONE (OUTPATIENT)
Dept: FAMILY MEDICINE CLINIC | Facility: CLINIC | Age: 52
End: 2022-12-30

## 2022-12-30 NOTE — TELEPHONE ENCOUNTER
----- Message from Jaime Wright DO sent at 12/29/2022 12:30 PM EST -----  Nml STD panel, TSH, CMP, CBC  Lipid borderline high - diet/exercise - repeat in 6months - if still elevated, t/c adding statin

## 2023-01-23 ENCOUNTER — TELEPHONE (OUTPATIENT)
Dept: OTHER | Facility: OTHER | Age: 53
End: 2023-01-23

## 2023-01-23 NOTE — TELEPHONE ENCOUNTER
Patient call complaining about cold sore for the past few days on her mouth  Please contact the patient

## 2023-01-24 ENCOUNTER — OFFICE VISIT (OUTPATIENT)
Dept: FAMILY MEDICINE CLINIC | Facility: CLINIC | Age: 53
End: 2023-01-24

## 2023-01-24 VITALS
RESPIRATION RATE: 16 BRPM | WEIGHT: 175 LBS | BODY MASS INDEX: 26.61 KG/M2 | DIASTOLIC BLOOD PRESSURE: 78 MMHG | HEART RATE: 77 BPM | OXYGEN SATURATION: 96 % | SYSTOLIC BLOOD PRESSURE: 116 MMHG

## 2023-01-24 DIAGNOSIS — B00.1 HERPES LABIALIS: Primary | ICD-10-CM

## 2023-01-24 DIAGNOSIS — B00.1 RECURRENT COLD SORES: ICD-10-CM

## 2023-01-24 DIAGNOSIS — K13.0 LIP PAIN: ICD-10-CM

## 2023-01-24 RX ORDER — VALACYCLOVIR HYDROCHLORIDE 1 G/1
2000 TABLET, FILM COATED ORAL 2 TIMES DAILY
Qty: 4 TABLET | Refills: 0 | Status: SHIPPED | OUTPATIENT
Start: 2023-01-24 | End: 2023-01-25

## 2023-01-24 NOTE — PROGRESS NOTES
Outpatient Note-acute    HPI:     Susana Shannon , 45 y o  female  presents today for cold sore  The patient recently had an illness, with fever and URI symptoms she will have an exacerbation with cold sore  Recently over the last 1 to 2 days she has had 1 breakout on her bottom lip  He has attempted Abreva without much success  she is on zoom all day, and is concerned about the irritation and the way it looks  She denies any current fever, chills, nausea, vomiting, rhinorrhea, nasal congestion  Past Medical History:   Diagnosis Date   • Bacterial vaginitis    • Fibroids    • Gastritis    • GERD (gastroesophageal reflux disease)    • Insomnia    • Pneumonia 2009     ROS:   Review of Systems   See HPI    OBJECTIVE  Vitals:    01/24/23 1544   BP: 116/78   Pulse: 77   Resp: 16   SpO2: 96%        Physical Exam  Constitutional:       General: She is not in acute distress  Appearance: Normal appearance  She is not ill-appearing, toxic-appearing or diaphoretic  HENT:      Head: Normocephalic and atraumatic  Nose: Nose normal  No congestion or rhinorrhea  Skin:     Capillary Refill: Capillary refill takes less than 2 seconds  Findings: Lesion present  No erythema or rash  Comments: 1 lesion with small ulcerated center  Color changed by make up present on lip  Mild erythema present  No drainage or purulent discharge  No evidence of a vesicle present  Neurological:      Mental Status: She is alert  ASSESSMENT AND PLAN   David Jackson was seen today for mouth lesions  Diagnoses and all orders for this visit:    Herpes labialis  Recurrent cold sores  Lip pain  Patient is interested in obtaining other medication for herpes labialis or cold sore  She typically obtains these after her illness, and she recently had a URI  Her URI symptoms have resolved and I have prescribed Valtrex 2 g every 12 hours for 2 doses  Dose was obtained through [employer]    Patient is to monitor the area, if for any reason her insurance does not cover the Valtrex, we will consider acyclovir 800 mg twice daily for 5 days   -     valACYclovir (VALTREX) 1,000 mg tablet;  Take 2 tablets (2,000 mg total) by mouth 2 (two) times a day for 2 doses        Lilia Arreola DO  [facility]  1/24/2023 4:13 PM

## 2023-01-24 NOTE — PATIENT INSTRUCTIONS
Please take two tablets initially and then 12 hours later another two tablets  This will complete the course of medication  Avoid irritation by not touching the area or touching the area and then other mucus membranes  Please see information below in case you feel there are any side effects after first dose  Please call with any other questions or concerns  Valacyclovir (By mouth)   Valacyclovir ([id number])  Treats herpes virus infections, including shingles, cold sores, and genital herpes  This medicine will not cure herpes, but may prevent a breakout of herpes sores or blisters  Also treats chicken pox  Brand Name(s): Valtrex, valACYclovir HCl AvPak   There may be other brand names for this medicine  When This Medicine Should Not Be Used: This medicine is not right for everyone  Do not use it if you had an allergic reaction to valacyclovir or acyclovir  How to Use This Medicine:   Tablet  Your doctor will tell you how much medicine to use  Do not use more than directed  This medicine works best when you take it at the first sign of a herpes breakout  Drink extra fluids so you will urinate more often and help prevent kidney problems  Missed dose: Take a dose as soon as you remember  If it is almost time for your next dose, wait until then and take a regular dose  Do not take extra medicine to make up for a missed dose  Store the medicine in a closed container at room temperature, away from heat, moisture, and direct light  Store the oral liquid in the refrigerator  Discard any unused medicine after 28 days  Drugs and Foods to Avoid:      Ask your doctor or pharmacist before using any other medicine, including over-the-counter medicines, vitamins, and herbal products  Warnings While Using This Medicine:   Tell your doctor if you are pregnant or breastfeeding, or if you have kidney disease, HIV or AIDS, or had a bone marrow or kidney transplant    This medicine may cause the following problems:   Kidney problems  Nervous system problems  Thrombotic thrombocytopenic purpura/hemolytic uremic syndrome (in patients who have HIV or had a bone marrow or kidney transplant)  Do not have sex while you have herpes sores  Valacyclovir will not stop the spread of herpes during sex  Even if you have no signs of a herpes infection, it is still possible to spread the virus to others  Always use condoms made from latex or polyurethane when you have sexual contact  Call your doctor if your symptoms do not improve or if they get worse  Tell any doctor or dentist who treats you that you are using this medicine  This medicine may affect certain medical test results  Do not stop using this medicine suddenly, or change how you take it, without talking to your doctor  Keep all medicine out of the reach of children  Never share your medicine with anyone  Possible Side Effects While Using This Medicine:   Call your doctor right away if you notice any of these side effects: Allergic reaction: Itching or hives, swelling in your face or hands, swelling or tingling in your mouth or throat, chest tightness, trouble breathing  Confusion, agitation, depression, or other behavior changes  Decrease in how much or how often you urinate  Fast heartbeat, fainting, or extreme weakness  Pinpoint red spots on your skin, unusual bleeding or bruising, blood in your urine or stools  Problems with walking, speaking, or coordination, seeing or hearing things that are not there  Seizures or tremors  Yellow skin or eyes  If you notice these less serious side effects, talk with your doctor:   Headache or dizziness  Nausea, vomiting, diarrhea, stomach pain  If you notice other side effects that you think are caused by this medicine, tell your doctor  Call your doctor for medical advice about side effects   You may report side effects to FDA at 4-859-FDA-9959    © Copyright [employer] 2022 Information is for End User's use only and may not be sold, redistributed or otherwise used for commercial purposes  The above information is an  only  It is not intended as medical advice for individual conditions or treatments  Talk to your doctor, nurse or pharmacist before following any medical regimen to see if it is safe and effective for you

## 2023-02-01 ENCOUNTER — HOSPITAL ENCOUNTER (OUTPATIENT)
Dept: RADIOLOGY | Facility: HOSPITAL | Age: 53
Discharge: HOME | End: 2023-02-01

## 2023-02-01 ENCOUNTER — TELEPHONE (OUTPATIENT)
Dept: FAMILY MEDICINE CLINIC | Facility: CLINIC | Age: 53
End: 2023-02-01

## 2023-02-01 VITALS — WEIGHT: 173 LBS | HEIGHT: 68 IN | BODY MASS INDEX: 26.22 KG/M2

## 2023-02-01 DIAGNOSIS — N60.02 BREAST CYST, LEFT: ICD-10-CM

## 2023-02-01 DIAGNOSIS — N63.20 MASS OF LEFT BREAST, UNSPECIFIED QUADRANT: ICD-10-CM

## 2023-02-01 NOTE — TELEPHONE ENCOUNTER
DO LEE [facility]  Several benign cysts left breast - return to routine screening in 1yr for both breasts     Called pt message verbalized

## 2023-03-22 ENCOUNTER — OFFICE VISIT (OUTPATIENT)
Dept: FAMILY MEDICINE CLINIC | Facility: CLINIC | Age: 53
End: 2023-03-22

## 2023-03-22 VITALS
WEIGHT: 180 LBS | RESPIRATION RATE: 16 BRPM | HEIGHT: 68 IN | SYSTOLIC BLOOD PRESSURE: 102 MMHG | DIASTOLIC BLOOD PRESSURE: 64 MMHG | HEART RATE: 68 BPM | BODY MASS INDEX: 27.28 KG/M2

## 2023-03-22 DIAGNOSIS — R42 DIZZINESS: Primary | ICD-10-CM

## 2023-03-22 RX ORDER — MECLIZINE HCL 12.5 MG/1
12.5 TABLET ORAL EVERY 8 HOURS PRN
Qty: 30 TABLET | Refills: 0 | Status: SHIPPED | OUTPATIENT
Start: 2023-03-22

## 2023-03-22 RX ORDER — ERGOCALCIFEROL 1.25 MG/1
CAPSULE ORAL
COMMUNITY

## 2023-03-22 NOTE — PROGRESS NOTES
Name: Andria Rojas      : 1970      MRN: [id number]  Encounter Provider: VANCE Dow  Encounter Date: 3/22/2023   Encounter department: [facility] [address]     1  Dizziness  -     meclizine (ANTIVERT) 12 5 MG tablet; Take 1 tablet (12 5 mg total) by mouth every 8 (eight) hours as needed for dizziness        Patient reports that she felt dizzy while doing her hair on Saturday  Patient reports 2 more episodes since then  Patient reports that she notices the dizziness with changes in position  Patient reports that the episodes go away after a couple of seconds of standing or sitting still  Denies any vision changes, Has, numbness, tingling, nausea, or vomiting  Denies any chest pain, SOB, or palpitations  Neuro exam wnl  Discussed with the patient that her symptoms are most likely caused by positional vertigo  Discussion on positional vertigo and treatment  Proper hydration reviewed  Patient instructed to change positions slowly  Meclizine prescribed prn  Medication information and side effects reviewed  Patient instructed to follow-up if symptoms get worse or do not get better  Will refer to physical therapy if symptoms continue  Subjective      Patient reports that she felt dizzy while doing her hair on Saturday  Patient reports that she had 2 more episodes since then  Patient reports that she notices the dizziness with position changes  Patient reports that the dizziness goes away after a couple seconds of standing or sitting still  Denies any vision changes, Has,  numbness, tingling, nausea, vomiting  Denies any chest pain, SOB, or palpitations  Patient is concerned about the dizziness  Review of Systems   Constitutional: Negative for chills and fever  HENT: Negative for congestion, ear pain, sinus pressure and sore throat  Eyes: Negative for pain, discharge and redness     Respiratory: Negative for cough, chest tightness, shortness of breath and wheezing  Cardiovascular: Negative for chest pain, palpitations and leg swelling  Gastrointestinal: Negative for abdominal pain, diarrhea, nausea and vomiting  Skin: Negative for rash  Neurological: Positive for dizziness  Negative for seizures, syncope and headaches  Current Outpatient Medications on File Prior to Visit   Medication Sig   • Biotin 1 MG CAPS Take by mouth   • Cranberry 1000 MG CAPS Take by mouth   • Multiple Vitamins-Minerals (CENTRUM SILVER 50+WOMEN PO)    • Omega-3 Fatty Acids (FISH OIL PO) Take by mouth   • traZODone (DESYREL) 50 mg tablet Take 1 tablet (50 mg total) by mouth daily at bedtime   • Vitamin D, Ergocalciferol, 86466 units CAPS Take by mouth   • [DISCONTINUED] famotidine (PEPCID) 40 MG tablet Take 1 tablet (40 mg total) by mouth daily at bedtime   • valACYclovir (VALTREX) 1,000 mg tablet Take 2 tablets (2,000 mg total) by mouth 2 (two) times a day for 2 doses   • [DISCONTINUED] omeprazole (PriLOSEC) 40 MG capsule TAKE 1 CAPSULE BY MOUTH EVERY DAY (Patient not taking: Reported on 1/24/2023)       Objective     /64   Pulse 68   Resp 16   Ht 5' 8" (1 727 m)   Wt 81 6 kg (180 lb)   LMP 10/05/2021 (Exact Date)   BMI 27 37 kg/m²     Physical Exam  Vitals reviewed  Constitutional:       General: She is not in acute distress  Appearance: She is not ill-appearing or diaphoretic  HENT:      Right Ear: External ear normal       Left Ear: External ear normal       Nose: Nose normal       Mouth/Throat:      Mouth: Mucous membranes are moist       Pharynx: Oropharynx is clear  No oropharyngeal exudate or posterior oropharyngeal erythema  Eyes:      Conjunctiva/sclera: Conjunctivae normal       Pupils: Pupils are equal, round, and reactive to light  Cardiovascular:      Rate and Rhythm: Normal rate and regular rhythm  Pulses: Normal pulses  Heart sounds: Normal heart sounds     Pulmonary:      Effort: Pulmonary effort is normal  No respiratory distress  Breath sounds: Normal breath sounds  No wheezing  Musculoskeletal:      Comments: Gait wnl    Skin:     Findings: No rash  Neurological:      Mental Status: She is alert and oriented to person, place, and time  Cranial Nerves: No cranial nerve deficit        Coordination: Coordination normal       Gait: Gait normal    Psychiatric:         Mood and Affect: Mood normal        VANCE Khan

## 2023-04-28 ENCOUNTER — OFFICE VISIT (OUTPATIENT)
Dept: FAMILY MEDICINE CLINIC | Facility: CLINIC | Age: 53
End: 2023-04-28

## 2023-04-28 VITALS
HEART RATE: 68 BPM | SYSTOLIC BLOOD PRESSURE: 102 MMHG | RESPIRATION RATE: 16 BRPM | BODY MASS INDEX: 27.28 KG/M2 | HEIGHT: 68 IN | DIASTOLIC BLOOD PRESSURE: 68 MMHG | WEIGHT: 180 LBS

## 2023-04-28 DIAGNOSIS — R21 RASH: Primary | ICD-10-CM

## 2023-04-28 DIAGNOSIS — Z28.21 INFLUENZA VACCINATION DECLINED BY PATIENT: ICD-10-CM

## 2023-04-28 RX ORDER — TRIAMCINOLONE ACETONIDE 5 MG/G
OINTMENT TOPICAL 2 TIMES DAILY
Qty: 30 G | Refills: 0 | Status: SHIPPED | OUTPATIENT
Start: 2023-04-28

## 2023-04-28 NOTE — PROGRESS NOTES
"Assessment/Plan:   Diagnoses and all orders for this visit:    Rash  -     triamcinolone (KENALOG) 0 5 % ointment; Apply topically 2 (two) times a day  - f/u PRN   Influenza vaccination declined by patient          Subjective:    Patient ID: Manuel Shah is a 52 y o  female  HPI   - has a discolored rash on her L-thigh - (+) itchy, sensitivity   - was bit by [facility] on 4/14/2023 - went to  for eval of rash on R-arm and was given topical steroid   - has been using that topical steroid but still with skin texture changes   - also of note, has been following with a \"hormone specialist\" in [place] via online and was prescribed - Pregnenolone 200mg QD and Estriol/estradiol/testosterone 9 5/7 8/83YO/HG - 2 clicks QD      The following portions of the patient's history were reviewed and updated as appropriate: allergies, current medications, past family history, past medical history, past social history, past surgical history and problem list     Review of Systems  as per HPI    Objective:  /68   Pulse 68   Resp 16   Ht 5' 8\" (1 727 m)   Wt 81 6 kg (180 lb)   LMP 10/05/2021 (Exact Date)   BMI 27 37 kg/m²    Physical Exam  Vitals reviewed  Constitutional:       General: She is not in acute distress  Appearance: Normal appearance  She is not ill-appearing, toxic-appearing or diaphoretic  HENT:      Head: Normocephalic and atraumatic  Right Ear: External ear normal       Left Ear: External ear normal       Nose: Nose normal    Eyes:      General: No scleral icterus  Right eye: No discharge  Left eye: No discharge  Extraocular Movements: Extraocular movements intact  Conjunctiva/sclera: Conjunctivae normal    Pulmonary:      Effort: Pulmonary effort is normal    Musculoskeletal:         General: Normal range of motion  Cervical back: Normal range of motion  Skin:     Findings: Rash present        Comments: Pruritic rash and skin discoloration of L-thigh   Neurological: " General: No focal deficit present  Mental Status: She is alert and oriented to person, place, and time     Psychiatric:         Mood and Affect: Mood normal          Behavior: Behavior normal

## 2023-05-01 ENCOUNTER — TELEPHONE (OUTPATIENT)
Dept: FAMILY MEDICINE CLINIC | Facility: CLINIC | Age: 53
End: 2023-05-01

## 2023-05-01 NOTE — TELEPHONE ENCOUNTER
I spoke with Noe Silva and advised her that according to her HM she is not due until 2028 but she had stated that you advised her she is due soon?

## 2023-05-10 ENCOUNTER — TELEPHONE (OUTPATIENT)
Dept: OBGYN CLINIC | Facility: CLINIC | Age: 53
End: 2023-05-10

## 2023-05-10 DIAGNOSIS — G47.00 INSOMNIA, UNSPECIFIED TYPE: ICD-10-CM

## 2023-05-10 RX ORDER — TRAZODONE HYDROCHLORIDE 50 MG/1
50 TABLET ORAL
Qty: 30 TABLET | Refills: 1 | Status: SHIPPED | OUTPATIENT
Start: 2023-05-10

## 2023-05-10 NOTE — TELEPHONE ENCOUNTER
Pregnenolone (vc/natural-dye) 200 mg capsules--1 capsule by mouth daily  Estriol/estradiol/testosterone 7 2/9 6/82 mg/ mL--two clicks (0 5 g) topically per day     Started by hormone specialist Dr Nikolas Lui [facility]  Started in April  Pt  Wanted to update you   Has yearly scheduled in July

## 2023-06-08 ENCOUNTER — OFFICE VISIT (OUTPATIENT)
Dept: FAMILY MEDICINE CLINIC | Facility: CLINIC | Age: 53
End: 2023-06-08
Payer: COMMERCIAL

## 2023-06-08 VITALS
HEART RATE: 86 BPM | HEIGHT: 68 IN | DIASTOLIC BLOOD PRESSURE: 74 MMHG | SYSTOLIC BLOOD PRESSURE: 118 MMHG | OXYGEN SATURATION: 99 % | RESPIRATION RATE: 16 BRPM | WEIGHT: 176 LBS | BODY MASS INDEX: 26.67 KG/M2

## 2023-06-08 DIAGNOSIS — K14.9 TONGUE IRRITATION: Primary | ICD-10-CM

## 2023-06-08 PROCEDURE — 99213 OFFICE O/P EST LOW 20 MIN: CPT | Performed by: FAMILY MEDICINE

## 2023-06-08 NOTE — PROGRESS NOTES
"Assessment/Plan:   Diagnoses and all orders for this visit:    Tongue irritation  -     Vitamin B12; Future  -     Vitamin D 25 hydroxy; Future  -     CBC and differential; Future  -     Iron Panel (Includes Ferritin, Iron Sat%, Iron, and TIBC); Future  - tongue texture has changed and with \"burning\"  - tip and L-side of tongue with texture changes (fissures)   - swelling and burning   - (+) allergies  - has seen Derm, Oral Surgeon and Allergist - per pt, still unsure of cause, but has been told by Specialists that they are \"not worried\"   - chewing cinnamon dentine gum - not causing any problems   - denies F/C/N/V/CP/palpitations/SOB/wheezing/abd pain/change in bowel habits/D  - unclear etiology  - will check labs - ?vitamin deficiencies   - f/u after bloodwork - pt aware and agreeable         Subjective:    Patient ID: Marleni Hayes is a 52 y o  female  HPI   - tongue texture has changed and with \"burning\"  - tip and L-side of tongue with texture changes (fissures)   - swelling and burning   - (+) allergies  - has seen Derm, Oral Surgeon and Allergist - per pt, still unsure of cause, but has been told by Specialists that they are \"not worried\"   - chewing cinnamon dentine gum - not causing any problems   - denies F/C/N/V/CP/palpitations/SOB/wheezing/abd pain/change in bowel habits/D       The following portions of the patient's history were reviewed and updated as appropriate: allergies, current medications, past family history, past medical history, past social history, past surgical history and problem list     Review of Systems  as per HPI    Objective:  /74 (BP Location: Left arm, Patient Position: Sitting, Cuff Size: Large)   Pulse 86   Resp 16   Ht 5' 8\" (1 727 m)   Wt 79 8 kg (176 lb)   LMP 10/05/2021 (Exact Date)   SpO2 99%   BMI 26 76 kg/m²    Physical Exam  Vitals reviewed  Constitutional:       General: She is not in acute distress  Appearance: Normal appearance   She is not " ill-appearing, toxic-appearing or diaphoretic  HENT:      Head: Normocephalic and atraumatic  Right Ear: External ear normal       Left Ear: External ear normal       Mouth/Throat:      Comments: Smooth R-side of tongue, fissures noted on L-side  Pulmonary:      Effort: Pulmonary effort is normal    Musculoskeletal:         General: Normal range of motion  Cervical back: Normal range of motion  Skin:     General: Skin is warm  Neurological:      General: No focal deficit present  Mental Status: She is alert and oriented to person, place, and time     Psychiatric:         Mood and Affect: Mood normal          Behavior: Behavior normal

## 2023-07-21 ENCOUNTER — ANNUAL EXAM (OUTPATIENT)
Dept: OBGYN CLINIC | Facility: CLINIC | Age: 53
End: 2023-07-21
Payer: COMMERCIAL

## 2023-07-21 VITALS
DIASTOLIC BLOOD PRESSURE: 78 MMHG | SYSTOLIC BLOOD PRESSURE: 110 MMHG | WEIGHT: 181 LBS | HEIGHT: 68 IN | BODY MASS INDEX: 27.43 KG/M2

## 2023-07-21 DIAGNOSIS — Z12.11 SCREENING FOR COLON CANCER: Primary | ICD-10-CM

## 2023-07-21 DIAGNOSIS — Z12.31 ENCOUNTER FOR SCREENING MAMMOGRAM FOR MALIGNANT NEOPLASM OF BREAST: ICD-10-CM

## 2023-07-21 DIAGNOSIS — B37.31 YEAST VAGINITIS: ICD-10-CM

## 2023-07-21 PROCEDURE — S0612 ANNUAL GYNECOLOGICAL EXAMINA: HCPCS | Performed by: OBSTETRICS & GYNECOLOGY

## 2023-07-21 RX ORDER — FLUCONAZOLE 150 MG/1
150 TABLET ORAL ONCE
Qty: 1 TABLET | Refills: 2 | Status: SHIPPED | OUTPATIENT
Start: 2023-07-21 | End: 2023-07-21

## 2023-07-21 NOTE — PROGRESS NOTES
ASSESSMENT & PLAN:   Diagnoses and all orders for this visit:    Screening for colon cancer  -     Ambulatory referral to Gastroenterology; Future    Encounter for screening mammogram for malignant neoplasm of breast  -     Mammo screening bilateral w 3d & cad; Future    Yeast vaginitis  -     fluconazole (DIFLUCAN) 150 mg tablet; Take 1 tablet (150 mg total) by mouth once for 1 dose          The following were reviewed in today's visit: ASCCP guidelines,  breast self exam, mammography screening ordered, menopause, osteoporosis, exercise, healthy diet and colonoscopy discussed and ordered. Patient to return to office in yearly for annual exam.     All questions have been answered to her satisfaction. CC:  Annual Gynecologic Examination  Chief Complaint   Patient presents with   • Gynecologic Exam     Pt is here for her yearly exam mammo and colonoscopy ordered. HPI: Nas Childress is a 52 y.o. [id number] who presents for annual gynecologic examination. She has the following concerns:  Having a hard time with weight loss. Has gained this weight over the past 2 years. Also having difficulty sleeping. Started on hormone therapy prescribed by Dr. Shahnaz Ceja. Not seeing a big difference yet. Also concerned she is developing a yeast infection. Noticed itching along the vulva starting yesterday.        Health Maintenance:    Exercise: frequently - total gym, beneath the desk pedaling   Breast exams/breast awareness: yes  Diet: well balanced diet  Last mammogram: 2023 birads-2 on left (benign cysts) and birads-1 on right  Colorectal cancer screenin, due this year      Past Medical History:   Diagnosis Date   • Bacterial vaginitis    • Fibroids    • Gastritis    • GERD (gastroesophageal reflux disease)    • Insomnia    • Pneumonia        Past Surgical History:   Procedure Laterality Date   •  SECTION     • COLONOSCOPY     • HAND SURGERY Left     Left pinky tendon repair   • HYSTERECTOMY     • OH COLONOSCOPY FLX DX W/COLLJ SPEC WHEN PFRMD N/A 5/14/2018    Procedure: COLONOSCOPY;  Surgeon: Hemal Blair MD;  Location: [facility]; Service: Gastroenterology   • OH CYSTOURETHROSCOPY N/A 11/8/2021    Procedure: CYSTOSCOPY;  Surgeon: Iraida Boland DO;  [facility];  Service: Gynecology   • OH ESOPHAGOGASTRODUODENOSCOPY TRANSORAL DIAGNOSTIC N/A 3/31/2017    Procedure: ESOPHAGOGASTRODUODENOSCOPY (EGD); Surgeon: Hemal Blair MD;  [facility]; Service: Gastroenterology   • OH LAPS TOTAL HYSTERECT 250 GM/< W/RMVL TUBE/OVARY N/A 11/8/2021    Procedure: HYSTERECTOMY LAPAROSCOPIC TOTAL ([address]) WITH BILATERAL SALPINGECTOMY, LYSIS OF ADHESIONS;  Surgeon: Iraida Boland DO;  [facility];  Service: Gynecology       Past OB/Gyn History:   Patient's last menstrual period was 10/05/2021 (exact date). Last Pap: 2/2021 : NILM, neg HR HPV  History of abnormal Pap smear: no    Patient is currently sexually active.          Family History  Family History   Problem Relation Age of Onset   • Breast cancer Mother 67   • Dementia Mother    • Hyperlipidemia Mother    • Parkinsonism Mother    • Fibroids Mother    • Prostatitis Father    • Cataracts Father    • Substance Abuse Sister    • COPD Sister    • No Known Problems Sister    • Prostate cancer Brother    • Substance Abuse Brother    • Dementia Maternal Grandmother    • Parkinsonism Maternal Grandmother    • Fibroids Maternal Grandmother    • Premature birth Daughter    • No Known Problems Daughter    • No Known Problems Daughter    • Dementia Maternal Aunt    • Dementia Maternal Aunt    • No Known Problems Maternal Grandfather    • No Known Problems Paternal Grandmother    • No Known Problems Paternal Grandfather    • Hypertension Neg Hx    • Diabetes Neg Hx    • Colon cancer Neg Hx    • Ovarian cancer Neg Hx    • Uterine cancer Neg Hx    • Cervical cancer Neg Hx        Family history of uterine or ovarian cancer: no  Family history of breast cancer: yes  Family history of colon cancer: no    Social History:  Social History     Socioeconomic History   • Marital status: /Civil Union     Spouse name: Not on file   • Number of children: Not on file   • Years of education: Not on file   • Highest education level: Not on file   Occupational History   • Not on file   Tobacco Use   • Smoking status: Never   • Smokeless tobacco: Never   Vaping Use   • Vaping Use: Never used   Substance and Sexual Activity   • Alcohol use: Yes     Alcohol/week: 3.0 standard drinks of alcohol     Types: 3 Glasses of wine per week     Comment: socially   • Drug use: Never   • Sexual activity: Yes     Partners: Male     Birth control/protection: Post-menopausal, Surgical   Other Topics Concern   • Not on file   Social History Narrative   • Not on file     Social Determinants of Health     Financial Resource Strain: Not on file   Food Insecurity: Not on file   Transportation Needs: Not on file   Physical Activity: Not on file   Stress: Not on file   Social Connections: Not on file   Intimate Partner Violence: Not on file   Housing Stability: Not on file     Domestic violence screen: negative    Allergies:   Allergies   Allergen Reactions   • Amoxicillin Itching       Medications:    Current Outpatient Medications:   •  Biotin 1 MG CAPS, Take by mouth, Disp: , Rfl:   •  Cranberry 1000 MG CAPS, Take by mouth, Disp: , Rfl:   •  meclizine (ANTIVERT) 12.5 MG tablet, Take 1 tablet (12.5 mg total) by mouth every 8 (eight) hours as needed for dizziness, Disp: 30 tablet, Rfl: 0  •  Multiple Vitamins-Minerals (CENTRUM SILVER 50+WOMEN PO), , Disp: , Rfl:   •  Omega-3 Fatty Acids (FISH OIL PO), Take by mouth, Disp: , Rfl:   •  traZODone (DESYREL) 50 mg tablet, Take 1 tablet (50 mg total) by mouth daily at bedtime, Disp: 30 tablet, Rfl: 1  •  triamcinolone (KENALOG) 0.5 % ointment, Apply topically 2 (two) times a day, Disp: 30 g, Rfl: 0  • valACYclovir (VALTREX) 1,000 mg tablet, Take 2 tablets (2,000 mg total) by mouth 2 (two) times a day for 2 doses, Disp: 4 tablet, Rfl: 0  •  Vitamin D, Ergocalciferol, 60098 units CAPS, Take by mouth, Disp: , Rfl:     Review of Systems:  Denies fevers, chills, unintentional weight loss, excessive fatigue, chest pain, shortness of breath, abdominal pain, nausea, vomiting, urinary incontinence, urinary frequency, vaginal bleeding, vaginal discharge. All other systems negative unless otherwise stated. Physical Exam:  /78 (BP Location: Right arm, Patient Position: Sitting, Cuff Size: Standard)   Ht 5' 8" (1.727 m)   Wt 82.1 kg (181 lb)   LMP 10/05/2021 (Exact Date)   BMI 27.52 kg/m²  Body mass index is 27.52 kg/m². GEN: The patient was alert and oriented x3, pleasant well-appearing female in no acute distress. HEENT:  Unremarkable, no anterior or posterior lymphadenopathy, no thyromegaly  CV:  Regular rate and rhythm, normal S1 and S2, no murmurs  RESP:  Clear to auscultation bilaterally, no wheezes, rales or rhonchi  BREAST:  Symmetric breasts with no palpable breast masses or obvious breast lesions. She has no retractions or nipple discharge. She has no axillary abnormalities or palpable masses. GI:  Soft, nontender, non-distended  MSK: bilateral lower extremities are nontender, no edema  : Normal appearing external female genitalia, normal appearing urethral meatus. Normal appearing vaginal epithelium with thick, white clumpy discharge present. On bimanual exam the uterus and cervix are surgically absent. Vaginal cuff is intact and nontender. No tenderness or fullness in the bilateral adnexa.

## 2023-08-16 ENCOUNTER — PREP FOR PROCEDURE (OUTPATIENT)
Age: 53
End: 2023-08-16

## 2023-08-16 ENCOUNTER — TELEPHONE (OUTPATIENT)
Age: 53
End: 2023-08-16

## 2023-08-16 DIAGNOSIS — Z12.11 SCREENING FOR COLON CANCER: Primary | ICD-10-CM

## 2023-08-16 NOTE — TELEPHONE ENCOUNTER
Scheduled date of colonoscopy (as of today): 10/06/2023    Physician performing colonoscopy: Lionel    Location of colonoscopy: [facility]    Bowel prep reviewed with patient: Miralax/Dulcolax    Instructions reviewed with patient by: Maggie WORKMAN

## 2023-08-16 NOTE — TELEPHONE ENCOUNTER
08/16/23  Screened by: Margareth Edmond    Referring Provider:     Pre- Screening:     Weight: 177lbs  Height: 5' 8"  BMI: 26.9    Has patient been referred for a routine screening Colonoscopy? yes  Is the patient between 43-73 years old? yes      Previous Colonoscopy yes   If yes: 5 years ago -       SCHEDULING STAFF: If the patient is between 39yrs-51yrs, please advise patient to confirm benefits/coverage with their insurance company for a routine screening colonoscopy, some insurance carriers will only cover at [facility] or [facility] [place]. If the patient is over 66years old, please schedule an office visit. Does the patient want to see a Gastroenterologist prior to their procedure OR are they having any GI symptoms? no    Has the patient been hospitalized or had abdominal surgery in the past 6 months? no    Does the patient use supplemental oxygen? no    Does the patient take Coumadin, Lovenox, Plavix, Elliquis, Xarelto, or other blood thinning medication? no    Has the patient had a stroke, cardiac event, or stent placed in the past year? no    SCHEDULING STAFF: If patient answers NO to above questions, then schedule procedure. If patient answers YES to above questions, then schedule office appointment. If patient is between 45yrs - 49yrs, please advise patient that we will have to confirm benefits & coverage with their insurance company for a routine screening colonoscopy.

## 2023-09-13 ENCOUNTER — APPOINTMENT (EMERGENCY)
Dept: RADIOLOGY | Facility: HOSPITAL | Age: 53
End: 2023-09-13
Payer: COMMERCIAL

## 2023-09-13 ENCOUNTER — HOSPITAL ENCOUNTER (EMERGENCY)
Facility: HOSPITAL | Age: 53
Discharge: HOME | End: 2023-09-13
Attending: EMERGENCY MEDICINE
Payer: COMMERCIAL

## 2023-09-13 VITALS
WEIGHT: 184.97 LBS | RESPIRATION RATE: 18 BRPM | DIASTOLIC BLOOD PRESSURE: 73 MMHG | HEART RATE: 64 BPM | BODY MASS INDEX: 28.12 KG/M2 | OXYGEN SATURATION: 100 % | TEMPERATURE: 97.3 F | SYSTOLIC BLOOD PRESSURE: 123 MMHG

## 2023-09-13 DIAGNOSIS — M79.603 ARM PAIN: Primary | ICD-10-CM

## 2023-09-13 DIAGNOSIS — R42 LIGHTHEADEDNESS: ICD-10-CM

## 2023-09-13 LAB
ALBUMIN SERPL BCP-MCNC: 4 G/DL (ref 3.5–5)
ALP SERPL-CCNC: 52 U/L (ref 34–104)
ALT SERPL W P-5'-P-CCNC: 9 U/L (ref 7–52)
ANION GAP SERPL CALCULATED.3IONS-SCNC: 6 MMOL/L
AST SERPL W P-5'-P-CCNC: 12 U/L (ref 13–39)
BASOPHILS # BLD AUTO: 0.02 THOUSANDS/ÂΜL (ref 0–0.1)
BASOPHILS NFR BLD AUTO: 1 % (ref 0–1)
BILIRUB SERPL-MCNC: 0.6 MG/DL (ref 0.2–1)
BUN SERPL-MCNC: 17 MG/DL (ref 5–25)
CALCIUM SERPL-MCNC: 9 MG/DL (ref 8.4–10.2)
CARDIAC TROPONIN I PNL SERPL HS: <2 NG/L
CARDIAC TROPONIN I PNL SERPL HS: <2 NG/L
CHLORIDE SERPL-SCNC: 104 MMOL/L (ref 96–108)
CO2 SERPL-SCNC: 26 MMOL/L (ref 21–32)
CREAT SERPL-MCNC: 0.83 MG/DL (ref 0.6–1.3)
EOSINOPHIL # BLD AUTO: 0.1 THOUSAND/ÂΜL (ref 0–0.61)
EOSINOPHIL NFR BLD AUTO: 3 % (ref 0–6)
ERYTHROCYTE [DISTWIDTH] IN BLOOD BY AUTOMATED COUNT: 12.4 % (ref 11.6–15.1)
GFR SERPL CREATININE-BSD FRML MDRD: 79 ML/MIN/1.73SQ M
GLUCOSE SERPL-MCNC: 79 MG/DL (ref 65–140)
GLUCOSE SERPL-MCNC: 85 MG/DL (ref 65–140)
HCT VFR BLD AUTO: 41 % (ref 34.8–46.1)
HGB BLD-MCNC: 13.2 G/DL (ref 11.5–15.4)
IMM GRANULOCYTES # BLD AUTO: 0.01 THOUSAND/UL (ref 0–0.2)
IMM GRANULOCYTES NFR BLD AUTO: 0 % (ref 0–2)
LYMPHOCYTES # BLD AUTO: 1.45 THOUSANDS/ÂΜL (ref 0.6–4.47)
LYMPHOCYTES NFR BLD AUTO: 39 % (ref 14–44)
MCH RBC QN AUTO: 28.8 PG (ref 26.8–34.3)
MCHC RBC AUTO-ENTMCNC: 32.2 G/DL (ref 31.4–37.4)
MCV RBC AUTO: 90 FL (ref 82–98)
MONOCYTES # BLD AUTO: 0.39 THOUSAND/ÂΜL (ref 0.17–1.22)
MONOCYTES NFR BLD AUTO: 11 % (ref 4–12)
NEUTROPHILS # BLD AUTO: 1.72 THOUSANDS/ÂΜL (ref 1.85–7.62)
NEUTS SEG NFR BLD AUTO: 46 % (ref 43–75)
NRBC BLD AUTO-RTO: 0 /100 WBCS
PLATELET # BLD AUTO: 224 THOUSANDS/UL (ref 149–390)
PMV BLD AUTO: 12.2 FL (ref 8.9–12.7)
POTASSIUM SERPL-SCNC: 3.8 MMOL/L (ref 3.5–5.3)
PROT SERPL-MCNC: 6.5 G/DL (ref 6.4–8.4)
RBC # BLD AUTO: 4.58 MILLION/UL (ref 3.81–5.12)
SODIUM SERPL-SCNC: 136 MMOL/L (ref 135–147)
WBC # BLD AUTO: 3.69 THOUSAND/UL (ref 4.31–10.16)

## 2023-09-13 PROCEDURE — 36415 COLL VENOUS BLD VENIPUNCTURE: CPT | Performed by: EMERGENCY MEDICINE

## 2023-09-13 PROCEDURE — 85025 COMPLETE CBC W/AUTO DIFF WBC: CPT | Performed by: EMERGENCY MEDICINE

## 2023-09-13 PROCEDURE — 71045 X-RAY EXAM CHEST 1 VIEW: CPT

## 2023-09-13 PROCEDURE — 82948 REAGENT STRIP/BLOOD GLUCOSE: CPT

## 2023-09-13 PROCEDURE — 93005 ELECTROCARDIOGRAM TRACING: CPT

## 2023-09-13 PROCEDURE — 99284 EMERGENCY DEPT VISIT MOD MDM: CPT | Performed by: EMERGENCY MEDICINE

## 2023-09-13 PROCEDURE — 84484 ASSAY OF TROPONIN QUANT: CPT | Performed by: EMERGENCY MEDICINE

## 2023-09-13 PROCEDURE — 80053 COMPREHEN METABOLIC PANEL: CPT | Performed by: EMERGENCY MEDICINE

## 2023-09-13 PROCEDURE — 99285 EMERGENCY DEPT VISIT HI MDM: CPT

## 2023-09-13 NOTE — Clinical Note
Bhaskar Riggins was seen and treated in our emergency department on 9/13/2023. Diagnosis:     Sharanda  . She may return on this date: 09/15/2023         If you have any questions or concerns, please don't hesitate to call.       Sully Lee MD    ______________________________           _______________          _______________  Hospital Representative                              Date                                Time

## 2023-09-13 NOTE — Clinical Note
Suze Zafar was seen and treated in our emergency department on 9/13/2023. Diagnosis:     Sharanda  . She may return on this date: 09/15/2023         If you have any questions or concerns, please don't hesitate to call.       Vibha Freeman MD    ______________________________           _______________          _______________  Hospital Representative                              Date                                Time

## 2023-09-13 NOTE — ED PROVIDER NOTES
History  Chief Complaint   Patient presents with   • Extremity Weakness     Left arm tingliness/ weakness started around 0740, headache     Patient states she was in her normal health this morning when she was driving to work, when she felt a tingling feeling in the left nondominant arm associated with a heaviness. Patient did not have chest pain. She pulled over thinking she might be having a stroke, and became quite anxious. Patient called 911 from the road. She states she developed a headache and lightheadedness shortly after the symptoms started. There was no weakness in the upper extremities. Patient states by the time of arrival her symptoms have resolved. No prior similar episodes in the past.          Prior to Admission Medications   Prescriptions Last Dose Informant Patient Reported? Taking?    Biotin 1 MG CAPS  Self Yes No   Sig: Take by mouth   Cranberry 1000 MG CAPS  Self Yes No   Sig: Take by mouth   Multiple Vitamins-Minerals (CENTRUM SILVER 50+WOMEN PO)  Self Yes No   Omega-3 Fatty Acids (FISH OIL PO)  Self Yes No   Sig: Take by mouth   Vitamin D, Ergocalciferol, 05341 units CAPS  Self Yes No   Sig: Take by mouth   meclizine (ANTIVERT) 12.5 MG tablet  Self No No   Sig: Take 1 tablet (12.5 mg total) by mouth every 8 (eight) hours as needed for dizziness   traZODone (DESYREL) 50 mg tablet  Self No No   Sig: Take 1 tablet (50 mg total) by mouth daily at bedtime   triamcinolone (KENALOG) 0.5 % ointment  Self No No   Sig: Apply topically 2 (two) times a day   Patient not taking: Reported on 7/21/2023   valACYclovir (VALTREX) 1,000 mg tablet  Self No No   Sig: Take 2 tablets (2,000 mg total) by mouth 2 (two) times a day for 2 doses      Facility-Administered Medications: None       Past Medical History:   Diagnosis Date   • Bacterial vaginitis    • Fibroids    • Gastritis    • GERD (gastroesophageal reflux disease)    • Insomnia    • Pneumonia 2009       Past Surgical History:   Procedure Laterality Date   •  SECTION     • COLONOSCOPY     • HAND SURGERY Left     Left pinky tendon repair   • HYSTERECTOMY     • WY COLONOSCOPY FLX DX W/COLLJ SPEC WHEN PFRMD N/A 2018    Procedure: COLONOSCOPY;  Surgeon: Ana Laura Pan MD;  Location: [facility]; Service: Gastroenterology   • WY CYSTOURETHROSCOPY N/A 2021    Procedure: CYSTOSCOPY;  Surgeon: Tayler Carter DO;  [facility];  Service: Gynecology   • WY ESOPHAGOGASTRODUODENOSCOPY TRANSORAL DIAGNOSTIC N/A 3/31/2017    Procedure: ESOPHAGOGASTRODUODENOSCOPY (EGD); Surgeon: Ana Laura Pan MD;  [facility]; Service: Gastroenterology   • WY LAPS TOTAL HYSTERECT 250 GM/< W/RMVL TUBE/OVARY N/A 2021    Procedure: HYSTERECTOMY LAPAROSCOPIC TOTAL ([address]) WITH BILATERAL SALPINGECTOMY, LYSIS OF ADHESIONS;  Surgeon: Tayler Carter DO;  [facility];  Service: Gynecology       Family History   Problem Relation Age of Onset   • Breast cancer Mother 67   • Dementia Mother    • Hyperlipidemia Mother    • Parkinsonism Mother    • Fibroids Mother    • Prostatitis Father    • Cataracts Father    • Substance Abuse Sister    • COPD Sister    • No Known Problems Sister    • Prostate cancer Brother    • Substance Abuse Brother    • Dementia Maternal Grandmother    • Parkinsonism Maternal Grandmother    • Fibroids Maternal Grandmother    • Premature birth Daughter    • No Known Problems Daughter    • No Known Problems Daughter    • Dementia Maternal Aunt    • Dementia Maternal Aunt    • No Known Problems Maternal Grandfather    • No Known Problems Paternal Grandmother    • No Known Problems Paternal Grandfather    • Hypertension Neg Hx    • Diabetes Neg Hx    • Colon cancer Neg Hx    • Ovarian cancer Neg Hx    • Uterine cancer Neg Hx    • Cervical cancer Neg Hx      I have reviewed and agree with the history as documented.     E-Cigarette/Vaping   • E-Cigarette Use Never User      E-Cigarette/Vaping Substances   • Nicotine No    • THC No    • CBD No    • Flavoring No    • Other No    • Unknown No      Social History     Tobacco Use   • Smoking status: Never   • Smokeless tobacco: Never   Vaping Use   • Vaping Use: Never used   Substance Use Topics   • Alcohol use: Yes     Alcohol/week: 3.0 standard drinks of alcohol     Types: 3 Glasses of wine per week     Comment: socially   • Drug use: Never       Review of Systems   Constitutional: Negative for chills and fever. HENT: Negative for congestion and sore throat. Eyes: Negative for visual disturbance. Respiratory: Negative for cough and shortness of breath. Cardiovascular: Negative for chest pain. Gastrointestinal: Negative for abdominal distention and nausea. Genitourinary: Negative for dysuria. Musculoskeletal: Positive for arthralgias. Negative for back pain. Skin: Negative for rash. Neurological: Positive for light-headedness and headaches. Negative for syncope, facial asymmetry and weakness. Hematological: Does not bruise/bleed easily. Psychiatric/Behavioral: Negative for confusion. The patient is nervous/anxious. All other systems reviewed and are negative. Physical Exam  Physical Exam  Vitals and nursing note reviewed. Constitutional:       Appearance: Normal appearance. HENT:      Head: Normocephalic. Right Ear: External ear normal.      Left Ear: External ear normal.      Nose: Nose normal.      Mouth/Throat:      Mouth: Mucous membranes are moist.   Eyes:      Conjunctiva/sclera: Conjunctivae normal.   Cardiovascular:      Rate and Rhythm: Normal rate and regular rhythm. Pulses: Normal pulses. Pulmonary:      Effort: Pulmonary effort is normal.   Abdominal:      Palpations: Abdomen is soft. Tenderness: There is no abdominal tenderness. Musculoskeletal:         General: Normal range of motion. Cervical back: Normal range of motion. Right lower leg: No edema. Left lower leg: No edema.    Skin:     General: Skin is warm and dry. Capillary Refill: Capillary refill takes less than 2 seconds. Neurological:      General: No focal deficit present. Mental Status: She is alert.    Psychiatric:         Mood and Affect: Mood normal.         Vital Signs  ED Triage Vitals   Temperature Pulse Respirations Blood Pressure SpO2   09/13/23 0931 09/13/23 0829 09/13/23 0829 09/13/23 0837 09/13/23 0829   (!) 97.3 °F (36.3 °C) 75 18 131/71 100 %      Temp Source Heart Rate Source Patient Position - Orthostatic VS BP Location FiO2 (%)   09/13/23 0931 09/13/23 0829 09/13/23 0952 09/13/23 0952 --   Oral Monitor Lying Right arm       Pain Score       --                  Vitals:    09/13/23 0837 09/13/23 0915 09/13/23 0921 09/13/23 0952   BP: 131/71  114/70 123/73   Pulse:  68 66 64   Patient Position - Orthostatic VS:    Lying         Visual Acuity      ED Medications  Medications - No data to display    Diagnostic Studies  Results Reviewed     Procedure Component Value Units Date/Time    HS Troponin I 2hr [[id number]] Collected: 09/13/23 1051    Lab Status: Final result Specimen: Blood from Arm, Right Updated: 09/13/23 1127     hs TnI 2hr <2 ng/L      Delta 2hr hsTnI --    HS Troponin I 4hr [[id number]]     Lab Status: No result Specimen: Blood     HS Troponin 0hr (reflex protocol) [[id number]]  (Normal) Collected: 09/13/23 0857    Lab Status: Final result Specimen: Blood from Arm, Right Updated: 09/13/23 0931     hs TnI 0hr <2 ng/L     Comprehensive metabolic panel [[id number]]  (Abnormal) Collected: 09/13/23 0857    Lab Status: Final result Specimen: Blood from Arm, Right Updated: 09/13/23 0923     Sodium 136 mmol/L      Potassium 3.8 mmol/L      Chloride 104 mmol/L      CO2 26 mmol/L      ANION GAP 6 mmol/L      BUN 17 mg/dL      Creatinine 0.83 mg/dL      Glucose 85 mg/dL      Calcium 9.0 mg/dL      AST 12 U/L      ALT 9 U/L      Alkaline Phosphatase 52 U/L      Total Protein 6.5 g/dL      Albumin 4.0 g/dL      Total Bilirubin 0.60 mg/dL eGFR 79 ml/min/1.73sq m     Narrative:      [facility] guidelines for Chronic Kidney Disease (CKD):   •  Stage 1 with normal or high GFR (GFR > 90 mL/min/1.73 square meters)  •  Stage 2 Mild CKD (GFR = 60-89 mL/min/1.73 square meters)  •  Stage 3A Moderate CKD (GFR = 45-59 mL/min/1.73 square meters)  •  Stage 3B Moderate CKD (GFR = 30-44 mL/min/1.73 square meters)  •  Stage 4 Severe CKD (GFR = 15-29 mL/min/1.73 square meters)  •  Stage 5 End Stage CKD (GFR <15 mL/min/1.73 square meters)  Note: GFR calculation is accurate only with a steady state creatinine    CBC and differential [[id number]]  (Abnormal) Collected: 09/13/23 0857    Lab Status: Final result Specimen: Blood from Arm, Right Updated: 09/13/23 0907     WBC 3.69 Thousand/uL      RBC 4.58 Million/uL      Hemoglobin 13.2 g/dL      Hematocrit 41.0 %      MCV 90 fL      MCH 28.8 pg      MCHC 32.2 g/dL      RDW 12.4 %      MPV 12.2 fL      Platelets 835 Thousands/uL      nRBC 0 /100 WBCs      Neutrophils Relative 46 %      Immat GRANS % 0 %      Lymphocytes Relative 39 %      Monocytes Relative 11 %      Eosinophils Relative 3 %      Basophils Relative 1 %      Neutrophils Absolute 1.72 Thousands/µL      Immature Grans Absolute 0.01 Thousand/uL      Lymphocytes Absolute 1.45 Thousands/µL      Monocytes Absolute 0.39 Thousand/µL      Eosinophils Absolute 0.10 Thousand/µL      Basophils Absolute 0.02 Thousands/µL     Fingerstick Glucose (POCT) [[id number]]  (Normal) Collected: 09/13/23 0832    Lab Status: Final result Updated: 09/13/23 0836     POC Glucose 79 mg/dl                  XR chest 1 view portable   Final Result by Barrie Peña MD (09/13 1609)      No acute cardiopulmonary disease.       Findings are stable            Workstation performed: [id number]                    Procedures  ECG 12 Lead Documentation Only    Date/Time: 9/13/2023 8:48 AM    Performed by: Yoanna Hercules MD  Authorized by: Yoanna Hercules MD Indications / Diagnosis:  Arm pain  ECG reviewed by me, the ED Provider: yes    Patient location:  ED  Interpretation:     Interpretation: normal    Rate:     ECG rate:  68    ECG rate assessment: normal    Rhythm:     Rhythm: sinus rhythm    Ectopy:     Ectopy: none    QRS:     QRS axis:  Normal    QRS intervals:  Normal  Conduction:     Conduction: normal    ST segments:     ST segments:  Normal  T waves:     T waves: normal               ED Course                               SBIRT 22yo+    Flowsheet Row Most Recent Value   Initial Alcohol Screen: US AUDIT-C     1. How often do you have a drink containing alcohol? 0 Filed at: 09/13/2023 0835   Audit-C Score 0 Filed at: 09/13/2023 5797   HERNANDEZ: How many times in the past year have you. .. Used an illegal drug or used a prescription medication for non-medical reasons? Never Filed at: 09/13/2023 2516                    Medical Decision Making  Patient's arm symptoms may be anginal equivalent, not suspected to be neurological.  She has no cardiac risk factors except diet-controlled cholesterol. Amount and/or Complexity of Data Reviewed  Labs: ordered. Radiology: ordered. Disposition  Final diagnoses:   Arm pain   Lightheadedness     Time reflects when diagnosis was documented in both MDM as applicable and the Disposition within this note     Time User Action Codes Description Comment    9/13/2023 11:31 AM Sully Lee Add [M79.603] Arm pain     9/13/2023 11:31 AM Sully Lee Add [R42] Lightheadedness       ED Disposition     ED Disposition   Discharge    Condition   Stable    Date/Time   Wed Sep 13, 2023 11:30 AM    Comment   Bhaskar Alt discharge to home/self care.                Follow-up Information     Follow up With Specialties Details Why Contact Sae Chandler DO Family Medicine Schedule an appointment as soon as possible for a visit   [address] [place] [id number] [phone]      John Dave DO Cardiology Schedule an appointment as soon as possible for a visit  Discussed stress test [address]  [phone]            Discharge Medication List as of 9/13/2023 11:46 AM      CONTINUE these medications which have NOT CHANGED    Details   Biotin 1 MG CAPS Take by mouth, Historical Med      Cranberry 1000 MG CAPS Take by mouth, Historical Med      meclizine (ANTIVERT) 12.5 MG tablet Take 1 tablet (12.5 mg total) by mouth every 8 (eight) hours as needed for dizziness, Starting Wed 3/22/2023, Normal      Multiple Vitamins-Minerals (CENTRUM SILVER 50+WOMEN PO) Starting Fri 5/21/2021, Historical Med      Omega-3 Fatty Acids (FISH OIL PO) Take by mouth, Historical Med      traZODone (DESYREL) 50 mg tablet Take 1 tablet (50 mg total) by mouth daily at bedtime, Starting Wed 5/10/2023, Normal      triamcinolone (KENALOG) 0.5 % ointment Apply topically 2 (two) times a day, Starting Fri 4/28/2023, Normal      valACYclovir (VALTREX) 1,000 mg tablet Take 2 tablets (2,000 mg total) by mouth 2 (two) times a day for 2 doses, Starting Tue 1/24/2023, Until Fri 7/21/2023, Normal      Vitamin D, Ergocalciferol, 09679 units CAPS Take by mouth, Historical Med             No discharge procedures on file.     PDMP Review       Value Time User    PDMP Reviewed  Yes 11/8/2021  1:09 PM Suzan Sanches DO          ED Provider  Electronically Signed by           Yoanna Hercules MD  09/13/23 9927

## 2023-09-14 ENCOUNTER — APPOINTMENT (OUTPATIENT)
Dept: LAB | Facility: CLINIC | Age: 53
End: 2023-09-14
Payer: COMMERCIAL

## 2023-09-14 ENCOUNTER — OFFICE VISIT (OUTPATIENT)
Dept: FAMILY MEDICINE CLINIC | Facility: CLINIC | Age: 53
End: 2023-09-14
Payer: COMMERCIAL

## 2023-09-14 VITALS
HEIGHT: 68 IN | WEIGHT: 187 LBS | BODY MASS INDEX: 28.34 KG/M2 | DIASTOLIC BLOOD PRESSURE: 80 MMHG | SYSTOLIC BLOOD PRESSURE: 122 MMHG

## 2023-09-14 DIAGNOSIS — J34.89 SINUS PRESSURE: ICD-10-CM

## 2023-09-14 DIAGNOSIS — R42 DIZZINESS: ICD-10-CM

## 2023-09-14 DIAGNOSIS — M25.561 RECURRENT PAIN OF RIGHT KNEE: ICD-10-CM

## 2023-09-14 DIAGNOSIS — K14.9 TONGUE IRRITATION: ICD-10-CM

## 2023-09-14 DIAGNOSIS — R20.0 NUMBNESS AND TINGLING IN LEFT ARM: Primary | ICD-10-CM

## 2023-09-14 DIAGNOSIS — R20.2 NUMBNESS AND TINGLING IN LEFT ARM: Primary | ICD-10-CM

## 2023-09-14 LAB
25(OH)D3 SERPL-MCNC: 57.8 NG/ML (ref 30–100)
BASOPHILS # BLD AUTO: 0.02 THOUSANDS/ÂΜL (ref 0–0.1)
BASOPHILS NFR BLD AUTO: 1 % (ref 0–1)
EOSINOPHIL # BLD AUTO: 0.05 THOUSAND/ÂΜL (ref 0–0.61)
EOSINOPHIL NFR BLD AUTO: 1 % (ref 0–6)
ERYTHROCYTE [DISTWIDTH] IN BLOOD BY AUTOMATED COUNT: 12.4 % (ref 11.6–15.1)
FERRITIN SERPL-MCNC: 68 NG/ML (ref 11–307)
HCT VFR BLD AUTO: 41.1 % (ref 34.8–46.1)
HGB BLD-MCNC: 12.8 G/DL (ref 11.5–15.4)
IMM GRANULOCYTES # BLD AUTO: 0 THOUSAND/UL (ref 0–0.2)
IMM GRANULOCYTES NFR BLD AUTO: 0 % (ref 0–2)
IRON SATN MFR SERPL: 24 % (ref 15–50)
IRON SERPL-MCNC: 80 UG/DL (ref 50–212)
LYMPHOCYTES # BLD AUTO: 1.76 THOUSANDS/ÂΜL (ref 0.6–4.47)
LYMPHOCYTES NFR BLD AUTO: 41 % (ref 14–44)
MCH RBC QN AUTO: 27.7 PG (ref 26.8–34.3)
MCHC RBC AUTO-ENTMCNC: 31.1 G/DL (ref 31.4–37.4)
MCV RBC AUTO: 89 FL (ref 82–98)
MONOCYTES # BLD AUTO: 0.46 THOUSAND/ÂΜL (ref 0.17–1.22)
MONOCYTES NFR BLD AUTO: 11 % (ref 4–12)
NEUTROPHILS # BLD AUTO: 2.04 THOUSANDS/ÂΜL (ref 1.85–7.62)
NEUTS SEG NFR BLD AUTO: 46 % (ref 43–75)
NRBC BLD AUTO-RTO: 0 /100 WBCS
PLATELET # BLD AUTO: 259 THOUSANDS/UL (ref 149–390)
PMV BLD AUTO: 12.8 FL (ref 8.9–12.7)
RBC # BLD AUTO: 4.62 MILLION/UL (ref 3.81–5.12)
TIBC SERPL-MCNC: 338 UG/DL (ref 250–450)
UIBC SERPL-MCNC: 258 UG/DL (ref 155–355)
VIT B12 SERPL-MCNC: 847 PG/ML (ref 180–914)
WBC # BLD AUTO: 4.33 THOUSAND/UL (ref 4.31–10.16)

## 2023-09-14 PROCEDURE — 99214 OFFICE O/P EST MOD 30 MIN: CPT | Performed by: FAMILY MEDICINE

## 2023-09-14 PROCEDURE — 82306 VITAMIN D 25 HYDROXY: CPT

## 2023-09-14 PROCEDURE — 85025 COMPLETE CBC W/AUTO DIFF WBC: CPT

## 2023-09-14 PROCEDURE — 83540 ASSAY OF IRON: CPT

## 2023-09-14 PROCEDURE — 82728 ASSAY OF FERRITIN: CPT

## 2023-09-14 PROCEDURE — 83550 IRON BINDING TEST: CPT

## 2023-09-14 PROCEDURE — 82607 VITAMIN B-12: CPT

## 2023-09-14 PROCEDURE — 36415 COLL VENOUS BLD VENIPUNCTURE: CPT

## 2023-09-14 NOTE — PROGRESS NOTES
Consultation - Cardiology Office  [facility]. Lizzy Osullivan 52 y.o. female MRN: [id number]  : 1970  Unit/Bed#:  Encounter: [id number]      ASSESSMENT:  Seen in ED on 2023 with left arm tingling and heaviness along with headache    History of dizziness/vertigo on meclizine    Low blood pressure at 92/80 with heart rate of 68/min    Hyperlipidemia  2022: , TG 60, HDL 87  Since then patient has adjusted her diet and has been exercising    RECOMMENDATIONS:  48-hour Holter monitor  Echocardiogram  Repeat lipid profile  Avoid dehydration  Compression stockings, knee-high, 20-30 mm compression to be worn regularly during the day  If patient remains hypotensive and continues to have dizziness, may consider midodrine        Thank you for your consultation. If you have any question please call me at [phone]      Primary Care Physician Requesting Consult: Izaiah Steward DO      Reason for Consult / Principal Problem: Cardiac evaluation        HPI :     Lizzy Osullivan is a 52y.o. year old female who was referred by primary care doctor for cardiac evaluation. Patient has a history of vertigo and dizziness and has normally low baseline blood pressure. A week ago she was driving and felt some tingling and heaviness along with headache and she was seen in the ED and underwent basic evaluation following which she was told to follow-up with cardiology. Patient denies any chest pain or dyspnea. She gets occasional dizziness because of her vertigo and is being treated with meclizine. Last December her LDL was 157 and she has been exercising and has improved her diet. We will recheck her lipid profile to see if any medical treatment is needed      Review of Systems   Neurological: Positive for dizziness and headaches. Left arm tingling and heaviness   All other systems reviewed and are negative.       Historical Information   Past Medical History: Diagnosis Date   • Bacterial vaginitis    • Fibroids    • Gastritis    • GERD (gastroesophageal reflux disease)    • Insomnia    • Pneumonia      Past Surgical History:   Procedure Laterality Date   •  SECTION     • COLONOSCOPY     • HAND SURGERY Left     Left pinky tendon repair   • HYSTERECTOMY     • NC COLONOSCOPY FLX DX W/COLLJ SPEC WHEN PFRMD N/A 2018    Procedure: COLONOSCOPY;  Surgeon: Manny Nye MD;  Location: [facility]; Service: Gastroenterology   • NC CYSTOURETHROSCOPY N/A 2021    Procedure: CYSTOSCOPY;  Surgeon: Monica Grady DO;  [facility];  Service: Gynecology   • NC ESOPHAGOGASTRODUODENOSCOPY TRANSORAL DIAGNOSTIC N/A 3/31/2017    Procedure: ESOPHAGOGASTRODUODENOSCOPY (EGD); Surgeon: Manny Nye MD;  [facility];   Service: Gastroenterology   • NC LAPS TOTAL HYSTERECT 250 GM/< W/RMVL TUBE/OVARY N/A 2021    Procedure: HYSTERECTOMY LAPAROSCOPIC TOTAL ([address]) WITH BILATERAL SALPINGECTOMY, LYSIS OF ADHESIONS;  Surgeon: Monica Grady DO;  Location[facility];  Service: Gynecology     Social History     Substance and Sexual Activity   Alcohol Use Yes   • Alcohol/week: 3.0 standard drinks of alcohol   • Types: 3 Glasses of wine per week    Comment: socially     Social History     Substance and Sexual Activity   Drug Use Never     Social History     Tobacco Use   Smoking Status Never   Smokeless Tobacco Never     Family History:   Family History   Problem Relation Age of Onset   • Breast cancer Mother 67   • Dementia Mother    • Hyperlipidemia Mother    • Parkinsonism Mother    • Fibroids Mother    • Prostatitis Father    • Cataracts Father    • Substance Abuse Sister    • COPD Sister    • No Known Problems Sister    • Prostate cancer Brother    • Substance Abuse Brother    • Dementia Maternal Grandmother    • Parkinsonism Maternal Grandmother    • Fibroids Maternal Grandmother    • Premature birth Daughter    • No Known Problems Daughter • No Known Problems Daughter    • Dementia Maternal Aunt    • Dementia Maternal Aunt    • No Known Problems Maternal Grandfather    • No Known Problems Paternal Grandmother    • No Known Problems Paternal Grandfather    • Hypertension Neg Hx    • Diabetes Neg Hx    • Colon cancer Neg Hx    • Ovarian cancer Neg Hx    • Uterine cancer Neg Hx    • Cervical cancer Neg Hx        Meds/Allergies     Allergies   Allergen Reactions   • Amoxicillin Itching       Current Outpatient Medications:   •  Biotin 1 MG CAPS, Take by mouth, Disp: , Rfl:   •  Cranberry 1000 MG CAPS, Take by mouth, Disp: , Rfl:   •  meclizine (ANTIVERT) 12.5 MG tablet, Take 1 tablet (12.5 mg total) by mouth every 8 (eight) hours as needed for dizziness, Disp: 30 tablet, Rfl: 0  •  Multiple Vitamins-Minerals (CENTRUM SILVER 50+WOMEN PO), , Disp: , Rfl:   •  Omega-3 Fatty Acids (FISH OIL PO), Take by mouth, Disp: , Rfl:   •  triamcinolone (KENALOG) 0.5 % ointment, Apply topically 2 (two) times a day, Disp: 30 g, Rfl: 0  •  Vitamin D, Ergocalciferol, 63604 units CAPS, Take by mouth, Disp: , Rfl:   •  traZODone (DESYREL) 50 mg tablet, Take 1 tablet (50 mg total) by mouth daily at bedtime (Patient not taking: Reported on 9/14/2023), Disp: 30 tablet, Rfl: 1  •  valACYclovir (VALTREX) 1,000 mg tablet, Take 2 tablets (2,000 mg total) by mouth 2 (two) times a day for 2 doses, Disp: 4 tablet, Rfl: 0    Vitals: Blood pressure 92/80, pulse 72, height 5' 8" (1.727 m), weight 82.6 kg (182 lb), last menstrual period 10/05/2021, SpO2 98 %, not currently breastfeeding. Body mass index is 27.67 kg/m².   Vitals:    09/15/23 1315   Weight: 82.6 kg (182 lb)     BP Readings from Last 3 Encounters:   09/15/23 92/80   09/14/23 122/80   09/13/23 123/73       Physical Exam  PHYSICAL EXAMINATION:  Neurologic:  Alert & oriented x 3, no new focal deficits, Not in any acute distress,  Constitutional:  Well developed, well nourished, non-toxic appearance   Eyes:  Pupil equal and reacting to light, conjunctiva normal, No JVP, No LNP   HENT:  Atraumatic, oropharynx moist, Neck- normal range of motion, no tenderness,  Neck supple   Respiratory:  Bilateral air entry, mostly clear to auscultation  Cardiovascular: S1-S2 regular rhythm  GI:  Soft, nondistended, normal bowel sounds, nontender, no hepatosplenomegaly appreciated. Musculoskeletal: no tenderness, no deformities. Skin:  Well hydrated, no rash   Lymphatic:  No lymphadenopathy noted   Extremities:  No edema and distal pulses are present    Diagnostic Studies Review Cardio:      EKG: Normal sinus rhythm, heart rate 68/min    Cardiac testing:   No results found for this or any previous visit. Imaging:  Chest X-Ray:   No Chest XR results available for this patient. CT-scan of the chest:     No CTA results available for this patient.   Lab Review   Lab Results   Component Value Date    WBC 4.33 09/14/2023    HGB 12.8 09/14/2023    HCT 41.1 09/14/2023    MCV 89 09/14/2023    RDW 12.4 09/14/2023     09/14/2023     BMP:  Lab Results   Component Value Date    SODIUM 136 09/13/2023    K 3.8 09/13/2023     09/13/2023    CO2 26 09/13/2023    BUN 17 09/13/2023    CREATININE 0.83 09/13/2023    GLUC 85 09/13/2023    GLUF 79 12/23/2022    CALCIUM 9.0 09/13/2023    EGFR 79 09/13/2023     LFT:  Lab Results   Component Value Date    AST 12 (L) 09/13/2023    ALT 9 09/13/2023    ALKPHOS 52 09/13/2023    TP 6.5 09/13/2023    ALB 4.0 09/13/2023      Lab Results   Component Value Date    MPM8BGTCXIRD 0.901 12/23/2022     No components found for: "TSH3"  Lab Results   Component Value Date    HGBA1C 4.5 10/19/2021     Lipid Profile:   Lab Results   Component Value Date    CHOLESTEROL 256 (H) 12/23/2022    HDL 87 12/23/2022    LDLCALC 157 (H) 12/23/2022    TRIG 60 12/23/2022     Lab Results   Component Value Date    CHOLESTEROL 256 (H) 12/23/2022    CHOLESTEROL 225 (H) 12/14/2021     No results found for: "CKTOTAL", "CKMB", "CKMBINDEX", "TROPONINI"  No results found for: "NTBNP"   Recent Results (from the past 672 hour(s))   Fingerstick Glucose (POCT)    Collection Time: 09/13/23  8:32 AM   Result Value Ref Range    POC Glucose 79 65 - 140 mg/dl   CBC and differential    Collection Time: 09/13/23  8:57 AM   Result Value Ref Range    WBC 3.69 (L) 4.31 - 10.16 Thousand/uL    RBC 4.58 3.81 - 5.12 Million/uL    Hemoglobin 13.2 11.5 - 15.4 g/dL    Hematocrit 41.0 34.8 - 46.1 %    MCV 90 82 - 98 fL    MCH 28.8 26.8 - 34.3 pg    MCHC 32.2 31.4 - 37.4 g/dL    RDW 12.4 11.6 - 15.1 %    MPV 12.2 8.9 - 12.7 fL    Platelets 887 649 - 229 Thousands/uL    nRBC 0 /100 WBCs    Neutrophils Relative 46 43 - 75 %    Immat GRANS % 0 0 - 2 %    Lymphocytes Relative 39 14 - 44 %    Monocytes Relative 11 4 - 12 %    Eosinophils Relative 3 0 - 6 %    Basophils Relative 1 0 - 1 %    Neutrophils Absolute 1.72 (L) 1.85 - 7.62 Thousands/µL    Immature Grans Absolute 0.01 0.00 - 0.20 Thousand/uL    Lymphocytes Absolute 1.45 0.60 - 4.47 Thousands/µL    Monocytes Absolute 0.39 0.17 - 1.22 Thousand/µL    Eosinophils Absolute 0.10 0.00 - 0.61 Thousand/µL    Basophils Absolute 0.02 0.00 - 0.10 Thousands/µL   Comprehensive metabolic panel    Collection Time: 09/13/23  8:57 AM   Result Value Ref Range    Sodium 136 135 - 147 mmol/L    Potassium 3.8 3.5 - 5.3 mmol/L    Chloride 104 96 - 108 mmol/L    CO2 26 21 - 32 mmol/L    ANION GAP 6 mmol/L    BUN 17 5 - 25 mg/dL    Creatinine 0.83 0.60 - 1.30 mg/dL    Glucose 85 65 - 140 mg/dL    Calcium 9.0 8.4 - 10.2 mg/dL    AST 12 (L) 13 - 39 U/L    ALT 9 7 - 52 U/L    Alkaline Phosphatase 52 34 - 104 U/L    Total Protein 6.5 6.4 - 8.4 g/dL    Albumin 4.0 3.5 - 5.0 g/dL    Total Bilirubin 0.60 0.20 - 1.00 mg/dL    eGFR 79 ml/min/1.73sq m   HS Troponin 0hr (reflex protocol)    Collection Time: 09/13/23  8:57 AM   Result Value Ref Range    hs TnI 0hr <2 "Refer to ACS Flowchart"- see link ng/L   HS Troponin I 2hr    Collection Time: 09/13/23 10:51 AM   Result Value Ref Range    hs TnI 2hr <2 "Refer to ACS Flowchart"- see link ng/L    Delta 2hr hsTnI     Vitamin B12    Collection Time: 09/14/23 10:08 AM   Result Value Ref Range    Vitamin B-12 847 180 - 914 pg/mL   Vitamin D 25 hydroxy    Collection Time: 09/14/23 10:08 AM   Result Value Ref Range    Vit D, 25-Hydroxy 57.8 30.0 - 100.0 ng/mL   CBC and differential    Collection Time: 09/14/23 10:08 AM   Result Value Ref Range    WBC 4.33 4.31 - 10.16 Thousand/uL    RBC 4.62 3.81 - 5.12 Million/uL    Hemoglobin 12.8 11.5 - 15.4 g/dL    Hematocrit 41.1 34.8 - 46.1 %    MCV 89 82 - 98 fL    MCH 27.7 26.8 - 34.3 pg    MCHC 31.1 (L) 31.4 - 37.4 g/dL    RDW 12.4 11.6 - 15.1 %    MPV 12.8 (H) 8.9 - 12.7 fL    Platelets 216 121 - 863 Thousands/uL    nRBC 0 /100 WBCs    Neutrophils Relative 46 43 - 75 %    Immat GRANS % 0 0 - 2 %    Lymphocytes Relative 41 14 - 44 %    Monocytes Relative 11 4 - 12 %    Eosinophils Relative 1 0 - 6 %    Basophils Relative 1 0 - 1 %    Neutrophils Absolute 2.04 1.85 - 7.62 Thousands/µL    Immature Grans Absolute 0.00 0.00 - 0.20 Thousand/uL    Lymphocytes Absolute 1.76 0.60 - 4.47 Thousands/µL    Monocytes Absolute 0.46 0.17 - 1.22 Thousand/µL    Eosinophils Absolute 0.05 0.00 - 0.61 Thousand/µL    Basophils Absolute 0.02 0.00 - 0.10 Thousands/µL   TIBC Panel (incl. Iron, TIBC, % Iron Saturation)    Collection Time: 09/14/23 10:08 AM   Result Value Ref Range    Iron Saturation 24 15 - 50 %    TIBC 338 250 - 450 ug/dL    Iron 80 50 - 212 ug/dL    UIBC 258 155 - 355 ug/dL   Ferritin    Collection Time: 09/14/23 10:08 AM   Result Value Ref Range    Ferritin 68 11 - 307 ng/mL           Dr. Cash Argueta MD, [facility] [place]      "This note has been constructed using a voice recognition system. Therefore there may be syntax, spelling, and/or grammatical errors.  Please call if you have any questions. "

## 2023-09-14 NOTE — PATIENT INSTRUCTIONS
Iron Deficiency Anemia   WHAT YOU NEED TO KNOW:   What is iron deficiency anemia (STONEY)? STONEY means you have low red blood cell and hemoglobin levels. Hemoglobin is part of red blood cells and helps carry oxygen to your body. Iron helps make hemoglobin. STONEY is caused by a lack of iron in the blood. Blood loss and not enough iron in the foods you eat are the most common causes of low iron. What increases my risk for STONEY? A woman's monthly period    Donating blood more than 5 times a year    Pregnancy and breastfeeding    A vegan diet    NSAIDs such as ibuprofen or aspirin    Trauma or bleeding in your intestines    What are the signs and symptoms of STONEY? Feeling weak, tired, or irritable    Pale skin    Headache, dizziness    Shortness of breath with activity    Fast or uneven heartbeat    Sore or swollen tongue and mouth    Nails that break easily    An urge to eat ice, paint, starch, or dirt    How is STONEY diagnosed? Blood tests  will show how much iron is in your blood and how your body uses the iron. A bowel movement sample  will show any blood in your bowel movement. An endoscopy  may show bleeding in your esophagus or stomach. An endoscope is a bendable tube with a light and camera on the end. It is put into your esophagus through your mouth and throat. A colonoscopy  may show bleeding in your intestines. A scope is put into your rectum. How is STONEY treated? Treatment may take 3 to 6 months. You may need medicines and supplements to increase the amount of iron in your blood. Ask your healthcare provider how much iron you should take each day. A blood transfusion may be needed if your anemia is severe. This will help replace the blood and iron you have lost.  How can I manage my symptoms? Eat foods rich in iron and protein. Nuts, meat, dark leafy green vegetables, and beans are high in iron and protein. Limit milk to 2 cups a day.  The calcium in milk can interfere with how your body absorbs iron. Take the iron supplement with food or a drink that is high in vitamin C. This helps your body absorb the iron. You may need to meet with a dietitian to create the right food plan for you. Drink liquids as directed. Iron supplements may cause constipation. Liquids help prevent constipation. Ask how much liquid to drink each day and which liquids are best for you. When should I seek immediate care? You have dark or bloody bowel movements. You vomit blood. You are too dizzy to stand up. You have trouble swallowing because of the pain in your mouth and throat. When should I call my doctor? You have heartburn, constipation, or diarrhea. You have nausea or are vomiting. You are dizzy or very tired. You have questions or concerns about your condition or care. CARE AGREEMENT:   You have the right to help plan your care. Learn about your health condition and how it may be treated. Discuss treatment options with your healthcare providers to decide what care you want to receive. You always have the right to refuse treatment. The above information is an  only. It is not intended as medical advice for individual conditions or treatments. Talk to your doctor, nurse or pharmacist before following any medical regimen to see if it is safe and effective for you. © Copyright Libia Dailey 2022 Information is for End User's use only and may not be sold, redistributed or otherwise used for commercial purposes.

## 2023-09-14 NOTE — PROGRESS NOTES
Assessment/Plan:   Diagnoses and all orders for this visit:    Numbness and tingling in left arm  Dizziness  - reviewed ER note from 9/13/2023   - reviewed labs, EKG and imaging   - no Cardiac PMHx or FHx  - VSS   - agree with ER recs to have Cardiac testing done as outpt and pt has an appt with Cardio scheduled for tmrw   - advised to get reminder of routine labs done (?Fe deficiency?) - pt will go after OV today   - f/u after labs   - The 10-year ASCVD risk score (Laurita CAMARILLO, et al., 2019) is: 2.2%    Values used to calculate the score:      Age: 52 years      Sex: Female      Is Non- : Yes      Diabetic: No      Tobacco smoker: No      Systolic Blood Pressure: 262 mmHg      Is BP treated: No      HDL Cholesterol: 87 mg/dL      Total Cholesterol: 256 mg/dL    Sinus pressure  - cont Flonase   - advised Zyrtec     Recurrent pain of right knee  -     Ambulatory Referral to Physical Therapy; Future          Subjective:    Patient ID: Tal Gotti is a 52 y.o. female. HPI   49yo F present to the office with her  for ER f/u   - was seen in the ER yesterday for eval of sudden onset of L-arm tingling and weakness   - (+) vertigo and dizzness since yesterday   - "big dizzy spell" when this happened   - "not sure if vertigo or panicking"   - (+) maxillary sinus pressure   - no Cardiac PMHx or FHx  - (+) acute on chronic R-knee pain and would like referral to PT       The following portions of the patient's history were reviewed and updated as appropriate: allergies, current medications, past family history, past medical history, past social history, past surgical history and problem list.    Review of Systems  as per HPI    Objective:  /80   Ht 5' 8" (1.727 m)   Wt 84.8 kg (187 lb)   LMP 10/05/2021 (Exact Date)   BMI 28.43 kg/m²    Physical Exam  Vitals reviewed. Constitutional:       General: She is not in acute distress. Appearance: Normal appearance.  She is not ill-appearing, toxic-appearing or diaphoretic. HENT:      Head: Normocephalic and atraumatic. Right Ear: External ear normal.      Left Ear: External ear normal.      Nose:      Right Sinus: Maxillary sinus tenderness present. No frontal sinus tenderness. Left Sinus: Maxillary sinus tenderness present. No frontal sinus tenderness. Eyes:      General: No scleral icterus. Right eye: No discharge. Left eye: No discharge. Extraocular Movements: Extraocular movements intact. Conjunctiva/sclera: Conjunctivae normal.   Cardiovascular:      Rate and Rhythm: Normal rate and regular rhythm. Heart sounds: Normal heart sounds. No murmur heard. No friction rub. No gallop. Pulmonary:      Effort: Pulmonary effort is normal.      Breath sounds: Normal breath sounds. Abdominal:      Palpations: Abdomen is soft. Musculoskeletal:         General: Normal range of motion. Cervical back: Normal range of motion. Right lower leg: No edema. Left lower leg: No edema. Comments: +5/5 UE b/l and sensation intact    Skin:     General: Skin is warm. Neurological:      General: No focal deficit present. Mental Status: She is alert and oriented to person, place, and time. Psychiatric:         Mood and Affect: Mood is anxious. BMI Counseling: Body mass index is 28.43 kg/m². The BMI is above normal. Nutrition recommendations include 3-5 servings of fruits/vegetables daily. Exercise recommendations include exercising 3-5 times per week.

## 2023-09-15 ENCOUNTER — CONSULT (OUTPATIENT)
Dept: CARDIOLOGY CLINIC | Facility: CLINIC | Age: 53
End: 2023-09-15
Payer: COMMERCIAL

## 2023-09-15 VITALS
DIASTOLIC BLOOD PRESSURE: 80 MMHG | BODY MASS INDEX: 27.58 KG/M2 | OXYGEN SATURATION: 98 % | HEIGHT: 68 IN | SYSTOLIC BLOOD PRESSURE: 92 MMHG | HEART RATE: 72 BPM | WEIGHT: 182 LBS

## 2023-09-15 DIAGNOSIS — E78.00 PURE HYPERCHOLESTEROLEMIA: ICD-10-CM

## 2023-09-15 DIAGNOSIS — R42 DIZZINESS: Primary | ICD-10-CM

## 2023-09-15 LAB
ATRIAL RATE: 68 BPM
P AXIS: 48 DEGREES
PR INTERVAL: 142 MS
QRS AXIS: 0 DEGREES
QRSD INTERVAL: 72 MS
QT INTERVAL: 366 MS
QTC INTERVAL: 389 MS
T WAVE AXIS: 1 DEGREES
VENTRICULAR RATE: 68 BPM

## 2023-09-15 PROCEDURE — 99213 OFFICE O/P EST LOW 20 MIN: CPT | Performed by: INTERNAL MEDICINE

## 2023-09-15 PROCEDURE — 93010 ELECTROCARDIOGRAM REPORT: CPT | Performed by: INTERNAL MEDICINE

## 2023-09-15 PROCEDURE — 93000 ELECTROCARDIOGRAM COMPLETE: CPT | Performed by: INTERNAL MEDICINE

## 2023-09-20 ENCOUNTER — TELEPHONE (OUTPATIENT)
Dept: FAMILY MEDICINE CLINIC | Facility: CLINIC | Age: 53
End: 2023-09-20

## 2023-09-20 NOTE — TELEPHONE ENCOUNTER
----- Message from Maynor Chandler DO sent at 9/19/2023  3:16 PM EDT -----  Nml CBC, Vit D, B12, Ferritin, Fe panel

## 2023-09-22 ENCOUNTER — TELEPHONE (OUTPATIENT)
Age: 53
End: 2023-09-22

## 2023-09-22 DIAGNOSIS — Z12.11 ENCOUNTER FOR COLORECTAL CANCER SCREENING: Primary | ICD-10-CM

## 2023-09-22 DIAGNOSIS — Z12.12 ENCOUNTER FOR COLORECTAL CANCER SCREENING: Primary | ICD-10-CM

## 2023-09-22 RX ORDER — POLYETHYLENE GLYCOL-3350 AND ELECTROLYTES 236; 6.74; 5.86; 2.97; 22.74 G/274.31G; G/274.31G; G/274.31G; G/274.31G; G/274.31G
4 POWDER, FOR SOLUTION ORAL ONCE
Qty: 4000 ML | Refills: 0 | Status: SHIPPED | OUTPATIENT
Start: 2023-09-22 | End: 2023-09-22

## 2023-09-22 NOTE — TELEPHONE ENCOUNTER
Patients GI provider:  Dr. Ruy Palacios    Number to return call: [phone]    Reason for call: Pt called to request prep be changed over to GolyTely, Pt has gastritis and bloats easily, does not do well with DUL/SHEILA. Please send over prescription for GolyTely prep. Instructions sent via [employer].     Scheduled procedure/appointment date if applicable: Procedure 79/70/2870

## 2023-09-25 ENCOUNTER — OFFICE VISIT (OUTPATIENT)
Dept: FAMILY MEDICINE CLINIC | Facility: CLINIC | Age: 53
End: 2023-09-25
Payer: COMMERCIAL

## 2023-09-25 VITALS
WEIGHT: 188 LBS | BODY MASS INDEX: 28.49 KG/M2 | HEIGHT: 68 IN | OXYGEN SATURATION: 99 % | SYSTOLIC BLOOD PRESSURE: 118 MMHG | HEART RATE: 82 BPM | RESPIRATION RATE: 16 BRPM | DIASTOLIC BLOOD PRESSURE: 80 MMHG

## 2023-09-25 DIAGNOSIS — R42 DIZZINESS: ICD-10-CM

## 2023-09-25 DIAGNOSIS — H69.91 ETD (EUSTACHIAN TUBE DYSFUNCTION), RIGHT: ICD-10-CM

## 2023-09-25 DIAGNOSIS — J01.10 ACUTE NON-RECURRENT FRONTAL SINUSITIS: Primary | ICD-10-CM

## 2023-09-25 DIAGNOSIS — R42 VERTIGO: ICD-10-CM

## 2023-09-25 DIAGNOSIS — Z12.11 SCREENING FOR MALIGNANT NEOPLASM OF COLON: ICD-10-CM

## 2023-09-25 PROBLEM — M54.2 NECK AND SHOULDER PAIN: Status: RESOLVED | Noted: 2021-03-22 | Resolved: 2023-09-25

## 2023-09-25 PROBLEM — H60.8X3 CHRONIC REACTIVE OTITIS EXTERNA OF BOTH EARS: Status: RESOLVED | Noted: 2021-04-21 | Resolved: 2023-09-25

## 2023-09-25 PROBLEM — M25.519 NECK AND SHOULDER PAIN: Status: RESOLVED | Noted: 2021-03-22 | Resolved: 2023-09-25

## 2023-09-25 PROBLEM — S63.602A SPRAIN OF LEFT THUMB: Status: RESOLVED | Noted: 2020-02-24 | Resolved: 2023-09-25

## 2023-09-25 PROBLEM — R19.4 CHANGE IN BOWEL HABITS: Status: RESOLVED | Noted: 2017-03-09 | Resolved: 2023-09-25

## 2023-09-25 PROCEDURE — 99214 OFFICE O/P EST MOD 30 MIN: CPT | Performed by: FAMILY MEDICINE

## 2023-09-25 RX ORDER — DEXAMETHASONE 2 MG/1
2 TABLET ORAL 2 TIMES DAILY WITH MEALS
Qty: 6 TABLET | Refills: 0 | Status: SHIPPED | OUTPATIENT
Start: 2023-09-25 | End: 2023-09-28

## 2023-09-25 RX ORDER — DOXYCYCLINE HYCLATE 100 MG
100 TABLET ORAL 2 TIMES DAILY
Qty: 20 TABLET | Refills: 0 | Status: SHIPPED | OUTPATIENT
Start: 2023-09-25 | End: 2023-10-05

## 2023-09-25 RX ORDER — MECLIZINE HCL 12.5 MG/1
12.5 TABLET ORAL EVERY 8 HOURS PRN
Qty: 90 TABLET | Refills: 0 | Status: SHIPPED | OUTPATIENT
Start: 2023-09-25
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
## 2023-09-27 ENCOUNTER — HOSPITAL ENCOUNTER (OUTPATIENT)
Dept: NON INVASIVE DIAGNOSTICS | Facility: HOSPITAL | Age: 53
Discharge: HOME | End: 2023-09-27
Attending: INTERNAL MEDICINE
Payer: COMMERCIAL

## 2023-09-27 VITALS
BODY MASS INDEX: 27.58 KG/M2 | HEIGHT: 68 IN | WEIGHT: 182 LBS | DIASTOLIC BLOOD PRESSURE: 85 MMHG | SYSTOLIC BLOOD PRESSURE: 136 MMHG | HEART RATE: 82 BPM

## 2023-09-27 DIAGNOSIS — R42 DIZZINESS: ICD-10-CM

## 2023-09-27 LAB
GLOBAL LONGITUIDAL STRAIN: -18 %
RA PRESSURE ESTIMATED: 8 MMHG
RV PSP: 30 MMHG
SL CV LV EF: 55
TR MAX PG: 22 MMHG
TR PEAK VELOCITY: 2.4 M/S

## 2023-09-27 PROCEDURE — 93306 TTE W/DOPPLER COMPLETE: CPT | Performed by: INTERNAL MEDICINE

## 2023-09-27 PROCEDURE — 93225 XTRNL ECG REC<48 HRS REC: CPT

## 2023-09-27 PROCEDURE — 93306 TTE W/DOPPLER COMPLETE: CPT

## 2023-09-27 PROCEDURE — 93226 XTRNL ECG REC<48 HR SCAN A/R: CPT

## 2023-09-29 ENCOUNTER — TELEPHONE (OUTPATIENT)
Dept: CARDIOLOGY CLINIC | Facility: CLINIC | Age: 53
End: 2023-09-29

## 2023-09-29 NOTE — TELEPHONE ENCOUNTER
----- Message from Senait Valdes MD sent at 9/28/2023  9:30 AM EDT -----  Please call and inform the patient that the Echocardiogram showed normal pumping function of the heart. No significant valve abnormality was seen.

## 2023-09-29 NOTE — TELEPHONE ENCOUNTER
Patient called asking if she was currently recording - sadly, we cannot access the info. Patient is aware we will await results.

## 2023-10-01 ENCOUNTER — APPOINTMENT (EMERGENCY)
Dept: CT IMAGING | Facility: HOSPITAL | Age: 53
End: 2023-10-01
Payer: COMMERCIAL

## 2023-10-01 ENCOUNTER — HOSPITAL ENCOUNTER (EMERGENCY)
Facility: HOSPITAL | Age: 53
Discharge: HOME | End: 2023-10-01
Attending: EMERGENCY MEDICINE
Payer: COMMERCIAL

## 2023-10-01 VITALS
OXYGEN SATURATION: 98 % | SYSTOLIC BLOOD PRESSURE: 116 MMHG | TEMPERATURE: 98.5 F | DIASTOLIC BLOOD PRESSURE: 57 MMHG | HEART RATE: 71 BPM | RESPIRATION RATE: 16 BRPM

## 2023-10-01 DIAGNOSIS — J32.9 SINUSITIS: Primary | ICD-10-CM

## 2023-10-01 PROCEDURE — 70450 CT HEAD/BRAIN W/O DYE: CPT

## 2023-10-01 PROCEDURE — 99284 EMERGENCY DEPT VISIT MOD MDM: CPT | Performed by: EMERGENCY MEDICINE

## 2023-10-01 PROCEDURE — 99284 EMERGENCY DEPT VISIT MOD MDM: CPT

## 2023-10-01 RX ORDER — METOCLOPRAMIDE 10 MG/1
10 TABLET ORAL ONCE
Status: COMPLETED | OUTPATIENT
Start: 2023-10-01 | End: 2023-10-01

## 2023-10-01 RX ORDER — KETOROLAC TROMETHAMINE 10 MG/1
10 TABLET, FILM COATED ORAL EVERY 6 HOURS PRN
Qty: 6 TABLET | Refills: 0 | Status: SHIPPED | OUTPATIENT
Start: 2023-10-01 | End: 2023-10-04

## 2023-10-01 RX ADMIN — METOCLOPRAMIDE 10 MG: 10 TABLET ORAL at 15:45

## 2023-10-01 NOTE — ED PROVIDER NOTES
History  Chief Complaint   Patient presents with   • Headache     Pt reports headache since Thursday. Currently getting treated for sinus infection. Pt reports relief with motrin but as soon as it wears off headache comes back. Last took motrin @ 115pm today     53 y/o F w no PMH presents w frontal headache and frontal sinus pressure. Currently on doxycycline and dexamethasone prescribed by primary care doctor with some relief. Patient denies any fevers or chills. No nasal congestion. No neck pain. No palpitations. No vision changes. No nausea vomiting. History provided by:  Patient  Headache  Associated symptoms: no back pain, no congestion, no cough, no diarrhea, no ear pain, no eye pain, no fatigue, no fever, no focal weakness, no near-syncope, no neck stiffness, no numbness, no paresthesias, no seizures, no sore throat, no swollen glands, no syncope, no vomiting and no weakness        Prior to Admission Medications   Prescriptions Last Dose Informant Patient Reported? Taking?    Biotin 1 MG CAPS  Self Yes No   Sig: Take by mouth   Cranberry 1000 MG CAPS  Self Yes No   Sig: Take by mouth   Multiple Vitamins-Minerals (CENTRUM SILVER 50+WOMEN PO)  Self Yes No   Omega-3 Fatty Acids (FISH OIL PO)  Self Yes No   Sig: Take by mouth   Vitamin D, Ergocalciferol, 34467 units CAPS  Self Yes No   Sig: Take by mouth   doxycycline hyclate (VIBRA-TABS) 100 mg tablet   No No   Sig: Take 1 tablet (100 mg total) by mouth 2 (two) times a day for 10 days   meclizine (ANTIVERT) 12.5 MG tablet   No No   Sig: Take 1 tablet (12.5 mg total) by mouth every 8 (eight) hours as needed for dizziness   polyethylene glycol (GaviLyte-G) 4000 mL solution   No No   Sig: Take 4,000 mL by mouth once for 1 dose   triamcinolone (KENALOG) 0.5 % ointment  Self No No   Sig: Apply topically 2 (two) times a day   valACYclovir (VALTREX) 1,000 mg tablet  Self No No   Sig: Take 2 tablets (2,000 mg total) by mouth 2 (two) times a day for 2 doses Facility-Administered Medications: None       Past Medical History:   Diagnosis Date   • Bacterial vaginitis    • Fibroids    • Gastritis    • GERD (gastroesophageal reflux disease)    • Insomnia    • Pneumonia        Past Surgical History:   Procedure Laterality Date   •  SECTION     • COLONOSCOPY     • HAND SURGERY Left     Left pinky tendon repair   • HYSTERECTOMY     • SD COLONOSCOPY FLX DX W/COLLJ SPEC WHEN PFRMD N/A 2018    Procedure: COLONOSCOPY;  Surgeon: Vin Wills MD;  [facility]: [facility]; Service: Gastroenterology   • SD CYSTOURETHROSCOPY N/A 2021    Procedure: CYSTOSCOPY;  Surgeon: Marie Hazel DO;  [facility];  Service: Gynecology   • SD ESOPHAGOGASTRODUODENOSCOPY TRANSORAL DIAGNOSTIC N/A 3/31/2017    Procedure: ESOPHAGOGASTRODUODENOSCOPY (EGD); Surgeon: Vin Wills MD;  [facility];   Service: Gastroenterology   • SD LAPS TOTAL HYSTERECT 250 GM/< W/RMVL TUBE/OVARY N/A 2021    Procedure: HYSTERECTOMY LAPAROSCOPIC TOTAL ([address]) WITH BILATERAL SALPINGECTOMY, LYSIS OF ADHESIONS;  Surgeon: Marie Hazel DO;  [facility];  Service: Gynecology       Family History   Problem Relation Age of Onset   • Breast cancer Mother 67   • Dementia Mother    • Hyperlipidemia Mother    • Parkinsonism Mother    • Fibroids Mother    • Prostatitis Father    • Cataracts Father    • Substance Abuse Sister    • COPD Sister    • No Known Problems Sister    • Prostate cancer Brother    • Substance Abuse Brother    • Dementia Maternal Grandmother    • Parkinsonism Maternal Grandmother    • Fibroids Maternal Grandmother    • Premature birth Daughter    • No Known Problems Daughter    • No Known Problems Daughter    • Dementia Maternal Aunt    • Dementia Maternal Aunt    • No Known Problems Maternal Grandfather    • No Known Problems Paternal Grandmother    • No Known Problems Paternal Grandfather    • Hypertension Neg Hx    • Diabetes Neg Hx    • Colon cancer Neg Hx    • Ovarian cancer Neg Hx    • Uterine cancer Neg Hx    • Cervical cancer Neg Hx      I have reviewed and agree with the history as documented. E-Cigarette/Vaping   • E-Cigarette Use Never User      E-Cigarette/Vaping Substances   • Nicotine No    • THC No    • CBD No    • Flavoring No    • Other No    • Unknown No      Social History     Tobacco Use   • Smoking status: Never   • Smokeless tobacco: Never   Vaping Use   • Vaping Use: Never used   Substance Use Topics   • Alcohol use: Yes     Alcohol/week: 3.0 standard drinks of alcohol     Types: 3 Glasses of wine per week     Comment: socially   • Drug use: Never       Review of Systems   Constitutional: Negative for activity change, chills, fatigue and fever. HENT: Negative for congestion, drooling, ear discharge, ear pain and sore throat. Eyes: Negative for pain, discharge and itching. Respiratory: Negative for apnea, cough, choking and chest tightness. Cardiovascular: Negative for syncope and near-syncope. Gastrointestinal: Negative for diarrhea and vomiting. Endocrine: Negative for cold intolerance and heat intolerance. Genitourinary: Negative for difficulty urinating, dyspareunia, dysuria, frequency and hematuria. Musculoskeletal: Negative for back pain and neck stiffness. Allergic/Immunologic: Negative for environmental allergies and food allergies. Neurological: Positive for headaches. Negative for focal weakness, seizures, weakness, numbness and paresthesias. Hematological: Negative for adenopathy. Psychiatric/Behavioral: Negative for agitation, confusion and hallucinations. Physical Exam  Physical Exam  Vitals and nursing note reviewed. Constitutional:       General: She is not in acute distress. Appearance: She is well-developed. HENT:      Head: Normocephalic and atraumatic. Nose: Nose normal. No congestion or rhinorrhea.       Mouth/Throat:      Mouth: Mucous membranes are moist.      Pharynx: No oropharyngeal exudate or posterior oropharyngeal erythema. Eyes:      Extraocular Movements: Extraocular movements intact. Conjunctiva/sclera: Conjunctivae normal.      Pupils: Pupils are equal, round, and reactive to light. Cardiovascular:      Rate and Rhythm: Normal rate and regular rhythm. Heart sounds: No murmur heard. No gallop. Pulmonary:      Effort: Pulmonary effort is normal. No respiratory distress. Breath sounds: Normal breath sounds. No stridor. No wheezing or rhonchi. Abdominal:      General: There is no distension. Palpations: Abdomen is soft. Tenderness: There is no abdominal tenderness. There is no guarding. Hernia: No hernia is present. Musculoskeletal:         General: No swelling. Cervical back: Neck supple. Skin:     General: Skin is warm and dry. Capillary Refill: Capillary refill takes less than 2 seconds. Coloration: Skin is not jaundiced or pale. Findings: No bruising, erythema, lesion or rash. Neurological:      General: No focal deficit present. Mental Status: She is alert and oriented to person, place, and time. Cranial Nerves: No cranial nerve deficit. Sensory: No sensory deficit. Motor: No weakness.    Psychiatric:         Mood and Affect: Mood normal.         Vital Signs  ED Triage Vitals   Temperature Pulse Respirations Blood Pressure SpO2   10/01/23 1431 10/01/23 1431 10/01/23 1431 10/01/23 1431 10/01/23 1431   98.5 °F (36.9 °C) 72 16 117/64 100 %      Temp Source Heart Rate Source Patient Position - Orthostatic VS BP Location FiO2 (%)   10/01/23 1431 10/01/23 1431 10/01/23 1431 10/01/23 1431 --   Oral Monitor Sitting Right arm       Pain Score       10/01/23 1548       4           Vitals:    10/01/23 1431 10/01/23 1549 10/01/23 1704   BP: 117/64 108/76 116/57   Pulse: 72 77 71   Patient Position - Orthostatic VS: Sitting Lying Lying         Visual Acuity      ED Medications  Medications metoclopramide (REGLAN) tablet 10 mg (10 mg Oral Given 10/1/23 2611)       Diagnostic Studies  Results Reviewed     None                 CT head without contrast   Final Result by Timothy Wisdom MD (10/01 1710)      No acute intracranial abnormality. Workstation performed: [id number]                    Procedures  Procedures         ED Course                               SBIRT 20yo+    Flowsheet Row Most Recent Value   Initial Alcohol Screen: US AUDIT-C     1. How often do you have a drink containing alcohol? 0 Filed at: 10/01/2023 1538   2. How many drinks containing alcohol do you have on a typical day you are drinking? 0 Filed at: 10/01/2023 1538   3a. Male UNDER 65: How often do you have five or more drinks on one occasion? 0 Filed at: 10/01/2023 1538   3b. FEMALE Any Age, or MALE 65+: How often do you have 4 or more drinks on one occassion? 0 Filed at: 10/01/2023 1538   Audit-C Score 0 Filed at: 10/01/2023 1538   HERNANDEZ: How many times in the past year have you. .. Used an illegal drug or used a prescription medication for non-medical reasons? Never Filed at: 10/01/2023 1538                    Medical Decision Making  CT head was obtained to evaluate for frontal headache. No signs of intracranial lesion or other acute pathology on CT. Patient is extremely well-appearing my exam.  Likely frontal headache is referred from the frontal sinus infection. Will refer to ENT for further evaluation. Patient agreed with the plan verbalized understanding. Extensive turn precautions provided. Patient discharged in stable condition. Amount and/or Complexity of Data Reviewed  External Data Reviewed: radiology. Radiology: ordered. Risk  Prescription drug management.           Disposition  Final diagnoses:   Sinusitis     Time reflects when diagnosis was documented in both MDM as applicable and the Disposition within this note     Time User Action Codes Description Comment 10/1/2023  6:24 PM Amanda Bell Add [J32.9] Sinusitis       ED Disposition     ED Disposition   Discharge    Condition   Stable    Date/Time   Sun Oct 1, [phone] [facility] discharge to home/self care. Follow-up Information     Follow up With Specialties Details Why Contact Info Additional Information    [facility] Schedule an appointment as soon as possible for a visit   [address] [place] [id number]  [id number] [facility] [address] [place] [address], [address] [place] [address], [address] [place] [address], [id number], [phone]          Patient's Medications   Discharge Prescriptions    KETOROLAC (TORADOL) 10 MG TABLET    Take 1 tablet (10 mg total) by mouth every 6 (six) hours as needed for moderate pain for up to 3 days       Start Date: 10/1/2023 End Date: 10/4/2023       Order Dose: 10 mg       Quantity: 6 tablet    Refills: 0       No discharge procedures on file.     PDMP Review       Value Time User    PDMP Reviewed  Yes 2021  1:09 PM Jamal Morillo DO          ED Provider  Electronically Signed by           Amanda Bell DO  10/01/23 9752

## 2023-10-01 NOTE — Clinical Note
Osvaldo Jean Baptiste was seen and treated in our emergency department on 10/1/2023. Diagnosis:     Ana Paula Tyson  may return to work on return date. She may return on this date: 10/04/2023         If you have any questions or concerns, please don't hesitate to call.       Dionne Archer, DO    ______________________________           _______________          _______________  Hospital Representative                              Date                                Time

## 2023-10-01 NOTE — Clinical Note
Naye Merlos was seen and treated in our emergency department on 10/1/2023. Diagnosis:     Jemal Montoya  may return to work on return date. She may return on this date: 10/04/2023         If you have any questions or concerns, please don't hesitate to call.       Luther Weaver, DO    ______________________________           _______________          _______________  Hospital Representative                              Date                                Time

## 2023-10-02 ENCOUNTER — TELEPHONE (OUTPATIENT)
Age: 53
End: 2023-10-02

## 2023-10-02 NOTE — TELEPHONE ENCOUNTER
Please inform patient that her last colonoscopy was in 2018 and Dr. Edil Russo recommended a repeat in 5 years due to the prep not being completely perfect and small polyps could have been missed. I would recommend she keep her appointment for colonoscopy this week.

## 2023-10-02 NOTE — TELEPHONE ENCOUNTER
Patients GI provider:  Dr. Apolonia Powell    Number to return call: [phone]    Reason for call: Pt calling stating that she is scheduled Friday for colonoscopy however her PCP, Dr. Arnoldo August states he doesn't know why she is having it at 5 yrs. He thinks she is ok with 10yrs. She would like to know if Dr. Apolonia Powell thinks it's ok to go 10 or she should keep appointment on Friday.   Please return her call    Scheduled procedure/appointment date if applicable: 29/3/48

## 2023-10-03 NOTE — TELEPHONE ENCOUNTER
Tried to return pts call 3 x, states number not in service. Will send message through my chart so she can see reply.

## 2023-10-05 ENCOUNTER — TELEPHONE (OUTPATIENT)
Age: 53
End: 2023-10-05

## 2023-10-05 NOTE — TELEPHONE ENCOUNTER
Patients GI provider:  Dr. Stanislaw Juares    Number to return call: [phone]    Reason for call: Pt calling to go over prep instructions. I informed pt what she needs to do w/ Golytely prep and told her she needs 2 5mg dulcolax tablets. Pt understood instructions and was going to pharmacy to get the dulcolax. Pt also confirmed she has not eaten & will not eat any solid foods.     Scheduled procedure/appointment date if applicable:  Procedure 12/7/14

## 2023-10-06 ENCOUNTER — HOSPITAL ENCOUNTER (OUTPATIENT)
Dept: GASTROENTEROLOGY | Facility: AMBULARY SURGERY CENTER | Age: 53
Setting detail: OUTPATIENT SURGERY
End: 2023-10-06
Attending: INTERNAL MEDICINE
Payer: COMMERCIAL

## 2023-10-06 ENCOUNTER — ANESTHESIA (OUTPATIENT)
Dept: GASTROENTEROLOGY | Facility: AMBULARY SURGERY CENTER | Age: 53
End: 2023-10-06

## 2023-10-06 ENCOUNTER — ANESTHESIA EVENT (OUTPATIENT)
Dept: GASTROENTEROLOGY | Facility: AMBULARY SURGERY CENTER | Age: 53
End: 2023-10-06

## 2023-10-06 VITALS
DIASTOLIC BLOOD PRESSURE: 65 MMHG | TEMPERATURE: 97 F | HEART RATE: 65 BPM | RESPIRATION RATE: 20 BRPM | OXYGEN SATURATION: 100 % | BODY MASS INDEX: 27.34 KG/M2 | WEIGHT: 180.4 LBS | HEIGHT: 68 IN | SYSTOLIC BLOOD PRESSURE: 119 MMHG

## 2023-10-06 DIAGNOSIS — Z12.11 SCREENING FOR COLON CANCER: ICD-10-CM

## 2023-10-06 RX ORDER — PROPOFOL 10 MG/ML
INJECTION, EMULSION INTRAVENOUS AS NEEDED
Status: DISCONTINUED | OUTPATIENT
Start: 2023-10-06 | End: 2023-10-06

## 2023-10-06 RX ORDER — LIDOCAINE HYDROCHLORIDE 10 MG/ML
INJECTION, SOLUTION EPIDURAL; INFILTRATION; INTRACAUDAL; PERINEURAL AS NEEDED
Status: DISCONTINUED | OUTPATIENT
Start: 2023-10-06 | End: 2023-10-06

## 2023-10-06 RX ORDER — SODIUM CHLORIDE, SODIUM LACTATE, POTASSIUM CHLORIDE, CALCIUM CHLORIDE 600; 310; 30; 20 MG/100ML; MG/100ML; MG/100ML; MG/100ML
INJECTION, SOLUTION INTRAVENOUS CONTINUOUS PRN
Status: DISCONTINUED | OUTPATIENT
Start: 2023-10-06 | End: 2023-10-06

## 2023-10-06 RX ADMIN — Medication 40 MG: at 12:28

## 2023-10-06 RX ADMIN — PROPOFOL 50 MG: 10 INJECTION, EMULSION INTRAVENOUS at 12:25

## 2023-10-06 RX ADMIN — PROPOFOL 30 MG: 10 INJECTION, EMULSION INTRAVENOUS at 12:31

## 2023-10-06 RX ADMIN — PROPOFOL 50 MG: 10 INJECTION, EMULSION INTRAVENOUS at 12:21

## 2023-10-06 RX ADMIN — PROPOFOL 100 MG: 10 INJECTION, EMULSION INTRAVENOUS at 12:19

## 2023-10-06 RX ADMIN — PROPOFOL 50 MG: 10 INJECTION, EMULSION INTRAVENOUS at 12:27

## 2023-10-06 RX ADMIN — SODIUM CHLORIDE, SODIUM LACTATE, POTASSIUM CHLORIDE, AND CALCIUM CHLORIDE: .6; .31; .03; .02 INJECTION, SOLUTION INTRAVENOUS at 11:50

## 2023-10-06 RX ADMIN — PROPOFOL 50 MG: 10 INJECTION, EMULSION INTRAVENOUS at 12:20

## 2023-10-06 RX ADMIN — PROPOFOL 50 MG: 10 INJECTION, EMULSION INTRAVENOUS at 12:23

## 2023-10-06 RX ADMIN — LIDOCAINE HYDROCHLORIDE 50 MG: 10 INJECTION, SOLUTION EPIDURAL; INFILTRATION; INTRACAUDAL; PERINEURAL at 12:19

## 2023-10-06 NOTE — ANESTHESIA PREPROCEDURE EVALUATION
Procedure:  COLONOSCOPY    Relevant Problems   ANESTHESIA (within normal limits)   (-) History of anesthesia complications      CARDIO   (-) Chest pain   (-) CHAN (dyspnea on exertion)      GI/HEPATIC   (+) GERD (gastroesophageal reflux disease)      PULMONARY  being treated for sinus infection, some residual nasal congestion, no fever/SOB/cough   (-) Shortness of breath        Physical Exam    Airway    Mallampati score: II  TM Distance: >3 FB  Neck ROM: full     Dental       Cardiovascular      Pulmonary      Other Findings        Anesthesia Plan  ASA Score- 1     Anesthesia Type- IV sedation with anesthesia with ASA Monitors. Additional Monitors:   Airway Plan:           Plan Factors-Exercise tolerance (METS): >4 METS. Chart reviewed. EKG reviewed. Existing labs reviewed. Patient summary reviewed. Induction- intravenous. Postoperative Plan-     Informed Consent- Anesthetic plan and risks discussed with patient. I personally reviewed this patient with the CRNA. Discussed and agreed on the Anesthesia Plan with the CRNA. Vidhi Christie

## 2023-10-06 NOTE — H&P
History and Physical - [facility]  Roldan Vega 52 y.o. female MRN: [id number]    HPI: Roldan Vega is a 52y.o. year old female who presents for colon cancer screening. Review of Systems    Historical Information   Past Medical History:   Diagnosis Date   • Bacterial vaginitis    • Fibroids    • Gastritis    • GERD (gastroesophageal reflux disease)    • Insomnia    • Pneumonia      Past Surgical History:   Procedure Laterality Date   •  SECTION     • COLONOSCOPY     • HAND SURGERY Left     Left pinky tendon repair   • HYSTERECTOMY     • SD COLONOSCOPY FLX DX W/COLLJ SPEC WHEN PFRMD N/A 2018    Procedure: COLONOSCOPY;  Surgeon: Jacob Sheehan MD;  [facility]; Service: Gastroenterology   • SD CYSTOURETHROSCOPY N/A 2021    Procedure: CYSTOSCOPY;  Surgeon: Solange Andrade DO;  [facility];  Service: Gynecology   • SD ESOPHAGOGASTRODUODENOSCOPY TRANSORAL DIAGNOSTIC N/A 3/31/2017    Procedure: ESOPHAGOGASTRODUODENOSCOPY (EGD); Surgeon: Jacob Sheehan MD;  [facility];   Service: Gastroenterology   • SD LAPS TOTAL HYSTERECT 250 GM/< W/RMVL TUBE/OVARY N/A 2021    Procedure: HYSTERECTOMY LAPAROSCOPIC TOTAL ([address]) WITH BILATERAL SALPINGECTOMY, LYSIS OF ADHESIONS;  Surgeon: Solange Andrade DO;  [facility];  Service: Gynecology     Social History   Social History     Substance and Sexual Activity   Alcohol Use Yes   • Alcohol/week: 3.0 standard drinks of alcohol   • Types: 3 Glasses of wine per week    Comment: socially     Social History     Substance and Sexual Activity   Drug Use Never     Social History     Tobacco Use   Smoking Status Never   Smokeless Tobacco Never     Family History   Problem Relation Age of Onset   • Breast cancer Mother 67   • Dementia Mother    • Hyperlipidemia Mother    • Parkinsonism Mother    • Fibroids Mother    • Prostatitis Father    • Cataracts Father    • Substance Abuse Sister    • COPD Sister    • No Known Problems Sister    • Prostate cancer Brother    • Substance Abuse Brother    • Dementia Maternal Grandmother    • Parkinsonism Maternal Grandmother    • Fibroids Maternal Grandmother    • Premature birth Daughter    • No Known Problems Daughter    • No Known Problems Daughter    • Dementia Maternal Aunt    • Dementia Maternal Aunt    • No Known Problems Maternal Grandfather    • No Known Problems Paternal Grandmother    • No Known Problems Paternal Grandfather    • Hypertension Neg Hx    • Diabetes Neg Hx    • Colon cancer Neg Hx    • Ovarian cancer Neg Hx    • Uterine cancer Neg Hx    • Cervical cancer Neg Hx        Meds/Allergies     (Not in a hospital admission)      Allergies   Allergen Reactions   • Amoxicillin Itching       Objective     LMP 10/05/2021 (Exact Date)       PHYSICAL EXAM    Gen: NAD  CV: RRR  CHEST: Clear  ABD: soft, NT/ND  EXT: no edema  Neuro: AAO      ASSESSMENT/PLAN:  This is a 52y.o. year old female here for colon cancer screening. Patient was explained about the risks and benefits of the procedure. Risks including but not limited to bleeding, infection, perforation were explained in detail. PLAN:   Procedure: Colonoscopy.

## 2023-10-09 ENCOUNTER — TELEPHONE (OUTPATIENT)
Dept: CARDIOLOGY CLINIC | Facility: CLINIC | Age: 53
End: 2023-10-09

## 2023-10-09 NOTE — TELEPHONE ENCOUNTER
----- Message from Daylin Irwin MD sent at 10/6/2023  2:39 PM EDT -----  Please call and inform patient that the Holter monitor was reviewed. This did not reveal any significant abnormality or arrhythmia  The symptoms correlated with normal rhythm and no abnormality on the monitor. No immediate treatment or action is needed.   Will discuss further at next office visit

## 2023-11-03 ENCOUNTER — TELEPHONE (OUTPATIENT)
Dept: FAMILY MEDICINE CLINIC | Facility: CLINIC | Age: 53
End: 2023-11-03

## 2023-11-07 NOTE — TELEPHONE ENCOUNTER
Reason for call:     Patient calling again, called last week, patient requesting refill for trazadone was last sent to the pharmacy on 5/10/23 with 1 refill. Med not on current active list. Please review and advise patient. [x] Refill   [] Prior Auth  [] Other:     Office:   [x] PCP/Provider - Dr Deidre Shultz  [] Specialty/Provider -     Medication:   Trazadone 50 mg 1 hs, 30       [facility]: [facility]    Does the patient have enough for 3 days?    [] Yes   [x] No - Send as HP to POD

## 2023-11-09 DIAGNOSIS — G47.00 INSOMNIA, UNSPECIFIED TYPE: Primary | ICD-10-CM

## 2023-11-09 RX ORDER — TRAZODONE HYDROCHLORIDE 50 MG/1
50 TABLET ORAL
Qty: 30 TABLET | Refills: 1 | Status: SHIPPED | OUTPATIENT
Start: 2023-11-09

## 2023-11-09 NOTE — TELEPHONE ENCOUNTER
Patient is now out of medication - told her I would send another message to provider and see if we can figure out best way for her to get a refill. Please advise.

## 2023-11-11 ENCOUNTER — OFFICE VISIT (OUTPATIENT)
Dept: URGENT CARE | Facility: CLINIC | Age: 53
End: 2023-11-11
Payer: COMMERCIAL

## 2023-11-11 VITALS
HEIGHT: 68 IN | SYSTOLIC BLOOD PRESSURE: 116 MMHG | OXYGEN SATURATION: 97 % | WEIGHT: 186 LBS | HEART RATE: 91 BPM | RESPIRATION RATE: 14 BRPM | BODY MASS INDEX: 28.19 KG/M2 | DIASTOLIC BLOOD PRESSURE: 62 MMHG | TEMPERATURE: 97.6 F

## 2023-11-11 DIAGNOSIS — J06.9 VIRAL URI WITH COUGH: Primary | ICD-10-CM

## 2023-11-11 LAB — S PYO AG THROAT QL: NEGATIVE

## 2023-11-11 PROCEDURE — 87880 STREP A ASSAY W/OPTIC: CPT | Performed by: PHYSICIAN ASSISTANT

## 2023-11-11 PROCEDURE — 99213 OFFICE O/P EST LOW 20 MIN: CPT | Performed by: PHYSICIAN ASSISTANT

## 2023-11-11 NOTE — PROGRESS NOTES
[facility]        NAME: Mendez Humphries is a 52 y.o. female  : 1970    MRN: [id number]  DATE: 2023  TIME: 8:59 AM    Assessment and Plan   Viral URI with cough [J06.9]  1. Viral URI with cough  POCT rapid strepA      Pt presents with sore throat concerning for strep, rapid strep in clinic is negative. Symptoms and exam consistent with viral URI, recommend continued conservative measures. Patient Instructions     Patient Instructions   Take Motrin and Tylenol as needed for any body aches or fever. Continue Mucinex. Recommend antihistamine, Flonase, or Sudafed for nasal congestion. Salt water gargles, tea with lemon, or over the counter throat lozenges for sore throat. Follow-up with PCP if symptoms persist for 7+ days. Report to the emergency department if you develop severe headache, fever not controlled with Motrin or Tylenol, or shortness of breath or chest pain. Chief Complaint     Chief Complaint   Patient presents with    Sore Throat     Worsening sore throat x 2 days. Negative covid test yesterday. History of Present Illness       52year old female presents with complaint of runny nose, sinus congestion, sore throat, chest congestion, and cough x 2 days. Pt denies fever, chills, chest pain and shortness of breath. Notes her  was recently ill. Taking Mucinex with mild relief. She had a negative COVID test at work yesterday. Sore Throat   Associated symptoms include congestion and coughing. Pertinent negatives include no diarrhea, shortness of breath or vomiting. Review of Systems   Review of Systems   Constitutional:  Negative for chills, fatigue and fever. HENT:  Positive for congestion, postnasal drip, rhinorrhea and sore throat. Respiratory:  Positive for cough. Negative for chest tightness and shortness of breath. Cardiovascular:  Negative for chest pain.    Gastrointestinal:  Negative for diarrhea, nausea and vomiting. Musculoskeletal:  Positive for myalgias.          Current Medications       Current Outpatient Medications:     Biotin 1 MG CAPS, Take by mouth, Disp: , Rfl:     Cranberry 1000 MG CAPS, Take by mouth, Disp: , Rfl:     ketorolac (TORADOL) 10 mg tablet, Take 1 tablet (10 mg total) by mouth every 6 (six) hours as needed for moderate pain for up to 3 days, Disp: 6 tablet, Rfl: 0    meclizine (ANTIVERT) 12.5 MG tablet, Take 1 tablet (12.5 mg total) by mouth every 8 (eight) hours as needed for dizziness, Disp: 90 tablet, Rfl: 0    montelukast (SINGULAIR) 10 mg tablet, Take 1 tablet (10 mg total) by mouth daily at bedtime, Disp: 30 tablet, Rfl: 1    Multiple Vitamins-Minerals (CENTRUM SILVER 50+WOMEN PO), , Disp: , Rfl:     Omega-3 Fatty Acids (FISH OIL PO), Take by mouth, Disp: , Rfl:     polyethylene glycol (GaviLyte-G) 4000 mL solution, Take 4,000 mL by mouth once for 1 dose, Disp: 4000 mL, Rfl: 0    traZODone (DESYREL) 50 mg tablet, Take 1 tablet (50 mg total) by mouth daily at bedtime, Disp: 30 tablet, Rfl: 1    triamcinolone (KENALOG) 0.5 % ointment, Apply topically 2 (two) times a day, Disp: 30 g, Rfl: 0    valACYclovir (VALTREX) 1,000 mg tablet, Take 2 tablets (2,000 mg total) by mouth 2 (two) times a day for 2 doses, Disp: 4 tablet, Rfl: 0    Vitamin D, Ergocalciferol, 09288 units CAPS, Take by mouth, Disp: , Rfl:     Current Allergies     Allergies as of 11/11/2023 - Reviewed 11/11/2023   Allergen Reaction Noted    Amoxicillin Itching 11/03/2021            The following portions of the patient's history were reviewed and updated as appropriate: allergies, current medications, past family history, past medical history, past social history, past surgical history and problem list.     Past Medical History:   Diagnosis Date    Bacterial vaginitis     Fibroids     Gastritis     GERD (gastroesophageal reflux disease)     Insomnia     Migraine     Otitis media     Pneumonia 2009       Past Surgical History: Procedure Laterality Date     SECTION      COLONOSCOPY      HAND SURGERY Left     Left pinky tendon repair    HYSTERECTOMY      WA COLONOSCOPY FLX DX W/COLLJ SPEC WHEN PFRMD N/A 2018    Procedure: COLONOSCOPY;  Surgeon: Wilman Davis MD;  [facility]; Service: Gastroenterology    WA CYSTOURETHROSCOPY N/A 2021    Procedure: CYSTOSCOPY;  Surgeon: Sandy Mota DO;  [facility];  Service: Gynecology    WA ESOPHAGOGASTRODUODENOSCOPY TRANSORAL DIAGNOSTIC N/A 3/31/2017    Procedure: ESOPHAGOGASTRODUODENOSCOPY (EGD); Surgeon: Wilman Davis MD;  [facility]; Service: Gastroenterology    WA LAPS TOTAL HYSTERECT 250 GM/< W/RMVL TUBE/OVARY N/A 2021    Procedure: HYSTERECTOMY LAPAROSCOPIC TOTAL ([address]) WITH BILATERAL SALPINGECTOMY, LYSIS OF ADHESIONS;  Surgeon: Sadny Mota DO;  [facility];  Service: Gynecology       Family History   Problem Relation Age of Onset    Breast cancer Mother 67    Dementia Mother     Hyperlipidemia Mother     Parkinsonism Mother     Fibroids Mother     Prostatitis Father     Cataracts Father     Substance Abuse Sister     COPD Sister     No Known Problems Sister     Prostate cancer Brother     Substance Abuse Brother     Dementia Maternal Grandmother     Parkinsonism Maternal Grandmother     Fibroids Maternal Grandmother     Premature birth Daughter     No Known Problems Daughter     No Known Problems Daughter     Dementia Maternal Aunt     Dementia Maternal Aunt     No Known Problems Maternal Grandfather     No Known Problems Paternal Grandmother     No Known Problems Paternal Grandfather     Hypertension Neg Hx     Diabetes Neg Hx     Colon cancer Neg Hx     Ovarian cancer Neg Hx     Uterine cancer Neg Hx     Cervical cancer Neg Hx          Medications have been verified.         Objective   /62   Pulse 91   Temp 97.6 °F (36.4 °C)   Resp 14   Ht 5' 8" (1.727 m)   Wt 84.4 kg (186 lb)   LMP 10/05/2021 (Exact Date)   SpO2 97%   BMI 28.28 kg/m²   Patient's last menstrual period was 10/05/2021 (exact date). Physical Exam     Physical Exam  Vitals and nursing note reviewed. Constitutional:       General: She is not in acute distress. Appearance: Normal appearance. She is not ill-appearing or toxic-appearing. HENT:      Head: Normocephalic and atraumatic. Jaw: No trismus. Right Ear: Tympanic membrane, ear canal and external ear normal. There is no impacted cerumen. No foreign body. Left Ear: Tympanic membrane, ear canal and external ear normal. There is no impacted cerumen. No foreign body. Nose: Mucosal edema, congestion and rhinorrhea present. No nasal deformity. Rhinorrhea is clear. Right Nostril: No foreign body, epistaxis or occlusion. Left Nostril: No foreign body, epistaxis or occlusion. Right Turbinates: Swollen. Not enlarged or pale. Left Turbinates: Not enlarged, swollen or pale. Mouth/Throat:      Lips: Pink. No lesions. Mouth: Mucous membranes are moist. No injury, oral lesions or angioedema. Dentition: Normal dentition. Tongue: No lesions. Tongue does not deviate from midline. Palate: No mass and lesions. Pharynx: Uvula midline. Pharyngeal swelling and posterior oropharyngeal erythema present. No oropharyngeal exudate or uvula swelling. Tonsils: No tonsillar exudate or tonsillar abscesses. Comments: Mild erythema and swelling of the posterior oropharynx. Eyes:      General: Lids are normal. Gaze aligned appropriately. No allergic shiner. Extraocular Movements: Extraocular movements intact. Cardiovascular:      Rate and Rhythm: Normal rate and regular rhythm. Heart sounds: Normal heart sounds, S1 normal and S2 normal. Heart sounds not distant. No murmur heard. No friction rub. No gallop. Pulmonary:      Effort: Pulmonary effort is normal.      Breath sounds: Normal breath sounds.  No decreased breath sounds, wheezing, rhonchi or rales. Comments: Patient speaking in full sentences with no increased respiratory effort. No audible wheezing or stridor. Lymphadenopathy:      Cervical: Cervical adenopathy present. Right cervical: No superficial, deep or posterior cervical adenopathy. Left cervical: Superficial cervical adenopathy present. No deep or posterior cervical adenopathy. Skin:     General: Skin is warm and dry. Neurological:      Mental Status: She is alert and oriented to person, place, and time. Coordination: Coordination is intact. Gait: Gait is intact. Psychiatric:         Attention and Perception: Attention and perception normal.         Mood and Affect: Mood and affect normal.         Speech: Speech normal.         Behavior: Behavior is cooperative. Note: Portions of this record may have been created with voice recognition software. Occasional wrong word or "sound a like" substitutions may have occurred due to the inherent limitations of voice recognition software. Please read the chart carefully and recognize, using context, where substitutions have occurred. *

## 2023-11-11 NOTE — PATIENT INSTRUCTIONS
Take Motrin and Tylenol as needed for any body aches or fever. Continue Mucinex. Recommend antihistamine, Flonase, or Sudafed for nasal congestion. Salt water gargles, tea with lemon, or over the counter throat lozenges for sore throat. Follow-up with PCP if symptoms persist for 7+ days. Report to the emergency department if you develop severe headache, fever not controlled with Motrin or Tylenol, or shortness of breath or chest pain.

## 2023-11-14 ENCOUNTER — OFFICE VISIT (OUTPATIENT)
Dept: FAMILY MEDICINE CLINIC | Facility: CLINIC | Age: 53
End: 2023-11-14
Payer: COMMERCIAL

## 2023-11-14 VITALS
BODY MASS INDEX: 30.05 KG/M2 | OXYGEN SATURATION: 98 % | WEIGHT: 187 LBS | HEIGHT: 66 IN | SYSTOLIC BLOOD PRESSURE: 110 MMHG | TEMPERATURE: 97.9 F | DIASTOLIC BLOOD PRESSURE: 60 MMHG | HEART RATE: 92 BPM

## 2023-11-14 DIAGNOSIS — J01.41 ACUTE RECURRENT PANSINUSITIS: Primary | ICD-10-CM

## 2023-11-14 PROCEDURE — 99213 OFFICE O/P EST LOW 20 MIN: CPT | Performed by: FAMILY MEDICINE

## 2023-11-14 RX ORDER — SULFAMETHOXAZOLE AND TRIMETHOPRIM 800; 160 MG/1; MG/1
1 TABLET ORAL EVERY 12 HOURS SCHEDULED
Qty: 20 TABLET | Refills: 0 | Status: SHIPPED | OUTPATIENT
Start: 2023-11-14 | End: 2023-11-24

## 2023-11-14 NOTE — PROGRESS NOTES
Assessment/Plan:   Diagnoses and all orders for this visit:    Acute recurrent pansinusitis  -     sulfamethoxazole-trimethoprim (BACTRIM DS) 800-160 mg per tablet; Take 1 tablet by mouth every 12 (twelve) hours for 10 days  - cont Flonase, Singulair, Mucinex   - hot steam in shower   - f/u PRN           Subjective:    Patient ID: Jonah Marti is a 52 y.o. female. HPI  - received Flu vaccine on 11/5/2023   - last Friday with sore throat, cough   - went to UC - NEG strep, cont with Mucinex   - tested COVID NEG at work   - currently sinus are "hurting/burning", throat is getting better   - mucinex makes it worse   - denies F/C/N/V/  - feels like this has been ongoing since 9/25/2023 - s/p course of Doxy 100mg BID [id number], ER visit on 10/1/2023, ENT eval on 10/26/2023 and UC visit on 11/11/2023       The following portions of the patient's history were reviewed and updated as appropriate: allergies, current medications, past family history, past medical history, past social history, past surgical history and problem list.    Review of Systems  as per HPI    Objective:  /60   Pulse 92   Temp 97.9 °F (36.6 °C)   Ht 5' 6" (1.676 m)   Wt 84.8 kg (187 lb)   LMP 10/05/2021 (Exact Date)   SpO2 98%   BMI 30.18 kg/m²    Physical Exam  Vitals reviewed. Constitutional:       General: She is not in acute distress. Appearance: Normal appearance. She is ill-appearing. She is not toxic-appearing or diaphoretic. HENT:      Head: Normocephalic and atraumatic. Right Ear: External ear normal. A middle ear effusion is present. Left Ear: External ear normal. A middle ear effusion is present. Nose: Congestion present. Right Sinus: Maxillary sinus tenderness and frontal sinus tenderness present. Left Sinus: Maxillary sinus tenderness and frontal sinus tenderness present. Mouth/Throat:      Mouth: Mucous membranes are dry.       Pharynx: No oropharyngeal exudate or posterior oropharyngeal erythema. Eyes:      General: No scleral icterus. Right eye: No discharge. Left eye: No discharge. Extraocular Movements: Extraocular movements intact. Conjunctiva/sclera: Conjunctivae normal.   Cardiovascular:      Rate and Rhythm: Normal rate and regular rhythm. Heart sounds: Normal heart sounds. Pulmonary:      Effort: Pulmonary effort is normal.      Breath sounds: Normal breath sounds. Musculoskeletal:         General: Normal range of motion. Cervical back: Normal range of motion. Skin:     General: Skin is warm. Neurological:      General: No focal deficit present. Mental Status: She is alert and oriented to person, place, and time.    Psychiatric:         Mood and Affect: Mood normal.         Behavior: Behavior normal.

## 2023-12-04 ENCOUNTER — OFFICE VISIT (OUTPATIENT)
Dept: FAMILY MEDICINE CLINIC | Facility: CLINIC | Age: 53
End: 2023-12-04
Payer: COMMERCIAL

## 2023-12-04 VITALS
OXYGEN SATURATION: 96 % | BODY MASS INDEX: 29.89 KG/M2 | HEIGHT: 66 IN | DIASTOLIC BLOOD PRESSURE: 72 MMHG | HEART RATE: 79 BPM | WEIGHT: 186 LBS | SYSTOLIC BLOOD PRESSURE: 118 MMHG

## 2023-12-04 DIAGNOSIS — R09.82 PND (POST-NASAL DRIP): Primary | ICD-10-CM

## 2023-12-04 PROCEDURE — 99213 OFFICE O/P EST LOW 20 MIN: CPT | Performed by: FAMILY MEDICINE

## 2023-12-04 NOTE — PROGRESS NOTES
Assessment/Plan:   Diagnoses and all orders for this visit:    PND (post-nasal drip)  - cont Flonase   - add OTC Zyrtec/Claritin/Allegra and Mucinex (w/o "D")   - cool mist humidifier  - f/u PRN           Subjective:    Patient ID: Teagan Beckett is a 52 y.o. female. HPI  49yo F presents to the office for eval of cough  - was treated with Bactrim DS on 11/14/2023 for acute recurrent sinusitis   - has been using Chloraseptic   - has been using Flonase in the AM and PM   - feels like fluid in her ears   - (+) "clearing throat cough"   - denies F/C/N/V/sinus pressure/CP/SOB/wheezing       The following portions of the patient's history were reviewed and updated as appropriate: allergies, current medications, past family history, past medical history, past social history, past surgical history and problem list.    Review of Systems  as per HPI    Objective:  /72   Pulse 79   Ht 5' 6" (1.676 m)   Wt 84.4 kg (186 lb)   LMP 10/05/2021 (Exact Date)   SpO2 96%   BMI 30.02 kg/m²    Physical Exam  Vitals reviewed. Constitutional:       General: She is not in acute distress. Appearance: Normal appearance. She is not ill-appearing, toxic-appearing or diaphoretic. HENT:      Head: Normocephalic and atraumatic. Right Ear: External ear normal. No swelling or tenderness. A middle ear effusion is present. Left Ear: External ear normal. No swelling or tenderness. A middle ear effusion is present. Nose: Congestion present. Mouth/Throat:      Mouth: Mucous membranes are moist.      Pharynx: Oropharynx is clear. No oropharyngeal exudate or posterior oropharyngeal erythema. Eyes:      General: No scleral icterus. Right eye: No discharge. Left eye: No discharge. Extraocular Movements: Extraocular movements intact. Conjunctiva/sclera: Conjunctivae normal.   Cardiovascular:      Rate and Rhythm: Normal rate and regular rhythm. Heart sounds: Normal heart sounds. Pulmonary:      Effort: Pulmonary effort is normal. No respiratory distress. Breath sounds: Normal breath sounds. No stridor. No wheezing, rhonchi or rales. Abdominal:      Palpations: Abdomen is soft. Musculoskeletal:         General: Normal range of motion. Cervical back: Normal range of motion. Skin:     General: Skin is warm. Neurological:      General: No focal deficit present. Mental Status: She is alert and oriented to person, place, and time.    Psychiatric:         Mood and Affect: Mood normal.         Behavior: Behavior normal.

## 2023-12-15 ENCOUNTER — HOSPITAL ENCOUNTER (EMERGENCY)
Facility: HOSPITAL | Age: 53
Discharge: HOME | End: 2023-12-15
Attending: EMERGENCY MEDICINE
Payer: COMMERCIAL

## 2023-12-15 ENCOUNTER — TELEPHONE (OUTPATIENT)
Age: 53
End: 2023-12-15

## 2023-12-15 ENCOUNTER — APPOINTMENT (EMERGENCY)
Dept: RADIOLOGY | Facility: HOSPITAL | Age: 53
End: 2023-12-15
Payer: COMMERCIAL

## 2023-12-15 ENCOUNTER — NURSE TRIAGE (OUTPATIENT)
Age: 53
End: 2023-12-15

## 2023-12-15 VITALS
OXYGEN SATURATION: 99 % | HEART RATE: 73 BPM | TEMPERATURE: 97.8 F | RESPIRATION RATE: 15 BRPM | DIASTOLIC BLOOD PRESSURE: 60 MMHG | SYSTOLIC BLOOD PRESSURE: 106 MMHG

## 2023-12-15 DIAGNOSIS — R07.9 CHEST PAIN, UNSPECIFIED TYPE: Primary | ICD-10-CM

## 2023-12-15 LAB
ALBUMIN SERPL BCP-MCNC: 4.2 G/DL (ref 3.5–5)
ALP SERPL-CCNC: 55 U/L (ref 34–104)
ALT SERPL W P-5'-P-CCNC: 12 U/L (ref 7–52)
ANION GAP SERPL CALCULATED.3IONS-SCNC: 8 MMOL/L
AST SERPL W P-5'-P-CCNC: 12 U/L (ref 13–39)
BASOPHILS # BLD MANUAL: 0.06 THOUSAND/UL (ref 0–0.1)
BASOPHILS NFR MAR MANUAL: 1 % (ref 0–1)
BILIRUB SERPL-MCNC: 0.38 MG/DL (ref 0.2–1)
BUN SERPL-MCNC: 18 MG/DL (ref 5–25)
CALCIUM SERPL-MCNC: 9.2 MG/DL (ref 8.4–10.2)
CARDIAC TROPONIN I PNL SERPL HS: <2 NG/L
CHLORIDE SERPL-SCNC: 102 MMOL/L (ref 96–108)
CO2 SERPL-SCNC: 26 MMOL/L (ref 21–32)
CREAT SERPL-MCNC: 0.83 MG/DL (ref 0.6–1.3)
EOSINOPHIL # BLD MANUAL: 0.25 THOUSAND/UL (ref 0–0.4)
EOSINOPHIL NFR BLD MANUAL: 4 % (ref 0–6)
ERYTHROCYTE [DISTWIDTH] IN BLOOD BY AUTOMATED COUNT: 12.7 % (ref 11.6–15.1)
GFR SERPL CREATININE-BSD FRML MDRD: 79 ML/MIN/1.73SQ M
GLUCOSE SERPL-MCNC: 86 MG/DL (ref 65–140)
HCT VFR BLD AUTO: 38.5 % (ref 34.8–46.1)
HGB BLD-MCNC: 12.4 G/DL (ref 11.5–15.4)
LG PLATELETS BLD QL SMEAR: PRESENT
LYMPHOCYTES # BLD AUTO: 2.67 THOUSAND/UL (ref 0.6–4.47)
LYMPHOCYTES # BLD AUTO: 42 % (ref 14–44)
MCH RBC QN AUTO: 29 PG (ref 26.8–34.3)
MCHC RBC AUTO-ENTMCNC: 32.2 G/DL (ref 31.4–37.4)
MCV RBC AUTO: 90 FL (ref 82–98)
MONOCYTES # BLD AUTO: 0.38 THOUSAND/UL (ref 0–1.22)
MONOCYTES NFR BLD: 6 % (ref 4–12)
NEUTROPHILS # BLD MANUAL: 2.98 THOUSAND/UL (ref 1.85–7.62)
NEUTS SEG NFR BLD AUTO: 47 % (ref 43–75)
PLATELET # BLD AUTO: 253 THOUSANDS/UL (ref 149–390)
PLATELET BLD QL SMEAR: ADEQUATE
PMV BLD AUTO: 11.6 FL (ref 8.9–12.7)
POLYCHROMASIA BLD QL SMEAR: PRESENT
POTASSIUM SERPL-SCNC: 3.6 MMOL/L (ref 3.5–5.3)
PROT SERPL-MCNC: 7.3 G/DL (ref 6.4–8.4)
RBC # BLD AUTO: 4.28 MILLION/UL (ref 3.81–5.12)
RBC MORPH BLD: NORMAL
SODIUM SERPL-SCNC: 136 MMOL/L (ref 135–147)
WBC # BLD AUTO: 6.35 THOUSAND/UL (ref 4.31–10.16)

## 2023-12-15 PROCEDURE — 99285 EMERGENCY DEPT VISIT HI MDM: CPT | Performed by: EMERGENCY MEDICINE

## 2023-12-15 PROCEDURE — 80053 COMPREHEN METABOLIC PANEL: CPT

## 2023-12-15 PROCEDURE — 85027 COMPLETE CBC AUTOMATED: CPT

## 2023-12-15 PROCEDURE — 84484 ASSAY OF TROPONIN QUANT: CPT

## 2023-12-15 PROCEDURE — 99285 EMERGENCY DEPT VISIT HI MDM: CPT

## 2023-12-15 PROCEDURE — 71045 X-RAY EXAM CHEST 1 VIEW: CPT

## 2023-12-15 PROCEDURE — 85007 BL SMEAR W/DIFF WBC COUNT: CPT

## 2023-12-15 PROCEDURE — 93005 ELECTROCARDIOGRAM TRACING: CPT

## 2023-12-15 PROCEDURE — 36415 COLL VENOUS BLD VENIPUNCTURE: CPT

## 2023-12-15 NOTE — TELEPHONE ENCOUNTER
Pt was calling to see if she had an active lab for lipid panel in chart, advised she did through her cardiologist.

## 2023-12-15 NOTE — TELEPHONE ENCOUNTER
Patient called with c/o chest pain 2/10. Started 4 to 5 days ago Patient stated she has gastritis and usually when she belches the pain goes away this time the pain has remained present on  left  side under breast.Patient denies sob or wheezing. Patient advised to go to ER for further evaluation. Patient on route to [facility].

## 2023-12-15 NOTE — TELEPHONE ENCOUNTER
Reason for Disposition   Chest pain lasting longer than 5 minutes and occurred in last 3 days (72 hours) (Exception: feels exactly the same as previously diagnosed heartburn and has accompanying sour taste in mouth)    Answer Assessment - Initial Assessment Questions  1. LOCATION: "Where does it hurt?"        Left side under breast and on side   2. RADIATION: "Does the pain go anywhere else?" (e.g., into neck, jaw, arms, back)      no  3. ONSET: "When did the chest pain begin?" (Minutes, hours or days)       Three of four days ago,  4. PATTERN "Does the pain come and go, or has it been constant since it started?"  "Does it get worse with exertion?"       Comes and goes ,pain has been constant in the area for the entire day   5. DURATION: "How long does it last" (e.g., seconds, minutes, hours)      Second to minutes until able to belch     6. SEVERITY: "How bad is the pain?"  (e.g., Scale 1-10; mild, moderate, or severe)     - MILD (1-3): doesn't interfere with normal activities      - MODERATE (4-7): interferes with normal activities or awakens from sleep     - SEVERE (8-10): excruciating pain, unable to do any normal activities       1 -2  7. CARDIAC RISK FACTORS: "Do you have any history of heart problems or risk factors for heart disease?" (e.g., angina, prior heart attack; diabetes, high blood pressure, high cholesterol, smoker, or strong family history of heart disease)      Border line high cholesterol   8. PULMONARY RISK FACTORS: "Do you have any history of lung disease?"  (e.g., blood clots in lung, asthma, emphysema, birth control pills)      Birth control pills   9. CAUSE: "What do you think is causing the chest pain?"     Possible gastritis ,pain not present at this time  10. OTHER SYMPTOMS: "Do you have any other symptoms?" (e.g., dizziness, nausea, vomiting, sweating, fever, difficulty breathing, cough)        Nausea yesterday   11.  PREGNANCY: "Is there any chance you are pregnant?" "When was your last menstrual period?"        no    Protocols used: Chest Pain-ADULT-[place]

## 2023-12-15 NOTE — ED PROVIDER NOTES
History  Chief Complaint   Patient presents with    Chest Pain     Pt presents to the ed with right sided chest pain that started 3-4 four days ago, no meds pta, reports sharp pain,      52-year-old female with past medical history of GERD, vertigo presents to the ED for evaluation of heaviness in the left side of her chest and left arm feeling heavy for the past 3 hours. She reports sharp, stabbing chest pain that occurred this morning but has also experienced similar episodes twice daily for the past week. She reports these episodes are associated with belching, nausea and resolves once she massages her mid chest. Today, she denies any fever, chills, headaches, nausea, vomiting, abdominal pain. She denies any facial drooping, dysarthria, dysarthria, gait disturbances, weakness in upper or lower extremity. Chest Pain  Associated symptoms: no abdominal pain, no cough, no fever, no headache, no nausea, no shortness of breath and not vomiting        Prior to Admission Medications   Prescriptions Last Dose Informant Patient Reported? Taking?    Biotin 1 MG CAPS  Self Yes No   Sig: Take by mouth   Cranberry 1000 MG CAPS  Self Yes No   Sig: Take by mouth   Multiple Vitamins-Minerals (CENTRUM SILVER 50+WOMEN PO)  Self Yes No   Omega-3 Fatty Acids (FISH OIL PO)  Self Yes No   Sig: Take by mouth   Vitamin D, Ergocalciferol, 22049 units CAPS  Self Yes No   Sig: Take by mouth   ketorolac (TORADOL) 10 mg tablet   No No   Sig: Take 1 tablet (10 mg total) by mouth every 6 (six) hours as needed for moderate pain for up to 3 days   meclizine (ANTIVERT) 12.5 MG tablet   No No   Sig: Take 1 tablet (12.5 mg total) by mouth every 8 (eight) hours as needed for dizziness   montelukast (SINGULAIR) 10 mg tablet   No No   Sig: Take 1 tablet (10 mg total) by mouth daily at bedtime   polyethylene glycol (GaviLyte-G) 4000 mL solution   No No   Sig: Take 4,000 mL by mouth once for 1 dose   traZODone (DESYREL) 50 mg tablet   No No   Sig: Take 1 tablet (50 mg total) by mouth daily at bedtime   triamcinolone (KENALOG) 0.5 % ointment  Self No No   Sig: Apply topically 2 (two) times a day   valACYclovir (VALTREX) 1,000 mg tablet  Self No No   Sig: Take 2 tablets (2,000 mg total) by mouth 2 (two) times a day for 2 doses      Facility-Administered Medications: None       Past Medical History:   Diagnosis Date    Bacterial vaginitis     Fibroids     Gastritis     GERD (gastroesophageal reflux disease)     Insomnia     Migraine     Otitis media     Pneumonia        Past Surgical History:   Procedure Laterality Date     SECTION      COLONOSCOPY      HAND SURGERY Left     Left pinky tendon repair    HYSTERECTOMY      UT COLONOSCOPY FLX DX W/COLLJ SPEC WHEN PFRMD N/A 2018    Procedure: COLONOSCOPY;  Surgeon: Nuvia Ferguson MD;  Location: [facility]; Service: Gastroenterology    UT CYSTOURETHROSCOPY N/A 2021    Procedure: CYSTOSCOPY;  Surgeon: Lien Elizabeth DO;  Location[facility];  Service: Gynecology    UT ESOPHAGOGASTRODUODENOSCOPY TRANSORAL DIAGNOSTIC N/A 3/31/2017    Procedure: ESOPHAGOGASTRODUODENOSCOPY (EGD); Surgeon: Nuvia Ferguson MD;  [facility];   Service: Gastroenterology    UT LAPS TOTAL HYSTERECT 250 GM/< W/RMVL TUBE/OVARY N/A 2021    Procedure: HYSTERECTOMY LAPAROSCOPIC TOTAL ([address]) WITH BILATERAL SALPINGECTOMY, LYSIS OF ADHESIONS;  Surgeon: Lien Elizabeth DO;  [facility];  Service: Gynecology       Family History   Problem Relation Age of Onset    Breast cancer Mother 67    Dementia Mother     Hyperlipidemia Mother     Parkinsonism Mother     Fibroids Mother     Prostatitis Father     Cataracts Father     Substance Abuse Sister     COPD Sister     No Known Problems Sister     Prostate cancer Brother     Substance Abuse Brother     Dementia Maternal Grandmother     Parkinsonism Maternal Grandmother     Fibroids Maternal Grandmother     Premature birth Daughter     No Known Problems Daughter     No Known Problems Daughter     Dementia Maternal Aunt     Dementia Maternal Aunt     No Known Problems Maternal Grandfather     No Known Problems Paternal Grandmother     No Known Problems Paternal Grandfather     Hypertension Neg Hx     Diabetes Neg Hx     Colon cancer Neg Hx     Ovarian cancer Neg Hx     Uterine cancer Neg Hx     Cervical cancer Neg Hx      I have reviewed and agree with the history as documented. E-Cigarette/Vaping    E-Cigarette Use Never User      E-Cigarette/Vaping Substances    Nicotine No     THC No     CBD No     Flavoring No     Other No     Unknown No      Social History     Tobacco Use    Smoking status: Never    Smokeless tobacco: Never   Vaping Use    Vaping status: Never Used   Substance Use Topics    Alcohol use: Yes     Alcohol/week: 3.0 standard drinks of alcohol     Types: 3 Glasses of wine per week     Comment: socially    Drug use: Never        Review of Systems   Constitutional:  Negative for chills and fever. HENT:  Negative for sore throat. Respiratory:  Negative for cough and shortness of breath. Cardiovascular:  Positive for chest pain. Gastrointestinal:  Negative for abdominal pain, constipation, diarrhea, nausea and vomiting. Genitourinary:  Negative for dysuria and hematuria. Neurological:  Negative for headaches. Physical Exam  ED Triage Vitals [12/15/23 1820]   Temperature Pulse Respirations Blood Pressure SpO2   97.8 °F (36.6 °C) 84 18 124/63 99 %      Temp Source Heart Rate Source Patient Position - Orthostatic VS BP Location FiO2 (%)   Oral -- Lying Right arm --      Pain Score       --             Orthostatic Vital Signs  Vitals:    12/15/23 1820 12/15/23 1900 12/15/23 1930   BP: 124/63 114/54 104/51   Pulse: 84 77 72   Patient Position - Orthostatic VS: Lying         Physical Exam  Constitutional:       General: She is not in acute distress. Appearance: Normal appearance. She is not ill-appearing.    HENT:      Head: Normocephalic and atraumatic. Mouth/Throat:      Mouth: Mucous membranes are moist.      Pharynx: Oropharynx is clear. Eyes:      Extraocular Movements: Extraocular movements intact. Pupils: Pupils are equal, round, and reactive to light. Cardiovascular:      Rate and Rhythm: Normal rate and regular rhythm. Pulmonary:      Effort: Pulmonary effort is normal.      Breath sounds: Normal breath sounds. Abdominal:      General: Abdomen is flat. Palpations: Abdomen is soft. Tenderness: There is no abdominal tenderness. Skin:     General: Skin is warm and dry. Capillary Refill: Capillary refill takes less than 2 seconds. Neurological:      General: No focal deficit present. Mental Status: She is alert and oriented to person, place, and time. Cranial Nerves: Cranial nerves 2-12 are intact. No cranial nerve deficit or dysarthria. Motor: Motor function is intact. Coordination: Coordination is intact. Gait: Gait is intact. ED Medications  Medications - No data to display    Diagnostic Studies  Results Reviewed       Procedure Component Value Units Date/Time    CBC and differential [[id number]]  (Normal) Collected: 12/15/23 1853    Lab Status: Final result Specimen: Blood from Arm, Left Updated: 12/15/23 1942     WBC 6.35 Thousand/uL      RBC 4.28 Million/uL      Hemoglobin 12.4 g/dL      Hematocrit 38.5 %      MCV 90 fL      MCH 29.0 pg      MCHC 32.2 g/dL      RDW 12.7 %      MPV 11.6 fL      Platelets 144 Thousands/uL     Narrative: This is an appended report. These results have been appended to a previously verified report.     Manual Differential(PHLEBS Do Not Order) [[id number]] Collected: 12/15/23 1853    Lab Status: Final result Specimen: Blood from Arm, Left Updated: 12/15/23 1942     Segmented % 47 %      Lymphocytes % 42 %      Monocytes % 6 %      Eosinophils, % 4 %      Basophils % 1 %      Absolute Neutrophils 2.98 Thousand/uL Lymphocytes Absolute 2.67 Thousand/uL      Monocytes Absolute 0.38 Thousand/uL      Eosinophils Absolute 0.25 Thousand/uL      Basophils Absolute 0.06 Thousand/uL      Total Counted --     RBC Morphology Normal     Platelet Estimate Adequate     Large Platelet Present     Polychromasia Present    RBC Morphology Reflex Test [[id number]] Collected: 12/15/23 1853    Lab Status:  In process Specimen: Blood from Arm, Left Updated: 12/15/23 1942    HS Troponin 0hr (reflex protocol) [[id number]]  (Normal) Collected: 12/15/23 1853    Lab Status: Final result Specimen: Blood from Arm, Left Updated: 12/15/23 1919     hs TnI 0hr <2 ng/L     HS Troponin I 2hr [[id number]]     Lab Status: No result Specimen: Blood     Comprehensive metabolic panel [[id number]]  (Abnormal) Collected: 12/15/23 1853    Lab Status: Final result Specimen: Blood from Arm, Left Updated: 12/15/23 1915     Sodium 136 mmol/L      Potassium 3.6 mmol/L      Chloride 102 mmol/L      CO2 26 mmol/L      ANION GAP 8 mmol/L      BUN 18 mg/dL      Creatinine 0.83 mg/dL      Glucose 86 mg/dL      Calcium 9.2 mg/dL      AST 12 U/L      ALT 12 U/L      Alkaline Phosphatase 55 U/L      Total Protein 7.3 g/dL      Albumin 4.2 g/dL      Total Bilirubin 0.38 mg/dL      eGFR 79 ml/min/1.73sq m     Narrative:      [facility] Disease (CKD):     Stage 1 with normal or high GFR (GFR > 90 mL/min/1.73 square meters)    Stage 2 Mild CKD (GFR = 60-89 mL/min/1.73 square meters)    Stage 3A Moderate CKD (GFR = 45-59 mL/min/1.73 square meters)    Stage 3B Moderate CKD (GFR = 30-44 mL/min/1.73 square meters)    Stage 4 Severe CKD (GFR = 15-29 mL/min/1.73 square meters)    Stage 5 End Stage CKD (GFR <15 mL/min/1.73 square meters)  Note: GFR calculation is accurate only with a steady state creatinine                   X-ray chest 1 view portable   ED Interpretation by Lesvia Armas DO (12/15 1904)   No pneumonia or pneumothorax Procedures  Procedures      ED Course  ED Course as of 12/15/23 1955   Fri Dec 15, 2023   5787 X-ray chest 1 view portable  No acute cardiopulmonary process, per my independent interpretation. 1904 CBC and differential  WNL   1931 Comprehensive metabolic panel(!)  WNL. 1931 HS Troponin 0hr (reflex protocol)  <2                             SBIRT 20yo+      Flowsheet Row Most Recent Value   Initial Alcohol Screen: US AUDIT-C     1. How often do you have a drink containing alcohol? 0 Filed at: 12/15/2023 1819   2. How many drinks containing alcohol do you have on a typical day you are drinking? 0 Filed at: 12/15/2023 1819   3a. Male UNDER 65: How often do you have five or more drinks on one occasion? 0 Filed at: 12/15/2023 1819   3b. FEMALE Any Age, or MALE 65+: How often do you have 4 or more drinks on one occassion? 0 Filed at: 12/15/2023 1819   Audit-C Score 0 Filed at: 12/15/2023 1819   HERNANDEZ: How many times in the past year have you. .. Used an illegal drug or used a prescription medication for non-medical reasons? Never Filed at: 12/15/2023 1819            Wells' Criteria for PE      Flowsheet Row Most Recent Value   Wells' Criteria for PE    Clinical signs and symptoms of DVT 0 Filed at: 12/15/2023 2090   PE is primary diagnosis or equally likely 0 Filed at: 12/15/2023 1854   HR >100 0 Filed at: 12/15/2023 1854   Immobilization at least 3 days or Surgery in the previous 4 weeks 0 Filed at: 12/15/2023 1854   Previous, objectively diagnosed PE or DVT 0 Filed at: 12/15/2023 1854   Hemoptysis 0 Filed at: 12/15/2023 1854   Malignancy with treatment within 6 months or palliative 0 Filed at: 12/15/2023 1854   Wells' Criteria Total 0 Filed at: 12/15/2023 8050 [facility] Making  64-year-old female with past medical history of GERD, vertigo presents to the ED for evaluation of heaviness in the left side of her chest and left arm feeling heavy for the past 3 hours. Vital stable.  EKG without any ST segment changes. Differential diagnoses include ACS, GERD, PUD, pneumonia. Low suspicion for PE, as patient's Wells score is 0. CXR without any acute cardiopulmonary process per my independent rotation. 0 hr troponin <2, low suspicion for ACS event. Patient was stable for discharge, was advised to follow-up with PCP. Return precaution discussed. Amount and/or Complexity of Data Reviewed  Labs: ordered. Decision-making details documented in ED Course. Radiology: ordered and independent interpretation performed. Decision-making details documented in ED Course. Disposition  Final diagnoses:   Chest pain, unspecified type     Time reflects when diagnosis was documented in both MDM as applicable and the Disposition within this note       Time User Action Codes Description Comment    12/15/2023  7:52 PM Fredtha [place] Add [R07.9] Chest pain, unspecified type           ED Disposition       ED Disposition   Discharge    Condition   Stable    Date/Time   Fri Dec 15, 2023 [facility] discharge to home/self care. Follow-up Information       Follow up With Specialties Details Why Contact Info Additional [address] [facility],[address] Emergency Department Emergency Medicine Go to  If symptoms worsen [address] [place] [id number]  [address] [facility] Emergency Department, [address], [address] [facility] Geovanny Brown DO Family Medicine Schedule an appointment as soon as possible for a visit in 1 week For follow up [address]  [phone]               Patient's Medications   Discharge Prescriptions    No medications on file     No discharge procedures on file. PDMP Review         Value Time User    PDMP Reviewed  Yes 11/8/2021  1:09 PM Neri Bajwa DO             ED Provider  Attending physically available and evaluated Jonah Marti.  I managed the patient along with the ED Attending.     Electronically Signed by           Brad Dumont DO  12/15/23 1955

## 2023-12-17 LAB
ATRIAL RATE: 82 BPM
ATRIAL RATE: 82 BPM
ATRIAL RATE: 83 BPM
P AXIS: 109 DEGREES
P AXIS: 60 DEGREES
P AXIS: 63 DEGREES
PR INTERVAL: 146 MS
PR INTERVAL: 146 MS
PR INTERVAL: 154 MS
QRS AXIS: -15 DEGREES
QRS AXIS: -19 DEGREES
QRS AXIS: 187 DEGREES
QRSD INTERVAL: 66 MS
QRSD INTERVAL: 66 MS
QRSD INTERVAL: 68 MS
QT INTERVAL: 352 MS
QTC INTERVAL: 411 MS
QTC INTERVAL: 411 MS
QTC INTERVAL: 413 MS
T WAVE AXIS: -5 DEGREES
T WAVE AXIS: -9 DEGREES
T WAVE AXIS: 179 DEGREES
VENTRICULAR RATE: 82 BPM
VENTRICULAR RATE: 82 BPM
VENTRICULAR RATE: 83 BPM

## 2023-12-18 NOTE — ED ATTENDING ATTESTATION
12/15/2023  I, Srinivas Carrion DO, saw and evaluated the patient. I have discussed the patient with the resident/non-physician practitioner and agree with the resident's/non-physician practitioner's findings, Plan of Care, and MDM as documented in the resident's/non-physician practitioner's note, except where noted. All available labs and Radiology studies were reviewed.  I was present for key portions of any procedure(s) performed by the resident/non-physician practitioner and I was immediately available to provide assistance.       At this point I agree with the current assessment done in the Emergency Department.  I have conducted an independent evaluation of this patient a history and physical is as follows:  This is a 53-year-old female who presents to the emergency department complaining of left-sided chest pain.  States she has had pain for the last 3 days and has gotten worse over the last 3 hours.  She called her primary care physician to complain about her GERD symptoms, but was told to come to the emergency department.  She denies fevers or chills.  She denies leg pain or swelling.  She denies nausea or vomiting.  On exam she is not tachycardic or febrile.  She is not hypoxic.  The patient had lab work that was significant for a troponin of less than 2 and a white count of 6.35.  She continues to be well-appearing on exam and will be discharged with follow-up to cardiology.  ED Course  ED Course as of 12/18/23 1636   Fri Dec 15, 2023   1904 The patient had a chest x-ray that was independently interpreted by me that shows no pneumonia or pneumothorax.         Critical Care Time  Procedures

## 2023-12-31 LAB
CHOLEST SERPL-MCNC: 230 MG/DL (ref 100–199)
HDLC SERPL-MCNC: 78 MG/DL
LDLC SERPL CALC-MCNC: 141 MG/DL (ref 0–99)
SL AMB VLDL CHOLESTEROL CALC: 11 MG/DL (ref 5–40)
TRIGL SERPL-MCNC: 62 MG/DL (ref 0–149)

## 2024-01-02 ENCOUNTER — TELEPHONE (OUTPATIENT)
Dept: CARDIOLOGY CLINIC | Facility: CLINIC | Age: 54
End: 2024-01-02

## 2024-01-02 NOTE — TELEPHONE ENCOUNTER
----- Message from Sosa Barkley MD sent at 1/2/2024 10:46 AM EST -----  Please call and inform patient that the blood test showed slight improvement in her bad cholesterol which is down to 141 from 157.  Continue low-cholesterol diet  Would recommend low-dose medication/statin if patient is agreeable

## 2024-01-02 NOTE — TELEPHONE ENCOUNTER
Patient returned phone call.  Patient would like to stick with what she is doing and not pursue any medication as of right now.

## 2024-01-10 ENCOUNTER — TELEPHONE (OUTPATIENT)
Dept: CARDIOLOGY CLINIC | Facility: CLINIC | Age: 54
End: 2024-01-10

## 2024-01-10 NOTE — TELEPHONE ENCOUNTER
Naty and I'm calling to see if you give the COVID shot at doctor office trying to find the location to get my shot soon as possible. My call back number is [phone]. Thank you.

## 2024-01-16 NOTE — PROGRESS NOTES
Progress Note - Cardiology Office  [facility]    Naty Olivas 53 y.o. female MRN: [id number]  : 1970  Encounter: [id number]      ASSESSMENT:  Seen in ED on 2023 with left arm tingling and heaviness along with headache     History of dizziness/vertigo on meclizine     Hyperlipidemia  2022: , TG 60, HDL 87  Since then patient has adjusted her diet and has been exercising  2023: , TG 62, HDL 78, normal AST and ALT    48-hour Holter, 2023:   Overall normal 24 Holter monitor.  The patient's average heart rate was 75 bpm.  The minimum heart rate was 52 bpm at 4:03 AM.  The maximum heart rate was 154 bpm at 6 AM.  PVC burden 0%.  Supraventricular ectopy burden 0.3%. no major supraventricular or ventricular arrhythmias noted.  The patient's symptoms of chest pain and tired correlated with normal sinus rhythm and controlled heart rate.    TTE, 2023:  EF 55%, GLS -18%  Mild MR, TR and MA.  RSVP 30 mmHg     RECOMMENDATIONS:  Low-cholesterol diet  Dietary consultation  Regular cardiovascular exercise for 20 to 30 minutes daily  Weight loss  Consider low-dose statin.  Patient prefers working with diet to bring down her cholesterol.  She is also started on an over-the-counter cholesterol vitamin  Recheck lipid profile in 6 months  Avoid dehydration  Compression stockings, knee-high, 20-30 mm compression to be worn regularly during the day        Please call [phone] if any questions.    HPI :     Naty Olivas is a 53 y.o. year old female who came for follow up.  She generally feels better but complains of some abdominal discomfort and bloating and gases.  She is planning to see GI.  We discussed her elevated LDL which is still high at 141.  She states that she has started on a cholesterol vitamin over-the-counter and prefers not to go on a statin.  She requested a dietary consultation which I made for her.  We will recheck her  labs in 6 months    REVIEW OF SYSTEMS:  Review of Systems   Gastrointestinal:  Positive for abdominal distention and abdominal pain.   All other systems reviewed and are negative.        Historical Information   Past Medical History:   Diagnosis Date    Bacterial vaginitis     Fibroids     Gastritis     GERD (gastroesophageal reflux disease)     Insomnia     Migraine     Otitis media     Pneumonia      Past Surgical History:   Procedure Laterality Date     SECTION      COLONOSCOPY      HAND SURGERY Left     Left pinky tendon repair    HYSTERECTOMY      PA COLONOSCOPY FLX DX W/COLLJ SPEC WHEN PFRMD N/A 2018    Procedure: COLONOSCOPY;  Surgeon: Crystal Lacey MD;  [facility];  Service: Gastroenterology    PA CYSTOURETHROSCOPY N/A 2021    Procedure: CYSTOSCOPY;  Surgeon: Alysia Osorio DO;  [facility];  Service: Gynecology    PA ESOPHAGOGASTRODUODENOSCOPY TRANSORAL DIAGNOSTIC N/A 3/31/2017    Procedure: ESOPHAGOGASTRODUODENOSCOPY (EGD);  Surgeon: Crystal Lacey MD;  [facility];  Service: Gastroenterology    PA LAPS TOTAL HYSTERECT 250 GM/< W/RMVL TUBE/OVARY N/A 2021    Procedure: HYSTERECTOMY LAPAROSCOPIC TOTAL (LTH) WITH BILATERAL SALPINGECTOMY, LYSIS OF ADHESIONS;  Surgeon: Alysia Osorio DO;  [facility];  Service: Gynecology     Social History     Substance and Sexual Activity   Alcohol Use Yes    Alcohol/week: 3.0 standard drinks of alcohol    Types: 3 Glasses of wine per week    Comment: socially     Social History     Substance and Sexual Activity   Drug Use Never     Social History     Tobacco Use   Smoking Status Never   Smokeless Tobacco Never     Family History:   Family History   Problem Relation Age of Onset    Breast cancer Mother 72    Dementia Mother     Hyperlipidemia Mother     Parkinsonism Mother     Fibroids Mother     Prostatitis Father     Cataracts Father     Substance Abuse Sister     COPD Sister     No Known Problems  Sister     Prostate cancer Brother     Substance Abuse Brother     Dementia Maternal Grandmother     Parkinsonism Maternal Grandmother     Fibroids Maternal Grandmother     Premature birth Daughter     No Known Problems Daughter     No Known Problems Daughter     Dementia Maternal Aunt     Dementia Maternal Aunt     No Known Problems Maternal Grandfather     No Known Problems Paternal Grandmother     No Known Problems Paternal Grandfather     Hypertension Neg Hx     Diabetes Neg Hx     Colon cancer Neg Hx     Ovarian cancer Neg Hx     Uterine cancer Neg Hx     Cervical cancer Neg Hx        Meds/Allergies     Allergies   Allergen Reactions    Amoxicillin Itching       Current Outpatient Medications:     Biotin 1 MG CAPS, Take by mouth, Disp: , Rfl:     Cranberry 1000 MG CAPS, Take by mouth, Disp: , Rfl:     meclizine (ANTIVERT) 12.5 MG tablet, Take 1 tablet (12.5 mg total) by mouth every 8 (eight) hours as needed for dizziness, Disp: 90 tablet, Rfl: 0    montelukast (SINGULAIR) 10 mg tablet, Take 1 tablet (10 mg total) by mouth daily at bedtime, Disp: 30 tablet, Rfl: 1    Multiple Vitamins-Minerals (CENTRUM SILVER 50+WOMEN PO), , Disp: , Rfl:     Omega-3 Fatty Acids (FISH OIL PO), Take by mouth, Disp: , Rfl:     traZODone (DESYREL) 50 mg tablet, Take 1 tablet (50 mg total) by mouth daily at bedtime, Disp: 30 tablet, Rfl: 1    triamcinolone (KENALOG) 0.5 % ointment, Apply topically 2 (two) times a day, Disp: 30 g, Rfl: 0    Vitamin D, Ergocalciferol, 12166 units CAPS, Take by mouth, Disp: , Rfl:     ketorolac (TORADOL) 10 mg tablet, Take 1 tablet (10 mg total) by mouth every 6 (six) hours as needed for moderate pain for up to 3 days, Disp: 6 tablet, Rfl: 0    polyethylene glycol (GaviLyte-G) 4000 mL solution, Take 4,000 mL by mouth once for 1 dose, Disp: 4000 mL, Rfl: 0    valACYclovir (VALTREX) 1,000 mg tablet, Take 2 tablets (2,000 mg total) by mouth 2 (two) times a day for 2 doses, Disp: 4 tablet, Rfl: 0    Vitals:  "Blood pressure 114/80, pulse 81, height 5' 6\" (1.676 m), weight 82.6 kg (182 lb), last menstrual period 10/05/2021, SpO2 99%, not currently breastfeeding.    Body mass index is 29.38 kg/m².  Vitals:    01/17/24 0757   Weight: 82.6 kg (182 lb)     BP Readings from Last 3 Encounters:   01/17/24 114/80   12/15/23 106/60   12/04/23 118/72       Physical Exam:  Physical Exam    Neurologic:  Alert & oriented x 3, no new focal deficits, Not in any acute distress,  Constitutional:  Well developed, well nourished, non-toxic appearance   Eyes:  Pupil equal and reacting to light, conjunctiva normal,   HENT:  Atraumatic, oropharynx moist, Neck- normal range of motion, no tenderness,  Neck supple, No JVP, No LNP   Respiratory:  Bilateral air entry, mostly clear to auscultation  Cardiovascular: S1-S2 regular rhythm  GI:  Soft, nondistended, normal bowel sounds, nontender, no hepatosplenomegaly appreciated.  Musculoskeletal:  no tenderness, no deformities.   Skin:  Well hydrated, no rash   Lymphatic:  No lymphadenopathy noted   Extremities:  No edema and distal pulses are present        Cardiac testing:     Results for orders placed during the hospital encounter of 09/27/23    Echo complete w/ contrast if indicated    Interpretation Summary    Left Ventricle: Left ventricular cavity size is normal. Wall thickness is normal. The left ventricular ejection fraction is 55% by visual estimation.. Systolic function is normal. Global longitudinal strain is normal at -18%. Wall motion is normal. Diastolic function is normal.    Mitral Valve: There is mild regurgitation.    Tricuspid Valve: There is mild regurgitation. The right ventricular systolic pressure is normal. The estimated right ventricular systolic pressure is 30.00 mmHg.    Pulmonic Valve: There is mild regurgitation.        Results for orders placed during the hospital encounter of 09/27/23    Holter monitor    Interpretation Summary  Holter Monitor Report    Date of Report: " "9/27/2023    Please see narrative summary for full details- scanned in EMR    Final physician interpretation:  Overall normal 24 Holter monitor.  The patient's average heart rate was 75 bpm.  The minimum heart rate was 52 bpm at 4:03 AM.  The maximum heart rate was 154 bpm at 6 AM.  PVC burden 0%.  Supraventricular ectopy burden 0.3%. no major supraventricular or ventricular arrhythmias noted.  The patient's symptoms of chest pain and tired correlated with normal sinus rhythm and controlled heart rate.    Interpretation: Isaias Miles MD      Imaging:  Chest X-Ray:   No Chest XR results available for this patient.    CT-scan of the chest:     No CTA results available for this patient.  Lab Review   Lab Results   Component Value Date    WBC 6.35 12/15/2023    HGB 12.4 12/15/2023    HCT 38.5 12/15/2023    MCV 90 12/15/2023    RDW 12.7 12/15/2023     12/15/2023     BMP:  Lab Results   Component Value Date    SODIUM 136 12/15/2023    K 3.6 12/15/2023     12/15/2023    CO2 26 12/15/2023    BUN 18 12/15/2023    CREATININE 0.83 12/15/2023    GLUC 86 12/15/2023    GLUF 79 12/23/2022    CALCIUM 9.2 12/15/2023    EGFR 79 12/15/2023     LFT:  Lab Results   Component Value Date    AST 12 (L) 12/15/2023    ALT 12 12/15/2023    ALKPHOS 55 12/15/2023    TP 7.3 12/15/2023    ALB 4.2 12/15/2023      No components found for: \"TSH3\"  Lab Results   Component Value Date    DFO1EKDVMYLN 0.901 12/23/2022     Lab Results   Component Value Date    HGBA1C 4.5 10/19/2021     Lipid Profile:   Lab Results   Component Value Date    CHOLESTEROL 230 (H) 12/30/2023    HDL 78 12/30/2023    LDLCALC 141 (H) 12/30/2023    TRIG 62 12/30/2023     Lab Results   Component Value Date    CHOLESTEROL 230 (H) 12/30/2023    CHOLESTEROL 256 (H) 12/23/2022     No results found for: \"CKTOTAL\", \"CKMB\", \"CKMBINDEX\", \"TROPONINI\"  No results found for: \"NTBNP\"   Recent Results (from the past 672 hour(s))   Lipid Panel with Direct LDL reflex    Collection " "Time: 12/30/23  9:16 AM   Result Value Ref Range    Cholesterol, Total 230 (H) 100 - 199 mg/dL    Triglycerides 62 0 - 149 mg/dL    HDL 78 >39 mg/dL    VLDL Cholesterol Calculated 11 5 - 40 mg/dL    LDL Calculated 141 (H) 0 - 99 mg/dL             Dr. Sosa Barkley MD, [facility]      \"This note has been constructed using a voice recognition system.Therefore there may be syntax, spelling, and/or grammatical errors. Please call if you have any questions. \"  "

## 2024-01-17 ENCOUNTER — OFFICE VISIT (OUTPATIENT)
Dept: CARDIOLOGY CLINIC | Facility: CLINIC | Age: 54
End: 2024-01-17
Payer: COMMERCIAL

## 2024-01-17 VITALS
DIASTOLIC BLOOD PRESSURE: 80 MMHG | BODY MASS INDEX: 29.25 KG/M2 | HEART RATE: 81 BPM | OXYGEN SATURATION: 99 % | SYSTOLIC BLOOD PRESSURE: 114 MMHG | WEIGHT: 182 LBS | HEIGHT: 66 IN

## 2024-01-17 DIAGNOSIS — E78.00 PURE HYPERCHOLESTEROLEMIA: Primary | ICD-10-CM

## 2024-01-17 PROCEDURE — 99214 OFFICE O/P EST MOD 30 MIN: CPT | Performed by: INTERNAL MEDICINE

## 2024-02-02 ENCOUNTER — HOSPITAL ENCOUNTER (OUTPATIENT)
Dept: RADIOLOGY | Facility: IMAGING CENTER | Age: 54
End: 2024-02-02
Payer: COMMERCIAL

## 2024-02-02 VITALS — WEIGHT: 175 LBS | HEIGHT: 68 IN | BODY MASS INDEX: 26.52 KG/M2

## 2024-02-02 DIAGNOSIS — Z12.31 ENCOUNTER FOR SCREENING MAMMOGRAM FOR MALIGNANT NEOPLASM OF BREAST: ICD-10-CM

## 2024-02-02 PROCEDURE — 77063 BREAST TOMOSYNTHESIS BI: CPT

## 2024-02-02 PROCEDURE — 77067 SCR MAMMO BI INCL CAD: CPT

## 2024-02-07 RX ORDER — AZITHROMYCIN 250 MG/1
TABLET, FILM COATED ORAL
COMMUNITY
Start: 2024-02-01 | End: 2024-02-08

## 2024-02-08 ENCOUNTER — OFFICE VISIT (OUTPATIENT)
Dept: FAMILY MEDICINE CLINIC | Facility: CLINIC | Age: 54
End: 2024-02-08
Payer: COMMERCIAL

## 2024-02-08 VITALS
HEIGHT: 68 IN | SYSTOLIC BLOOD PRESSURE: 122 MMHG | WEIGHT: 181 LBS | OXYGEN SATURATION: 98 % | RESPIRATION RATE: 16 BRPM | BODY MASS INDEX: 27.43 KG/M2 | DIASTOLIC BLOOD PRESSURE: 80 MMHG | HEART RATE: 86 BPM

## 2024-02-08 DIAGNOSIS — J01.10 ACUTE NON-RECURRENT FRONTAL SINUSITIS: ICD-10-CM

## 2024-02-08 DIAGNOSIS — J01.11 ACUTE RECURRENT FRONTAL SINUSITIS: Primary | ICD-10-CM

## 2024-02-08 PROCEDURE — 99213 OFFICE O/P EST LOW 20 MIN: CPT | Performed by: FAMILY MEDICINE

## 2024-02-08 RX ORDER — AZITHROMYCIN 250 MG/1
TABLET, FILM COATED ORAL
Qty: 6 TABLET | Refills: 0 | Status: SHIPPED | OUTPATIENT
Start: 2024-02-08 | End: 2024-02-12

## 2024-02-08 NOTE — ASSESSMENT & PLAN NOTE
Symptoms responded well to oral Z-Sahil that the patient received for a dental abscess by her dentist.  Sinus congestion returned few days after she discontinued the medication.  Patient can do another round of antibiotic, along with flonase this time.  Also advised to contact her dentist re dental abscess, since the abscess did not regress.   Will need ENT reevaluation if sinus symptoms do not improve.

## 2024-02-08 NOTE — PROGRESS NOTES
Subjective:      Patient ID: Naty Olivas is a 53 y.o. female.    HPI    Patient is here for symptoms of sinus congestion.  Symptoms started a few weeks ago.    Patient has been evaluated by ENT in the past for chronic sinus congestion.  Noted to have deviated nasal septum, started on a trial of Singulair and Flonase.  Patient was recently diagnosed with a dental abscess for which she was started on a Z-Sahil by the dentist.  Patient states that her sinus symptoms improved significantly while she was on the Z-Sahil however symptoms recurred 4 days after she stopped the antibiotic.  Denies any fever or chills.    Past Medical History:   Diagnosis Date    Bacterial vaginitis     Fibroids     Gastritis     GERD (gastroesophageal reflux disease)     Insomnia     Migraine     Otitis media     Pneumonia 2009       Family History   Problem Relation Age of Onset    Breast cancer Mother 72    Dementia Mother     Hyperlipidemia Mother     Parkinsonism Mother     Fibroids Mother     Prostatitis Father     Cataracts Father     Premature birth Daughter     No Known Problems Daughter     No Known Problems Daughter     Dementia Maternal Grandmother     Parkinsonism Maternal Grandmother     Fibroids Maternal Grandmother     No Known Problems Maternal Grandfather     No Known Problems Paternal Grandmother     No Known Problems Paternal Grandfather     Prostate cancer Brother     Substance Abuse Brother     Dementia Maternal Aunt     Dementia Maternal Aunt     No Known Problems Maternal Aunt     No Known Problems Maternal Aunt     No Known Problems Maternal Aunt     No Known Problems Half-Sister     No Known Problems Half-Sister     No Known Problems Half-Sister     No Known Problems Paternal Aunt     No Known Problems Paternal Aunt     No Known Problems Paternal Aunt     Hypertension Neg Hx     Diabetes Neg Hx     Colon cancer Neg Hx     Ovarian cancer Neg Hx     Uterine cancer Neg Hx     Cervical cancer Neg Hx        Past  Surgical History:   Procedure Laterality Date     SECTION      COLONOSCOPY      HAND SURGERY Left     Left pinky tendon repair    HYSTERECTOMY      CT COLONOSCOPY FLX DX W/COLLJ SPEC WHEN PFRMD N/A 2018    Procedure: COLONOSCOPY;  Surgeon: Crystal Lacey MD;  [facility];  Service: Gastroenterology    CT CYSTOURETHROSCOPY N/A 2021    Procedure: CYSTOSCOPY;  Surgeon: Alysia Osorio DO;  [facility];  Service: Gynecology    CT ESOPHAGOGASTRODUODENOSCOPY TRANSORAL DIAGNOSTIC N/A 3/31/2017    Procedure: ESOPHAGOGASTRODUODENOSCOPY (EGD);  Surgeon: Crystal Lacey MD;  [facility];  Service: Gastroenterology    CT LAPS TOTAL HYSTERECT 250 GM/< W/RMVL TUBE/OVARY N/A 2021    Procedure: HYSTERECTOMY LAPAROSCOPIC TOTAL (LTH) WITH BILATERAL SALPINGECTOMY, LYSIS OF ADHESIONS;  Surgeon: Alysia Osorio DO;  [facility];  Service: Gynecology        reports that she has never smoked. She has never used smokeless tobacco. She reports current alcohol use of about 3.0 standard drinks of alcohol per week. She reports that she does not use drugs.      Current Outpatient Medications:     azithromycin (ZITHROMAX) 250 mg tablet, Take 2 tablets today then 1 tablet daily x 4 days, Disp: 6 tablet, Rfl: 0    Biotin 1 MG CAPS, Take by mouth, Disp: , Rfl:     Cranberry 1000 MG CAPS, Take by mouth, Disp: , Rfl:     meclizine (ANTIVERT) 12.5 MG tablet, Take 1 tablet (12.5 mg total) by mouth every 8 (eight) hours as needed for dizziness, Disp: 90 tablet, Rfl: 0    montelukast (SINGULAIR) 10 mg tablet, Take 1 tablet (10 mg total) by mouth daily at bedtime, Disp: 30 tablet, Rfl: 1    Multiple Vitamins-Minerals (CENTRUM SILVER 50+WOMEN PO), , Disp: , Rfl:     Omega-3 Fatty Acids (FISH OIL PO), Take by mouth, Disp: , Rfl:     traZODone (DESYREL) 50 mg tablet, Take 1 tablet (50 mg total) by mouth daily at bedtime, Disp: 30 tablet, Rfl: 1    triamcinolone (KENALOG) 0.5 % ointment, Apply  "topically 2 (two) times a day, Disp: 30 g, Rfl: 0    Vitamin D, Ergocalciferol, 57550 units CAPS, Take by mouth, Disp: , Rfl:     ketorolac (TORADOL) 10 mg tablet, Take 1 tablet (10 mg total) by mouth every 6 (six) hours as needed for moderate pain for up to 3 days, Disp: 6 tablet, Rfl: 0    polyethylene glycol (GaviLyte-G) 4000 mL solution, Take 4,000 mL by mouth once for 1 dose, Disp: 4000 mL, Rfl: 0    valACYclovir (VALTREX) 1,000 mg tablet, Take 2 tablets (2,000 mg total) by mouth 2 (two) times a day for 2 doses, Disp: 4 tablet, Rfl: 0    The following portions of the patient's history were reviewed and updated as appropriate: allergies, current medications, past family history, past medical history, past social history, past surgical history and problem list.    Review of Systems   Constitutional: Negative.    HENT:  Positive for congestion and sinus pressure.         Dental abscess.    Neurological:  Positive for headaches.           Objective:    /80 (BP Location: Right arm, Patient Position: Sitting, Cuff Size: Standard)   Pulse 86   Resp 16   Ht 5' 8\" (1.727 m)   Wt 82.1 kg (181 lb)   LMP 10/05/2021 (Exact Date)   SpO2 98%   BMI 27.52 kg/m²      Physical Exam  Constitutional:       Appearance: Normal appearance.   HENT:      Right Ear: There is impacted cerumen.      Left Ear: Tympanic membrane normal.      Mouth/Throat:      Pharynx: Posterior oropharyngeal erythema present.        Comments: Dental abscess.   Cardiovascular:      Heart sounds: Normal heart sounds.   Pulmonary:      Breath sounds: Normal breath sounds.           Recent Results (from the past 1008 hour(s))   Lipid Panel with Direct LDL reflex    Collection Time: 12/30/23  9:16 AM   Result Value Ref Range    Cholesterol, Total 230 (H) 100 - 199 mg/dL    Triglycerides 62 0 - 149 mg/dL    HDL 78 >39 mg/dL    VLDL Cholesterol Calculated 11 5 - 40 mg/dL    LDL Calculated 141 (H) 0 - 99 mg/dL       Assessment/Plan:    No " problem-specific Assessment & Plan notes found for this encounter.           Problem List Items Addressed This Visit          Respiratory    Acute recurrent frontal sinusitis - Primary     Symptoms responded well to oral Z-Sahil that the patient received for a dental abscess by her dentist.  Sinus congestion returned few days after she discontinued the medication.  Patient can do another round of antibiotic, along with flonase this time.  Also advised to contact her dentist re dental abscess, since the abscess did not regress.   Will need ENT reevaluation if sinus symptoms do not improve.          Relevant Medications    azithromycin (ZITHROMAX) 250 mg tablet

## 2024-02-10 ENCOUNTER — APPOINTMENT (EMERGENCY)
Dept: RADIOLOGY | Facility: HOSPITAL | Age: 54
End: 2024-02-10
Payer: COMMERCIAL

## 2024-02-10 ENCOUNTER — HOSPITAL ENCOUNTER (EMERGENCY)
Facility: HOSPITAL | Age: 54
Discharge: HOME | End: 2024-02-10
Attending: EMERGENCY MEDICINE
Payer: COMMERCIAL

## 2024-02-10 VITALS
OXYGEN SATURATION: 99 % | SYSTOLIC BLOOD PRESSURE: 122 MMHG | DIASTOLIC BLOOD PRESSURE: 60 MMHG | HEART RATE: 84 BPM | TEMPERATURE: 98.4 F | RESPIRATION RATE: 20 BRPM

## 2024-02-10 DIAGNOSIS — R07.9 CHEST PAIN, UNSPECIFIED TYPE: Primary | ICD-10-CM

## 2024-02-10 LAB
ALBUMIN SERPL BCP-MCNC: 4.1 G/DL (ref 3.5–5)
ALP SERPL-CCNC: 51 U/L (ref 34–104)
ALT SERPL W P-5'-P-CCNC: 13 U/L (ref 7–52)
ANION GAP SERPL CALCULATED.3IONS-SCNC: 7 MMOL/L
AST SERPL W P-5'-P-CCNC: 20 U/L (ref 13–39)
ATRIAL RATE: 80 BPM
BASOPHILS # BLD MANUAL: 0 THOUSAND/UL (ref 0–0.1)
BASOPHILS NFR MAR MANUAL: 0 % (ref 0–1)
BILIRUB SERPL-MCNC: 0.47 MG/DL (ref 0.2–1)
BUN SERPL-MCNC: 17 MG/DL (ref 5–25)
CALCIUM SERPL-MCNC: 9.4 MG/DL (ref 8.4–10.2)
CARDIAC TROPONIN I PNL SERPL HS: <2 NG/L
CHLORIDE SERPL-SCNC: 104 MMOL/L (ref 96–108)
CO2 SERPL-SCNC: 26 MMOL/L (ref 21–32)
CREAT SERPL-MCNC: 0.96 MG/DL (ref 0.6–1.3)
EOSINOPHIL # BLD MANUAL: 0.17 THOUSAND/UL (ref 0–0.4)
EOSINOPHIL NFR BLD MANUAL: 3 % (ref 0–6)
ERYTHROCYTE [DISTWIDTH] IN BLOOD BY AUTOMATED COUNT: 11.9 % (ref 11.6–15.1)
GFR SERPL CREATININE-BSD FRML MDRD: 66 ML/MIN/1.73SQ M
GLUCOSE SERPL-MCNC: 101 MG/DL (ref 65–140)
HCT VFR BLD AUTO: 39.8 % (ref 34.8–46.1)
HGB BLD-MCNC: 13 G/DL (ref 11.5–15.4)
LG PLATELETS BLD QL SMEAR: PRESENT
LYMPHOCYTES # BLD AUTO: 2.48 THOUSAND/UL (ref 0.6–4.47)
LYMPHOCYTES # BLD AUTO: 44 % (ref 14–44)
MCH RBC QN AUTO: 29 PG (ref 26.8–34.3)
MCHC RBC AUTO-ENTMCNC: 32.7 G/DL (ref 31.4–37.4)
MCV RBC AUTO: 89 FL (ref 82–98)
MONOCYTES # BLD AUTO: 0.28 THOUSAND/UL (ref 0–1.22)
MONOCYTES NFR BLD: 5 % (ref 4–12)
NEUTROPHILS # BLD MANUAL: 2.71 THOUSAND/UL (ref 1.85–7.62)
NEUTS SEG NFR BLD AUTO: 48 % (ref 43–75)
P AXIS: 64 DEGREES
PLATELET # BLD AUTO: 266 THOUSANDS/UL (ref 149–390)
PLATELET BLD QL SMEAR: ADEQUATE
PMV BLD AUTO: 12.1 FL (ref 8.9–12.7)
POLYCHROMASIA BLD QL SMEAR: PRESENT
POTASSIUM SERPL-SCNC: 4.7 MMOL/L (ref 3.5–5.3)
PR INTERVAL: 140 MS
PROT SERPL-MCNC: 6.9 G/DL (ref 6.4–8.4)
QRS AXIS: 8 DEGREES
QRSD INTERVAL: 72 MS
QT INTERVAL: 348 MS
QTC INTERVAL: 401 MS
RBC # BLD AUTO: 4.49 MILLION/UL (ref 3.81–5.12)
RBC MORPH BLD: NORMAL
SODIUM SERPL-SCNC: 137 MMOL/L (ref 135–147)
T WAVE AXIS: 23 DEGREES
VENTRICULAR RATE: 80 BPM
WBC # BLD AUTO: 5.64 THOUSAND/UL (ref 4.31–10.16)

## 2024-02-10 PROCEDURE — 85027 COMPLETE CBC AUTOMATED: CPT | Performed by: PHYSICIAN ASSISTANT

## 2024-02-10 PROCEDURE — 71046 X-RAY EXAM CHEST 2 VIEWS: CPT

## 2024-02-10 PROCEDURE — 96374 THER/PROPH/DIAG INJ IV PUSH: CPT

## 2024-02-10 PROCEDURE — 80053 COMPREHEN METABOLIC PANEL: CPT | Performed by: PHYSICIAN ASSISTANT

## 2024-02-10 PROCEDURE — 85007 BL SMEAR W/DIFF WBC COUNT: CPT | Performed by: PHYSICIAN ASSISTANT

## 2024-02-10 PROCEDURE — 99285 EMERGENCY DEPT VISIT HI MDM: CPT | Performed by: PHYSICIAN ASSISTANT

## 2024-02-10 PROCEDURE — 36415 COLL VENOUS BLD VENIPUNCTURE: CPT | Performed by: PHYSICIAN ASSISTANT

## 2024-02-10 PROCEDURE — 99285 EMERGENCY DEPT VISIT HI MDM: CPT

## 2024-02-10 PROCEDURE — 84484 ASSAY OF TROPONIN QUANT: CPT | Performed by: PHYSICIAN ASSISTANT

## 2024-02-10 PROCEDURE — 93005 ELECTROCARDIOGRAM TRACING: CPT

## 2024-02-10 RX ORDER — FAMOTIDINE 10 MG/ML
20 INJECTION, SOLUTION INTRAVENOUS ONCE
Status: COMPLETED | OUTPATIENT
Start: 2024-02-10 | End: 2024-02-10

## 2024-02-10 RX ORDER — FAMOTIDINE 40 MG/1
40 TABLET, FILM COATED ORAL
Qty: 20 TABLET | Refills: 0 | Status: SHIPPED | OUTPATIENT
Start: 2024-02-10

## 2024-02-10 RX ADMIN — FAMOTIDINE 20 MG: 10 INJECTION, SOLUTION INTRAVENOUS at 17:23

## 2024-02-10 NOTE — DISCHARGE INSTRUCTIONS
Continue with all your medications.  Use pepcid daily for next 1-2 weeks; along with Gas-X as needed.  Follow-up with your PCP or GI as needed

## 2024-02-10 NOTE — ED PROVIDER NOTES
History  Chief Complaint   Patient presents with    Chest Pain     Pt presents to the ed with chest pressure that started yesterday, reports feeling on left side of chest, no meds pta      Past Medical History: Bacterial vaginitis, Fibroids, Gastritis, GERD, Insomnia, Migraine  Past Surgical History:  SECTION, Left HAND SURGERY - Left pinky tendon repair, HYSTERECTOMY, CYSTOURETHROSCOPY, LAPS TOTAL HYSTERECT, WITH B/L SALPINGECTOMY, LYSIS OF ADHESIONS.     Patient presents to ED c/o 2-day history of constant, atraumatic, nonradiating, left-sided chest pain that spreads across top of left breast sometimes under left breasts associated with + nausea, no vomiting, + increased belching that does not change symptoms, no fever, chills, URI symptoms, shortness of breath, lower extremity swelling, diaphoresis.  Patient took Gas-X today with little improvement of symptoms  Patient states history of gastritis, GERD which has been acting up for the past 2 weeks        Prior to Admission Medications   Prescriptions Last Dose Informant Patient Reported? Taking?   Biotin 1 MG CAPS  Self Yes No   Sig: Take by mouth   Cranberry 1000 MG CAPS  Self Yes No   Sig: Take by mouth   Multiple Vitamins-Minerals (CENTRUM SILVER 50+WOMEN PO)  Self Yes No   Omega-3 Fatty Acids (FISH OIL PO)  Self Yes No   Sig: Take by mouth   Vitamin D, Ergocalciferol, 53323 units CAPS  Self Yes No   Sig: Take by mouth   azithromycin (ZITHROMAX) 250 mg tablet   No No   Sig: Take 2 tablets today then 1 tablet daily x 4 days   ketorolac (TORADOL) 10 mg tablet   No No   Sig: Take 1 tablet (10 mg total) by mouth every 6 (six) hours as needed for moderate pain for up to 3 days   meclizine (ANTIVERT) 12.5 MG tablet   No No   Sig: Take 1 tablet (12.5 mg total) by mouth every 8 (eight) hours as needed for dizziness   montelukast (SINGULAIR) 10 mg tablet   No No   Sig: Take 1 tablet (10 mg total) by mouth daily at bedtime   polyethylene glycol (GaviLyte-G) 4000  mL solution   No No   Sig: Take 4,000 mL by mouth once for 1 dose   traZODone (DESYREL) 50 mg tablet   No No   Sig: Take 1 tablet (50 mg total) by mouth daily at bedtime   triamcinolone (KENALOG) 0.5 % ointment  Self No No   Sig: Apply topically 2 (two) times a day   valACYclovir (VALTREX) 1,000 mg tablet  Self No No   Sig: Take 2 tablets (2,000 mg total) by mouth 2 (two) times a day for 2 doses      Facility-Administered Medications: None       Past Medical History:   Diagnosis Date    Bacterial vaginitis     Fibroids     Gastritis     GERD (gastroesophageal reflux disease)     Insomnia     Migraine     Otitis media     Pneumonia        Past Surgical History:   Procedure Laterality Date     SECTION      COLONOSCOPY      HAND SURGERY Left     Left pinky tendon repair    HYSTERECTOMY      NH COLONOSCOPY FLX DX W/COLLJ SPEC WHEN PFRMD N/A 2018    Procedure: COLONOSCOPY;  Surgeon: Crystal Lacey MD;  [facility]: [facility];  Service: Gastroenterology    NH CYSTOURETHROSCOPY N/A 2021    Procedure: CYSTOSCOPY;  Surgeon: Alysia Osorio DO;  [facility];  Service: Gynecology    NH ESOPHAGOGASTRODUODENOSCOPY TRANSORAL DIAGNOSTIC N/A 3/31/2017    Procedure: ESOPHAGOGASTRODUODENOSCOPY (EGD);  Surgeon: Crystal Lacey MD;  [facility];  Service: Gastroenterology    NH LAPS TOTAL HYSTERECT 250 GM/< W/RMVL TUBE/OVARY N/A 2021    Procedure: HYSTERECTOMY LAPAROSCOPIC TOTAL (LTH) WITH BILATERAL SALPINGECTOMY, LYSIS OF ADHESIONS;  Surgeon: Alysia Osorio DO;  [facility];  Service: Gynecology       Family History   Problem Relation Age of Onset    Breast cancer Mother 72    Dementia Mother     Hyperlipidemia Mother     Parkinsonism Mother     Fibroids Mother     Prostatitis Father     Cataracts Father     Premature birth Daughter     No Known Problems Daughter     No Known Problems Daughter     Dementia Maternal Grandmother     Parkinsonism Maternal Grandmother      Fibroids Maternal Grandmother     No Known Problems Maternal Grandfather     No Known Problems Paternal Grandmother     No Known Problems Paternal Grandfather     Prostate cancer Brother     Substance Abuse Brother     Dementia Maternal Aunt     Dementia Maternal Aunt     No Known Problems Maternal Aunt     No Known Problems Maternal Aunt     No Known Problems Maternal Aunt     No Known Problems Half-Sister     No Known Problems Half-Sister     No Known Problems Half-Sister     No Known Problems Paternal Aunt     No Known Problems Paternal Aunt     No Known Problems Paternal Aunt     Hypertension Neg Hx     Diabetes Neg Hx     Colon cancer Neg Hx     Ovarian cancer Neg Hx     Uterine cancer Neg Hx     Cervical cancer Neg Hx      I have reviewed and agree with the history as documented.    E-Cigarette/Vaping    E-Cigarette Use Never User      E-Cigarette/Vaping Substances    Nicotine No     THC No     CBD No     Flavoring No     Other No     Unknown No      Social History     Tobacco Use    Smoking status: Never    Smokeless tobacco: Never   Vaping Use    Vaping status: Never Used   Substance Use Topics    Alcohol use: Yes     Alcohol/week: 3.0 standard drinks of alcohol     Types: 3 Glasses of wine per week     Comment: socially    Drug use: Never       Review of Systems   Constitutional:  Negative for fever.   HENT:  Negative for hearing loss and sore throat.    Respiratory:  Negative for cough and shortness of breath.    Cardiovascular:  Positive for chest pain. Negative for leg swelling.   Gastrointestinal:  Positive for nausea. Negative for abdominal pain, constipation, diarrhea and vomiting.        Belching   Genitourinary:  Negative for dysuria and frequency.   Musculoskeletal:  Negative for arthralgias and myalgias.   Skin:  Negative for rash.   Neurological:  Negative for dizziness and weakness.   Psychiatric/Behavioral:  Negative for behavioral problems.    All other systems reviewed and are  negative.      Physical Exam  Physical Exam  Vitals and nursing note reviewed.   Constitutional:       General: She is not in acute distress.     Appearance: She is well-developed.      Comments: belching   HENT:      Head: Normocephalic and atraumatic.      Right Ear: External ear normal.      Left Ear: External ear normal.      Nose: Nose normal.      Mouth/Throat:      Mouth: Mucous membranes are moist.      Pharynx: Oropharynx is clear.   Eyes:      Conjunctiva/sclera: Conjunctivae normal.      Pupils: Pupils are equal, round, and reactive to light.   Neck:      Vascular: No JVD.   Cardiovascular:      Rate and Rhythm: Normal rate and regular rhythm.   Pulmonary:      Effort: Pulmonary effort is normal. No respiratory distress.      Breath sounds: Normal breath sounds.   Chest:      Chest wall: No tenderness or crepitus.   Abdominal:      General: Bowel sounds are normal.      Palpations: Abdomen is soft.   Musculoskeletal:         General: No tenderness. Normal range of motion.      Cervical back: Normal range of motion.      Right lower leg: No edema.      Left lower leg: No edema.   Skin:     General: Skin is warm and dry.      Capillary Refill: Capillary refill takes less than 2 seconds.      Findings: No rash.   Neurological:      General: No focal deficit present.      Mental Status: She is alert.   Psychiatric:         Behavior: Behavior normal.      Comments: Slightly anxious         Vital Signs  ED Triage Vitals   Temperature Pulse Respirations Blood Pressure SpO2   02/10/24 1711 02/10/24 1711 02/10/24 1711 02/10/24 1711 02/10/24 1711   98.4 °F (36.9 °C) 81 16 147/70 93 %      Temp Source Heart Rate Source Patient Position - Orthostatic VS BP Location FiO2 (%)   02/10/24 1711 02/10/24 1711 02/10/24 1814 02/10/24 1814 --   Oral Monitor Sitting Left arm       Pain Score       02/10/24 1814       No Pain           Vitals:    02/10/24 1711 02/10/24 1814   BP: 147/70 122/60   Pulse: 81 84   Patient Position  - Orthostatic VS:  Sitting         Visual Acuity      ED Medications  Medications   Famotidine (PF) (PEPCID) injection 20 mg (20 mg Intravenous Given 2/10/24 1723)       Diagnostic Studies  Results Reviewed       Procedure Component Value Units Date/Time    RBC Morphology Reflex Test [[id number]] Collected: 02/10/24 1723    Lab Status: Final result Specimen: Blood from Arm, Left Updated: 02/10/24 1802    CBC and differential [[id number]]  (Normal) Collected: 02/10/24 1723    Lab Status: Final result Specimen: Blood from Arm, Left Updated: 02/10/24 1758     WBC 5.64 Thousand/uL      RBC 4.49 Million/uL      Hemoglobin 13.0 g/dL      Hematocrit 39.8 %      MCV 89 fL      MCH 29.0 pg      MCHC 32.7 g/dL      RDW 11.9 %      MPV 12.1 fL      Platelets 266 Thousands/uL     Narrative:      This is an appended report.  These results have been appended to a previously verified report.    Manual Differential(PHLEBS Do Not Order) [[id number]] Collected: 02/10/24 1723    Lab Status: Final result Specimen: Blood from Arm, Left Updated: 02/10/24 1758     Segmented % 48 %      Lymphocytes % 44 %      Monocytes % 5 %      Eosinophils, % 3 %      Basophils % 0 %      Absolute Neutrophils 2.71 Thousand/uL      Lymphocytes Absolute 2.48 Thousand/uL      Monocytes Absolute 0.28 Thousand/uL      Eosinophils Absolute 0.17 Thousand/uL      Basophils Absolute 0.00 Thousand/uL      Total Counted --     RBC Morphology Normal     Platelet Estimate Adequate     Large Platelet Present     Polychromasia Present    Comprehensive metabolic panel [[id number]] Collected: 02/10/24 1723    Lab Status: Final result Specimen: Blood from Arm, Left Updated: 02/10/24 1755     Sodium 137 mmol/L      Potassium 4.7 mmol/L      Chloride 104 mmol/L      CO2 26 mmol/L      ANION GAP 7 mmol/L      BUN 17 mg/dL      Creatinine 0.96 mg/dL      Glucose 101 mg/dL      Calcium 9.4 mg/dL      AST 20 U/L      ALT 13 U/L      Alkaline Phosphatase 51 U/L      Total Protein  6.9 g/dL      Albumin 4.1 g/dL      Total Bilirubin 0.47 mg/dL      eGFR 66 ml/min/1.73sq m     Narrative:      National Kidney Disease Foundation guidelines for Chronic Kidney Disease (CKD):     Stage 1 with normal or high GFR (GFR > 90 mL/min/1.73 square meters)    Stage 2 Mild CKD (GFR = 60-89 mL/min/1.73 square meters)    Stage 3A Moderate CKD (GFR = 45-59 mL/min/1.73 square meters)    Stage 3B Moderate CKD (GFR = 30-44 mL/min/1.73 square meters)    Stage 4 Severe CKD (GFR = 15-29 mL/min/1.73 square meters)    Stage 5 End Stage CKD (GFR <15 mL/min/1.73 square meters)  Note: GFR calculation is accurate only with a steady state creatinine    HS Troponin 0hr (reflex protocol) [[id number]]  (Normal) Collected: 02/10/24 1723    Lab Status: Final result Specimen: Blood from Arm, Left Updated: 02/10/24 1751     hs TnI 0hr <2 ng/L                    XR chest 2 views   ED Interpretation by Anna Donato PA-C (02/10 1822)   nad                 Procedures  ECG 12 Lead Documentation Only    Date/Time: 2/10/2024 5:08 PM    Performed by: Anna Donato PA-C  Authorized by: Anna Donato PA-C    Indications / Diagnosis:  Cp  ECG reviewed by me, the ED Provider: yes    Patient location:  ED  Previous ECG:     Comparison to cardiac monitor: Yes    Interpretation:     Interpretation: normal    Rate:     ECG rate:  80    ECG rate assessment: normal    Rhythm:     Rhythm: sinus rhythm    Comments:      No acute ischemic changes           ED Course  ED Course as of 02/10/24 1850   Sat Feb 10, 2024   1731 Cbc unremarkable   1807 hs TnI 0hr: <2  normal   1807 Cmp unremarkable             HEART Risk Score      Flowsheet Row Most Recent Value   Heart Score Risk Calculator    History 0 Filed at: 02/10/2024 1824   ECG 0 Filed at: 02/10/2024 1824   Age 1 Filed at: 02/10/2024 1824   Risk Factors 1 Filed at: 02/10/2024 1824   Troponin 0 Filed at: 02/10/2024 1824   HEART Score 2 Filed at: 02/10/2024 1824                          SBIRT  20yo+      Flowsheet Row Most Recent Value   Initial Alcohol Screen: US AUDIT-C     1. How often do you have a drink containing alcohol? 0 Filed at: 02/10/2024 1701   2. How many drinks containing alcohol do you have on a typical day you are drinking?  0 Filed at: 02/10/2024 1701   3a. Male UNDER 65: How often do you have five or more drinks on one occasion? 0 Filed at: 02/10/2024 1701   3b. FEMALE Any Age, or MALE 65+: How often do you have 4 or more drinks on one occassion? 0 Filed at: 02/10/2024 1701   Audit-C Score 0 Filed at: 02/10/2024 1701   HERNANDEZ: How many times in the past year have you...    Used an illegal drug or used a prescription medication for non-medical reasons? Never Filed at: 02/10/2024 1701                      Medical Decision Making  Cp x 2 days, bleching consider cad, pleuristy, gastritis, gerd, dehdyration, among others  Pt feeling better after meds. Will continue Pepcid, FU with PCP, gi as needed    Amount and/or Complexity of Data Reviewed  Labs: ordered. Decision-making details documented in ED Course.  Radiology: ordered. Decision-making details documented in ED Course.  ECG/medicine tests: ordered and independent interpretation performed. Decision-making details documented in ED Course.    Risk  OTC drugs.  Prescription drug management.             Disposition  Final diagnoses:   Chest pain, unspecified type - Belching     Time reflects when diagnosis was documented in both MDM as applicable and the Disposition within this note       Time User Action Codes Description Comment    2/10/2024  6:46 PM Anna Donato Add [R07.9] Chest pain, unspecified type     2/10/2024  6:46 PM Anna Donato Modify [R07.9] Chest pain, unspecified type Belching          ED Disposition       ED Disposition   Discharge    Condition   Stable    Date/Time   Sat Feb 10, 2024 4466    Comment   Naty Olivas discharge to home/self care.                   Follow-up Information       Follow up With  Specialties Details Why Contact Info    Silvia Park DO Family Medicine   [address] [facility] [place] [address] [phone]              Patient's Medications   Discharge Prescriptions    FAMOTIDINE (PEPCID) 40 MG TABLET    Take 1 tablet (40 mg total) by mouth daily at bedtime as needed for heartburn       Start Date: 2/10/2024 End Date: --       Order Dose: 40 mg       Quantity: 20 tablet    Refills: 0       No discharge procedures on file.    PDMP Review         Value Time User    PDMP Reviewed  Yes 11/8/2021  1:09 PM Alysia Osorio DO            ED Provider  Electronically Signed by             Anna Donato PA-C  02/10/24 [id number]

## 2024-02-13 LAB
ATRIAL RATE: 80 BPM
P AXIS: 64 DEGREES
PR INTERVAL: 140 MS
QRS AXIS: 8 DEGREES
QRSD INTERVAL: 72 MS
QT INTERVAL: 348 MS
QTC INTERVAL: 401 MS
T WAVE AXIS: 23 DEGREES
VENTRICULAR RATE: 80 BPM

## 2024-02-14 ENCOUNTER — CLINICAL SUPPORT (OUTPATIENT)
Dept: NUTRITION | Facility: HOSPITAL | Age: 54
End: 2024-02-14
Attending: INTERNAL MEDICINE
Payer: COMMERCIAL

## 2024-02-14 DIAGNOSIS — E78.00 PURE HYPERCHOLESTEROLEMIA: ICD-10-CM

## 2024-02-14 PROCEDURE — 97802 MEDICAL NUTRITION INDIV IN: CPT | Performed by: DIETITIAN, REGISTERED

## 2024-02-14 NOTE — PROGRESS NOTES
Virtual Brief Visit    This Visit is being completed by computer link. The Patient is located at Home and in the following state in which I hold an active license [place]    The patient was identified by name and date of birth. Naty Olivas was informed that this is a telemedicine visit and that the visit is being conducted through the [employer] platform. She agrees to proceed..  My office door was closed. No one else was in the room.  She acknowledged consent and understanding of privacy and security of the video platform. The patient has agreed to participate and understands they can discontinue the visit at any time.    Patient is aware this is a billable service.       Assessment/Plan:    Problem List Items Addressed This Visit    None  Visit Diagnoses       Pure hypercholesterolemia                Recent Visits  No visits were found meeting these conditions.  Showing recent visits within past 7 days and meeting all other requirements  Future Appointments  No visits were found meeting these conditions.  Showing future appointments within next 150 days and meeting all other requirements         Visit Time  Total Visit Duration: 30 minutes           Nutrition Assessment Form    Patient Name: Naty Olivas    YOB: 1970    Sex: Female     Assessment Date: 2/14/2024  Start Time: 9:00 Stop Time: 9:30 Total Minutes: 30     Data:  Present at session: self   Parent/Patient Concerns/reason for visit: PT notes her high cholesterol and desire for lifestyle changes to decrease   Medical Dx/Reason for Referral: E78.00 Pure hypercholesterolemia   Past Medical History:   Diagnosis Date    Bacterial vaginitis     Fibroids     Gastritis     GERD (gastroesophageal reflux disease)     Insomnia     Migraine     Otitis media     Pneumonia 2009       Current Outpatient Medications   Medication Sig Dispense Refill    Biotin 1 MG CAPS Take by mouth      Cranberry 1000 MG CAPS Take by mouth      famotidine  "(PEPCID) 40 MG tablet Take 1 tablet (40 mg total) by mouth daily at bedtime as needed for heartburn 20 tablet 0    ketorolac (TORADOL) 10 mg tablet Take 1 tablet (10 mg total) by mouth every 6 (six) hours as needed for moderate pain for up to 3 days 6 tablet 0    meclizine (ANTIVERT) 12.5 MG tablet Take 1 tablet (12.5 mg total) by mouth every 8 (eight) hours as needed for dizziness 90 tablet 0    montelukast (SINGULAIR) 10 mg tablet Take 1 tablet (10 mg total) by mouth daily at bedtime 30 tablet 1    Multiple Vitamins-Minerals (CENTRUM SILVER 50+WOMEN PO)       Omega-3 Fatty Acids (FISH OIL PO) Take by mouth      polyethylene glycol (GaviLyte-G) 4000 mL solution Take 4,000 mL by mouth once for 1 dose 4000 mL 0    traZODone (DESYREL) 50 mg tablet Take 1 tablet (50 mg total) by mouth daily at bedtime 30 tablet 1    triamcinolone (KENALOG) 0.5 % ointment Apply topically 2 (two) times a day 30 g 0    valACYclovir (VALTREX) 1,000 mg tablet Take 2 tablets (2,000 mg total) by mouth 2 (two) times a day for 2 doses 4 tablet 0    Vitamin D, Ergocalciferol, 85609 units CAPS Take by mouth       No current facility-administered medications for this visit.        Additional Meds/Supplements:    Special Learning Needs/barriers to learning/any new barriers    Height: HC Readings from Last 5 Encounters:   No data found for HC      Weight: Wt Readings from Last 10 Encounters:   02/08/24 82.1 kg (181 lb)   02/02/24 79.4 kg (175 lb)   01/17/24 82.6 kg (182 lb)   12/04/23 84.4 kg (186 lb)   11/14/23 84.8 kg (187 lb)   11/11/23 84.4 kg (186 lb)   10/26/23 81.6 kg (180 lb)   10/06/23 81.8 kg (180 lb 6.4 oz)   09/27/23 82.6 kg (182 lb)   09/25/23 85.3 kg (188 lb)     Estimated body mass index is 27.52 kg/m² as calculated from the following:    Height as of 2/8/24: 5' 8\" (1.727 m).    Weight as of 2/8/24: 82.1 kg (181 lb).   Recent Weight Change: [x]Yes     []No  Amount: PT states that she has recently lost 10 lbs through changes to her " diet and addition of exercise      Energy Needs: No calculation needed   Allergies   Allergen Reactions    Amoxicillin Itching    or intolerances    Social History     Substance and Sexual Activity   Alcohol Use Yes    Alcohol/week: 3.0 standard drinks of alcohol    Types: 3 Glasses of wine per week    Comment: socially       Social History     Tobacco Use   Smoking Status Never   Smokeless Tobacco Never       Who shops? patient   Who cooks/cooking methods/Eating out/take out habits   patient     Exercise: PT states that she has begun to exercise      Other: ie: Sleep habits/ stress level/ work habits household-lives with ?/ food security    Prior Nutritional Counseling? []Yes     [x]No  When:      Why:         Diet Hx:  Breakfast: Diet:  Leftovers or quick oats or Belvita and cup of mushroom coffee   Lunch:   Leftovers  or [employer] (chicken/steak or salmon, rice and extra veggies) or can of Progresso or hot dog/fish sticks/french fries - with grandchildren)       Dinner:   Spaghetti or homemade spicy chicken noodle soup       Snacks: Go to snack of [employer] potato chips and chocolate.  Also fruit  Drinks water and iced tea   Other Notes/ Initial Assessment:  PT requested a virtual visit which was performed from the RD office over computer.  PT states that she has already made changes to her diet and lifestyle in that she has already started to exercise and is trying not to eat after 6-7 pm.  PT states that she avoids starchy foods as it seems to effect her gastritis.  She soaks her rice before cooking it.  She also finds that she can't eat broccoli or cabbage.  Discussed potential of spicy and highly acidic foods being a trigger for the gastritis.  PT has changed her coffee intake to once in a while and has been drinking mushroom coffee instead due to palpitations.  PT has started to add more salmon to her meals and has cut down on her beef intake.  Discussed sources of cholesterol and amount per day (200 mg).   "Also discussed adding soluble/viscous fiber into the diet (about 5-10 grams).  Provided handouts to her email address      Updated assessment (Follow up note only):           Nutrition Diagnosis:   Altered Nutrition-Related Laboratory values  related to Kidney, liver, cardiac, endocrine, neurologic, and/or pulmonary dysfunction as evidenced by  Abnormal serum lipids       Any change or new dx since previous visit:     Nutrition Diagnosis:         Medical Nutrition Therapy Intervention:  []Individualized Meal Plan []Understanding Lab Values   []Basic Pathophysiology of Disease []Food/Medication Interactions   []Food Diary []Exercise   [x]Lifestyle/Behavior Modification Techniques: add viscous fiber to the diet []Medication, Mechanism of Action   [x]Label Reading: cholesterol []Self Blood Glucose Monitoring   []Weight/BMI Goals: gain/lose/maintain []Other -           Comprehension: []Excellent  [x]Very Good  []Good  []Fair   []Poor    Receptivity: []Excellent  [x]Very Good  []Good  []Fair   []Poor    Expected Compliance: []Excellent  [x]Very Good  []Good  []Fair   []Poor        Goals (initial)/ Progress made on previous goals/new goals:  Limit cholesterol to 200 mg per day   2.    Add 5-10 gms viscous fiber to day   3.       No follow-ups on file.  Labs:  CMP  Lab Results   Component Value Date    K 4.7 02/10/2024     02/10/2024    CO2 26 02/10/2024    BUN 17 02/10/2024    CREATININE 0.96 02/10/2024    GLUF 79 12/23/2022    CALCIUM 9.4 02/10/2024    AST 20 02/10/2024    ALT 13 02/10/2024    ALKPHOS 51 02/10/2024    EGFR 66 02/10/2024       BMP  Lab Results   Component Value Date    CALCIUM 9.4 02/10/2024    K 4.7 02/10/2024    CO2 26 02/10/2024     02/10/2024    BUN 17 02/10/2024    CREATININE 0.96 02/10/2024       Lipids  No results found for: \"CHOL\"  Lab Results   Component Value Date    HDL 78 12/30/2023    HDL 87 12/23/2022    HDL 82 12/14/2021     Lab Results   Component Value Date    LDLCALC 141 (H) " "12/30/2023    LDLCALC 157 (H) 12/23/2022    LDLCALC 131 (H) 12/14/2021     Lab Results   Component Value Date    TRIG 62 12/30/2023    TRIG 60 12/23/2022    TRIG 58 12/14/2021     Lab Results   Component Value Date    CHOLHDL 2.9 05/30/2020       Hemoglobin A1C  Lab Results   Component Value Date    HGBA1C 4.5 10/19/2021       Fasting Glucose  Lab Results   Component Value Date    GLUF 79 12/23/2022       Insulin     Thyroid  Lab Results   Component Value Date    TSH 1.730 05/20/2021    [id number] 6.9 05/20/2021       Hepatic Function Panel  Lab Results   Component Value Date    ALT 13 02/10/2024    AST 20 02/10/2024    ALKPHOS 51 02/10/2024       Celiac Disease Antibody Panel  No results found for: \"ENDOMYSIAL IGA\", \"GLIADIN IGA\", \"GLIADIN IGG\", \"IGA\", \"TISSUE TRANSGLUT AB\", \"TTG IGA\"   Iron  Lab Results   Component Value Date    IRON 80 09/14/2023    TIBC 338 09/14/2023    FERRITIN 68 09/14/2023            Libby Aguila RD  [facility]  [address] [place] [phone]    "

## 2024-02-21 ENCOUNTER — OFFICE VISIT (OUTPATIENT)
Dept: GASTROENTEROLOGY | Facility: CLINIC | Age: 54
End: 2024-02-21
Payer: COMMERCIAL

## 2024-02-21 VITALS
WEIGHT: 180.8 LBS | DIASTOLIC BLOOD PRESSURE: 66 MMHG | HEART RATE: 78 BPM | HEIGHT: 68 IN | SYSTOLIC BLOOD PRESSURE: 96 MMHG | BODY MASS INDEX: 27.4 KG/M2

## 2024-02-21 DIAGNOSIS — K21.9 GASTROESOPHAGEAL REFLUX DISEASE, UNSPECIFIED WHETHER ESOPHAGITIS PRESENT: Primary | ICD-10-CM

## 2024-02-21 DIAGNOSIS — R14.2 BELCHING: ICD-10-CM

## 2024-02-21 DIAGNOSIS — R14.0 ABDOMINAL BLOATING: ICD-10-CM

## 2024-02-21 PROBLEM — J01.11 ACUTE RECURRENT FRONTAL SINUSITIS: Status: RESOLVED | Noted: 2024-02-08 | Resolved: 2024-02-21

## 2024-02-21 PROCEDURE — 99214 OFFICE O/P EST MOD 30 MIN: CPT | Performed by: INTERNAL MEDICINE

## 2024-02-21 NOTE — PATIENT INSTRUCTIONS
Scheduled date of EGD(as of today): 4/3/24  Physician performing EGD: Lionel  Location of EGD: [facility]  Instructions reviewed with patient by: Marianna DIA  Clearances: n/a

## 2024-02-21 NOTE — PROGRESS NOTES
[facility]  Progress Note - Naty Olivas 54 y.o. female MRN: [id number]    Unit/Bed#:  Encounter: [id number]    Assessment/Plan:      1.  Dyspepsia  2.  Belching  3.  Epigastric/left upper quadrant discomfort  -Suspect likely secondary to NSAID induced gastritis or peptic ulcer disease.  Patient does report recent use of NSAIDs.  No melena or hematochezia.  She is currently on famotidine at bedtime on as-needed basis only.  -CBC, CMP and troponins were normal during the recent ER visit for her current symptoms.  -Would recommend starting on Protonix 40 mg on daily basis to be taken 20 minutes before breakfast.  Had a lengthy discussion with the patient regarding the importance of following a GERD diet.  Avoid the use of NSAIDs.  Would recommend EGD for further evaluation.  Patient reports history of H. pylori bacteria however the most recent EGD did not show any evidence of H. pylori.    4.  Colon cancer screening: Up-to-date.    Subjective:     54-year-old female with history of gastritis, presents for evaluation of symptoms of abdominal bloating, belching.  Patient reports that she was in the emergency room on February 10 for similar symptoms which included chest pressure, bloating.  She underwent testing including chest x-ray, troponins, CBC and CMP which were all unremarkable.  Patient reports that she has been feeling better however has been having extreme belching and occasional left upper quadrant discomfort and bloating.  She does take Pepcid 40 mg at bedtime however only takes it on as-needed basis.  She has been taking probiotics with minimal relief.  No change in bowel habits or hematochezia.  No melena.  Does report taking NSAIDs fairly frequently, recently had dental work and was on NSAIDs.  She reports having had H. pylori in the past, reports that this was treated twice.  EGD in 2017 was essentially unremarkable and the biopsies at that time were negative for H. pylori  PCP prescribed the PT but you instructed him to continue with that order. They are apparently wanting your opinion on what they need to work on for this patient. "and intestinal metaplasia.    She recently underwent colonoscopy in October 2023 which was notable for diverticulosis and internal hemorrhoids only.  She was recommended repeat at 10-year interval.    Objective:     Vitals: Blood pressure 96/66, pulse 78, height 5' 8\" (1.727 m), weight 82 kg (180 lb 12.8 oz), last menstrual period 10/05/2021, not currently breastfeeding.,Body mass index is 27.49 kg/m².    [unfilled]    Physical Exam:    GEN: wn/wd, NAD  HEENT: MMM, no cervical or supraclavicular LAD, anciteric  CV: RRR, no m/r/g  CHEST: CTA b/l, no w/r/r  ABD: +BS, soft, NT/ND, no hepatosplenomegaly  EXT: no c/c/e  SKIN: no rashes  NEURO: aaox3      Invasive Devices       None                           Lab, Imaging and other studies:     No visits with results within 1 Day(s) from this visit.   Latest known visit with results is:   Admission on 02/10/2024, Discharged on 02/10/2024   Component Date Value    WBC 02/10/2024 5.64     RBC 02/10/2024 4.49     Hemoglobin 02/10/2024 13.0     Hematocrit 02/10/2024 39.8     MCV 02/10/2024 89     MCH 02/10/2024 29.0     MCHC 02/10/2024 32.7     RDW 02/10/2024 11.9     MPV 02/10/2024 12.1     Platelets 02/10/2024 266     Sodium 02/10/2024 137     Potassium 02/10/2024 4.7     Chloride 02/10/2024 104     CO2 02/10/2024 26     ANION GAP 02/10/2024 7     BUN 02/10/2024 17     Creatinine 02/10/2024 0.96     Glucose 02/10/2024 101     Calcium 02/10/2024 9.4     AST 02/10/2024 20     ALT 02/10/2024 13     Alkaline Phosphatase 02/10/2024 51     Total Protein 02/10/2024 6.9     Albumin 02/10/2024 4.1     Total Bilirubin 02/10/2024 0.47     eGFR 02/10/2024 66     hs TnI 0hr 02/10/2024 <2     Ventricular Rate 02/10/2024 80     Atrial Rate 02/10/2024 80     VT Interval 02/10/2024 140     QRSD Interval 02/10/2024 72     QT Interval 02/10/2024 348     QTC Interval 02/10/2024 401     P Axis 02/10/2024 64     QRS Axis 02/10/2024 8     T Wave [place] 02/10/2024 23     Segmented % 02/10/2024 48     " Lymphocytes % 02/10/2024 44     Monocytes % 02/10/2024 5     Eosinophils, % 02/10/2024 3     Basophils % 02/10/2024 0     Absolute Neutrophils 02/10/2024 2.71     Lymphocytes Absolute 02/10/2024 2.48     Monocytes Absolute 02/10/2024 0.28     Eosinophils Absolute 02/10/2024 0.17     Basophils Absolute 02/10/2024 0.00     RBC Morphology 02/10/2024 Normal     Platelet Estimate 02/10/2024 Adequate     Large Platelet 02/10/2024 Present     Polychromasia 02/10/2024 Present          I have personally reviewed pertinent films in PACS    No current facility-administered medications for this visit.

## 2024-03-20 ENCOUNTER — ANESTHESIA EVENT (OUTPATIENT)
Dept: ANESTHESIOLOGY | Facility: HOSPITAL | Age: 54
End: 2024-03-20

## 2024-03-20 ENCOUNTER — ANESTHESIA (OUTPATIENT)
Dept: ANESTHESIOLOGY | Facility: HOSPITAL | Age: 54
End: 2024-03-20

## 2024-03-21 ENCOUNTER — OFFICE VISIT (OUTPATIENT)
Dept: FAMILY MEDICINE CLINIC | Facility: CLINIC | Age: 54
End: 2024-03-21
Payer: COMMERCIAL

## 2024-03-21 VITALS
WEIGHT: 184 LBS | HEIGHT: 68 IN | RESPIRATION RATE: 16 BRPM | DIASTOLIC BLOOD PRESSURE: 80 MMHG | SYSTOLIC BLOOD PRESSURE: 118 MMHG | BODY MASS INDEX: 27.89 KG/M2

## 2024-03-21 DIAGNOSIS — R42 VERTIGO: ICD-10-CM

## 2024-03-21 DIAGNOSIS — R42 DIZZINESS: ICD-10-CM

## 2024-03-21 DIAGNOSIS — J34.2 DNS (DEVIATED NASAL SEPTUM): ICD-10-CM

## 2024-03-21 DIAGNOSIS — J34.89 SINUS PRESSURE: Primary | ICD-10-CM

## 2024-03-21 DIAGNOSIS — R09.82 PND (POST-NASAL DRIP): ICD-10-CM

## 2024-03-21 PROCEDURE — 99213 OFFICE O/P EST LOW 20 MIN: CPT | Performed by: FAMILY MEDICINE

## 2024-03-21 RX ORDER — PREDNISONE 20 MG/1
40 TABLET ORAL DAILY
Qty: 10 TABLET | Refills: 0 | Status: SHIPPED | OUTPATIENT
Start: 2024-03-21 | End: 2024-03-26

## 2024-03-21 RX ORDER — FLUTICASONE PROPIONATE 50 MCG
1 SPRAY, SUSPENSION (ML) NASAL DAILY
Qty: 15.8 ML | Refills: 0 | Status: SHIPPED | OUTPATIENT
Start: 2024-03-21

## 2024-03-21 NOTE — PROGRESS NOTES
"Assessment/Plan:   Diagnoses and all orders for this visit:    Sinus pressure  -     Ambulatory Referral to Otolaryngology; Future  -     predniSONE 20 mg tablet; Take 2 tablets (40 mg total) by mouth daily for 5 days  -     fluticasone (FLONASE) 50 mcg/act nasal spray; 1 spray into each nostril daily  - s/s not consistent with acute infection   - advised to cont Flonase, Zyrtec and Singulair and will add Pred Burst  - cool mist humidifier  - close f/u with ENT - pt aware and agreeable   PND (post-nasal drip)  Vertigo  Dizziness  DNS (deviated nasal septum)          Subjective:    Patient ID: Naty Olivas is a 54 y.o. female.  HPI  55yo F presents to the office for eval of persistent sinus pressure  - had sinusitis 2/8/2024 and was treated with Zpak   - does follow with ENT - last OV was 10/2023 -- Flonase, Zyrtec, Singulair, VNG (not covered by pt's insurance) and allergy testing   - CT head = clear sinuses   - (+) DNS   - has been doing above regimen w/o relief -- (+) HA, dizziness, vertigo and feels like head wants to pop  - denies F/C/N/V      The following portions of the patient's history were reviewed and updated as appropriate: allergies, current medications, past family history, past medical history, past social history, past surgical history and problem list.    Review of Systems  as per HPI    Objective:  /80 (BP Location: Left arm, Patient Position: Sitting, Cuff Size: Standard)   Resp 16   Ht 5' 8\" (1.727 m)   Wt 83.5 kg (184 lb)   LMP 10/05/2021 (Exact Date)   BMI 27.98 kg/m²    Physical Exam  Vitals reviewed.   Constitutional:       General: She is not in acute distress.     Appearance: Normal appearance. She is not ill-appearing, toxic-appearing or diaphoretic.   HENT:      Head: Normocephalic and atraumatic.      Right Ear: External ear normal. A middle ear effusion is present. There is no impacted cerumen.      Left Ear: External ear normal. A middle ear effusion is present. " There is no impacted cerumen.      Nose: No congestion or rhinorrhea.      Right Sinus: Maxillary sinus tenderness present. No frontal sinus tenderness.      Left Sinus: Maxillary sinus tenderness present. No frontal sinus tenderness.      Mouth/Throat:      Mouth: Mucous membranes are moist.      Pharynx: Oropharynx is clear. No oropharyngeal exudate or posterior oropharyngeal erythema.   Eyes:      General: No scleral icterus.        Right eye: No discharge.         Left eye: No discharge.      Extraocular Movements: Extraocular movements intact.      Conjunctiva/sclera: Conjunctivae normal.   Pulmonary:      Effort: Pulmonary effort is normal. No respiratory distress.   Musculoskeletal:      Cervical back: Normal range of motion.   Lymphadenopathy:      Cervical: No cervical adenopathy.   Neurological:      Mental Status: She is alert.   Psychiatric:         Mood and Affect: Mood normal.         Behavior: Behavior normal.         BMI Counseling: Body mass index is 27.98 kg/m². The BMI is above normal. Nutrition recommendations include 3-5 servings of fruits/vegetables daily. Exercise recommendations include exercising 3-5 times per week.

## 2024-03-22 NOTE — PROGRESS NOTES
I first called the [id number] and was informed that the EGD was not covered on the grounds that it was not documented that patient had trialed PPI therapy once daily for at least 4 weeks.    I then called the patient who confirms she is still experiencing the symptoms she discussed with us at office visit 1 month ago, she has not started PPI therapy yet, however she does tell me that she has been having ongoing problems of this nature since 1999, and in the past has in fact trialed Prilosec over-the-counter on a daily basis for considerably longer than 4 weeks in the past without relief of these symptoms.    To our office staff, please inform her insurance of this development and the fact that she has in fact trialed PPI therapy for over 4 weeks for the symptoms without benefit, I will put this into her chart as a note for documentation that can be provided to them.  If a peer to peer is still required for any reason let us know, thank you.

## 2024-03-27 ENCOUNTER — TELEPHONE (OUTPATIENT)
Dept: GASTROENTEROLOGY | Facility: AMBULARY SURGERY CENTER | Age: 54
End: 2024-03-27

## 2024-03-27 NOTE — TELEPHONE ENCOUNTER
Called and spoke to the patient regarding EGD approval and gave her approval number provided by Julio. Patient verbalized understanding and was advised to call the office with any follow-up questions or concerns.

## 2024-04-03 ENCOUNTER — HOSPITAL ENCOUNTER (OUTPATIENT)
Dept: GASTROENTEROLOGY | Facility: AMBULARY SURGERY CENTER | Age: 54
Setting detail: OUTPATIENT SURGERY
Discharge: HOME | End: 2024-04-03
Attending: INTERNAL MEDICINE | Admitting: INTERNAL MEDICINE
Payer: COMMERCIAL

## 2024-04-03 ENCOUNTER — ANESTHESIA EVENT (OUTPATIENT)
Dept: GASTROENTEROLOGY | Facility: AMBULARY SURGERY CENTER | Age: 54
End: 2024-04-03

## 2024-04-03 ENCOUNTER — ANESTHESIA (OUTPATIENT)
Dept: GASTROENTEROLOGY | Facility: AMBULARY SURGERY CENTER | Age: 54
End: 2024-04-03

## 2024-04-03 VITALS
DIASTOLIC BLOOD PRESSURE: 61 MMHG | SYSTOLIC BLOOD PRESSURE: 108 MMHG | RESPIRATION RATE: 18 BRPM | TEMPERATURE: 97.5 F | HEART RATE: 75 BPM | OXYGEN SATURATION: 100 %

## 2024-04-03 DIAGNOSIS — K21.9 GASTROESOPHAGEAL REFLUX DISEASE, UNSPECIFIED WHETHER ESOPHAGITIS PRESENT: ICD-10-CM

## 2024-04-03 PROCEDURE — 88305 TISSUE EXAM BY PATHOLOGIST: CPT | Performed by: PATHOLOGY

## 2024-04-03 RX ORDER — LIDOCAINE HYDROCHLORIDE 10 MG/ML
INJECTION, SOLUTION EPIDURAL; INFILTRATION; INTRACAUDAL; PERINEURAL AS NEEDED
Status: DISCONTINUED | OUTPATIENT
Start: 2024-04-03 | End: 2024-04-03

## 2024-04-03 RX ORDER — SODIUM CHLORIDE, SODIUM LACTATE, POTASSIUM CHLORIDE, CALCIUM CHLORIDE 600; 310; 30; 20 MG/100ML; MG/100ML; MG/100ML; MG/100ML
125 INJECTION, SOLUTION INTRAVENOUS CONTINUOUS
Status: DISCONTINUED | OUTPATIENT
Start: 2024-04-03 | End: 2024-04-07 | Stop reason: HOSPADM

## 2024-04-03 RX ORDER — PROPOFOL 10 MG/ML
INJECTION, EMULSION INTRAVENOUS AS NEEDED
Status: DISCONTINUED | OUTPATIENT
Start: 2024-04-03 | End: 2024-04-03

## 2024-04-03 RX ORDER — PANTOPRAZOLE SODIUM 40 MG/1
40 TABLET, DELAYED RELEASE ORAL DAILY
Qty: 90 TABLET | Refills: 0 | Status: SHIPPED | OUTPATIENT
Start: 2024-04-03 | End: 2024-07-02

## 2024-04-03 RX ADMIN — LIDOCAINE HYDROCHLORIDE 100 MG: 10 INJECTION, SOLUTION EPIDURAL; INFILTRATION; INTRACAUDAL; PERINEURAL at 11:13

## 2024-04-03 RX ADMIN — SODIUM CHLORIDE, SODIUM LACTATE, POTASSIUM CHLORIDE, AND CALCIUM CHLORIDE: .6; .31; .03; .02 INJECTION, SOLUTION INTRAVENOUS at 10:57

## 2024-04-03 RX ADMIN — PROPOFOL 50 MG: 10 INJECTION, EMULSION INTRAVENOUS at 11:17

## 2024-04-03 RX ADMIN — PROPOFOL 200 MG: 10 INJECTION, EMULSION INTRAVENOUS at 11:13

## 2024-04-03 NOTE — H&P
History and Physical - [facility]  Naty Olivas 54 y.o. female MRN: [id number]    HPI: Naty Olivas is a 54 y.o. year old female who presents for evaluation of dyspepsia symptoms.      Review of Systems    Historical Information   Past Medical History:   Diagnosis Date    Bacterial vaginitis     Fibroids     Gastritis     GERD (gastroesophageal reflux disease)     Insomnia     Migraine     Otitis media     Pneumonia 2009     Past Surgical History:   Procedure Laterality Date     SECTION      COLONOSCOPY      HAND SURGERY Left     Left pinky tendon repair    HYSTERECTOMY      MA COLONOSCOPY FLX DX W/COLLJ SPEC WHEN PFRMD N/A 2018    Procedure: COLONOSCOPY;  Surgeon: Crystal Lacey MD;  Location: [facility];  Service: Gastroenterology    MA CYSTOURETHROSCOPY N/A 2021    Procedure: CYSTOSCOPY;  Surgeon: Alysia Osorio DO;  [facility];  Service: Gynecology    MA ESOPHAGOGASTRODUODENOSCOPY TRANSORAL DIAGNOSTIC N/A 2017    Procedure: ESOPHAGOGASTRODUODENOSCOPY (EGD);  Surgeon: Crystal Lacey MD;  [facility];  Service: Gastroenterology    MA LAPS TOTAL HYSTERECT 250 GM/< W/RMVL TUBE/OVARY N/A 2021    Procedure: HYSTERECTOMY LAPAROSCOPIC TOTAL (LTH) WITH BILATERAL SALPINGECTOMY, LYSIS OF ADHESIONS;  Surgeon: Alysia Osorio DO;  Location[facility];  Service: Gynecology    ROOT CANAL      UPPER GASTROINTESTINAL ENDOSCOPY       Social History   Social History     Substance and Sexual Activity   Alcohol Use Yes    Alcohol/week: 3.0 standard drinks of alcohol    Types: 3 Glasses of wine per week    Comment: socially     Social History     Substance and Sexual Activity   Drug Use Never     Social History     Tobacco Use   Smoking Status Never   Smokeless Tobacco Never     Family History   Problem Relation Age of Onset    Breast cancer Mother 72    Dementia Mother     Hyperlipidemia Mother     Parkinsonism Mother     Fibroids  Mother     Prostatitis Father     Cataracts Father     Premature birth Daughter     No Known Problems Daughter     No Known Problems Daughter     Dementia Maternal Grandmother     Parkinsonism Maternal Grandmother     Fibroids Maternal Grandmother     No Known Problems Maternal Grandfather     No Known Problems Paternal Grandmother     No Known Problems Paternal Grandfather     Prostate cancer Brother     Substance Abuse Brother     Dementia Maternal Aunt     Dementia Maternal Aunt     No Known Problems Maternal Aunt     No Known Problems Maternal Aunt     No Known Problems Maternal Aunt     No Known Problems Half-Sister     No Known Problems Half-Sister     No Known Problems Half-Sister     No Known Problems Paternal Aunt     No Known Problems Paternal Aunt     No Known Problems Paternal Aunt     Hypertension Neg Hx     Diabetes Neg Hx     Colon cancer Neg Hx     Ovarian cancer Neg Hx     Uterine cancer Neg Hx     Cervical cancer Neg Hx        Meds/Allergies     (Not in a hospital admission)      Allergies   Allergen Reactions    Amoxicillin Itching       Objective     /71   Pulse 82   Temp 97.5 °F (36.4 °C) (Temporal)   Resp 16   LMP 10/05/2021 (Exact Date)   SpO2 100%       PHYSICAL EXAM    Gen: NAD  CV: RRR  CHEST: Clear  ABD: soft, NT/ND  EXT: no edema  Neuro: AAO      ASSESSMENT/PLAN:  This is a 54 y.o. year old female here for evaluation of dyspepsia symptoms.    PLAN:   Procedure: EGD.

## 2024-04-03 NOTE — ANESTHESIA PREPROCEDURE EVALUATION
Procedure:  EGD    Relevant Problems   GI/HEPATIC   (+) GERD (gastroesophageal reflux disease)      Ear/Nose/Throat   (+) Laryngopharyngeal reflux (LPR)      Rheumatology   (+) TMJ syndrome      Other   (+) Belching             Anesthesia Plan  ASA Score- 2     Anesthesia Type- IV sedation with anesthesia with ASA Monitors.         Additional Monitors:     Airway Plan:            Plan Factors-    Chart reviewed.                      Induction- intravenous.    Postoperative Plan-     Informed Consent- Anesthetic plan and risks discussed with patient.  I personally reviewed this patient with the CRNA. Discussed and agreed on the Anesthesia Plan with the CRNA..

## 2024-04-05 DIAGNOSIS — G47.00 INSOMNIA, UNSPECIFIED TYPE: ICD-10-CM

## 2024-04-05 RX ORDER — TRAZODONE HYDROCHLORIDE 50 MG/1
50 TABLET ORAL
Qty: 30 TABLET | Refills: 1 | Status: SHIPPED | OUTPATIENT
Start: 2024-04-05

## 2024-04-05 NOTE — TELEPHONE ENCOUNTER
Reason for call:   [x] Refill   [] Prior Auth  [] Other:     Office:   [x] PCP/Provider - Silvia Park DO   [] Specialty/Provider -     Medication: traZODone (DESYREL) 50 mg tablet     Dose/Frequency: Take 1 tablet (50 mg total) by mouth daily at bedtime     Quantity: 30 + 1 refill    Pharmacy: [facility] #[id number] [place] [address] [id number]     Does the patient have enough for 3 days?   [] Yes   [x] No - Send as HP to POD

## 2024-04-08 PROCEDURE — 88305 TISSUE EXAM BY PATHOLOGIST: CPT | Performed by: PATHOLOGY

## 2024-04-16 ENCOUNTER — OFFICE VISIT (OUTPATIENT)
Dept: AUDIOLOGY | Facility: CLINIC | Age: 54
End: 2024-04-16
Payer: COMMERCIAL

## 2024-04-16 DIAGNOSIS — R42 VERTIGO: ICD-10-CM

## 2024-04-16 DIAGNOSIS — R42 DIZZINESS: Primary | ICD-10-CM

## 2024-04-16 DIAGNOSIS — H91.93 BILATERAL HEARING LOSS, UNSPECIFIED HEARING LOSS TYPE: ICD-10-CM

## 2024-04-16 PROCEDURE — 92538 CALORIC VSTBLR TEST W/REC: CPT | Performed by: AUDIOLOGIST

## 2024-04-16 PROCEDURE — 92567 TYMPANOMETRY: CPT | Performed by: AUDIOLOGIST

## 2024-04-16 PROCEDURE — 92540 BASIC VESTIBULAR EVALUATION: CPT | Performed by: AUDIOLOGIST

## 2024-04-16 NOTE — PROGRESS NOTES
Videonystagmography (VNG) Evaluation    Name:  Naty Olivas  :  1970  Age:  54 y.o.  MRN:  [id number]  Date of Evaluation: 24     HISTORY:     Reason for visit: Dizziness    Naty Olivas is seen today at the request of Dr. Ontiveros for VNG testing. Naty was accompanied by her  to today's visit.. Today, Naty reported that onset of symptoms began 2 years ago and have been intermittent with time. Ms. Sacha Olivas noted that at onset 2 years ago she underwent therapy for vertigo and that with time symptoms became quiescent. Now reemerged, the current symptoms are described as occurring several times/week in frequency. Dizziness perception is described as a sensation of head fullness and pressure that Ms. Sacha Olivas feels is associated with sinus discomfort. This results in positional imbalance where Ms. Sacha Olivas feels she needs to reach out to objects. Triggers include dizziness to visual stimuli (e.g., contrast/shading) and rapid head movements. Ms. Sacha Olivas stated that she has had photosensitivity since she was a teenager, and will become symptomatic from bright lights. She has shaded glasses to help with this. Ms. Sacha Olivas noted that she has had a history of headaches, but has never undergone formal evaluation for these. Symptom duration was noted to typically last minutes before subsiding. Naty has not fallen/lost consciousness previously. Of note, Ms. Sacha Olivas has been taking Trazodone for sleep, but described that she will only sleep 4 hours/day. She also described that she was taking Meclizine daily prior to cessation for this exam.     EVALUATION:    Otoscopic Evaluation:   Right Ear: Unremarkable, canal clear   Left Ear: Unremarkable, canal clear    Tympanometry:   Right: Type A; normal middle ear pressure and static compliance    Left: Type A; normal middle ear pressure and static compliance     Oculomotor battery:   Gaze:   Right: Normal; no  nystagmus  Left: Normal; no nystagmus  Up: Normal; no nystagmus  Down: Normal; no nystagmus      Tracking: Within normal limits    Saccades: Within normal limits     Optokinetic: Within normal limits    Positioning/Positionals:     [place]:    Right: Negative; no nystagmus seen, no subjective dizziness.     Left: Negative; no nystagmus seen, no subjective dizziness.       Positionals:   Sitting: Negative; no nystagmus seen, no subjective dizziness.   Supine: Negative; no nystagmus seen, no subjective dizziness.   Head Right:Negative; no nystagmus seen, no subjective dizziness.   Head Left: Negative; no nystagmus seen, no subjective dizziness.     Calorics: (Normal response <25% difference)    [place] Caloric Irrigation: Within normal limits. (22% stronger right ear response)     Caloric irrigations  completed without incident with good parting otoscopy noted.     Ms. Sacha Olivas was very symptomatic during initial RW irrigation and reported that she was concerned for emesis. After several minutes Ms. Sacha Olivas was able to recollect herself and proceed with LW to complete MWST. Cools omitted for patient wellness. All irrigations completed without incident and good parting otoscopy.     IMPRESSIONS:     Within normal limits: No evidence of peripheral or central vestibular pathology indicated by today's findings.      RECOMMENDATIONS:     1) Follow-up with referring provider to review today's results. Consider further evaluation for migraine associated vertigo based on clinical presentation that includes chronic dizziness, motion intolerance with respect to head/eye/body movement, spontaneous vertigo attacks, nausea/vomiting, diminished eye focus with photosensitivity, loss of balance/ataxia, confusion with altered cognition, spatial disorientation, and heightened anxiety. If suspected, pharmacological treatment (preventative vs. abortive), vestibular rehabilitation (including VOR/gaze stability exercises,  spatial awareness/proprioception, vestibulo-visual interactive exercises, and postural stability/gait training) and lifestyle modifications (avoidance of triggers such as food triggers, hormonal fluctuations, changes in barometric pressure, poor sleep hygiene, fluorescent lights, elevated stress levels, etc.) can be reviewed.    2) Continue to monitor dizziness symptoms. If symptoms worsen or fail to improve prior to follow-up with their referring provider, contact your primary care/or referring provider and/or urgent medical attention should be considered.  3) Fall precautions were discussed at length with the patient. Most test effects are expected to subside shortly after testing is completed, it was recommended that they use caution moving around for the remainder of the day.     Lizbeth Robertson.,  Clinical Audiologist  [facility]  [address] [id number]

## 2024-04-16 NOTE — Clinical Note
Moises Pablo,  Normal VNG study for this patient with concern for possible migraines. Of additional note, patient also described that she was taking Meclizine daily and has been sleeping poorly.    Let me know if there's any other questions; thank you for the referral.  -Carter

## 2024-05-08 ENCOUNTER — HOSPITAL ENCOUNTER (OUTPATIENT)
Dept: MRI IMAGING | Facility: HOSPITAL | Age: 54
Discharge: HOME | End: 2024-05-08
Payer: COMMERCIAL

## 2024-05-08 DIAGNOSIS — R42 VERTIGO: ICD-10-CM

## 2024-05-08 PROCEDURE — 70553 MRI BRAIN STEM W/O & W/DYE: CPT

## 2024-05-08 PROCEDURE — A9585 GADOBUTROL INJECTION: HCPCS | Performed by: NURSE PRACTITIONER

## 2024-05-08 RX ORDER — GADOBUTROL 604.72 MG/ML
8 INJECTION INTRAVENOUS
Status: COMPLETED | OUTPATIENT
Start: 2024-05-08 | End: 2024-05-08

## 2024-05-08 RX ADMIN — GADOBUTROL 8 ML: 604.72 INJECTION INTRAVENOUS at 09:37

## 2024-05-17 ENCOUNTER — TELEPHONE (OUTPATIENT)
Dept: NEUROLOGY | Facility: CLINIC | Age: 54
End: 2024-05-17

## 2024-05-17 NOTE — TELEPHONE ENCOUNTER
Pt called wants to be seen for Abnormal MRI. They found lesions in her brain.   LOV- 10/19/21- Fabrice  Is this something you can see pt for?    Called pt scheduled with Dr Avina for 10/22. She said will keep this for now. But will look for another neurologist who can see her sooner. Will call back if cancelling appointment.

## 2024-06-04 ENCOUNTER — OFFICE VISIT (OUTPATIENT)
Dept: NEUROLOGY | Facility: CLINIC | Age: 54
End: 2024-06-04
Payer: COMMERCIAL

## 2024-06-04 VITALS
SYSTOLIC BLOOD PRESSURE: 136 MMHG | WEIGHT: 181 LBS | DIASTOLIC BLOOD PRESSURE: 84 MMHG | HEART RATE: 78 BPM | HEIGHT: 68 IN | BODY MASS INDEX: 27.43 KG/M2 | OXYGEN SATURATION: 100 % | TEMPERATURE: 96.8 F

## 2024-06-04 DIAGNOSIS — H81.10 BPPV (BENIGN PAROXYSMAL POSITIONAL VERTIGO): Primary | ICD-10-CM

## 2024-06-04 DIAGNOSIS — D32.9 MENINGIOMA (HCC): ICD-10-CM

## 2024-06-04 PROCEDURE — 99215 OFFICE O/P EST HI 40 MIN: CPT | Performed by: PSYCHIATRY & NEUROLOGY

## 2024-06-04 NOTE — PROGRESS NOTES
Return NeuroOutpatient Note        Naty Olivas  [id number]  54 y.o.  1970       Nausea ; Dizziness; and Benign paroxysmal positional vertigo, unspecified lateralit        History obtained from:  Patient and      HPI/Subjective:    Naty Olivas is a 55 yo F with PMH of dizziness, BPPV presents to reestablish care for incidental finding on MRI brain. Per my previous history, patient has been dealing with vertigo since 2020. She would  roll over in bed and had sudden onset room spinning sensation. Patient tried PT for 2 months and then her vertigo was only rare with certain position change. She didn't return since 2021 visit. At previous visit, we had discussed MRI brain IAC protocol if her sxs didn't subside.   She had her imaging done few days ago and it shows no pathology along CPA or 7th, 8th angle but shows incidental 6mm enhancing lesion along right frontal convexity consistent with meningioma.     She was feeling better since 2021 after vestibular rehab until sept of 2023 when her sxs returned.     Patient has always had car motion sickness all of her life.   Patient was sensitive to certain vision exercises.   This year in May, she had right sided ear pain. Her ENT work up was negative.     She recently went for vestibular rehab and since has not been feeling well.  She reports rare headaches.   She denies any left sided sxs.   Meclizine does work when taken prn.     Past Medical History:   Diagnosis Date   • Bacterial vaginitis    • Brain lesion    • Fibroids    • Gastritis    • GERD (gastroesophageal reflux disease)    • Insomnia    • Migraine    • Otitis media    • Pneumonia 2009     Social History     Socioeconomic History   • Marital status: /Civil Union     Spouse name: Not on file   • Number of children: Not on file   • Years of education: Not on file   • Highest education level: Not on file   Occupational History   • Not on file   Tobacco Use   • Smoking status:  Never   • Smokeless tobacco: Never   Vaping Use   • Vaping status: Never Used   Substance and Sexual Activity   • Alcohol use: Yes     Alcohol/week: 3.0 standard drinks of alcohol     Types: 3 Glasses of wine per week     Comment: socially   • Drug use: Never   • Sexual activity: Yes     Partners: Male     Birth control/protection: Post-menopausal, Surgical   Other Topics Concern   • Not on file   Social History Narrative   • Not on file     Social Determinants of Health     Financial Resource Strain: Not on file   Food Insecurity: Not on file   Transportation Needs: Not on file   Physical Activity: Not on file   Stress: Not on file   Social Connections: Not on file   Intimate Partner Violence: Not on file   Housing Stability: Not on file     Family History   Problem Relation Age of Onset   • Breast cancer Mother 72   • Dementia Mother    • Hyperlipidemia Mother    • Parkinsonism Mother    • Fibroids Mother    • Prostatitis Father    • Cataracts Father    • Premature birth Daughter    • No Known Problems Daughter    • No Known Problems Daughter    • Dementia Maternal Grandmother    • Parkinsonism Maternal Grandmother    • Fibroids Maternal Grandmother    • No Known Problems Maternal Grandfather    • No Known Problems Paternal Grandmother    • No Known Problems Paternal Grandfather    • Prostate cancer Brother    • Substance Abuse Brother    • Dementia Maternal Aunt    • Dementia Maternal Aunt    • No Known Problems Maternal Aunt    • No Known Problems Maternal Aunt    • No Known Problems Maternal Aunt    • No Known Problems Half-Sister    • No Known Problems Half-Sister    • No Known Problems Half-Sister    • No Known Problems Paternal Aunt    • No Known Problems Paternal Aunt    • No Known Problems Paternal Aunt    • Hypertension Neg Hx    • Diabetes Neg Hx    • Colon cancer Neg Hx    • Ovarian cancer Neg Hx    • Uterine cancer Neg Hx    • Cervical cancer Neg Hx      Allergies   Allergen Reactions   • Amoxicillin  Itching     Current Outpatient Medications on File Prior to Visit   Medication Sig Dispense Refill   • Biotin 1 MG CAPS Take by mouth     • Cetirizine HCl (ZyrTEC Allergy) 10 MG CAPS Take by mouth     • Cranberry 1000 MG CAPS Take by mouth     • Echinacea Plus CAPS Take by mouth     • famotidine (PEPCID) 40 MG tablet Take 1 tablet (40 mg total) by mouth daily at bedtime 30 tablet 6   • fluticasone (FLONASE) 50 mcg/act nasal spray 1 spray into each nostril daily 15.8 mL 0   • meclizine (ANTIVERT) 12.5 MG tablet Take 1 tablet (12.5 mg total) by mouth every 8 (eight) hours as needed for dizziness 90 tablet 0   • montelukast (SINGULAIR) 10 mg tablet Take 1 tablet (10 mg total) by mouth daily at bedtime 30 tablet 1   • Multiple Vitamins-Minerals (CENTRUM SILVER 50+WOMEN PO)      • Omega-3 Fatty Acids (FISH OIL PO) Take by mouth     • pantoprazole (PROTONIX) 40 mg tablet Take 1 tablet (40 mg total) by mouth daily 90 tablet 0   • traZODone (DESYREL) 50 mg tablet Take 1 tablet (50 mg total) by mouth daily at bedtime 30 tablet 1   • Vitamin D, Ergocalciferol, 72950 units CAPS Take by mouth     • ALPRAZolam (XANAX) 0.5 mg tablet Take 1 tablet (0.5 mg total) by mouth daily at bedtime as needed for anxiety (Patient not taking: Reported on 6/4/2024) 1 tablet 0   • polyethylene glycol (GaviLyte-G) 4000 mL solution Take 4,000 mL by mouth once for 1 dose (Patient not taking: Reported on 6/4/2024) 4000 mL 0     No current facility-administered medications on file prior to visit.         Review of Systems   Refer to positive review of systems in HPI.   Review of Systems    Constitutional- No fever  Eyes- No visual change  ENT- Hearing normal  CV- No chest pain  Resp- No Shortness of breath  GI- No diarrhea  - Bladder normal  MS- No Arthritis   Skin- No rash  Psych- No depression  Endo- No DM  Heme- No nodes    Vitals:    06/04/24 1637   BP: 136/84   BP Location: Left arm   Patient Position: Sitting   Cuff Size: Standard   Pulse: 78  "  Temp: (!) 96.8 °F (36 °C)   TempSrc: Tympanic   SpO2: 100%   Weight: 82.1 kg (181 lb)   Height: 5' 8\" (1.727 m)       PHYSICAL EXAM:  Appearance: No Acute Distress  Ophthalmoscopic: Disc Flat, Normal fundus  Mental status:  Orientation: Awake, Alert, and Orientedx3  Memory: Registation 3/3 Recall 3/3  Attention: normal  Knowledge: good  Language: No aphasia  Speech: No dysarthria  Cranial Nerves:  2 No Visual Defect on Confrontation, Pupils round, equal, reactive to light  3,4,6 Extraocular Movements Intact, no nystagmus  5 Facial Sensation Intact  7 No facial asymmetry  8 Intact hearing  9,10 Palate symmetric, normal gag  11 Good shoulder shrug  12 Tongue Midline  Gait: Stable  Coordination: No ataxia with finger to nose testing, and heel to shin  Sensory: Intact, Symmetric to pinprick, light touch, vibration, and joint position  Muscle Tone: Normal              Muscle exam:  Arm Right Left Leg Right Left   Deltoid 5/5 5/5 Iliopsoas 5/5 5/5   Biceps 5/5 5/5 Quads 5/5 5/5   Triceps 5/5 5/5 Hamstrings 5/5 5/5   Wrist Extension 5/5 5/5 Ankle Dorsi Flexion 5/5 5/5   Wrist Flexion 5/5 5/5 Ankle Plantar Flexion 5/5 5/5   Interossei 5/5 5/5 Ankle Eversion 5/5 5/5   APB 5/5 5/5 Ankle Inversion 5/5 5/5       Reflexes   RJ BJ TJ KJ AJ Plantars Andrade's   Right 2+ 2+ 2+ 2+  Downgoing Not present   Left 2+ 2+ 2+ 2+  Downgoing Not present     Personal review of  Labs:                  Diagnoses and all orders for this visit:      1. BPPV (benign paroxysmal positional vertigo)        2. Meningioma (HCC)  MRI brain with and without contrast            Patient has been dealing with BPV for few years but episodes would be once every few months to 1-2 yrs.  She may resume prn meclizine.  Discussed that her meningioma is very small and not related to her vertigo. We will get periodic imaging to make sure it's not enlarging. Will get repeat MRI brain in 1 year.               Total time of encounter:  40 min  More than 50% of the " time was used in counseling and/or coordination of care  Extent of counseling and/or coordination of care        Ladarius Avina MD  [facility]  [address] [place] [id number] [phone]

## 2024-06-18 ENCOUNTER — NURSE TRIAGE (OUTPATIENT)
Dept: OTHER | Facility: OTHER | Age: 54
End: 2024-06-18

## 2024-06-18 NOTE — TELEPHONE ENCOUNTER
Called pt to see if she can come in sooner but the times she needs we don't have available I told her I would call her if we get a cancellation

## 2024-06-18 NOTE — TELEPHONE ENCOUNTER
"Reason for Disposition  • Back pain lasts > 2 weeks    Answer Assessment - Initial Assessment Questions  1. ONSET: \"When did the pain begin?\"       2,5 weeks ago   2. LOCATION: \"Where does it hurt?\" (upper, mid or lower back)      Between the shoulders to the left.   3. SEVERITY: \"How bad is the pain?\"  (e.g., Scale 1-10; mild, moderate, or severe)    - MILD (1-3): doesn't interfere with normal activities     - MODERATE (4-7): interferes with normal activities or awakens from sleep     - SEVERE (8-10): excruciating pain, unable to do any normal activities       Moderate 5 out of 10.   4. PATTERN: \"Is the pain constant?\" (e.g., yes, no; constant, intermittent)       Constant.   5. RADIATION: \"Does the pain shoot into your legs or elsewhere?\"      Between the shoulders   6. CAUSE:  \"What do you think is causing the back pain?\"       Possible muscle pull. Patient  sees chiropractor   7. BACK OVERUSE:  \"Any recent lifting of heavy objects, strenuous work or exercise?\"      Bending over   8. MEDICATIONS: \"What have you taken so far for the pain?\" (e.g., nothing, acetaminophen, NSAIDS)      No.   9. NEUROLOGIC SYMPTOMS: \"Do you have any weakness, numbness, or problems with bowel/bladder control?\"      No   10. OTHER SYMPTOMS: \"Do you have any other symptoms?\" (e.g., fever, abdominal pain, burning with urination, blood in urine)        No   11. PREGNANCY: \"Is there any chance you are pregnant?\" (e.g., yes, no; LMP)        No    Protocols used: Back Pain-ADULT-[place]    "

## 2024-06-18 NOTE — TELEPHONE ENCOUNTER
Patient denies any chest pain, stated she pulled the muscle when bending over. Patient confirmed an appointment on 6/24 at 1020.

## 2024-06-24 ENCOUNTER — OFFICE VISIT (OUTPATIENT)
Dept: FAMILY MEDICINE CLINIC | Facility: CLINIC | Age: 54
End: 2024-06-24
Payer: COMMERCIAL

## 2024-06-24 VITALS
BODY MASS INDEX: 27.28 KG/M2 | WEIGHT: 180 LBS | DIASTOLIC BLOOD PRESSURE: 70 MMHG | HEIGHT: 68 IN | SYSTOLIC BLOOD PRESSURE: 110 MMHG

## 2024-06-24 DIAGNOSIS — M62.838 CERVICAL PARASPINAL MUSCLE SPASM: ICD-10-CM

## 2024-06-24 DIAGNOSIS — M62.838 TRAPEZIUS MUSCLE SPASM: Primary | ICD-10-CM

## 2024-06-24 DIAGNOSIS — R29.898 DECREASED ROM OF NECK: ICD-10-CM

## 2024-06-24 PROCEDURE — 99213 OFFICE O/P EST LOW 20 MIN: CPT | Performed by: FAMILY MEDICINE

## 2024-06-24 NOTE — PROGRESS NOTES
Outpatient Note- Follow up     HPI:     Naty Goncalves Brendon , 54 y.o. female  presents today for back spasm and pain.  Her pain started about 2 weeks ago after she bent down to  an object and suddenly had a spasm and pain.  It halted her in her tracks and she slowly stood up.  A knot formed with pain and discomfort over the last 1.5-2 weeks.  She has gone to the chiropractor 3 times and although each time there was some improvement, she is still having pain and discomfort in the trap and cervical region.    Onset of back pain:  2 weeks  Intensity of pain:  3/10, got up to a 4-5/10 earlier today  Location of Pain:  neck/trap region  Radiation:  occasionally to mid back  Character of Pain:  sharp and stabby when working/ morning, tightness, tension  Constant intermittent: constant  Exacerbating factors:  None known   Relieving Factors: ibuprofen, tens unit, massage  Associated Symptoms:    -  Erythema, Numbness, tingling  -  Decreased ROM  -  Decreased Strength    Inciting Event/ Trauma:  reaching down and bending over 2 weeks ago  Progression:  improved knot in back, continued tightness  Previous Episodes: multiple  Specialist Follow up: Chiropractor  Hx of Physical Therapy:  None  Prior medications: Ibuprofen       Past Medical History:   Diagnosis Date    Bacterial vaginitis     Brain lesion     Fibroids     Gastritis     GERD (gastroesophageal reflux disease)     Insomnia     Migraine     Otitis media     Pneumonia 2009      ROS:   Review of Systems   See HPI    OBJECTIVE  Vitals:    06/24/24 1025   BP: 110/70        Physical Exam  Constitutional:       General: She is not in acute distress.     Appearance: Normal appearance. She is normal weight. She is not ill-appearing, toxic-appearing or diaphoretic.   HENT:      Head: Normocephalic and atraumatic.   Cardiovascular:      Rate and Rhythm: Normal rate and regular rhythm.      Heart sounds: Normal heart sounds. No murmur heard.     No friction rub. No  gallop.   Pulmonary:      Effort: Pulmonary effort is normal. No respiratory distress.      Breath sounds: Normal breath sounds. No stridor. No wheezing, rhonchi or rales.   Musculoskeletal:         General: Tenderness (Tenderness to palpation of the traps and cervical paraspinal musculature.  Both areas to have significant spasm.  No significant issues in the low cervical/thoracic region.) present.      Comments: Decreased range of motion in flexion, extension, and rotation of the neck.  10 degrees on both sides reduction in rotation.   Skin:     General: Skin is warm.   Neurological:      Mental Status: She is alert.            ASSESSMENT AND PLAN   Naty was seen today for back pain.  Diagnoses and all orders for this visit:    Trapezius muscle spasm  Cervical paraspinal muscle spasm  Decreased ROM of neck  Patient presents today with significant muscle spasm and pain in the upper back.  It is possible this is multifactorial between work, sleeping, and a recent injury where she was bending down and had a significant knot in her back.  The body likely compensated for the not, producing more spasm in the neck and traps.  This is not the first time this has happened, and the patient is looking for some relief.  I offered a muscle relaxer, Flexeril 10 mg daily, patient declined stating that she was already on medication to help her sleep and she did not want to be more groggy.  PT order was placed.  I believe that this will be the most successful in treating her symptoms and improving the muscle spasm and range of motion.  -     Ambulatory Referral to Physical Therapy; Future         DO Bernadette Marie [facility]  6/24/2024 10:48 AM

## 2024-07-02 ENCOUNTER — TELEPHONE (OUTPATIENT)
Dept: PHYSICAL THERAPY | Facility: CLINIC | Age: 54
End: 2024-07-02

## 2024-07-02 ENCOUNTER — TELEPHONE (OUTPATIENT)
Age: 54
End: 2024-07-02

## 2024-07-02 NOTE — TELEPHONE ENCOUNTER
Rec'd call from patient requesting NEW for (possible) changing MOLE ON HIP.    Informed patient of next available.    Patient will see her PCP prior to scheduling with dermatology.

## 2024-07-02 NOTE — TELEPHONE ENCOUNTER
"Patient reached out to PT on 7/1 via [email or web address] \"I need to reschedule. I forgot about a previous appointment already scheduled. Is there anything available for Monday, 7/8/2024, after 1pm?\" This office attempted to call the patient back on 7/1 and 7/2 to reschedule the appointment, however we had to leave a voicemail both times. Informed patient in voicemail that we will be taking out her 7/3 appointment as she requested, and informed her to contact this office to reschedule her initial evaluation PT appointment.   "

## 2024-07-05 DIAGNOSIS — G47.00 INSOMNIA, UNSPECIFIED TYPE: ICD-10-CM

## 2024-07-05 RX ORDER — TRAZODONE HYDROCHLORIDE 50 MG/1
50 TABLET ORAL
Qty: 30 TABLET | Refills: 1 | Status: SHIPPED | OUTPATIENT
Start: 2024-07-05

## 2024-07-23 ENCOUNTER — OFFICE VISIT (OUTPATIENT)
Dept: FAMILY MEDICINE CLINIC | Facility: CLINIC | Age: 54
End: 2024-07-23
Payer: COMMERCIAL

## 2024-07-23 VITALS
RESPIRATION RATE: 16 BRPM | BODY MASS INDEX: 27.43 KG/M2 | HEART RATE: 85 BPM | WEIGHT: 181 LBS | DIASTOLIC BLOOD PRESSURE: 70 MMHG | SYSTOLIC BLOOD PRESSURE: 120 MMHG | HEIGHT: 68 IN

## 2024-07-23 DIAGNOSIS — Z13.29 THYROID DISORDER SCREENING: ICD-10-CM

## 2024-07-23 DIAGNOSIS — E55.9 VITAMIN D INSUFFICIENCY: ICD-10-CM

## 2024-07-23 DIAGNOSIS — R60.9 EDEMA, UNSPECIFIED TYPE: Primary | ICD-10-CM

## 2024-07-23 DIAGNOSIS — Z13.1 SCREENING FOR DIABETES MELLITUS: ICD-10-CM

## 2024-07-23 DIAGNOSIS — Z13.0 SCREENING FOR DEFICIENCY ANEMIA: ICD-10-CM

## 2024-07-23 DIAGNOSIS — E78.00 ELEVATED LDL CHOLESTEROL LEVEL: ICD-10-CM

## 2024-07-23 PROCEDURE — 99213 OFFICE O/P EST LOW 20 MIN: CPT | Performed by: FAMILY MEDICINE

## 2024-07-23 NOTE — PROGRESS NOTES
"Assessment/Plan:   Diagnoses and all orders for this visit:    Edema, unspecified type  -     Comprehensive metabolic panel  - no swelling present on exam today   - advised low Na diet   - cont to wear compression stockings and elevate legs at night   - RTO, with labs, for f/u and annual exam - pt aware and agreeable     Screening for diabetes mellitus  -     Comprehensive metabolic panel    Elevated LDL cholesterol level  -     Lipid panel; Future    Thyroid disorder screening  -     TSH, 3rd generation with Free T4 reflex    Screening for deficiency anemia  -     CBC and differential    Vitamin D insufficiency  -     Vitamin D 25 hydroxy; Future          Subjective:    Patient ID: Naty Olivas is a 54 y.o. female.  HPI  - had noted it last week when taking a bath   - thought had banged it   - it didn't hurt  - indentation lasted for ~20mins while pt was getting dressed for work   - showed her daughter who is an RN - has been wearing compression stockings since Saturday   - \"felt prickly\" when it \"filled back up\"   - denies F/C/N/V/SOB/wheezing/LE edema   - (+) gas pain   - \"sciatica is bad\"       The following portions of the patient's history were reviewed and updated as appropriate: allergies, current medications, past family history, past medical history, past social history, past surgical history and problem list.    Review of Systems  as per HPI    Objective:  /70   Pulse 85   Resp 16   Ht 5' 8\" (1.727 m)   Wt 82.1 kg (181 lb)   LMP 10/05/2021 (Exact Date)   BMI 27.52 kg/m²    Physical Exam  Vitals reviewed.   Constitutional:       General: She is not in acute distress.     Appearance: Normal appearance. She is not ill-appearing, toxic-appearing or diaphoretic.   HENT:      Head: Normocephalic and atraumatic.      Right Ear: External ear normal.      Left Ear: External ear normal.   Eyes:      General: No scleral icterus.        Right eye: No discharge.         Left eye: No discharge.    "   Extraocular Movements: Extraocular movements intact.      Conjunctiva/sclera: Conjunctivae normal.   Pulmonary:      Effort: Pulmonary effort is normal.   Musculoskeletal:         General: No swelling or tenderness. Normal range of motion.      Cervical back: Normal range of motion.      Right lower leg: No edema.      Left lower leg: No edema.   Skin:     General: Skin is warm.   Neurological:      General: No focal deficit present.      Mental Status: She is alert and oriented to person, place, and time.   Psychiatric:         Mood and Affect: Mood normal.         Behavior: Behavior normal.

## 2024-07-26 ENCOUNTER — ANNUAL EXAM (OUTPATIENT)
Dept: OBGYN CLINIC | Facility: CLINIC | Age: 54
End: 2024-07-26
Payer: COMMERCIAL

## 2024-07-26 VITALS
WEIGHT: 180.4 LBS | HEIGHT: 68 IN | BODY MASS INDEX: 27.34 KG/M2 | DIASTOLIC BLOOD PRESSURE: 70 MMHG | SYSTOLIC BLOOD PRESSURE: 102 MMHG

## 2024-07-26 DIAGNOSIS — Z12.31 ENCOUNTER FOR SCREENING MAMMOGRAM FOR MALIGNANT NEOPLASM OF BREAST: ICD-10-CM

## 2024-07-26 DIAGNOSIS — Z01.419 WELL WOMAN EXAM WITH ROUTINE GYNECOLOGICAL EXAM: Primary | ICD-10-CM

## 2024-07-26 PROCEDURE — 99396 PREV VISIT EST AGE 40-64: CPT | Performed by: OBSTETRICS & GYNECOLOGY

## 2024-07-26 NOTE — PROGRESS NOTES
ASSESSMENT & PLAN:   Diagnoses and all orders for this visit:    Well woman exam with routine gynecological exam    Encounter for screening mammogram for malignant neoplasm of breast  -     Mammo screening bilateral w 3d & cad; Future          The following were reviewed in today's visit: ASCCP guidelines, Gardisil vaccination, STD testing breast self exam, mammography screening ordered, menopause, exercise, healthy diet, and colonoscopy discussed and ordered.    Patient to return to office in yearly for annual exam.     All questions have been answered to her satisfaction.        CC:  Annual Gynecologic Examination  Chief Complaint   Patient presents with    Gynecologic Exam     Annual exam, pap not indicated. Pt is well, no gyn concerns at this time.   s/p hysterectomy - 2021  mammo 24 Negative- Repeat 1 yr   Colonoscopy 10/06/23, repeat 10 year       HPI: Naty Olivas is a 54 y.o.  who presents for annual gynecologic examination.  She has the following concerns:  none      Health Maintenance:    Exercise: frequently  Breast exams/breast awareness: yes  Last mammogram:  - BIRADS 2  Colorectal cancer screenin - repeat recommended in 10 years.     Past Medical History:   Diagnosis Date    Bacterial vaginitis     Brain lesion     Fibroids     Gastritis     GERD (gastroesophageal reflux disease)     Insomnia     Migraine     Otitis media     Pneumonia        Past Surgical History:   Procedure Laterality Date     SECTION      COLONOSCOPY      HAND SURGERY Left     Left pinky tendon repair    HYSTERECTOMY      MN COLONOSCOPY FLX DX W/COLLJ SPEC WHEN PFRMD N/A 2018    Procedure: COLONOSCOPY;  Surgeon: Crystal Lacey MD;  Location: [facility];  Service: Gastroenterology    MN CYSTOURETHROSCOPY N/A 2021    Procedure: CYSTOSCOPY;  Surgeon: Alysia Osorio DO;  [facility];  Service: Gynecology    MN ESOPHAGOGASTRODUODENOSCOPY TRANSORAL DIAGNOSTIC  N/A 03/31/2017    Procedure: ESOPHAGOGASTRODUODENOSCOPY (EGD);  Surgeon: Crystal Lacey MD;  [facility];  Service: Gastroenterology    DC LAPS TOTAL HYSTERECT 250 GM/< W/RMVL TUBE/OVARY N/A 11/08/2021    Procedure: HYSTERECTOMY LAPAROSCOPIC TOTAL (LTH) WITH BILATERAL SALPINGECTOMY, LYSIS OF ADHESIONS;  Surgeon: Alysia Osorio DO;  [facility];  Service: Gynecology    ROOT CANAL      UPPER GASTROINTESTINAL ENDOSCOPY         Past OB/Gyn History:   Patient's last menstrual period was 10/05/2021 (exact date).    Menopausal status: postmenopausal  Menopausal symptoms: None    Last Pap: 2021 : no abnormalities. Further pap smears not indicated. Hysterectomy due to benign reasons  History of abnormal Pap smear: no    Patient is currently sexually active.   STD testing: no  Current contraception: status post hysterectomy      Family History  Family History   Problem Relation Age of Onset    Breast cancer Mother 72    Dementia Mother     Hyperlipidemia Mother     Parkinsonism Mother     Fibroids Mother     Prostatitis Father     Cataracts Father     Prostate cancer Brother     Substance Abuse Brother     Premature birth Daughter     No Known Problems Daughter     No Known Problems Daughter     Dementia Maternal Grandmother     Parkinsonism Maternal Grandmother     Fibroids Maternal Grandmother     No Known Problems Maternal Grandfather     No Known Problems Paternal Grandmother     No Known Problems Paternal Grandfather     Dementia Maternal Aunt     Dementia Maternal Aunt     No Known Problems Maternal Aunt     No Known Problems Maternal Aunt     No Known Problems Maternal Aunt     No Known Problems Paternal Aunt     No Known Problems Paternal Aunt     No Known Problems Paternal Aunt     No Known Problems Half-Sister     No Known Problems Half-Sister     No Known Problems Half-Sister     Ovarian cancer Cousin     Breast cancer Cousin     Hypertension Neg Hx     Diabetes Neg Hx     Colon cancer Neg Hx      Uterine cancer Neg Hx     Cervical cancer Neg Hx        Family history of uterine or ovarian cancer: no  Family history of breast cancer: yes  Family history of colon cancer: no    Social History:  Social History     Socioeconomic History    Marital status: /Civil Union     Spouse name: Not on file    Number of children: Not on file    Years of education: Not on file    Highest education level: Not on file   Occupational History    Not on file   Tobacco Use    Smoking status: Never    Smokeless tobacco: Never   Vaping Use    Vaping status: Never Used   Substance and Sexual Activity    Alcohol use: Yes     Alcohol/week: 3.0 standard drinks of alcohol     Types: 3 Glasses of wine per week     Comment: socially    Drug use: Never    Sexual activity: Yes     Partners: Male     Birth control/protection: Post-menopausal, Surgical     Comment: Hysterectomy   Other Topics Concern    Not on file   Social History Narrative    Not on file     Social Determinants of Health     Financial Resource Strain: Not on file   Food Insecurity: Not on file   Transportation Needs: Not on file   Physical Activity: Not on file   Stress: Not on file   Social Connections: Not on file   Intimate Partner Violence: Not on file   Housing Stability: Not on file     Domestic violence screen: negative    Allergies:  Allergies   Allergen Reactions    Amoxicillin Itching       Medications:    Current Outpatient Medications:     Biotin 1 MG CAPS, Take by mouth, Disp: , Rfl:     Cetirizine HCl (ZyrTEC Allergy) 10 MG CAPS, Take by mouth, Disp: , Rfl:     Cranberry 1000 MG CAPS, Take by mouth, Disp: , Rfl:     Echinacea Plus CAPS, Take by mouth, Disp: , Rfl:     famotidine (PEPCID) 40 MG tablet, Take 1 tablet (40 mg total) by mouth daily at bedtime, Disp: 30 tablet, Rfl: 6    fluticasone (FLONASE) 50 mcg/act nasal spray, 1 spray into each nostril daily, Disp: 15.8 mL, Rfl: 0    meclizine (ANTIVERT) 12.5 MG tablet, Take 1 tablet (12.5 mg total) by  "mouth every 8 (eight) hours as needed for dizziness, Disp: 90 tablet, Rfl: 0    montelukast (SINGULAIR) 10 mg tablet, Take 1 tablet (10 mg total) by mouth daily at bedtime, Disp: 30 tablet, Rfl: 1    Multiple Vitamins-Minerals (CENTRUM SILVER 50+WOMEN PO), , Disp: , Rfl:     Omega-3 Fatty Acids (FISH OIL PO), Take by mouth, Disp: , Rfl:     traZODone (DESYREL) 50 mg tablet, TAKE 1 TABLET(50 MG) BY MOUTH DAILY AT BEDTIME, Disp: 30 tablet, Rfl: 1    Vitamin D, Ergocalciferol, 77900 units CAPS, Take by mouth, Disp: , Rfl:     ALPRAZolam (XANAX) 0.5 mg tablet, Take 1 tablet (0.5 mg total) by mouth daily at bedtime as needed for anxiety (Patient not taking: Reported on 7/26/2024), Disp: 1 tablet, Rfl: 0    pantoprazole (PROTONIX) 40 mg tablet, Take 1 tablet (40 mg total) by mouth daily (Patient not taking: Reported on 7/26/2024), Disp: 90 tablet, Rfl: 0    polyethylene glycol (GaviLyte-G) 4000 mL solution, Take 4,000 mL by mouth once for 1 dose (Patient not taking: Reported on 6/4/2024), Disp: 4000 mL, Rfl: 0    Review of Systems:  Review of Systems   Constitutional:  Negative for activity change, appetite change and unexpected weight change.   Respiratory:  Negative for cough and shortness of breath.    Cardiovascular:  Negative for chest pain.   Gastrointestinal:  Negative for abdominal pain, constipation, diarrhea, nausea and vomiting.   Genitourinary:  Negative for difficulty urinating, dyspareunia, frequency, menstrual problem, pelvic pain, urgency, vaginal bleeding, vaginal discharge and vaginal pain.   Musculoskeletal:  Negative for back pain.   Skin: Negative.    Neurological:  Negative for dizziness, weakness, light-headedness and headaches.   Psychiatric/Behavioral: Negative.           Physical Exam:  /70 (BP Location: Left arm, Patient Position: Sitting, Cuff Size: Standard)   Ht 5' 8\" (1.727 m)   Wt 81.8 kg (180 lb 6.4 oz)   LMP 10/05/2021 (Exact Date)   BMI 27.43 kg/m²    Physical " Exam  Constitutional:       General: She is not in acute distress.     Appearance: Normal appearance. She is well-developed. She is not diaphoretic.   Genitourinary:      Vulva and bladder normal.      No lesions in the vagina.      Genitourinary Comments: Perineum normal in appearance, no lacerations, no ulcerations, no lesions visualized.      Right Labia: No rash, tenderness or lesions.     Left Labia: No tenderness, lesions or rash.     No inguinal adenopathy present in the right or left side.     Vaginal cuff intact.     No vaginal discharge, erythema, tenderness or bleeding.      No vaginal prolapse present.     No vaginal atrophy present.       Right Adnexa: not tender, not full and no mass present.     Left Adnexa: not tender, not full and no mass present.     Cervix is absent.      No parametrium nodularity or thickening present.     Uterus is absent.      No urethral prolapse or mass present.      Bladder is not tender.       Pelvic exam was performed with patient in the lithotomy position.   Rectum:      No tenderness or external hemorrhoid.   Breasts:     Breasts are symmetrical.      Right: No swelling, bleeding, mass, skin change or tenderness.      Left: No swelling, bleeding, mass, skin change or tenderness.   HENT:      Head: Normocephalic and atraumatic.   Neck:      Thyroid: No thyromegaly or thyroid tenderness.   Cardiovascular:      Rate and Rhythm: Normal rate and regular rhythm.      Heart sounds: Normal heart sounds. No murmur heard.     No friction rub.   Pulmonary:      Effort: Pulmonary effort is normal. No respiratory distress.      Breath sounds: Normal breath sounds. No wheezing or rales.   Abdominal:      Palpations: Abdomen is soft. There is no mass.      Tenderness: There is no abdominal tenderness. There is no guarding.   Musculoskeletal:         General: No tenderness. Normal range of motion.      Right lower leg: No edema.      Left lower leg: No edema.   Lymphadenopathy:       Lower Body: No right inguinal adenopathy. No left inguinal adenopathy.   Neurological:      Mental Status: She is alert and oriented to person, place, and time.   Skin:     General: Skin is warm and dry.      Coloration: Skin is not pale.      Findings: No erythema.   Psychiatric:         Mood and Affect: Mood normal.         Behavior: Behavior normal.         Thought Content: Thought content normal.         Judgment: Judgment normal.   Vitals and nursing note reviewed.

## 2024-08-05 ENCOUNTER — EVALUATION (OUTPATIENT)
Dept: PHYSICAL THERAPY | Facility: REHABILITATION | Age: 54
End: 2024-08-05
Payer: COMMERCIAL

## 2024-08-05 DIAGNOSIS — M54.2 NECK PAIN: Primary | ICD-10-CM

## 2024-08-05 PROCEDURE — 97161 PT EVAL LOW COMPLEX 20 MIN: CPT | Performed by: PHYSICAL THERAPIST

## 2024-08-05 NOTE — PROGRESS NOTES
PT Evaluation     Today's date: 2024  Patient name: Naty Olivas  : 1970  MRN: [id number]  Referring provider: Emir Mcallister, *  Dx:   Encounter Diagnosis     ICD-10-CM    1. Neck pain  M54.2                      Assessment  Impairments: abnormal coordination, abnormal muscle firing, abnormal muscle tone, abnormal or restricted ROM, abnormal movement, activity intolerance, impaired balance, impaired physical strength, lacks appropriate home exercise program, pain with function, scapular dyskinesis and poor posture     Assessment details: Patient presents to PT with neck pain. Patient displays decreased cervical ROM, UT/cervical paraspinal tightness. These impairments are leading to pain with sleeping and computer work. Patient will benefit from skilled PT to address above impairments and help them return to PLOF.    Barriers to therapy: > 3 months duration  Understanding of Dx/Px/POC: good     Prognosis: good    Goals  STG  1. Patient will display decreased pain to 0-2 in 6 weeks  2. Patient will display cervical ROM WNL in 6 weeks  3. Patient will display scapular strength WNL in 6 weeks    LTG  1. Patient will be able to read for 30 minutes without pain in 12 weeks  2. Patient will be able to perform computer work for 30 minutes without pain in 12 weeks  3. Patient will be sleep for 4-6 hours straight without pain in 12 weeks            Plan  Patient would benefit from: PT eval and skilled physical therapy  Planned modality interventions: cryotherapy, thermotherapy: hydrocollator packs, TENS and traction    Planned therapy interventions: activity modification, ADL training, behavior modification, body mechanics training, flexibility, functional ROM exercises, graded activity, graded exercise, home exercise program, work reintegration, transfer training, therapeutic training, therapeutic exercise, therapeutic activities, stretching, strengthening, patient education, postural training,  motor coordination training, neuromuscular re-education, manual therapy, joint mobilization and massage    Frequency: 2x week  Duration in weeks: 6  Plan of Care beginning date: 2024  Plan of Care expiration date: 2024        Subjective Evaluation    History of Present Illness  Mechanism of injury: Patient has had neck pain for a while now. She went to chiro which helped but the pain came back when she stopped. She gets pain mostly in the morning when she wakes up. She does have some tightness when she is at work on her computer. She doesn't usually take anything for the pain. Patient denies symptoms into UEs          Not a recurrent problem   Quality of life: fair    Patient Goals  Patient goals for therapy: decreased edema, decreased pain, increased motion and increased strength    Pain  Current pain rating: 3  At best pain rating: 3  At worst pain ratin  Quality: sharp and tight  Relieving factors: medications and heat  Aggravating factors: overhead activity and lifting (sleeping, computer work)    Social Support  Steps to enter house: yes    Employment status: working    Diagnostic Tests  X-ray: normal  Treatments  Previous treatment: chiropractic        Objective     Active Range of Motion   Cervical/Thoracic Spine       Cervical    Flexion: 20 degrees  with pain Restriction level: moderate  Extension:  WFL  Left lateral flexion: 10 degrees     with pain Restriction level: moderate  Right lateral flexion: 10 degrees     with pain Restriction level moderate  Left rotation: 60 degrees with pain Restriction level: moderate  Right rotation: 60 degrees    with pain Restriction level: moderate  Left Shoulder   Normal active range of motion    Right Shoulder   Normal active range of motion    General Comments:      Shoulder Comments   B cervical paraspinal tightness noted             Precautions: NA      Manuals             STM                                                    Neuro Re-Ed             row              Low row                                                                              Ther Ex             UT s             Levator s             SNAGS             Chin tucks                                                                 Ther Activity                                       Gait Training                                       Modalities

## 2024-08-09 ENCOUNTER — APPOINTMENT (OUTPATIENT)
Dept: PHYSICAL THERAPY | Facility: REHABILITATION | Age: 54
End: 2024-08-09
Payer: COMMERCIAL

## 2024-08-14 ENCOUNTER — OFFICE VISIT (OUTPATIENT)
Dept: PHYSICAL THERAPY | Facility: REHABILITATION | Age: 54
End: 2024-08-14
Payer: COMMERCIAL

## 2024-08-14 DIAGNOSIS — M54.2 NECK PAIN: Primary | ICD-10-CM

## 2024-08-14 PROCEDURE — 97140 MANUAL THERAPY 1/> REGIONS: CPT | Performed by: PHYSICAL THERAPIST

## 2024-08-14 PROCEDURE — 97110 THERAPEUTIC EXERCISES: CPT | Performed by: PHYSICAL THERAPIST

## 2024-08-14 NOTE — PROGRESS NOTES
"Daily Note     Today's date: 2024  Patient name: Naty Olivas  : 1970  MRN: [id number]  Referring provider: Emir Mcallister, *  Dx:   Encounter Diagnosis     ICD-10-CM    1. Neck pain  M54.2           Start Time:   Stop Time:   Total time in clinic (min): 35 minutes    Subjective: Patient reports the neck feels a little better after doing some of the stretches.      Objective: See treatment diary below      Assessment: Tolerated treatment well. Patient exhibited good technique with therapeutic exercises and would benefit from continued PT. Positive response to manual therapy with evident soft tissue restrictions and trigger points noted. Evident fatigue/challenge noted with initiation of postural strengthening. Provided patient with updated HEP.      Plan: Continue per plan of care.      Precautions: NA      Manuals             STM B/l UT                                                   Neuro Re-Ed             row Yellow 2x10            Low row Purple 2x10                                                                             Ther Ex             UT s 3x20\" b/l            Levator s 3x20\" b/l            SNAGS nv            Chin tucks 2x10x5\" seated                                                                Ther Activity                                       Gait Training                                       Modalities                                            "

## 2024-08-15 ENCOUNTER — TELEPHONE (OUTPATIENT)
Age: 54
End: 2024-08-15

## 2024-08-15 ENCOUNTER — OFFICE VISIT (OUTPATIENT)
Dept: FAMILY MEDICINE CLINIC | Facility: CLINIC | Age: 54
End: 2024-08-15

## 2024-08-15 VITALS
OXYGEN SATURATION: 100 % | HEART RATE: 77 BPM | HEIGHT: 68 IN | SYSTOLIC BLOOD PRESSURE: 120 MMHG | WEIGHT: 180 LBS | DIASTOLIC BLOOD PRESSURE: 70 MMHG | RESPIRATION RATE: 16 BRPM | BODY MASS INDEX: 27.28 KG/M2

## 2024-08-15 DIAGNOSIS — Z00.00 ANNUAL PHYSICAL EXAM: Primary | ICD-10-CM

## 2024-08-15 DIAGNOSIS — Z23 NEED FOR TDAP VACCINATION: ICD-10-CM

## 2024-08-15 DIAGNOSIS — M25.561 ACUTE PAIN OF RIGHT KNEE: ICD-10-CM

## 2024-08-15 DIAGNOSIS — R60.9 EDEMA, UNSPECIFIED TYPE: ICD-10-CM

## 2024-08-15 DIAGNOSIS — L82.1 SEBORRHEIC KERATOSIS: ICD-10-CM

## 2024-08-15 DIAGNOSIS — Z23 ENCOUNTER FOR IMMUNIZATION: ICD-10-CM

## 2024-08-15 NOTE — PROGRESS NOTES
"Assessment/Plan:   Diagnoses and all orders for this visit:    Annual physical exam  - reviewed PMHx, PSHx, meds, allergies, FHx, Soc Hx and Sexual Hx   - UTD with Tdap in  - received Booster in office today   - UTD with COVID IMMs and Boosters x2  - discussed diet and exercise   - follows with [facility] - s/p hysterectomy in 2021 - ovaries intact - UTD with annual exam   - UTD with Mammo (2024)   - UTD with EGD 2023 and Cscope 10/2023 - 10yr recall  - script printed for routine screening labs  - UTD with Optho and Dental   - RTO in 1yr for annual exam or sooner if with abnml labs - pt aware and agreeable     Encounter for immunization  -     TDAP VACCINE GREATER THAN OR EQUAL TO 8YO IM  Need for Tdap vaccination    Edema, unspecified type  - has resolved s/p wearing compression stockings and elevating legs   - labs pending     Seborrheic keratosis  -     Ambulatory Referral to Dermatology; Future    Acute pain of right knee  -     Ambulatory Referral to Physical Therapy; Future          Subjective:    Patient ID: Naty Olivas is a 54 y.o. female.  Naty GoncalvesAsaeljuan luis is a 54 y.o. female who presents to the office for an annual exam  - Specialists: GI, Neuro, ENT  - PMHx: GERD, LPR, TMJ, internal hemorrhoids, Vit D deficiency, fibroids,  x2, 6mm meningioma   - allergies: Amox - itching   - Meds: see med rec   - PSHx: , hand surgery, hysterectomy (2021 - ovaries intact)    - FHx: M (Breast Ca - dx at 69yo, Dementia, borderline HL, Parkinsonism), F (Prostatitis, Cataracts), B (?Prostate Ca), denies FHx of HTN, DM, Colon/Cervical/Ovarian/Uterine Ca  - Immunizations: Tdap in , UTD with COVID IMMs and Boosters x2   - GYN Hx: [facility] - UTD with annual exam (2024)   - Hm: EGD 2023 and Cscope 10/2023 - 10yr recall; Mammo 2024 (annual f/u recommended)   - diet/exercise: has been exercising, diet: \"not bad\"   - social: social EtOH, denies " "tob/illicits   - sexual Hx: active with spouse, declined STD screening   - last vision: does wear glasses, [employer], last Optho was within the year  - last dental: goes Q6months   - ROS: today in the office pt denies F/C/N/V/HA/visual changes/CP/palpitations/SOB/wheezing/abd pain/D/LE edema  - on Trazodone 50mg - takes it QHS PRN   - labs pending   - (+) growing lesion on L-hip   - (+) b/l knee pain (R>L) - would like referral to PT         The following portions of the patient's history were reviewed and updated as appropriate: allergies, current medications, past family history, past medical history, past social history, past surgical history and problem list.    Review of Systems  as per HPI    Objective:  /70   Pulse 77   Resp 16   Ht 5' 8\" (1.727 m)   Wt 81.6 kg (180 lb)   LMP 10/05/2021 (Exact Date)   SpO2 100%   BMI 27.37 kg/m²    Physical Exam  Vitals reviewed.   Constitutional:       General: She is not in acute distress.     Appearance: Normal appearance. She is not ill-appearing, toxic-appearing or diaphoretic.   HENT:      Head: Normocephalic and atraumatic.      Right Ear: External ear normal.      Left Ear: External ear normal.   Eyes:      General: No scleral icterus.        Right eye: No discharge.         Left eye: No discharge.      Extraocular Movements: Extraocular movements intact.      Conjunctiva/sclera: Conjunctivae normal.   Cardiovascular:      Rate and Rhythm: Normal rate and regular rhythm.      Heart sounds: Normal heart sounds.   Pulmonary:      Effort: Pulmonary effort is normal. No respiratory distress.      Breath sounds: Normal breath sounds. No stridor. No wheezing, rhonchi or rales.   Abdominal:      Palpations: Abdomen is soft.   Musculoskeletal:         General: Tenderness present. Normal range of motion.      Cervical back: Normal range of motion.      Right lower leg: No edema.      Left lower leg: No edema.   Skin:     Findings: Lesion present.      " Comments: (+) seborrheic keratosis lesion growing on L-hip    Neurological:      General: No focal deficit present.      Mental Status: She is alert and oriented to person, place, and time.   Psychiatric:         Mood and Affect: Mood normal.         Behavior: Behavior normal.

## 2024-08-15 NOTE — PATIENT INSTRUCTIONS
"Patient Education     Routine physical for adults   The Basics   Written by the doctors and editors at [facility]   What is a physical? -- A physical is a routine visit, or \"check-up,\" with your doctor. You might also hear it called a \"wellness visit\" or \"preventive visit.\"  During each visit, the doctor will:   Ask about your physical and mental health   Ask about your habits, behaviors, and lifestyle   Do an exam   Give you vaccines if needed   Talk to you about any medicines you take   Give advice about your health   Answer your questions  Getting regular check-ups is an important part of taking care of your health. It can help your doctor find and treat any problems you have. But it's also important for preventing health problems.  A routine physical is different from a \"sick visit.\" A sick visit is when you see a doctor because of a health concern or problem. Since physicals are scheduled ahead of time, you can think about what you want to ask the doctor.  How often should I get a physical? -- It depends on your age and health. In general, for people age 21 years and older:   If you are younger than 50 years, you might be able to get a physical every 3 years.   If you are 50 years or older, your doctor might recommend a physical every year.  If you have an ongoing health condition, like diabetes or high blood pressure, your doctor will probably want to see you more often.  What happens during a physical? -- In general, each visit will include:   Physical exam - The doctor or nurse will check your height, weight, heart rate, and blood pressure. They will also look at your eyes and ears. They will ask about how you are feeling and whether you have any symptoms that bother you.   Medicines - It's a good idea to bring a list of all the medicines you take to each doctor visit. Your doctor will talk to you about your medicines and answer any questions. Tell them if you are having any side effects that bother you. You " "should also tell them if you are having trouble paying for any of your medicines.   Habits and behaviors - This includes:   Your diet   Your exercise habits   Whether you smoke, drink alcohol, or use drugs   Whether you are sexually active   Whether you feel safe at home  Your doctor will talk to you about things you can do to improve your health and lower your risk of health problems. They will also offer help and support. For example, if you want to quit smoking, they can give you advice and might prescribe medicines. If you want to improve your diet or get more physical activity, they can help you with this, too.   Lab tests, if needed - The tests you get will depend on your age and situation. For example, your doctor might want to check your:   Cholesterol   Blood sugar   Iron level   Vaccines - The recommended vaccines will depend on your age, health, and what vaccines you already had. Vaccines are very important because they can prevent certain serious or deadly infections.   Discussion of screening - \"Screening\" means checking for diseases or other health problems before they cause symptoms. Your doctor can recommend screening based on your age, risk, and preferences. This might include tests to check for:   Cancer, such as breast, prostate, cervical, ovarian, colorectal, prostate, lung, or skin cancer   Sexually transmitted infections, such as chlamydia and gonorrhea   Mental health conditions like depression and anxiety  Your doctor will talk to you about the different types of screening tests. They can help you decide which screenings to have. They can also explain what the results might mean.   Answering questions - The physical is a good time to ask the doctor or nurse questions about your health. If needed, they can refer you to other doctors or specialists, too.  Adults older than 65 years often need other care, too. As you get older, your doctor will talk to you about:   How to prevent falling at " home   Hearing or vision tests   Memory testing   How to take your medicines safely   Making sure that you have the help and support you need at home  All topics are updated as new evidence becomes available and our peer review process is complete.  This topic retrieved from [employer] on: May 02, 2024.  Topic 460650 Version 1.0  Release: 32.4.3 - C32.122  © 2024 UpToDate, Inc. and/or its affiliates. All rights reserved.  Consumer Information Use and Disclaimer   Disclaimer: This generalized information is a limited summary of diagnosis, treatment, and/or medication information. It is not meant to be comprehensive and should be used as a tool to help the user understand and/or assess potential diagnostic and treatment options. It does NOT include all information about conditions, treatments, medications, side effects, or risks that may apply to a specific patient. It is not intended to be medical advice or a substitute for the medical advice, diagnosis, or treatment of a health care provider based on the health care provider's examination and assessment of a patient's specific and unique circumstances. Patients must speak with a health care provider for complete information about their health, medical questions, and treatment options, including any risks or benefits regarding use of medications. This information does not endorse any treatments or medications as safe, effective, or approved for treating a specific patient. UpToDate, Inc. and its affiliates disclaim any warranty or liability relating to this information or the use thereof.The use of this information is governed by the Terms of Use, available at https://www.[employer].com/en/know/clinical-effectiveness-terms. 2024© UpToDate, Inc. and its affiliates and/or licensors. All rights reserved.  Copyright   © 2024 UpToDate, Inc. and/or its affiliates. All rights reserved.

## 2024-08-15 NOTE — TELEPHONE ENCOUNTER
Patient called to let office know [facility]  Patients labs did not interface to [facility] because they were put in chart as a generic lab slip.  Patient tried to print from [employer] and could not.  Since patients appointment was 7:30am, patient did not call office because office is closed, to have [facility].  Patient had to cancel appointment.  [facility] and is requesting that office fax labs to them so she can have her blood drawn.    Patient will [facility] so those labs labs can be [facility], as [facility] not [facility] on to labs that are faxed days prior to patients appointments.    When patient calls and states she is at the lab please fax to   [phone]

## 2024-08-21 ENCOUNTER — OFFICE VISIT (OUTPATIENT)
Dept: PHYSICAL THERAPY | Facility: REHABILITATION | Age: 54
End: 2024-08-21
Payer: COMMERCIAL

## 2024-08-21 DIAGNOSIS — M54.2 NECK PAIN: Primary | ICD-10-CM

## 2024-08-21 PROCEDURE — 97112 NEUROMUSCULAR REEDUCATION: CPT | Performed by: PHYSICAL THERAPIST

## 2024-08-21 PROCEDURE — 97140 MANUAL THERAPY 1/> REGIONS: CPT | Performed by: PHYSICAL THERAPIST

## 2024-08-21 PROCEDURE — 97110 THERAPEUTIC EXERCISES: CPT | Performed by: PHYSICAL THERAPIST

## 2024-08-21 NOTE — PROGRESS NOTES
"Daily Note     Today's date: 2024  Patient name: Naty Olivas  : 1970  MRN: [id number]  Referring provider: Emir Mcallister, *  Dx:   Encounter Diagnosis     ICD-10-CM    1. Neck pain  M54.2           Start Time:   Stop Time:   Total time in clinic (min): 44 minutes    Subjective: Patient reports her neck is feeling a little better than last week. Continues to report some discomfort and stiffness on the right side but reports relief with completion of HEP and all stretches.       Objective: See treatment diary below      Assessment: Tolerated treatment well. Patient demonstrated fatigue post treatment, exhibited good technique with therapeutic exercises, and would benefit from continued PT. Positive response to manual therapy with patient reporting relief and improved neck range of motion post-treatment. Muscle fatigue noted with all postural strengthening.      Plan: Continue per plan of care.  Progress treatment as tolerated.       Precautions: NA      Manuals            STM B/l UT B/l UT                                                  Neuro Re-Ed             Row Yellow 2x10 Red 2x10           Low row Purple 2x10 Yellow 2x10           Lat pull downs  GTB 2x10                                                               Ther Ex             UT s 3x20\" b/l 3x20'' b/l           Levator s 3x20\" b/l 3x20'' b/l           SNAGS nv Rotation 10x5\"           Chin tucks 2x10x5\" seated 2x10x5\" seated                                                                Ther Activity                                       Gait Training                                       Modalities                                              "

## 2024-08-25 LAB
25(OH)D3 SERPL-MCNC: 57 NG/ML (ref 30–100)
ALBUMIN SERPL-MCNC: 4.1 G/DL (ref 3.6–5.1)
ALBUMIN/GLOB SERPL: 1.4 (CALC) (ref 1–2.5)
ALP SERPL-CCNC: 72 U/L (ref 37–153)
ALT SERPL-CCNC: 12 U/L (ref 6–29)
AST SERPL-CCNC: 15 U/L (ref 10–35)
BASOPHILS # BLD AUTO: 28 CELLS/UL (ref 0–200)
BASOPHILS NFR BLD AUTO: 0.7 %
BILIRUB SERPL-MCNC: 0.6 MG/DL (ref 0.2–1.2)
BUN SERPL-MCNC: 15 MG/DL (ref 7–25)
BUN/CREAT SERPL: NORMAL (CALC) (ref 6–22)
CALCIUM SERPL-MCNC: 9.5 MG/DL (ref 8.6–10.4)
CHLORIDE SERPL-SCNC: 102 MMOL/L (ref 98–110)
CHOLEST SERPL-MCNC: 231 MG/DL
CHOLEST/HDLC SERPL: 3 (CALC)
CO2 SERPL-SCNC: 26 MMOL/L (ref 20–32)
CREAT SERPL-MCNC: 0.86 MG/DL (ref 0.5–1.03)
EOSINOPHIL # BLD AUTO: 148 CELLS/UL (ref 15–500)
EOSINOPHIL NFR BLD AUTO: 3.7 %
ERYTHROCYTE [DISTWIDTH] IN BLOOD BY AUTOMATED COUNT: 11.9 % (ref 11–15)
GFR/BSA.PRED SERPLBLD CYS-BASED-ARV: 79 ML/MIN/1.73M2
GLOBULIN SER CALC-MCNC: 2.9 G/DL (CALC) (ref 1.9–3.7)
GLUCOSE SERPL-MCNC: 86 MG/DL (ref 65–99)
HCT VFR BLD AUTO: 40.8 % (ref 35–45)
HDLC SERPL-MCNC: 76 MG/DL
HGB BLD-MCNC: 13.1 G/DL (ref 11.7–15.5)
LDLC SERPL CALC-MCNC: 141 MG/DL (CALC)
LYMPHOCYTES # BLD AUTO: 1940 CELLS/UL (ref 850–3900)
LYMPHOCYTES NFR BLD AUTO: 48.5 %
MCH RBC QN AUTO: 28.5 PG (ref 27–33)
MCHC RBC AUTO-ENTMCNC: 32.1 G/DL (ref 32–36)
MCV RBC AUTO: 88.7 FL (ref 80–100)
MONOCYTES # BLD AUTO: 408 CELLS/UL (ref 200–950)
MONOCYTES NFR BLD AUTO: 10.2 %
NEUTROPHILS # BLD AUTO: 1476 CELLS/UL (ref 1500–7800)
NEUTROPHILS NFR BLD AUTO: 36.9 %
NONHDLC SERPL-MCNC: 155 MG/DL (CALC)
PLATELET # BLD AUTO: 257 THOUSAND/UL (ref 140–400)
PMV BLD REES-ECKER: 11.9 FL (ref 7.5–12.5)
POTASSIUM SERPL-SCNC: 4.4 MMOL/L (ref 3.5–5.3)
PROT SERPL-MCNC: 7 G/DL (ref 6.1–8.1)
RBC # BLD AUTO: 4.6 MILLION/UL (ref 3.8–5.1)
SODIUM SERPL-SCNC: 138 MMOL/L (ref 135–146)
TRIGL SERPL-MCNC: 46 MG/DL
TSH SERPL-ACNC: 1.29 MIU/L (ref 0.4–4.5)
WBC # BLD AUTO: 4 THOUSAND/UL (ref 3.8–10.8)

## 2024-08-27 ENCOUNTER — TELEPHONE (OUTPATIENT)
Dept: FAMILY MEDICINE CLINIC | Facility: CLINIC | Age: 54
End: 2024-08-27

## 2024-08-27 ENCOUNTER — TELEPHONE (OUTPATIENT)
Age: 54
End: 2024-08-27

## 2024-08-27 NOTE — TELEPHONE ENCOUNTER
Pt requested to discuss lab results and wants to know if she needs meds. Please contact pt as soon as possible as she is highly concerned. Thank you for your kind assistance.

## 2024-08-27 NOTE — TELEPHONE ENCOUNTER
----- Message from Silvia Park DO sent at 8/27/2024  4:20 PM EDT -----  LDL elevated at 141 - low 10yr ASCVD risk - diet/exercise for now   Otherwise stable labs

## 2024-08-28 ENCOUNTER — OFFICE VISIT (OUTPATIENT)
Dept: PHYSICAL THERAPY | Facility: REHABILITATION | Age: 54
End: 2024-08-28
Payer: COMMERCIAL

## 2024-08-28 DIAGNOSIS — M54.2 NECK PAIN: Primary | ICD-10-CM

## 2024-08-28 DIAGNOSIS — M25.561 ACUTE PAIN OF RIGHT KNEE: ICD-10-CM

## 2024-08-28 PROCEDURE — 97110 THERAPEUTIC EXERCISES: CPT | Performed by: PHYSICAL THERAPIST

## 2024-08-28 PROCEDURE — 97140 MANUAL THERAPY 1/> REGIONS: CPT | Performed by: PHYSICAL THERAPIST

## 2024-08-28 PROCEDURE — 97112 NEUROMUSCULAR REEDUCATION: CPT | Performed by: PHYSICAL THERAPIST

## 2024-08-28 PROCEDURE — 97164 PT RE-EVAL EST PLAN CARE: CPT | Performed by: PHYSICAL THERAPIST

## 2024-09-03 ENCOUNTER — APPOINTMENT (OUTPATIENT)
Dept: PHYSICAL THERAPY | Facility: REHABILITATION | Age: 54
End: 2024-09-03
Payer: COMMERCIAL

## 2024-09-09 ENCOUNTER — OFFICE VISIT (OUTPATIENT)
Dept: PHYSICAL THERAPY | Facility: REHABILITATION | Age: 54
End: 2024-09-09
Payer: COMMERCIAL

## 2024-09-09 DIAGNOSIS — M25.561 ACUTE PAIN OF RIGHT KNEE: ICD-10-CM

## 2024-09-09 DIAGNOSIS — M54.2 NECK PAIN: Primary | ICD-10-CM

## 2024-09-09 PROCEDURE — 97112 NEUROMUSCULAR REEDUCATION: CPT | Performed by: PHYSICAL THERAPIST

## 2024-09-09 PROCEDURE — 97110 THERAPEUTIC EXERCISES: CPT | Performed by: PHYSICAL THERAPIST

## 2024-09-09 PROCEDURE — 97530 THERAPEUTIC ACTIVITIES: CPT | Performed by: PHYSICAL THERAPIST

## 2024-09-09 NOTE — PROGRESS NOTES
"Daily Note     Today's date: 2024  Patient name: Naty Olivas  : 1970  MRN: [id number]  Referring provider: Emir Mcallister, *  Dx:   Encounter Diagnosis     ICD-10-CM    1. Neck pain  M54.2       2. Acute pain of right knee  M25.561           Start Time: 1820  Stop Time: 1853  Total time in clinic (min): 33 minutes    Subjective: Patient did not have the opportunity to complete HEP secondary to being in the hospital for her granddaughters birth.      Objective: See treatment diary below      Assessment: Tolerated treatment well. Patient demonstrated fatigue post treatment, exhibited good technique with therapeutic exercises, and would benefit from continued PT. Initiated plan of care this visit with good tolerance but significant muscle fatigue noted, especially with lateral hip strengthening. Some complaints of medial knee discomfort with leg press.      Plan: Continue per plan of care.      Precautions: NA      Manuals  9         STM B/l UT B/l UT           Re-eval (add R knee)   done                                    Neuro Re-Ed             Row Yellow 2x10 Red 2x10           Low row Purple 2x10 Yellow 2x10           Lat pull downs  GTB 2x10           clamshells   Grn 3x10 3x10 bw R         bridges   nv 3x10                                   Ther Ex             UT s 3x20\" b/l 3x20'' b/l           Levator s 3x20\" b/l 3x20'' b/l           SNAGS nv Rotation 10x5\"           Chin tucks 2x10x5\" seated 2x10x5\" seated            Calf s   3x20s 3x20\" b/l         SL hip abd   3x10 3x10 R         SLR flex    3x10         Bike    nv 5' lv 1                      Ther Activity             Heel raises   nv 3x10         Step ups    nv         Step downs    nv         Squat taps                          Modalities                                              "

## 2024-09-18 ENCOUNTER — APPOINTMENT (OUTPATIENT)
Dept: PHYSICAL THERAPY | Facility: REHABILITATION | Age: 54
End: 2024-09-18
Payer: COMMERCIAL

## 2024-10-04 ENCOUNTER — OFFICE VISIT (OUTPATIENT)
Dept: OBGYN CLINIC | Facility: CLINIC | Age: 54
End: 2024-10-04
Payer: COMMERCIAL

## 2024-10-04 VITALS
WEIGHT: 179.8 LBS | DIASTOLIC BLOOD PRESSURE: 74 MMHG | HEIGHT: 68 IN | SYSTOLIC BLOOD PRESSURE: 122 MMHG | BODY MASS INDEX: 27.25 KG/M2

## 2024-10-04 DIAGNOSIS — N95.1 VASOMOTOR SYMPTOMS DUE TO MENOPAUSE: Primary | ICD-10-CM

## 2024-10-04 DIAGNOSIS — Z90.710 HISTORY OF HYSTERECTOMY FOR BENIGN DISEASE: ICD-10-CM

## 2024-10-04 PROCEDURE — 99213 OFFICE O/P EST LOW 20 MIN: CPT | Performed by: PHYSICIAN ASSISTANT

## 2024-10-04 RX ORDER — ESTRADIOL 0.03 MG/D
1 PATCH TRANSDERMAL WEEKLY
Qty: 6 PATCH | Refills: 0 | Status: SHIPPED | OUTPATIENT
Start: 2024-10-04 | End: 2024-11-18

## 2024-10-04 NOTE — PROGRESS NOTES
"Assessment/Plan:   - Recommend f/u with neurology given family history  - Will plan to start Climara patch  - Patient has no CI to estrogen at this time. 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   - Will reach out in 4-6 weeks to assess for sx improvement.    Diagnoses and all orders for this visit:    Vasomotor symptoms due to menopause  -     estradiol (Climara) 0.025 mg/24 hr; Place 1 patch on the skin over 7 days once a week    History of hysterectomy for benign disease  -     estradiol (Climara) 0.025 mg/24 hr; Place 1 patch on the skin over 7 days once a week          Subjective:     Patient ID: Naty Olivas is a 54 y.o. female.    Naty is a 53YO  AFAM female presenting to the office with complaints of persistent hot flashes over the last 7-8 months. Patient is not currently on any hormone replacement therapy. She states that she had a hysterectomy in  due to heavy and painful bleeding. Patient states she gets multiple hot flashes a day and then some at night. She states that she will perspire from her face and chest and feels as if steam is coming out of her ears and nares. Patient also reports a family history of Alzheimer's disease. She was recently reading that menopausal hot flashes can cause deposits to form in the brain. She would like to start HRT to fix her hot flashes.         Review of Systems   Endocrine: Positive for heat intolerance.   Genitourinary:  Negative for vaginal bleeding.   Neurological:  Negative for weakness.         Objective:  Visit Vitals  /74 (BP Location: Right arm, Patient Position: Sitting, Cuff Size: Standard)   Ht 5' 8\" (1.727 m)   Wt 81.6 kg (179 lb 12.8 oz)   LMP 10/05/2021 (Exact Date)   BMI 27.34 kg/m²   OB Status Hysterectomy   Smoking Status Never   BSA 1.95 m²        Physical Exam  Vitals reviewed.   Constitutional:       General: She is not in acute distress.     Appearance: Normal appearance. She is normal weight. She is not " ill-appearing, toxic-appearing or diaphoretic.   HENT:      Head: Normocephalic and atraumatic.   Pulmonary:      Effort: Pulmonary effort is normal.   Skin:     General: Skin is dry.   Neurological:      General: No focal deficit present.      Mental Status: She is alert.   Psychiatric:         Mood and Affect: Mood normal.         Behavior: Behavior normal.

## 2024-10-07 ENCOUNTER — TELEPHONE (OUTPATIENT)
Age: 54
End: 2024-10-07

## 2024-10-07 NOTE — TELEPHONE ENCOUNTER
PT is requesting a knee spec referral. She said she has been doing physical therapy, however she does not feel its sufficient. This weekend she experienced a shooting, and excruciating pain in her right knee. She took naproxen which helped her at least bear weight on it, but the pain is still there. PT was informed that Dr Park is out of the office today.    Please advise    thankYou

## 2024-10-08 DIAGNOSIS — M25.561 ACUTE PAIN OF RIGHT KNEE: Primary | ICD-10-CM

## 2024-10-09 ENCOUNTER — OFFICE VISIT (OUTPATIENT)
Dept: OBGYN CLINIC | Facility: CLINIC | Age: 54
End: 2024-10-09
Payer: COMMERCIAL

## 2024-10-09 ENCOUNTER — HOSPITAL ENCOUNTER (OUTPATIENT)
Dept: RADIOLOGY | Facility: HOSPITAL | Age: 54
Discharge: HOME | End: 2024-10-09
Attending: STUDENT IN AN ORGANIZED HEALTH CARE EDUCATION/TRAINING PROGRAM
Payer: COMMERCIAL

## 2024-10-09 VITALS — WEIGHT: 182 LBS | HEART RATE: 69 BPM | HEIGHT: 68 IN | OXYGEN SATURATION: 90 % | BODY MASS INDEX: 27.58 KG/M2

## 2024-10-09 DIAGNOSIS — Z01.89 ENCOUNTER FOR LOWER EXTREMITY COMPARISON IMAGING STUDY: ICD-10-CM

## 2024-10-09 DIAGNOSIS — M25.561 RIGHT KNEE PAIN, UNSPECIFIED CHRONICITY: ICD-10-CM

## 2024-10-09 DIAGNOSIS — M17.11 ARTHRITIS OF RIGHT KNEE: Primary | ICD-10-CM

## 2024-10-09 DIAGNOSIS — M25.561 ACUTE PAIN OF RIGHT KNEE: ICD-10-CM

## 2024-10-09 PROCEDURE — 73564 X-RAY EXAM KNEE 4 OR MORE: CPT

## 2024-10-09 PROCEDURE — 99203 OFFICE O/P NEW LOW 30 MIN: CPT | Performed by: STUDENT IN AN ORGANIZED HEALTH CARE EDUCATION/TRAINING PROGRAM

## 2024-10-09 PROCEDURE — 73562 X-RAY EXAM OF KNEE 3: CPT

## 2024-10-09 NOTE — PROGRESS NOTES
Initial Office Visit    Assessment:   Naty Olivas is a 54 y.o. female with medial compartment right knee osteoarthritis.    Plan:  No additional workup is necessary at this time.  We had a long discussion in the office about the nature of her condition and his treatment options - both operative and nonoperative.  I recommended a course of nonoperative management.  We discussed the use of intra-articular corticosteroid injections, oral anti-inflammatory medications and physical therapy.  She will continue oral NSAIDs as needed and I provided her with a prescription for formal physical therapy.  We will hold off on an injection at this time. She will return to the office in 6 weeks time for reevaluation.  We discussed that if no significant improvement at that visit, that we would likely order an MRI to further evaluate for possible medial meniscus tear.  We discussed that depending on what the MRI showed, she may be a candidate for a steroid injection in the future.  She verbalized her understanding of the diagnosis and treatment plan.  All of her questions were answered in detail.    Assessment & Plan  Right knee pain, unspecified chronicity    Orders:    XR knee 4+ vw right injury    Encounter for lower extremity comparison imaging study    Orders:    XR knee 3 vw left non injury    Acute pain of right knee    Orders:    Ambulatory Referral to Orthopedic Surgery    Arthritis of right knee    Orders:    Ambulatory Referral to Physical Therapy; Future      Chief Complaint: rightknee pain, swelling, limited motion     History of Present Illness:   Naty Olivas is a 54 y.o. female who presents to the office for evaluation of her right knees. she has had right knee pain which started on Friday last week and progressively worsened and her knee also became swollen.  She had significant anterior and medial knee pain which was worse with ambulation, and she was limping around and barely able to walk.   Her pain localizes to the anterior and medial aspect of each knee. She had pain with flexion activities including the stairs and had increasing difficulty with ambulating more than 2-3 blocks and with her ADLs. She had aching night pain and intermittent swelling episodes. She denies any significant mechanical symptoms but did have a grinding sensation anteriorly with flexion. She has been taking naproxen which helped, however she has never had a steroid injection.  She did PT a number of years ago for anterior knee pain, possibly patellar tendinitis, however has not done any PT recently.  Her main recreational activity is running and she would like to be able to get back to running.  She has been doing [employer] and other lifting recently.  She has not been able to run due to the pain.  With her symptoms having an increasing impact on her lifestyle, she presents to our office for evaluation and treatment recommendation.  She works a desk job in a payroll department.  The pain has significantly improved in the past day.    Pain Assessment:  Character:  Sharp/Aching/Dull  Location: Right knee  Duration: 3 days  Intensity: 8/10   Associated Symptoms:  Knee swelling and instability    PMHx:   Past Medical History:   Diagnosis Date    Bacterial vaginitis     Brain lesion     Fibroids     Gastritis     GERD (gastroesophageal reflux disease)     Insomnia     Migraine     Otitis media     Pneumonia      PSHx:   Past Surgical History:   Procedure Laterality Date     SECTION      COLONOSCOPY      HAND SURGERY Left     Left pinky tendon repair    HYSTERECTOMY      CA COLONOSCOPY FLX DX W/COLLJ SPEC WHEN PFRMD N/A 2018    Procedure: COLONOSCOPY;  Surgeon: Crystal Lacey MD;  [facility];  Service: Gastroenterology    CA CYSTOURETHROSCOPY N/A 2021    Procedure: CYSTOSCOPY;  Surgeon: Alysia Osorio DO;  [facility];  Service: Gynecology    CA ESOPHAGOGASTRODUODENOSCOPY TRANSORAL  DIAGNOSTIC N/A 03/31/2017    Procedure: ESOPHAGOGASTRODUODENOSCOPY (EGD);  Surgeon: Crystal Lacey MD;  [facility];  Service: Gastroenterology    NH LAPS TOTAL HYSTERECT 250 GM/< W/RMVL TUBE/OVARY N/A 11/08/2021    Procedure: HYSTERECTOMY LAPAROSCOPIC TOTAL (LTH) WITH BILATERAL SALPINGECTOMY, LYSIS OF ADHESIONS;  Surgeon: Alysia Osorio DO;  [facility];  Service: Gynecology    ROOT CANAL      UPPER GASTROINTESTINAL ENDOSCOPY       Medications:   Current Outpatient Medications on File Prior to Visit   Medication Sig Dispense Refill    Biotin 1 MG CAPS Take by mouth      Cetirizine HCl (ZyrTEC Allergy) 10 MG CAPS Take by mouth      Cranberry 1000 MG CAPS Take by mouth      Echinacea Plus CAPS Take by mouth      estradiol (Climara) 0.025 mg/24 hr Place 1 patch on the skin over 7 days once a week 6 patch 0    famotidine (PEPCID) 40 MG tablet Take 1 tablet (40 mg total) by mouth daily at bedtime 30 tablet 6    fluticasone (FLONASE) 50 mcg/act nasal spray 1 spray into each nostril daily 15.8 mL 0    meclizine (ANTIVERT) 12.5 MG tablet Take 1 tablet (12.5 mg total) by mouth every 8 (eight) hours as needed for dizziness 90 tablet 0    montelukast (SINGULAIR) 10 mg tablet Take 1 tablet (10 mg total) by mouth daily at bedtime 30 tablet 1    Multiple Vitamins-Minerals (CENTRUM SILVER 50+WOMEN PO)       Omega-3 Fatty Acids (FISH OIL PO) Take by mouth      traZODone (DESYREL) 50 mg tablet TAKE 1 TABLET(50 MG) BY MOUTH DAILY AT BEDTIME 30 tablet 1     No current facility-administered medications on file prior to visit.     Allergies:   Allergies   Allergen Reactions    Amoxicillin Itching     Social History: Works in a payroll department.  Enjoys running and swimming however running used to be her big activity.  Family History:   Family History   Problem Relation Age of Onset    Breast cancer Mother 72    Dementia Mother     Hyperlipidemia Mother     Parkinsonism Mother     Fibroids Mother     Prostatitis  Father     Cataracts Father     Prostate cancer Brother     Substance Abuse Brother     Premature birth Daughter     No Known Problems Daughter     No Known Problems Daughter     Dementia Maternal Grandmother     Parkinsonism Maternal Grandmother     Fibroids Maternal Grandmother     No Known Problems Maternal Grandfather     No Known Problems Paternal Grandmother     No Known Problems Paternal Grandfather     Dementia Maternal Aunt     Dementia Maternal Aunt     No Known Problems Maternal Aunt     No Known Problems Maternal Aunt     No Known Problems Maternal Aunt     No Known Problems Paternal Aunt     No Known Problems Paternal Aunt     No Known Problems Paternal Aunt     No Known Problems Half-Sister     No Known Problems Half-Sister     No Known Problems Half-Sister     Ovarian cancer Cousin     Breast cancer Cousin     Hypertension Neg Hx     Diabetes Neg Hx     Colon cancer Neg Hx     Uterine cancer Neg Hx     Cervical cancer Neg Hx      Review of systems: ROS is negative other than that noted in the HPI.  Constitutional: Negative for fatigue and fever.   HENT: Negative for sore throat.    Respiratory: Negative for shortness of breath.    Cardiovascular: Negative for chest pain.   Gastrointestinal: Negative for abdominal pain.   Endocrine: Negative for cold intolerance and heat intolerance.   Genitourinary: Negative for flank pain.   Musculoskeletal: Negative for back pain.   Skin: Negative for rash.   Allergic/Immunologic: Negative for immunocompromised state.   Neurological: Negative for dizziness.   Psychiatric/Behavioral: Negative for agitation.     Comprehensive Physical Examination:  Vitals:    10/09/24 0743   Pulse: 69   SpO2: 90%       General Appearance: The patient is a well developed, well nourished female  in no apparent distress.  Orientation:  The patient is alert and interactive.  Oriented to time, place and person.  Mood and Affect:  The patient has normal mood and affect.  Gait and Station:  she is noted to ambulate with a mildly antalgic gait favoring the  right  lower extremity.  Observed standing alignment demonstrates varus alignment of  her   bilateral  knee present.      Knee Exam (focused):                RIGHT LEFT   ROM:   0-130 0-130   Crepitus  Positive Negative   Palpation: Effusion mild negative     MJL tenderness Negative Negative     LJL tenderness Negative Negative   Meniscus: Levon Negative Negative    Apley's Compression Negative Negative   Instability: Varus at 0 + 30 stable stable     Valgus at 0 + 30 stable stable   Special Tests: Lachman Negative Negative     Posterior drawer Negative Negative     Anterior drawer Negative Negative     Pivot shift not tested not tested     Dial not tested not tested   Patella: Grind test Negative Negative     Mobility 1/4 1/4     Apprehension Negative Negative   Other: Single leg 1/4 squat not tested not tested      LE NV Exam: +2 DP/PT pulses bilaterally  Sensation intact to light touch L2-S1 bilaterally     Bilateral hip ROM demonstrates no pain actively or passively    No calf tenderness to palpation bilaterally      Radiographic Imaging:   I have personally reviewed and interpreted 4 radiographs of the bilateral knees including bilateral weightbearing AP, weightbearing PA flexion, sunrise, and weightbearing lateral of the affected knee which were obtained in the office today and reviewed in detail with the patient. These demonstrate very early varus osteoarthritis of both knees with minimal medial compartment joint space narrowing. There is also evidence of mild patellofemoral comparment disease present. No acute fracture or dislocation.    Administrative Statements   I have spent a total time of 30 minutes in caring for this patient on the day of the visit/encounter including Diagnostic results, Prognosis, Risks and benefits of tx options, Instructions for management, Patient and family education, Importance of tx compliance, Risk factor  reductions, Impressions, Counseling / Coordination of care, Documenting in the medical record, Reviewing / ordering tests, medicine, procedures  , and Obtaining or reviewing history  .

## 2024-10-22 ENCOUNTER — NURSE TRIAGE (OUTPATIENT)
Age: 54
End: 2024-10-22

## 2024-10-22 NOTE — TELEPHONE ENCOUNTER
Regarding: persistent dull stomachache with estradiol (Climara) patch  ----- Message from Michelle DIANE sent at 10/22/2024 11:31 AM EDT -----  Patient called, states since beginning the estradiol (Climara) patch she has experienced a dull stomachache, lasting about 2.5 weeks. Is this normal for the prescription, will it subside, etc?  Attempted warm transfer

## 2024-10-22 NOTE — TELEPHONE ENCOUNTER
"Call back to pt. States that she started the Climara patch about 3 weeks ago. For the last 2.5 weeks, she has been having a constant dull stomachache- states it is not really a pain or a nausea feeling. Denies any vomiting, diarrhea, constipation or urinary symptoms. Pt states she is noticing a very slight improvement in her hot flashes this week. Advised will review with Brigid Craig PA-C. Pt thankful.     Reason for Disposition   Caller has NON-URGENT medicine question about med that PCP or specialist prescribed and triager unable to answer question    Answer Assessment - Initial Assessment Questions  1. NAME of MEDICATION: \"What medicine are you calling about?\"      Climara patch  2. QUESTION: \"What is your question?\" (e.g., medication refill, side effect)      Why is she having a stomachache for the last 2.5 weeks  3. PRESCRIBING HCP: \"Who prescribed it?\" Reason: if prescribed by specialist, call should be referred to that group.      Brigid Craig PA-C  4. SYMPTOMS: \"Do you have any symptoms?\"      stomachache  5. SEVERITY: If symptoms are present, ask \"Are they mild, moderate or severe?\"      mild  6. PREGNANCY:  \"Is there any chance that you are pregnant?\" \"When was your last menstrual period?\"      hysterectomy    Protocols used: Medication Question Call-ADULT-[place]    "

## 2024-10-30 DIAGNOSIS — N95.1 VASOMOTOR SYMPTOMS DUE TO MENOPAUSE: ICD-10-CM

## 2024-10-30 DIAGNOSIS — Z90.710 HISTORY OF HYSTERECTOMY FOR BENIGN DISEASE: ICD-10-CM

## 2024-10-30 RX ORDER — ESTRADIOL 0.03 MG/D
1 PATCH TRANSDERMAL WEEKLY
Qty: 4 PATCH | Refills: 5 | Status: SHIPPED | OUTPATIENT
Start: 2024-10-30 | End: 2025-04-16

## 2024-12-10 ENCOUNTER — TELEPHONE (OUTPATIENT)
Age: 54
End: 2024-12-10

## 2024-12-10 NOTE — TELEPHONE ENCOUNTER
Patient would like a call back from the provider to discuss her cholesterol and possible medication. There are no available appointments for several months     Please advise

## 2024-12-16 ENCOUNTER — OFFICE VISIT (OUTPATIENT)
Dept: FAMILY MEDICINE CLINIC | Facility: CLINIC | Age: 54
End: 2024-12-16
Payer: COMMERCIAL

## 2024-12-16 VITALS — HEIGHT: 68 IN | WEIGHT: 179 LBS | BODY MASS INDEX: 27.13 KG/M2

## 2024-12-16 DIAGNOSIS — R60.9 EDEMA, UNSPECIFIED TYPE: Primary | ICD-10-CM

## 2024-12-16 DIAGNOSIS — E78.00 ELEVATED LDL CHOLESTEROL LEVEL: ICD-10-CM

## 2024-12-16 PROCEDURE — 99214 OFFICE O/P EST MOD 30 MIN: CPT | Performed by: FAMILY MEDICINE

## 2024-12-16 RX ORDER — ROSUVASTATIN CALCIUM 5 MG/1
5 TABLET, COATED ORAL DAILY
Qty: 30 TABLET | Refills: 2 | Status: SHIPPED | OUTPATIENT
Start: 2024-12-16

## 2024-12-16 NOTE — PROGRESS NOTES
"Virtual Regular Visit  Name: Naty Olivas      : 1970      MRN: [id number]  Encounter Provider: Silvia Park DO  Encounter Date: 2024   Encounter department: [facility]      Verification of patient location:  Patient is located at Home in the following state in which I hold an active license [place] :  Assessment & Plan  Edema, unspecified type    Orders:    B-Type Natriuretic Peptide(BNP); Future    Magnesium; Future    TSH, 3rd generation with Free T4 reflex; Future    CBC and differential; Future    Comprehensive metabolic panel; Future    Albumin / creatinine urine ratio; Future    Hemoglobin A1C; Future  - (+) \"legs swelling up more often in the past 10-14days\"   - (+) panic attacks a she's been reading online and concerned for CHF   -  <-- 141 <-- 157 -- was advised by Cardio to start low-dose statin -- will start on Crestor 5mg QHS   - (+) sedentary lifestyle - encouraged exercise   - no Fhx of heart disease  - caffeine intake = \"has cut that out a lot\" - eliminated coffee, soda, chocolate   - caution with Na intake - pt has been drinking a can of soup QD for the past 2wks in order to lose weight, likes to eat salty snacks and sea food (shrimp, crab legs) - educational information re: DASH diet given to pt   - caution with NSAIDs  - RTO in 1month, with labs, for close f/u and advised f/u with Cardio - pt aware and agreeable     Elevated LDL cholesterol level  -  <-- 141 <-- 157 -- was advised by Cardio to start low-dose statin -- will start on Crestor 5mg QHS   - (+) sedentary lifestyle - encouraged exercise   - no Fhx of heart disease  Orders:    rosuvastatin (CRESTOR) 5 mg tablet; Take 1 tablet (5 mg total) by mouth daily    Lipid panel; Future        Encounter provider Silvia Park DO    The patient was identified by name and date of birth. Naty Olivas was informed that this is a telemedicine visit and that the visit is being conducted " "through the [employer] platform. She agrees to proceed..  My office door was closed. No one else was in the room.  She acknowledged consent and understanding of privacy and security of the video platform. The patient has agreed to participate and understands they can discontinue the visit at any time.    Patient is aware this is a billable service.     History of Present Illness     HPI  55yo F presents to the office for f/u   - (+) \"legs swelling up more often in the past 10-14days\"   - (+) panic attacks   -  <-- 141 <-- 157 -- was advised by Cardio to start low-dose statin   - (+) sedentary lifestyle   - no Fhx of heart disease  - started on Estradiol on 10/30/2024 - eliminated hot flashes   - diet: canned soups (has been drinking a can of soup QD for the past 2wks for weight loss), salty snacks, sea food - crab legs, shrimp   - caffeine intake = \"has cut that out a lot\" - eliminated coffee, soda, chocolate       Review of Systems as per HPI       Objective   Ht 5' 8\" (1.727 m)   Wt 81.2 kg (179 lb)   LMP 10/05/2021 (Exact Date)   BMI 27.22 kg/m²     Physical Exam  It was my intent to perform this visit via video technology but the patient was not able to do a video connection so the visit was completed via audio telephone only.       Visit Time  Total Visit Duration: 20mins   "

## 2024-12-17 ENCOUNTER — APPOINTMENT (OUTPATIENT)
Dept: LAB | Facility: CLINIC | Age: 54
End: 2024-12-17
Payer: COMMERCIAL

## 2024-12-17 ENCOUNTER — RESULTS FOLLOW-UP (OUTPATIENT)
Dept: FAMILY MEDICINE CLINIC | Facility: CLINIC | Age: 54
End: 2024-12-17

## 2024-12-17 DIAGNOSIS — R60.9 EDEMA, UNSPECIFIED TYPE: ICD-10-CM

## 2024-12-17 LAB
ALBUMIN SERPL BCG-MCNC: 4.5 G/DL (ref 3.5–5)
ALP SERPL-CCNC: 70 U/L (ref 34–104)
ALT SERPL W P-5'-P-CCNC: 13 U/L (ref 7–52)
ANION GAP SERPL CALCULATED.3IONS-SCNC: 8 MMOL/L (ref 4–13)
AST SERPL W P-5'-P-CCNC: 16 U/L (ref 13–39)
BASOPHILS # BLD AUTO: 0.03 THOUSANDS/ÂΜL (ref 0–0.1)
BASOPHILS NFR BLD AUTO: 1 % (ref 0–1)
BILIRUB SERPL-MCNC: 0.69 MG/DL (ref 0.2–1)
BNP SERPL-MCNC: 14 PG/ML (ref 0–100)
BUN SERPL-MCNC: 16 MG/DL (ref 5–25)
CALCIUM SERPL-MCNC: 9.5 MG/DL (ref 8.4–10.2)
CHLORIDE SERPL-SCNC: 98 MMOL/L (ref 96–108)
CO2 SERPL-SCNC: 28 MMOL/L (ref 21–32)
CREAT SERPL-MCNC: 0.81 MG/DL (ref 0.6–1.3)
CREAT UR-MCNC: 41.3 MG/DL
EOSINOPHIL # BLD AUTO: 0.14 THOUSAND/ÂΜL (ref 0–0.61)
EOSINOPHIL NFR BLD AUTO: 4 % (ref 0–6)
ERYTHROCYTE [DISTWIDTH] IN BLOOD BY AUTOMATED COUNT: 12.5 % (ref 11.6–15.1)
EST. AVERAGE GLUCOSE BLD GHB EST-MCNC: 88 MG/DL
GFR SERPL CREATININE-BSD FRML MDRD: 81 ML/MIN/1.73SQ M
GLUCOSE P FAST SERPL-MCNC: 87 MG/DL (ref 65–99)
HBA1C MFR BLD: 4.7 %
HCT VFR BLD AUTO: 43.4 % (ref 34.8–46.1)
HGB BLD-MCNC: 13.4 G/DL (ref 11.5–15.4)
IMM GRANULOCYTES # BLD AUTO: 0.01 THOUSAND/UL (ref 0–0.2)
IMM GRANULOCYTES NFR BLD AUTO: 0 % (ref 0–2)
LYMPHOCYTES # BLD AUTO: 1.75 THOUSANDS/ÂΜL (ref 0.6–4.47)
LYMPHOCYTES NFR BLD AUTO: 44 % (ref 14–44)
MAGNESIUM SERPL-MCNC: 2 MG/DL (ref 1.9–2.7)
MCH RBC QN AUTO: 28 PG (ref 26.8–34.3)
MCHC RBC AUTO-ENTMCNC: 30.9 G/DL (ref 31.4–37.4)
MCV RBC AUTO: 91 FL (ref 82–98)
MICROALBUMIN UR-MCNC: <7 MG/L
MONOCYTES # BLD AUTO: 0.41 THOUSAND/ÂΜL (ref 0.17–1.22)
MONOCYTES NFR BLD AUTO: 11 % (ref 4–12)
NEUTROPHILS # BLD AUTO: 1.57 THOUSANDS/ÂΜL (ref 1.85–7.62)
NEUTS SEG NFR BLD AUTO: 40 % (ref 43–75)
NRBC BLD AUTO-RTO: 0 /100 WBCS
PLATELET # BLD AUTO: 255 THOUSANDS/UL (ref 149–390)
PMV BLD AUTO: 12.7 FL (ref 8.9–12.7)
POTASSIUM SERPL-SCNC: 4 MMOL/L (ref 3.5–5.3)
PROT SERPL-MCNC: 7.3 G/DL (ref 6.4–8.4)
RBC # BLD AUTO: 4.78 MILLION/UL (ref 3.81–5.12)
SODIUM SERPL-SCNC: 134 MMOL/L (ref 135–147)
TSH SERPL DL<=0.05 MIU/L-ACNC: 1.09 UIU/ML (ref 0.45–4.5)
WBC # BLD AUTO: 3.91 THOUSAND/UL (ref 4.31–10.16)

## 2024-12-17 PROCEDURE — 82570 ASSAY OF URINE CREATININE: CPT

## 2024-12-17 PROCEDURE — 83735 ASSAY OF MAGNESIUM: CPT

## 2024-12-17 PROCEDURE — 83036 HEMOGLOBIN GLYCOSYLATED A1C: CPT

## 2024-12-17 PROCEDURE — 83880 ASSAY OF NATRIURETIC PEPTIDE: CPT

## 2024-12-17 PROCEDURE — 82043 UR ALBUMIN QUANTITATIVE: CPT

## 2024-12-18 ENCOUNTER — RESULTS FOLLOW-UP (OUTPATIENT)
Dept: FAMILY MEDICINE CLINIC | Facility: CLINIC | Age: 54
End: 2024-12-18

## 2024-12-18 ENCOUNTER — TELEPHONE (OUTPATIENT)
Dept: OTHER | Facility: OTHER | Age: 54
End: 2024-12-18

## 2024-12-18 NOTE — TELEPHONE ENCOUNTER
Patient called wanting to know if the doctor had gotten a chance to review her labs and is asking if the office can give her a call back regarding the matter.

## 2024-12-19 NOTE — TELEPHONE ENCOUNTER
Pt received the message that her labs were stable but she stated they say abnormal and she never had abnormal results before so she would like someone to call her back and review in details the abnormalities with her. Please call to discuss, pt declined an appt.

## 2025-01-13 DIAGNOSIS — G47.00 INSOMNIA, UNSPECIFIED TYPE: ICD-10-CM

## 2025-01-13 RX ORDER — TRAZODONE HYDROCHLORIDE 50 MG/1
50 TABLET, FILM COATED ORAL
Qty: 30 TABLET | Refills: 1 | Status: SHIPPED | OUTPATIENT
Start: 2025-01-13

## 2025-01-28 ENCOUNTER — OFFICE VISIT (OUTPATIENT)
Dept: FAMILY MEDICINE CLINIC | Facility: CLINIC | Age: 55
End: 2025-01-28
Payer: COMMERCIAL

## 2025-01-28 VITALS
BODY MASS INDEX: 27.58 KG/M2 | SYSTOLIC BLOOD PRESSURE: 110 MMHG | RESPIRATION RATE: 16 BRPM | DIASTOLIC BLOOD PRESSURE: 62 MMHG | HEIGHT: 68 IN | WEIGHT: 182 LBS | HEART RATE: 68 BPM

## 2025-01-28 DIAGNOSIS — Z12.31 ENCOUNTER FOR SCREENING MAMMOGRAM FOR BREAST CANCER: ICD-10-CM

## 2025-01-28 DIAGNOSIS — E78.00 ELEVATED LDL CHOLESTEROL LEVEL: ICD-10-CM

## 2025-01-28 DIAGNOSIS — R60.9 EDEMA, UNSPECIFIED TYPE: Primary | ICD-10-CM

## 2025-01-28 DIAGNOSIS — G47.00 INSOMNIA, UNSPECIFIED TYPE: ICD-10-CM

## 2025-01-28 PROCEDURE — 99214 OFFICE O/P EST MOD 30 MIN: CPT | Performed by: FAMILY MEDICINE

## 2025-01-28 NOTE — PROGRESS NOTES
"Name: Naty Olivas      : 1970      MRN: [id number]  Encounter Provider: Silvia Park DO  Encounter Date: 2025   Encounter department: [facility]  :  Assessment & Plan  Edema, unspecified type  - BP in the office 110/62  - has been limiting her shrimp and canned soup intake   - cautious with Na intake   - UTD with annual Flu vaccine (received at work)   - reviewed stable labs done 2024  - advised to schedule Cardio f/u   - RTO in 2months for f/u = pt aware and agreeable        Elevated LDL cholesterol level  - was started on Crestor 5mg QHS at last OV and tolerating it well   - RTO in 2months, with repeat lipid panel, for f/u = pt aware and agreeable   - The 10-year ASCVD risk score (Laurita CAMARILLO, et al., 2019) is: 1.9%    Values used to calculate the score:      Age: 54 years      Sex: Female      Is Non- : Yes      Diabetic: No      Tobacco smoker: No      Systolic Blood Pressure: 110 mmHg      Is BP treated: No      HDL Cholesterol: 76 mg/dL      Total Cholesterol: 231 mg/dL         Insomnia, unspecified type  - discussed sleep hygiene   - bedroom = sleep and sex   - advised against studying and watching TV in bed   - limit fluids 2hrs prior to bed time   - OK to cont Trazodone at current dose        Encounter for screening mammogram for breast cancer  - pt has script as per Ob/Gyn - advised to schedule imaging               History of Present Illness   HPI  55yo F presents to the office for f/u   - has been limiting her shrimp and canned soup intake   - UTD with annual Flu vaccine (received at work)   - BP in the office 110/62   - sleep issues   - tolerating Crestor - denies myalgias   - denies F/C/N/V/CP/palpitations/SOB/wheezing/abd pain/LE edema       Review of Systems as per HPI     Objective   /62   Pulse 68   Resp 16   Ht 5' 8\" (1.727 m)   Wt 82.6 kg (182 lb)   LMP 10/05/2021 (Exact Date)   BMI 27.67 kg/m²      Physical " Exam  Vitals reviewed.   Constitutional:       General: She is not in acute distress.     Appearance: Normal appearance. She is not ill-appearing, toxic-appearing or diaphoretic.   HENT:      Head: Normocephalic and atraumatic.      Right Ear: External ear normal.      Left Ear: External ear normal.      Nose: Nose normal.   Eyes:      General: No scleral icterus.        Right eye: No discharge.         Left eye: No discharge.      Extraocular Movements: Extraocular movements intact.      Conjunctiva/sclera: Conjunctivae normal.   Cardiovascular:      Rate and Rhythm: Normal rate and regular rhythm.      Heart sounds: Normal heart sounds.   Pulmonary:      Effort: Pulmonary effort is normal. No respiratory distress.      Breath sounds: Normal breath sounds. No stridor. No wheezing, rhonchi or rales.   Abdominal:      Palpations: Abdomen is soft.   Musculoskeletal:         General: Normal range of motion.      Cervical back: Normal range of motion.      Right lower leg: No edema.      Left lower leg: No edema.   Skin:     General: Skin is warm.   Neurological:      General: No focal deficit present.      Mental Status: She is alert and oriented to person, place, and time.   Psychiatric:         Mood and Affect: Mood normal.         Behavior: Behavior normal.

## 2025-01-28 NOTE — ASSESSMENT & PLAN NOTE
- discussed sleep hygiene   - bedroom = sleep and sex   - advised against studying and watching TV in bed   - limit fluids 2hrs prior to bed time   - OK to cont Trazodone at current dose

## 2025-01-31 ENCOUNTER — HOSPITAL ENCOUNTER (EMERGENCY)
Facility: HOSPITAL | Age: 55
Discharge: HOME | End: 2025-01-31
Attending: EMERGENCY MEDICINE
Payer: COMMERCIAL

## 2025-01-31 ENCOUNTER — APPOINTMENT (EMERGENCY)
Dept: RADIOLOGY | Facility: HOSPITAL | Age: 55
End: 2025-01-31
Payer: COMMERCIAL

## 2025-01-31 VITALS
DIASTOLIC BLOOD PRESSURE: 57 MMHG | HEART RATE: 64 BPM | RESPIRATION RATE: 16 BRPM | TEMPERATURE: 99 F | OXYGEN SATURATION: 100 % | SYSTOLIC BLOOD PRESSURE: 103 MMHG

## 2025-01-31 DIAGNOSIS — R07.9 CHEST PAIN, UNSPECIFIED TYPE: Primary | ICD-10-CM

## 2025-01-31 DIAGNOSIS — R14.2 BELCHING: ICD-10-CM

## 2025-01-31 LAB
ALBUMIN SERPL BCG-MCNC: 4.4 G/DL (ref 3.5–5)
ALP SERPL-CCNC: 62 U/L (ref 34–104)
ALT SERPL W P-5'-P-CCNC: 10 U/L (ref 7–52)
ANION GAP SERPL CALCULATED.3IONS-SCNC: 6 MMOL/L (ref 4–13)
ANISOCYTOSIS BLD QL SMEAR: PRESENT
AST SERPL W P-5'-P-CCNC: 13 U/L (ref 13–39)
ATRIAL RATE: 77 BPM
BASOPHILS # BLD MANUAL: 0 THOUSAND/UL (ref 0–0.1)
BASOPHILS NFR MAR MANUAL: 0 % (ref 0–1)
BILIRUB SERPL-MCNC: 0.63 MG/DL (ref 0.2–1)
BUN SERPL-MCNC: 12 MG/DL (ref 5–25)
CALCIUM SERPL-MCNC: 9.6 MG/DL (ref 8.4–10.2)
CARDIAC TROPONIN I PNL SERPL HS: <2 NG/L (ref ?–50)
CHLORIDE SERPL-SCNC: 104 MMOL/L (ref 96–108)
CO2 SERPL-SCNC: 29 MMOL/L (ref 21–32)
CREAT SERPL-MCNC: 0.93 MG/DL (ref 0.6–1.3)
D DIMER PPP FEU-MCNC: <0.27 UG/ML FEU
EOSINOPHIL # BLD MANUAL: 0.27 THOUSAND/UL (ref 0–0.4)
EOSINOPHIL NFR BLD MANUAL: 7 % (ref 0–6)
ERYTHROCYTE [DISTWIDTH] IN BLOOD BY AUTOMATED COUNT: 12 % (ref 11.6–15.1)
GFR SERPL CREATININE-BSD FRML MDRD: 68 ML/MIN/1.73SQ M
GIANT PLATELETS BLD QL SMEAR: PRESENT
GLUCOSE SERPL-MCNC: 89 MG/DL (ref 65–140)
HCT VFR BLD AUTO: 41 % (ref 34.8–46.1)
HGB BLD-MCNC: 13 G/DL (ref 11.5–15.4)
LIPASE SERPL-CCNC: 27 U/L (ref 11–82)
LYMPHOCYTES # BLD AUTO: 1.72 THOUSAND/UL (ref 0.6–4.47)
LYMPHOCYTES # BLD AUTO: 44 % (ref 14–44)
MCH RBC QN AUTO: 28.2 PG (ref 26.8–34.3)
MCHC RBC AUTO-ENTMCNC: 31.7 G/DL (ref 31.4–37.4)
MCV RBC AUTO: 89 FL (ref 82–98)
MONOCYTES # BLD AUTO: 0.27 THOUSAND/UL (ref 0–1.22)
MONOCYTES NFR BLD: 7 % (ref 4–12)
NEUTROPHILS # BLD MANUAL: 1.64 THOUSAND/UL (ref 1.85–7.62)
NEUTS SEG NFR BLD AUTO: 42 % (ref 43–75)
P AXIS: 72 DEGREES
PLATELET # BLD AUTO: 203 THOUSANDS/UL (ref 149–390)
PLATELET BLD QL SMEAR: ADEQUATE
PMV BLD AUTO: 12 FL (ref 8.9–12.7)
POIKILOCYTOSIS BLD QL SMEAR: PRESENT
POTASSIUM SERPL-SCNC: 4.3 MMOL/L (ref 3.5–5.3)
PR INTERVAL: 154 MS
PROT SERPL-MCNC: 7.2 G/DL (ref 6.4–8.4)
QRS AXIS: 5 DEGREES
QRSD INTERVAL: 70 MS
QT INTERVAL: 364 MS
QTC INTERVAL: 411 MS
RBC # BLD AUTO: 4.61 MILLION/UL (ref 3.81–5.12)
RBC MORPH BLD: PRESENT
SODIUM SERPL-SCNC: 139 MMOL/L (ref 135–147)
T WAVE AXIS: 14 DEGREES
VENTRICULAR RATE: 77 BPM
WBC # BLD AUTO: 3.91 THOUSAND/UL (ref 4.31–10.16)

## 2025-01-31 PROCEDURE — 93010 ELECTROCARDIOGRAM REPORT: CPT | Performed by: INTERNAL MEDICINE

## 2025-01-31 PROCEDURE — 99285 EMERGENCY DEPT VISIT HI MDM: CPT

## 2025-01-31 PROCEDURE — 99285 EMERGENCY DEPT VISIT HI MDM: CPT | Performed by: PHYSICIAN ASSISTANT

## 2025-01-31 PROCEDURE — 83690 ASSAY OF LIPASE: CPT | Performed by: PHYSICIAN ASSISTANT

## 2025-01-31 PROCEDURE — 85027 COMPLETE CBC AUTOMATED: CPT | Performed by: PHYSICIAN ASSISTANT

## 2025-01-31 PROCEDURE — 96374 THER/PROPH/DIAG INJ IV PUSH: CPT

## 2025-01-31 PROCEDURE — 96361 HYDRATE IV INFUSION ADD-ON: CPT

## 2025-01-31 PROCEDURE — 85007 BL SMEAR W/DIFF WBC COUNT: CPT | Performed by: PHYSICIAN ASSISTANT

## 2025-01-31 PROCEDURE — 36415 COLL VENOUS BLD VENIPUNCTURE: CPT | Performed by: PHYSICIAN ASSISTANT

## 2025-01-31 PROCEDURE — 96375 TX/PRO/DX INJ NEW DRUG ADDON: CPT

## 2025-01-31 PROCEDURE — 84484 ASSAY OF TROPONIN QUANT: CPT | Performed by: PHYSICIAN ASSISTANT

## 2025-01-31 PROCEDURE — 71045 X-RAY EXAM CHEST 1 VIEW: CPT

## 2025-01-31 PROCEDURE — 80053 COMPREHEN METABOLIC PANEL: CPT | Performed by: PHYSICIAN ASSISTANT

## 2025-01-31 PROCEDURE — 93005 ELECTROCARDIOGRAM TRACING: CPT

## 2025-01-31 PROCEDURE — 85379 FIBRIN DEGRADATION QUANT: CPT | Performed by: PHYSICIAN ASSISTANT

## 2025-01-31 RX ORDER — MAGNESIUM HYDROXIDE/ALUMINUM HYDROXICE/SIMETHICONE 120; 1200; 1200 MG/30ML; MG/30ML; MG/30ML
30 SUSPENSION ORAL EVERY 4 HOURS PRN
Status: DISCONTINUED | OUTPATIENT
Start: 2025-01-31 | End: 2025-01-31 | Stop reason: HOSPADM

## 2025-01-31 RX ORDER — KETOROLAC TROMETHAMINE 30 MG/ML
15 INJECTION, SOLUTION INTRAMUSCULAR; INTRAVENOUS ONCE
Status: COMPLETED | OUTPATIENT
Start: 2025-01-31 | End: 2025-01-31

## 2025-01-31 RX ORDER — PANTOPRAZOLE SODIUM 40 MG/10ML
40 INJECTION, POWDER, LYOPHILIZED, FOR SOLUTION INTRAVENOUS ONCE
Status: COMPLETED | OUTPATIENT
Start: 2025-01-31 | End: 2025-01-31

## 2025-01-31 RX ADMIN — SODIUM CHLORIDE 1000 ML: 0.9 INJECTION, SOLUTION INTRAVENOUS at 08:45

## 2025-01-31 RX ADMIN — PANTOPRAZOLE SODIUM 40 MG: 40 INJECTION, POWDER, FOR SOLUTION INTRAVENOUS at 09:43

## 2025-01-31 RX ADMIN — ALUMINUM HYDROXIDE, MAGNESIUM HYDROXIDE, AND DIMETHICONE 30 ML: 200; 20; 200 SUSPENSION ORAL at 09:48

## 2025-01-31 RX ADMIN — KETOROLAC TROMETHAMINE 15 MG: 30 INJECTION, SOLUTION INTRAMUSCULAR at 09:41

## 2025-01-31 NOTE — DISCHARGE INSTRUCTIONS
[place] diet, advance as tolerated, avoiding spicy foods/or others that make symptoms worse  Continue all medications and add Omeprazole daily for next 2 weeks.  Follow-up with your PCP or GI specialist if no improvement in symptoms

## 2025-01-31 NOTE — ED PROVIDER NOTES
Time reflects when diagnosis was documented in both MDM as applicable and the Disposition within this note       Time User Action Codes Description Comment    1/31/2025 10:46 AM Anna Donato Add [R07.9] Chest pain, unspecified type     1/31/2025 10:46 AM Anna Donato Add [R14.2] Belching     1/31/2025 10:46 AM Anna Donato Modify [R14.2] Belching GERD          ED Disposition       ED Disposition   Discharge    Condition   Stable    Date/Time   Fri Jan 31, 2025 10:40 AM    Comment   Naty Olivas discharge to home/self care.                   Assessment & Plan       Medical Decision Making  Patient presents to the emergency department complaining of 2-day history of left-sided chest pain described as gas, pressure  Considered ACS, MI, PE, pneumonia, musculoskeletal, GERD, reflux, bowel obstruction, among others  Labs reviewed, EKG reviewed, chest x-ray all within normal limits  Heart score 2  Will try Toradol, maalox, protonix medication in the emergency department for symptomatic relief  1035 pt states sharp pains on gone, still belching  Stable for dc, outpt FU      Amount and/or Complexity of Data Reviewed  Labs: ordered. Decision-making details documented in ED Course.  Radiology: ordered and independent interpretation performed.  ECG/medicine tests: ordered and independent interpretation performed. Decision-making details documented in ED Course.     Details: NSR at 75 no acute ischemic changes, no change when compared to old from 2-10 24    Risk  OTC drugs.  Prescription drug management.        ED Course as of 01/31/25 1335   Fri Jan 31, 2025   0919 CBC unremarkable   0919 CMP unremarkable   0919 LIPASE: 27  normal   0919 D-Dimer, Quant: <0.27  normal   0919 hs TnI 0hr: <2  normal       Medications   sodium chloride 0.9 % bolus 1,000 mL (0 mL Intravenous Stopped 1/31/25 1021)   ketorolac (TORADOL) injection 15 mg (15 mg Intravenous Given 1/31/25 0941)   pantoprazole (PROTONIX) injection 40 mg (40  mg Intravenous Given 25 0943)       ED Risk Strat Scores   HEART Risk Score      Flowsheet Row Most Recent Value   Heart Score Risk Calculator    History 1 Filed at: 2025   ECG 0 Filed at: 2025   Age 1 Filed at: 2025   Risk Factors 0 Filed at: 2025   Troponin 0 Filed at: 2025   HEART Score 2 Filed at: 2025          HEART Risk Score      Flowsheet Row Most Recent Value   Heart Score Risk Calculator    History 1 Filed at: 2025   ECG 0 Filed at: 2025   Age 1 Filed at: 2025   Risk Factors 0 Filed at: 2025   Troponin 0 Filed at: 2025   HEART Score 2 Filed at: 2025                                                History of Present Illness       Chief Complaint   Patient presents with    Chest Pain     Pt reports sharp left sided chest pain starting yesterday, describes it as feeling like gas pain but has not gotten relief. Pt denies SOB, denies n/v/d. Took 2 gas x tablets without relief       Past Medical History:   Diagnosis Date    Bacterial vaginitis     Brain lesion     Fibroids     Gastritis     GERD (gastroesophageal reflux disease)     Insomnia     Migraine     Otitis media     Pneumonia       Past Surgical History:   Procedure Laterality Date     SECTION      COLONOSCOPY      HAND SURGERY Left     Left pinky tendon repair    HYSTERECTOMY      OH COLONOSCOPY FLX DX W/COLLJ SPEC WHEN PFRMD N/A 2018    Procedure: COLONOSCOPY;  Surgeon: Crystal Lacey MD;  [facility]: [facility];  Service: Gastroenterology    OH CYSTOURETHROSCOPY N/A 2021    Procedure: CYSTOSCOPY;  Surgeon: Alysia Osorio DO;  [facility]: [facility];  Service: Gynecology    OH ESOPHAGOGASTRODUODENOSCOPY TRANSORAL DIAGNOSTIC N/A 2017    Procedure: ESOPHAGOGASTRODUODENOSCOPY (EGD);  Surgeon: Crystal Lacey MD;  [facility]: [facility];  Service: Gastroenterology    OH LAPS TOTAL HYSTERECT  250 GM/< W/RMVL TUBE/OVARY N/A 11/08/2021    Procedure: HYSTERECTOMY LAPAROSCOPIC TOTAL (LTH) WITH BILATERAL SALPINGECTOMY, LYSIS OF ADHESIONS;  Surgeon: Alysia Osorio DO;  Location[facility];  Service: Gynecology    ROOT CANAL      UPPER GASTROINTESTINAL ENDOSCOPY        Family History   Problem Relation Age of Onset    Breast cancer Mother 72    Dementia Mother     Hyperlipidemia Mother     Parkinsonism Mother     Fibroids Mother     Prostatitis Father     Cataracts Father     Prostate cancer Brother     Substance Abuse Brother     Premature birth Daughter     No Known Problems Daughter     No Known Problems Daughter     Dementia Maternal Grandmother     Parkinsonism Maternal Grandmother     Fibroids Maternal Grandmother     No Known Problems Maternal Grandfather     No Known Problems Paternal Grandmother     No Known Problems Paternal Grandfather     Dementia Maternal Aunt     Dementia Maternal Aunt     No Known Problems Maternal Aunt     No Known Problems Maternal Aunt     No Known Problems Maternal Aunt     No Known Problems Paternal Aunt     No Known Problems Paternal Aunt     No Known Problems Paternal Aunt     No Known Problems Half-Sister     No Known Problems Half-Sister     No Known Problems Half-Sister     Ovarian cancer Cousin     Breast cancer Cousin     Hypertension Neg Hx     Diabetes Neg Hx     Colon cancer Neg Hx     Uterine cancer Neg Hx     Cervical cancer Neg Hx       Social History     Tobacco Use    Smoking status: Never    Smokeless tobacco: Never   Vaping Use    Vaping status: Never Used   Substance Use Topics    Alcohol use: Yes     Alcohol/week: 3.0 standard drinks of alcohol     Types: 3 Glasses of wine per week     Comment: socially    Drug use: Never      E-Cigarette/Vaping    E-Cigarette Use Never User       E-Cigarette/Vaping Substances    Nicotine No     THC No     CBD No     Flavoring No     Other No     Unknown No       I have reviewed and agree with the history as  documented.     PMH: GERD, Vertigo, TMJ syndrome, Laryngopharyngeal reflux (LPR), Insomnia, Gastritis, Fibroids, Migraine, Brain lesion   PSH:  section, Left Hand surgery, LAPS TOTAL HYSTERECT 250 GM/< W/RMVL TUBE/OVARY, CYSTOURETHROSCOPY,     Pt presents emergency department complaining of 2-day history of constant sharp diffuse atraumatic left sided chest pain located to left upper chest and under left breast area, describes as gas pain, pt states feels like a big ball of gas, does belch frequent and states that only relieves alittle bit of the pressure  Pt denies SOB, denies n/v/d, lower extremity edema, diaphoresis, urinary complaints  PT tried Famotidine, apple cider vinegar, Gas X tablets without relief        Review of Systems        Objective       ED Triage Vitals   Temperature Pulse Blood Pressure Respirations SpO2 Patient Position - Orthostatic VS   25 0840 25 0834 25 0834 25 0834 25 0834 25 1000   99 °F (37.2 °C) 84 136/64 16 99 % Lying      Temp src Heart Rate Source BP Location FiO2 (%) Pain Score    -- 25 -- -- 25 0941     Monitor   6      Vitals      Date and Time Temp Pulse SpO2 Resp BP Pain Score FACES Pain Rating User   25 1030 -- 64 100 % 16 103/57 -- -- RR   25 1000 -- 62 100 % 12 118/55 -- -- RR   25 0941 -- -- -- -- -- 6 -- RR   25 0930 -- 66 100 % 18 114/61 -- -- RR   25 0840 99 °F (37.2 °C) -- -- -- -- -- -- PH   25 0834 -- 84 99 % 16 136/64 -- -- BG            Physical Exam    Results Reviewed       Procedure Component Value Units Date/Time    RBC Morphology Reflex Test [[id number]] Collected: 25    Lab Status: Final result Specimen: Blood from Arm, Right Updated: 25 1101    CBC and differential [[id number]]  (Abnormal) Collected: 25    Lab Status: Final result Specimen: Blood from Arm, Right Updated: 25 1004     WBC 3.91 Thousand/uL      RBC 4.61 Million/uL       Hemoglobin 13.0 g/dL      Hematocrit 41.0 %      MCV 89 fL      MCH 28.2 pg      MCHC 31.7 g/dL      RDW 12.0 %      MPV 12.0 fL      Platelets 203 Thousands/uL     Narrative:      This is an appended report.  These results have been appended to a previously verified report.    Manual Differential(PHLEBS Do Not Order) [[id number]]  (Abnormal) Collected: 01/31/25 0842    Lab Status: Final result Specimen: Blood from Arm, Right Updated: 01/31/25 1004     Segmented % 42 %      Lymphocytes % 44 %      Monocytes % 7 %      Eosinophils % 7 %      Basophils % 0 %      Absolute Neutrophils 1.64 Thousand/uL      Absolute Lymphocytes 1.72 Thousand/uL      Absolute Monocytes 0.27 Thousand/uL      Absolute Eosinophils 0.27 Thousand/uL      Absolute Basophils 0.00 Thousand/uL      Total Counted --     RBC Morphology Present     Platelet Estimate Adequate     Giant PLTs Present     Anisocytosis Present     Poikilocytes Present    HS Troponin 0hr (reflex protocol) [[id number]]  (Normal) Collected: 01/31/25 0842    Lab Status: Final result Specimen: Blood from Arm, Right Updated: 01/31/25 0911     hs TnI 0hr <2 ng/L     D-Dimer [[id number]]  (Normal) Collected: 01/31/25 0842    Lab Status: Final result Specimen: Blood from Arm, Right Updated: 01/31/25 0908     D-Dimer, Quant <0.27 ug/ml FEU     Narrative:      In the evaluation for possible pulmonary embolism, in the appropriate (Well's Score of 4 or less) patient, the age adjusted d-dimer cutoff for this patient can be calculated as:    Age x 0.01 (in ug/mL) for Age-adjusted D-dimer exclusion threshold for a patient over 50 years.    Comprehensive metabolic panel [[id number]] Collected: 01/31/25 0842    Lab Status: Final result Specimen: Blood from Arm, Right Updated: 01/31/25 0905     Sodium 139 mmol/L      Potassium 4.3 mmol/L      Chloride 104 mmol/L      CO2 29 mmol/L      ANION GAP 6 mmol/L      BUN 12 mg/dL      Creatinine 0.93 mg/dL      Glucose 89 mg/dL      Calcium 9.6 mg/dL       AST 13 U/L      ALT 10 U/L      Alkaline Phosphatase 62 U/L      Total Protein 7.2 g/dL      Albumin 4.4 g/dL      Total Bilirubin 0.63 mg/dL      eGFR 68 ml/min/1.73sq m     Narrative:      National Kidney Disease Foundation guidelines for Chronic Kidney Disease (CKD):     Stage 1 with normal or high GFR (GFR > 90 mL/min/1.73 square meters)    Stage 2 Mild CKD (GFR = 60-89 mL/min/1.73 square meters)    Stage 3A Moderate CKD (GFR = 45-59 mL/min/1.73 square meters)    Stage 3B Moderate CKD (GFR = 30-44 mL/min/1.73 square meters)    Stage 4 Severe CKD (GFR = 15-29 mL/min/1.73 square meters)    Stage 5 End Stage CKD (GFR <15 mL/min/1.73 square meters)  Note: GFR calculation is accurate only with a steady state creatinine    Lipase [[id number]]  (Normal) Collected: 01/31/25 0842    Lab Status: Final result Specimen: Blood from Arm, Right Updated: 01/31/25 0905     Lipase 27 u/L             XR chest 1 view portable   ED Interpretation by Anna Donato PA-C (01/31 0920)   NAD      Final Interpretation by Abundio Bedoya MD (01/31 1225)      No acute disease in the chest.            Workstation performed: [id number]             Procedures    ED Medication and Procedure Management   Prior to Admission Medications   Prescriptions Last Dose Informant Patient Reported? Taking?   Biotin 1 MG CAPS  Self Yes No   Sig: Take by mouth   Cetirizine HCl (ZyrTEC Allergy) 10 MG CAPS  Self Yes No   Sig: Take by mouth   Cranberry 1000 MG CAPS  Self Yes No   Sig: Take by mouth   Echinacea Plus CAPS  Self Yes No   Sig: Take by mouth   Multiple Vitamins-Minerals (CENTRUM SILVER 50+WOMEN PO)  Self Yes No   Omega-3 Fatty Acids (FISH OIL PO)  Self Yes No   Sig: Take by mouth   estradiol (CLIMARA) 0.025 mg/24 hr   No No   Sig: PLACE 1 PATCH ON THE SKIN OVER 7 DAYS ONCE A WEEK   famotidine (PEPCID) 40 MG tablet  Self No No   Sig: Take 1 tablet (40 mg total) by mouth daily at bedtime   fluticasone (FLONASE) 50 mcg/act nasal spray   Self No No   Si spray into each nostril daily   meclizine (ANTIVERT) 12.5 MG tablet  Self No No   Sig: Take 1 tablet (12.5 mg total) by mouth every 8 (eight) hours as needed for dizziness   montelukast (SINGULAIR) 10 mg tablet  Self No No   Sig: Take 1 tablet (10 mg total) by mouth daily at bedtime   rosuvastatin (CRESTOR) 5 mg tablet   No No   Sig: Take 1 tablet (5 mg total) by mouth daily   traZODone (DESYREL) 50 mg tablet   No No   Sig: TAKE 1 TABLET(50 MG) BY MOUTH DAILY AT BEDTIME      Facility-Administered Medications: None     Discharge Medication List as of 2025 10:49 AM        START taking these medications    Details   omeprazole (PriLOSEC) 20 mg delayed release capsule Take 1 capsule (20 mg total) by mouth daily for 14 days, Starting 2025, Until 2025, Normal           CONTINUE these medications which have NOT CHANGED    Details   Biotin 1 MG CAPS Take by mouth, Historical Med      Cetirizine HCl (ZyrTEC Allergy) 10 MG CAPS Take by mouth, Historical Med      Cranberry 1000 MG CAPS Take by mouth, Historical Med      Echinacea Plus CAPS Take by mouth, Historical Med      estradiol (CLIMARA) 0.025 mg/24 hr PLACE 1 PATCH ON THE SKIN OVER 7 DAYS ONCE A WEEK, Starting Wed 10/30/2024, Until 2025, Normal      famotidine (PEPCID) 40 MG tablet Take 1 tablet (40 mg total) by mouth daily at bedtime, Starting Tue 3/26/2024, Normal      fluticasone (FLONASE) 50 mcg/act nasal spray 1 spray into each nostril daily, Starting Thu 3/21/2024, Normal      meclizine (ANTIVERT) 12.5 MG tablet Take 1 tablet (12.5 mg total) by mouth every 8 (eight) hours as needed for dizziness, Starting Mon 2023, Normal      montelukast (SINGULAIR) 10 mg tablet Take 1 tablet (10 mg total) by mouth daily at bedtime, Starting Mon 2024, Normal      Multiple Vitamins-Minerals (CENTRUM SILVER 50+WOMEN PO) Starting 2021, Historical Med      Omega-3 Fatty Acids (FISH OIL PO) Take by mouth, Historical  Med      rosuvastatin (CRESTOR) 5 mg tablet Take 1 tablet (5 mg total) by mouth daily, Starting Mon 12/16/2024, Normal      traZODone (DESYREL) 50 mg tablet TAKE 1 TABLET(50 MG) BY MOUTH DAILY AT BEDTIME, Starting Mon 1/13/2025, Normal           No discharge procedures on file.  ED SEPSIS DOCUMENTATION   Time reflects when diagnosis was documented in both MDM as applicable and the Disposition within this note       Time User Action Codes Description Comment    1/31/2025 10:46 AM Anna Donato [R07.9] Chest pain, unspecified type     1/31/2025 10:46 AM Anna Donato [R14.2] Belching     1/31/2025 10:46 AM Anna Donato Modify [R14.2] [place] GERD                 Anna Donato PA-C  01/31/25 [id number]

## 2025-03-07 ENCOUNTER — HOSPITAL ENCOUNTER (OUTPATIENT)
Dept: RADIOLOGY | Facility: HOSPITAL | Age: 55
End: 2025-03-07
Attending: STUDENT IN AN ORGANIZED HEALTH CARE EDUCATION/TRAINING PROGRAM
Payer: COMMERCIAL

## 2025-03-07 ENCOUNTER — OFFICE VISIT (OUTPATIENT)
Dept: OBGYN CLINIC | Facility: CLINIC | Age: 55
End: 2025-03-07
Payer: COMMERCIAL

## 2025-03-07 VITALS — HEIGHT: 68 IN | WEIGHT: 180 LBS | BODY MASS INDEX: 27.28 KG/M2

## 2025-03-07 DIAGNOSIS — M25.551 PAIN IN RIGHT HIP: ICD-10-CM

## 2025-03-07 DIAGNOSIS — M16.11 PRIMARY OSTEOARTHRITIS OF ONE HIP, RIGHT: Primary | ICD-10-CM

## 2025-03-07 PROCEDURE — 73502 X-RAY EXAM HIP UNI 2-3 VIEWS: CPT

## 2025-03-07 PROCEDURE — 99213 OFFICE O/P EST LOW 20 MIN: CPT | Performed by: STUDENT IN AN ORGANIZED HEALTH CARE EDUCATION/TRAINING PROGRAM

## 2025-03-07 RX ORDER — MELOXICAM 15 MG/1
15 TABLET ORAL DAILY
Qty: 30 TABLET | Refills: 2 | Status: SHIPPED | OUTPATIENT
Start: 2025-03-07 | End: 2025-06-05

## 2025-03-07 NOTE — LETTER
March 7, 2025     Patient: Naty Olivas  YOB: 1970  Date of Visit: 3/7/2025      To Whom it May Concern:    Naty Olivas is under my professional care. Naty was seen in my office on 3/7/2025. Naty may return to work on 3/8/2025. Please excuse her form work today 3/7/2025.     If you have any questions or concerns, please don't hesitate to call.         Sincerely,          Clifford Wyman MD        CC: No Recipients

## 2025-03-07 NOTE — PROGRESS NOTES
Ortho Sports Medicine New Patient Visit     Assesment:   55 y.o. female with right hip osteoarthritis    Plan:    We reviewed their current history, physical exam, and imaging in detail today with the patient.  This is consistent with right hip osteoarthritis. Discussed attending formal physical therapy for strengthening, range of motion, and at home exercise program.  PT prescription provided to the patient today. We discussed Meloxicam for persistent pain and inflammation. Discussed side effects and risks of medication. Patient agreeable. Prescription and instruction for oral use provided to the patient today.  We also reviewed that in the future we can consider a intra-articular corticosteroid injection under ultrasound if she does not get significant relief from physical therapy and meloxicam. All of their questions were answered in detail today.  The patient is agreeable to this plan.  They will follow-up in clinic in 6 weeks without x-ray.             1. Primary osteoarthritis of one hip, right  -     Ambulatory Referral to Physical Therapy; Future  -     meloxicam (MOBIC) 15 mg tablet; Take 1 tablet (15 mg total) by mouth daily  2. Pain in right hip  -     XR hip/pelv 2-3 vws right if performed; Future; Expected date: 03/07/2025         Follow up:    Return in about 6 weeks (around 4/18/2025).  Without x-ray        Chief Complaint   Patient presents with    Right Hip - Pain       History of Present Illness:    The patient is a 55 y.o. female who presents for right hip evaluation.  She reports that 1 week ago she was twisting to grab something off of her nightstand when she felt a sharp pain through her anterior hip.  She states since then she has had instances of sharp pain as well as achiness.  She reports her pain is intermittent at this time.  She has pain with sitting for long periods of time, or getting up from a seated position.  She also has pain if she is walking long distances.  She has been to a  chiropractor 2 times this week, and reports that this has helped her.  She has also taken naproxen and states that this has helped as well.  She has not done any formal physical therapy for this hip in the past few years.  There are notes indicating she went to physical therapy in  for her right hip.  She denies any previous surgeries, or injections to her hip.  She presents to clinic today for right hip evaluation.      Hip Surgical History:  None    Past Medical, Social and Family History:  Past Medical History:   Diagnosis Date    Bacterial vaginitis     Brain lesion     Fibroids     Gastritis     GERD (gastroesophageal reflux disease)     Insomnia     Migraine     Otitis media     Pneumonia      Past Surgical History:   Procedure Laterality Date     SECTION      COLONOSCOPY      HAND SURGERY Left     Left pinky tendon repair    HYSTERECTOMY      CT COLONOSCOPY FLX DX W/COLLJ SPEC WHEN PFRMD N/A 2018    Procedure: COLONOSCOPY;  Surgeon: Crystal Lacey MD;  Location: [facility];  Service: Gastroenterology    CT CYSTOURETHROSCOPY N/A 2021    Procedure: CYSTOSCOPY;  Surgeon: Alysia Osorio DO;  Location: [facility];  Service: Gynecology    CT ESOPHAGOGASTRODUODENOSCOPY TRANSORAL DIAGNOSTIC N/A 2017    Procedure: ESOPHAGOGASTRODUODENOSCOPY (EGD);  Surgeon: Crystal Lacey MD;  [facility]: [facility];  Service: Gastroenterology    CT LAPS TOTAL HYSTERECT 250 GM/< W/RMVL TUBE/OVARY N/A 2021    Procedure: HYSTERECTOMY LAPAROSCOPIC TOTAL (LTH) WITH BILATERAL SALPINGECTOMY, LYSIS OF ADHESIONS;  Surgeon: Alysia Osorio DO;  Location: [facility];  Service: Gynecology    ROOT CANAL      UPPER GASTROINTESTINAL ENDOSCOPY       Allergies   Allergen Reactions    Amoxicillin Itching     Current Outpatient Medications on File Prior to Visit   Medication Sig Dispense Refill    Biotin 1 MG CAPS Take by mouth      Cetirizine HCl (ZyrTEC Allergy) 10 MG CAPS Take by mouth      Cranberry  1000 MG CAPS Take by mouth      Echinacea Plus CAPS Take by mouth      estradiol (CLIMARA) 0.025 mg/24 hr PLACE 1 PATCH ON THE SKIN OVER 7 DAYS ONCE A WEEK 4 patch 5    famotidine (PEPCID) 40 MG tablet Take 1 tablet (40 mg total) by mouth daily at bedtime 30 tablet 6    fluticasone (FLONASE) 50 mcg/act nasal spray 1 spray into each nostril daily 15.8 mL 0    meclizine (ANTIVERT) 12.5 MG tablet Take 1 tablet (12.5 mg total) by mouth every 8 (eight) hours as needed for dizziness 90 tablet 0    montelukast (SINGULAIR) 10 mg tablet TAKE 1 TABLET BY MOUTH EVERY NIGHT AT BEDTIME 30 tablet 6    Multiple Vitamins-Minerals (CENTRUM SILVER 50+WOMEN PO)       Omega-3 Fatty Acids (FISH OIL PO) Take by mouth      rosuvastatin (CRESTOR) 5 mg tablet Take 1 tablet (5 mg total) by mouth daily 30 tablet 2    traZODone (DESYREL) 50 mg tablet TAKE 1 TABLET(50 MG) BY MOUTH DAILY AT BEDTIME 30 tablet 1    omeprazole (PriLOSEC) 20 mg delayed release capsule Take 1 capsule (20 mg total) by mouth daily for 14 days 14 capsule 0     No current facility-administered medications on file prior to visit.     Social History     Socioeconomic History    Marital status: /Civil Union     Spouse name: Not on file    Number of children: Not on file    Years of education: Not on file    Highest education level: Not on file   Occupational History    Not on file   Tobacco Use    Smoking status: Never    Smokeless tobacco: Never   Vaping Use    Vaping status: Never Used   Substance and Sexual Activity    Alcohol use: Yes     Alcohol/week: 3.0 standard drinks of alcohol     Types: 3 Glasses of wine per week     Comment: socially    Drug use: Never    Sexual activity: Yes     Partners: Male     Birth control/protection: Post-menopausal, Surgical     Comment: Hysterectomy   Other Topics Concern    Not on file   Social History Narrative    Not on file     Social Drivers of Health     Financial Resource Strain: Not on file   Food Insecurity: Not on file  "  Transportation Needs: Not on file   Physical Activity: Not on file   Stress: Not on file   Social Connections: Not on file   Intimate Partner Violence: Not on file   Housing Stability: Not on file         I have reviewed the past medical, surgical, social and family history, medications and allergies as documented in the EMR.    Review of systems: ROS is negative other than that noted in the HPI.  Constitutional: Negative for fatigue and fever.   HENT: Negative for sore throat.    Respiratory: Negative for shortness of breath.    Cardiovascular: Negative for chest pain.   Gastrointestinal: Negative for abdominal pain.   Endocrine: Negative for cold intolerance and heat intolerance.   Genitourinary: Negative for flank pain.   Musculoskeletal: Negative for back pain.   Skin: Negative for rash.   Allergic/Immunologic: Negative for immunocompromised state.   Neurological: Negative for dizziness.   Psychiatric/Behavioral: Negative for agitation.      Physical Exam:    Height 5' 8\" (1.727 m), weight 81.6 kg (180 lb), last menstrual period 10/05/2021, not currently breastfeeding.    General/Constitutional: NAD, well developed, well nourished  HENT: Normocephalic, atraumatic  CV: Intact distal pulses, regular rate  Resp: No respiratory distress or labored breathing  Lymphatic: No lymphadenopathy palpated  Neuro: Alert and Oriented x 3, no focal deficits  Psych: Normal mood, normal affect  Skin: Warm, dry, no rashes, no erythema      Hip Exam:   No  skin lesions or significant deformity   Palpation:   ROM : 120 of flexion, 20 of IR, 45 of ER  Negative  straight leg raise   FADIR: Positive   PADMINI: Negative   Positive Stinchfield    Neurovasculary intact distally         Hip Imaging    I personally reviewed and interpreted 3 x-rays including AP pelvis, as well as AP and modified Lipscomb lateral of the affected hip which were obtained in the office today and reviewed in detail with the patient. There are mild-moderate " degenerative changes in the hip joint. No acute fracture or dislocation. No other bony pathology is noted.      Scribe Attestation      I,:  Socorro Waller am acting as a scribe while in the presence of the attending physician.:       I,:  Clifford Wyman MD personally performed the services described in this documentation    as scribed in my presence.:

## 2025-03-19 ENCOUNTER — EVALUATION (OUTPATIENT)
Dept: PHYSICAL THERAPY | Facility: CLINIC | Age: 55
End: 2025-03-19
Payer: COMMERCIAL

## 2025-03-19 DIAGNOSIS — M16.11 PRIMARY OSTEOARTHRITIS OF ONE HIP, RIGHT: ICD-10-CM

## 2025-03-19 PROCEDURE — 97162 PT EVAL MOD COMPLEX 30 MIN: CPT

## 2025-03-19 PROCEDURE — 97140 MANUAL THERAPY 1/> REGIONS: CPT

## 2025-03-19 NOTE — PROGRESS NOTES
PT Evaluation     Today's date: 3/19/2025  Patient name: Naty Olivas  : 1970  MRN: [id number]  Referring provider: Clifford Wyman MD  Dx:   Encounter Diagnosis     ICD-10-CM    1. Primary osteoarthritis of one hip, right  M16.11 Ambulatory Referral to Physical Therapy                     Assessment  Impairments: abnormal coordination, abnormal gait, abnormal muscle firing, abnormal or restricted ROM, abnormal movement, activity intolerance, impaired balance, impaired physical strength, lacks appropriate home exercise program, pain with function, safety issue, weight-bearing intolerance and poor body mechanics    Assessment details: Naty Olivas is a pleasant 55 y.o. female presents with signs and symptoms consistent with:   R hip     Problem List:  1) hypomobility hip   2) decreased hip strength     Comparable signs:  1) Walking  2)     she has hip hypomobility, decreased hip strength  and decreased function resulting in worry over not knowing what's wrong and fear of not being able to keep active. These impairments listed above are preventing the patient from participating in functional activity. No further referral appears necessary at this time based upon examination results, and is negative for any red flags. Prognosis is good based off HEP compliance and attendance to physical therapy 2x a week.  Positive prognostic indicators include positive attitude toward recovery and good understanding of diagnosis and treatment plan options.  Negative prognostic indicators include chronicity of symptoms, high symptom irritability, and obesity.  Patent will benefit from skilled physical therapy at this time to address deficits to improve overall function and return to PLOF. Patient verbalized understanding of POC, HEP, and return demonstrated HEP. All questions were answered to patients satisfaction.     Please contact me if you have any questions or recommendations. Thank you for the  referral and the opportunity to share in Olejean MIS Olivas's care.              Understanding of Dx/Px/POC: good     Prognosis: good    Goals  Impairment Goals 4-6 weeks   In order to maximize function patient will be able to...   - Decrease intensity/duration/frequency of pain to 2/10  - Demonstrate symmetrical hip AROM without pain  - Increase core strength to 5/5 throughout  - Increase hip strength to 5/5 throughout      Functional Goals 6-8 weeks  In order to return to prior level of function patient will be able to...   - Participate in ADL's/IADL's/sport specific activities with no greater than 2/10 pain.    - Increase Functional Status Measure (FOTO) to predicted outcomes   - Demonstrate independence and compliant with HEP  - Demonstrate a squat and or sit to stand with good mechanics and eccentric control without pain/difficulty/compensation  - Ascend and descend stairs without increased pain/compensation/difficulty and a reciprocal gait pattern.  - Patient will be able to demonstrate good gait mechanics without compensations.     Plan  Patient would benefit from: skilled PT  Planned modality interventions: cryotherapy, electrical stimulation/Russian stimulation, TENS and thermotherapy: hydrocollator packs    Planned therapy interventions: joint mobilization, manual therapy, neuromuscular re-education, patient education, strengthening, stretching, therapeutic activities, therapeutic exercise, home exercise program, functional ROM exercises, Richey taping, postural training, gait training, balance, balance/weight bearing training, flexibility, graded exercise and motor coordination training    Frequency: 2x week  Duration in weeks: 8  Treatment plan discussed with: patient    Subjective Evaluation    History of Present Illness  Mechanism of injury: Patient presents to PT with R hip pain. She was standing and turned and had increased sharp pain in her hip and had to catch herself and increased pain.  She states this happened two weeks ago. She did get an xray with arthritis. She states the ache is never gone. She gets sharp pain and her leg gonzalez. She is walking with a cane to help offload. She had no hip issues prior. Pain on the outside, but usually deep in the bone in the groin. She went to a chiropractor with manipulation and calmed symptoms for a bit. She does have sciatica on her left side and was controlled. She was prescribed muscle relaxer for about a week but did not like how they made her feel. Stairs are fine    Difficulty with: walking   Patient Goals  Patient goals for therapy: decreased pain, independence with ADLs/IADLs and return to sport/leisure activities  Patient goal: get back to running  Pain  Current pain ratin  At worst pain rating: 10  Location: in front of hip  Quality: dull ache, knife-like and throbbing  Relieving factors: medications (ibuprofen)  Aggravating factors: walking  Progression: improved      Diagnostic Tests  Abnormal x-ray: see chart.  Treatments  Current treatment: chiropractic      Objective     Observations     Additional Observation Details  Hip hike, trendelenburg on R side    Palpation     Right   Tenderness of the gluteus medius.     Tenderness     Left Hip   No tenderness in the greater trochanter.     Right Hip   Tenderness in the greater trochanter.     Lumbar Screen  Lumbar range of motion within normal limits with the following exceptions:No change with repeated motions to this date.   Side glides reduced symptom slightly but no significant change    Neurological Testing     Sensation     Hip   Left Hip   Intact: light touch    Right Hip   Intact: light touch    Reflexes   Left   Patellar (L4): normal (2+)  Achilles (S1): normal (2+)  Babinski sign: negative  Clonus sign: negative    Right   Patellar (L4): normal (2+)  Achilles (S1): normal (2+)  Babinski sign: negative  Clonus sign: negative    Active Range of Motion   Left Hip   External rotation  (prone): 50 degrees   Internal rotation (prone): 60 degrees     Right Hip   Flexion: WFL  External rotation (prone): 45 degrees   Internal rotation (prone): 40 degrees     Strength/Myotome Testing     Left Hip   Normal muscle strength    Right Hip   Planes of Motion   Flexion: 3+  Extension: 4-  Abduction: 3+  External rotation: 4-    Isolated Muscles   Gluteus maximums: 4  Gluteus medius: 4-    Tests     Right Hip   Positive PADMINI, FADIR and scour.   Negative long sit, SI compression and SI distraction.   Héctor: Negative.             POC Expires Auth Status Start Date Expiration Date PT Visit Limit    one monthtoday/date: 3/19/2025  3/19/2025     Date 3/19       Used        Remaining           Diagnosis:  L hip   Precautions:  See chart   Comparable signs 1) Walking  2) Stairs   Primary Impairments: 1) Hip hypomobility   2) Hip strength    Patient Goals Walk   Manual Therapy 3/19/2025        PROM          LAD                           Re-evaluation          Exercise Diary          Therapeutic Exercise         Bike         Hip fall outs         Nerve glides                                             Neuromuscular Re-education         Bridges          Sidelying hip abd                                                               Therapeutic Activities         Education  POC, diagnosis, expecations                                            Modalities

## 2025-03-25 ENCOUNTER — HOSPITAL ENCOUNTER (OUTPATIENT)
Dept: MAMMOGRAPHY | Facility: HOSPITAL | Age: 55
Discharge: HOME | End: 2025-03-25
Payer: COMMERCIAL

## 2025-03-25 VITALS — WEIGHT: 179.9 LBS | HEIGHT: 68 IN | BODY MASS INDEX: 27.26 KG/M2

## 2025-03-25 DIAGNOSIS — Z12.31 ENCOUNTER FOR SCREENING MAMMOGRAM FOR MALIGNANT NEOPLASM OF BREAST: ICD-10-CM

## 2025-03-25 PROCEDURE — 77063 BREAST TOMOSYNTHESIS BI: CPT

## 2025-03-25 PROCEDURE — 77067 SCR MAMMO BI INCL CAD: CPT

## 2025-03-26 ENCOUNTER — OFFICE VISIT (OUTPATIENT)
Dept: PHYSICAL THERAPY | Facility: CLINIC | Age: 55
End: 2025-03-26
Payer: COMMERCIAL

## 2025-03-26 ENCOUNTER — RESULTS FOLLOW-UP (OUTPATIENT)
Dept: FAMILY MEDICINE CLINIC | Facility: CLINIC | Age: 55
End: 2025-03-26

## 2025-03-26 DIAGNOSIS — M16.11 PRIMARY OSTEOARTHRITIS OF ONE HIP, RIGHT: Primary | ICD-10-CM

## 2025-03-26 PROCEDURE — 97112 NEUROMUSCULAR REEDUCATION: CPT

## 2025-03-26 PROCEDURE — 97140 MANUAL THERAPY 1/> REGIONS: CPT

## 2025-03-26 PROCEDURE — 97110 THERAPEUTIC EXERCISES: CPT

## 2025-03-26 NOTE — PROGRESS NOTES
Daily Note     Today's date: 3/26/2025  Patient name: Naty Olivas  : 1970  MRN: [id number]  Referring provider: Clifford Wyman MD  Dx:   Encounter Diagnosis     ICD-10-CM    1. Primary osteoarthritis of one hip, right  M16.11           Start Time:   Stop Time:   Total time in clinic (min): 40 minutes    Subjective: Patient reports significant improvements since last session, noting that she is able to ambulate without aggravating sxs as compared to last session where she was limping with each step due to how much pain she was in.       Objective: See treatment diary below      Assessment: Tolerated treatment well. Initiated session with warm up on bike to increase blood flow and improve mobility, with no adverse response noted t/o duration. Focused today's session on lumbar and hip motor control/mobility exercises to improve functional deficits and to relieve sxs, with good tolerance and no significant increases in sxs t/o performance. Improvements in tightness and mobility noted with addition of PPU exercise today. Primary PT performed hip joint mobilizations at the conclusion of session in which patient responded favorably to and noted some improvements in tightness post treatment. Updated and reviewed HEP and provided patient with a handout; patient verbalized understanding. Progress patient as able. Patient would benefit from continued PT      Plan: Continue per plan of care.          POC Expires Auth Status Start Date Expiration Date PT Visit Limit    one monthtoday/date: 3/19/2025  3/19/2025     Date 3/19 3/26      Used        Remaining           Diagnosis:  L hip   Precautions:  See chart   Comparable signs 1) Walking  2) Stairs   Primary Impairments: 1) Hip hypomobility   2) Hip strength    Patient Goals Walk   Manual Therapy 3/19/2025 3/26       PROM          LAD         Hip mobs  SP                Re-evaluation          Exercise Diary          Therapeutic Exercise        "  Bike  5 min       Hip fall outs  5\"x15 latisha       Nerve glides  5\"x10       Standing lumbar ext  2x10       PPU  2x10                         Neuromuscular Re-education         Bridges          Sidelying hip abd         Héctor stretch  10\"x10                                                    Therapeutic Activities         Education  POC, diagnosis, expecations Updated and reviewed HEP                                           Modalities                            "

## 2025-03-28 ENCOUNTER — TELEPHONE (OUTPATIENT)
Age: 55
End: 2025-03-28

## 2025-04-02 ENCOUNTER — OFFICE VISIT (OUTPATIENT)
Dept: PHYSICAL THERAPY | Facility: CLINIC | Age: 55
End: 2025-04-02
Payer: COMMERCIAL

## 2025-04-02 ENCOUNTER — OFFICE VISIT (OUTPATIENT)
Dept: FAMILY MEDICINE CLINIC | Facility: CLINIC | Age: 55
End: 2025-04-02
Payer: COMMERCIAL

## 2025-04-02 VITALS
SYSTOLIC BLOOD PRESSURE: 118 MMHG | HEIGHT: 68 IN | DIASTOLIC BLOOD PRESSURE: 80 MMHG | WEIGHT: 180 LBS | BODY MASS INDEX: 27.28 KG/M2

## 2025-04-02 DIAGNOSIS — M16.11 PRIMARY OSTEOARTHRITIS OF ONE HIP, RIGHT: Primary | ICD-10-CM

## 2025-04-02 DIAGNOSIS — E78.00 ELEVATED LDL CHOLESTEROL LEVEL: Primary | ICD-10-CM

## 2025-04-02 DIAGNOSIS — Z13.29 THYROID DISORDER SCREENING: ICD-10-CM

## 2025-04-02 DIAGNOSIS — Z13.1 SCREENING FOR DIABETES MELLITUS: ICD-10-CM

## 2025-04-02 DIAGNOSIS — E55.9 VITAMIN D INSUFFICIENCY: ICD-10-CM

## 2025-04-02 DIAGNOSIS — R60.9 EDEMA, UNSPECIFIED TYPE: ICD-10-CM

## 2025-04-02 DIAGNOSIS — Z13.0 SCREENING FOR DEFICIENCY ANEMIA: ICD-10-CM

## 2025-04-02 LAB
CHOLEST SERPL-MCNC: 156 MG/DL
CHOLEST/HDLC SERPL: 2.2 (CALC)
HDLC SERPL-MCNC: 72 MG/DL
LDLC SERPL CALC-MCNC: 71 MG/DL (CALC)
NONHDLC SERPL-MCNC: 84 MG/DL (CALC)
TRIGL SERPL-MCNC: 53 MG/DL

## 2025-04-02 PROCEDURE — 97110 THERAPEUTIC EXERCISES: CPT

## 2025-04-02 PROCEDURE — 97140 MANUAL THERAPY 1/> REGIONS: CPT

## 2025-04-02 PROCEDURE — 97112 NEUROMUSCULAR REEDUCATION: CPT

## 2025-04-02 PROCEDURE — 99214 OFFICE O/P EST MOD 30 MIN: CPT | Performed by: FAMILY MEDICINE

## 2025-04-02 RX ORDER — ROSUVASTATIN CALCIUM 5 MG/1
5 TABLET, COATED ORAL DAILY
Qty: 90 TABLET | Refills: 1 | Status: SHIPPED | OUTPATIENT
Start: 2025-04-02

## 2025-04-02 NOTE — PROGRESS NOTES
"Daily Note     Today's date: 2025  Patient name: Naty Olivas  : 1970  MRN: [id number]  Referring provider: Clifford Wyman MD  Dx:   Encounter Diagnosis     ICD-10-CM    1. Primary osteoarthritis of one hip, right  M16.11                      Subjective: Pt reports she is doing better, her hip pain has been less frequent and less sharp.       Objective: See treatment diary below      Assessment: Tolerated treatment well. Pt performed all exercises without issue, continue to progress to tolerance. Pt showed good form with exercises throughout session, minimal cueing required. Pt responded positively to manuals this session, would benefit from continued focus on strengthening, stretching, and stability exercises to improve function and decrease pain/limitations. Patient demonstrated fatigue post treatment, exhibited good technique with therapeutic exercises, and would benefit from continued PT      Plan: Continue per plan of care.          POC Expires Auth Status Start Date Expiration Date PT Visit Limit    one monthtoday/date: 3/19/2025  3/19/2025     Date 3/19 3/26 4/2     Used        Remaining           Diagnosis:  L hip   Precautions:  See chart   Comparable signs 1) Walking  2) Stairs   Primary Impairments: 1) Hip hypomobility   2) Hip strength    Patient Goals Walk   Manual Therapy 3/19/2025 3/26 42      PROM    KM      LAD   KM      Hip mobs  SP                Re-evaluation          Exercise Diary          Therapeutic Exercise         Bike  5 min 5 min      Hip fall outs  5\"x15 latisha 20x5\" latisha      Nerve glides  5\"x10 2x10 5\" latisha      Standing lumbar ext  2x10 2x10      PPU  2x10 2x10                        Neuromuscular Re-education         Bridges    2x10 3\"      Sidelying hip abd         Héctor stretch  10\"x10 10x10\"                                                   Therapeutic Activities         Education  POC, diagnosis, expecations Updated and reviewed HEP                          "                  Modalities

## 2025-04-02 NOTE — PROGRESS NOTES
Name: Naty Olivas      : 1970      MRN: [id number]  Encounter Provider: Silvia Park DO  Encounter Date: 2025   Encounter department: [facility]  :  Assessment & Plan  Elevated LDL cholesterol level  - LDL  71 <-- 141   - tolerating Crestor - denies myalgias - cont at current dose and rx sent   - diet/exercise  - RTO in 6months, with labs, for f/u and annual exam - pt aware and agreeable   - The 10-year ASCVD risk score (Laurita CAMARILLO, et al., 2019) is: 1.9%    Values used to calculate the score:      Age: 55 years      Sex: Female      Is Non- : Yes      Diabetic: No      Tobacco smoker: No      Systolic Blood Pressure: 118 mmHg      Is BP treated: No      HDL Cholesterol: 72 mg/dL      Total Cholesterol: 156 mg/dL  Orders:    rosuvastatin (CRESTOR) 5 mg tablet; Take 1 tablet (5 mg total) by mouth daily    Lipid panel; Future    Edema, unspecified type  - resolved   - BP in the office 118/80  - has been limiting her shrimp and canned soup intake   - cautious with Na intake   - UTD with annual Flu vaccine (received at work)   - reviewed stable labs done 2024  - advised to schedule Cardio f/u   - RTO in 6months, with labs, for f/u and annual exam - pt aware and agreeable      Thyroid disorder screening    Orders:    TSH, 3rd generation with Free T4 reflex; Future    Screening for diabetes mellitus    Orders:    Comprehensive metabolic panel; Future    Screening for deficiency anemia    Orders:    CBC and differential; Future    Vitamin D insufficiency    Orders:    Vitamin D 25 hydroxy; Future           History of Present Illness   HPI  56yo F presents to the office for f/u   - BP in the office 118/80   - has been limiting her shrimp and canned soup intake   - UTD with annual Flu vaccine (received at work)   - LDL  71 <-- 141   - tolerating Crestor - denies myalgias   - denies F/C/N/V/CP/palpitations/SOB/wheezing/abd pain/LE edema       Review of  "Systems as per HPI     Objective   /80   Ht 5' 8\" (1.727 m)   Wt 81.6 kg (180 lb)   LMP 10/05/2021 (Exact Date)   BMI 27.37 kg/m²      Physical Exam  Vitals reviewed.   Constitutional:       General: She is not in acute distress.     Appearance: Normal appearance. She is not ill-appearing, toxic-appearing or diaphoretic.   HENT:      Head: Normocephalic and atraumatic.      Right Ear: External ear normal.      Left Ear: External ear normal.      Nose: Nose normal.   Eyes:      General: No scleral icterus.        Right eye: No discharge.         Left eye: No discharge.      Extraocular Movements: Extraocular movements intact.      Conjunctiva/sclera: Conjunctivae normal.   Cardiovascular:      Rate and Rhythm: Normal rate and regular rhythm.      Heart sounds: Normal heart sounds. No murmur heard.     No friction rub. No gallop.   Pulmonary:      Effort: Pulmonary effort is normal. No respiratory distress.      Breath sounds: Normal breath sounds. No stridor. No wheezing, rhonchi or rales.   Abdominal:      Palpations: Abdomen is soft.   Musculoskeletal:         General: Normal range of motion.      Cervical back: Normal range of motion.      Right lower leg: No edema.      Left lower leg: No edema.   Skin:     General: Skin is warm.   Neurological:      General: No focal deficit present.      Mental Status: She is alert and oriented to person, place, and time.   Psychiatric:         Mood and Affect: Mood normal.         Behavior: Behavior normal.         "

## 2025-04-09 ENCOUNTER — OFFICE VISIT (OUTPATIENT)
Dept: PHYSICAL THERAPY | Facility: CLINIC | Age: 55
End: 2025-04-09
Payer: COMMERCIAL

## 2025-04-09 ENCOUNTER — TELEPHONE (OUTPATIENT)
Age: 55
End: 2025-04-09

## 2025-04-09 DIAGNOSIS — M16.11 PRIMARY OSTEOARTHRITIS OF ONE HIP, RIGHT: Primary | ICD-10-CM

## 2025-04-09 PROCEDURE — 97140 MANUAL THERAPY 1/> REGIONS: CPT | Performed by: PHYSICAL THERAPIST

## 2025-04-09 PROCEDURE — 97110 THERAPEUTIC EXERCISES: CPT | Performed by: PHYSICAL THERAPIST

## 2025-04-09 PROCEDURE — 97112 NEUROMUSCULAR REEDUCATION: CPT | Performed by: PHYSICAL THERAPIST

## 2025-04-09 NOTE — TELEPHONE ENCOUNTER
The patient called she would like to schedule a virtual appointment with Dr Park  she has had a change in her bowel movements and she would like to consult with Dr Xavier zuniga   please call thank you

## 2025-04-09 NOTE — PROGRESS NOTES
"Daily Note     Today's date: 2025  Patient name: Naty Olivas  : 1970  MRN: [id number]  Referring provider: Clifford Wyman MD  Dx:   Encounter Diagnosis     ICD-10-CM    1. Primary osteoarthritis of one hip, right  M16.11                      Subjective: Patient reports that she is feeling better. She notes that she had 0 pain at work, when she got home her hip started to hurt. She was able to tolerate the heels all day yesterday for the first time in weeks. She notes that she does have increased pain after her commute home.       Objective: See treatment diary below      Assessment: Tolerated treatment well. Patient would benefit from continued PT.She does demonstrate well maintained joint mobility with manual hip mobilizations. She was challenged with hip flexor strengthening with greater difficulty on R vs. L during SLR flexion. She requires progression of bridge training next visit. She was able to add resistance to clamshells this date.       Plan: Continue per plan of care.  Re-Evaluation next visit. Then decreased POC to 1x/2 weeks due to co-pay.         POC Expires Auth Status Start Date Expiration Date PT Visit Limit    one monthtoday/date: 3/19/2025  3/19/2025     Date 3/19 3/26 4/2 4/9    Used        Remaining           Diagnosis:  L hip   Precautions:  See chart   Comparable signs 1) Walking  2) Stairs   Primary Impairments: 1) Hip hypomobility   2) Hip strength    Patient Goals Walk   Manual Therapy 3/19/2025 3/26 4/2 4/9     PROM    KM      LAD   KM      Hip mobs  SP  RS     MWM flexion, ER    RS     Re-evaluation          Exercise Diary          Therapeutic Exercise         Bike  5 min 5 min 5'      Hip fall outs  5\"x15 latisha 20x5\" latisha D/c     Nerve glides  5\"x10 2x10 5\" latisha      Standing lumbar ext  2x10 2x10      PPU  2x10 2x10      clamshells    Pink 2x8 ea              Neuromuscular Re-education         Bridges    2x10 3\" 2x10 5\"      Sidelying hip abd         Héctor " "stretch  10\"x10 10x10\" 10x10\" with strap for OP     SLR flexion    2x8                                         Therapeutic Activities         Education  POC, diagnosis, expecations Updated and reviewed HEP                                           Modalities                                "

## 2025-04-10 ENCOUNTER — TELEMEDICINE (OUTPATIENT)
Dept: FAMILY MEDICINE CLINIC | Facility: CLINIC | Age: 55
End: 2025-04-10
Payer: COMMERCIAL

## 2025-04-10 VITALS — BODY MASS INDEX: 27.28 KG/M2 | WEIGHT: 180 LBS | HEIGHT: 68 IN

## 2025-04-10 DIAGNOSIS — Z90.710 HISTORY OF HYSTERECTOMY FOR BENIGN DISEASE: ICD-10-CM

## 2025-04-10 DIAGNOSIS — R19.4 BOWEL HABIT CHANGES: Primary | ICD-10-CM

## 2025-04-10 DIAGNOSIS — N95.1 VASOMOTOR SYMPTOMS DUE TO MENOPAUSE: ICD-10-CM

## 2025-04-10 DIAGNOSIS — N95.1 VASOMOTOR SYMPTOMS DUE TO MENOPAUSE: Primary | ICD-10-CM

## 2025-04-10 PROCEDURE — 98005 SYNCH AUDIO-VIDEO EST LOW 20: CPT | Performed by: FAMILY MEDICINE

## 2025-04-10 RX ORDER — ESTRADIOL 0.03 MG/D
PATCH TRANSDERMAL
Qty: 4 PATCH | Refills: 5 | OUTPATIENT
Start: 2025-04-10

## 2025-04-10 RX ORDER — ESTRADIOL 0.03 MG/D
1 PATCH TRANSDERMAL WEEKLY
Qty: 12 PATCH | Refills: 1 | Status: SHIPPED | OUTPATIENT
Start: 2025-04-10 | End: 2025-07-09

## 2025-04-10 NOTE — PROGRESS NOTES
"Virtual Regular VisitName: Naty Olivas      : 1970      MRN: [id number]  Encounter Provider: Silvia Park DO  Encounter Date: 4/10/2025   Encounter department: [facility]  :  Assessment & Plan  Bowel habit changes  - saw Ortho on 3/7/2025 and was started on Mobic for primary OA of R-hip   - took for 3-4days - (+) constipation - stopped taking preferring Motrin/Naproxen and noted \"pasty stool\"   - took a laxative the Monday before last    - \"soft, hard to wipe, not normal stool\" for the past week and half   - brown in color, no odor, no blood in stool   - denies F/C/N/V/abdominal pain  - started a new diet this past week - high fiber and some steak/chicken/fish, minimal starch   - advised to revert to her original diet and reach out to GI - pt aware and agreeable            History of Present Illness     HPI  - saw Ortho on 3/7/2025 and was started on Mobic for primary OA of R-hip   - took for 3-4days - (+) constipation - stopped taking preferring Motrin/Naproxen and noted \"pasty stool\"   - took a laxative the Monday before last    - \"soft, hard to wipe, not normal stool\" for the past week and half   - brown in color, no odor, no blood in stool   - denies F/C/N/V/abdominal pain  - started a new diet this past week - high fiber and some steak/chicken/fish, minimal starch     Review of Systems as per HPI     Objective   Ht 5' 8\" (1.727 m)   Wt 81.6 kg (180 lb)   LMP 10/05/2021 (Exact Date)   BMI 27.37 kg/m²     Physical Exam  Vitals reviewed.   Constitutional:       General: She is not in acute distress.     Appearance: Normal appearance. She is not ill-appearing, toxic-appearing or diaphoretic.   HENT:      Head: Normocephalic and atraumatic.      Right Ear: External ear normal.      Left Ear: External ear normal.      Nose: Nose normal.   Eyes:      General: No scleral icterus.        Right eye: No discharge.         Left eye: No discharge.      Extraocular Movements: " Extraocular movements intact.      Conjunctiva/sclera: Conjunctivae normal.   Pulmonary:      Effort: Pulmonary effort is normal.   Neurological:      General: No focal deficit present.      Mental Status: She is alert and oriented to person, place, and time.         Administrative Statements   Encounter provider Silvia Park, DO    The Patient is located at Other and in the following state in which I hold an active license [place].    The patient was identified by name and date of birth. Naty MIS Olivas was informed that this is a telemedicine visit and that the visit is being conducted through the Epic Embedded platform. She agrees to proceed..  My office door was closed. No one else was in the room.  She acknowledged consent and understanding of privacy and security of the video platform. The patient has agreed to participate and understands they can discontinue the visit at any time.    I have spent a total time of 15 minutes in caring for this patient on the day of the visit/encounter including Prognosis, Risks and benefits of tx options, Instructions for management, Patient and family education, Importance of tx compliance, Risk factor reductions, Impressions, Counseling / Coordination of care, Documenting in the medical record, Reviewing/placing orders in the medical record (including tests, medications, and/or procedures), and Obtaining or reviewing history  , not including the time spent for establishing the audio/video connection.

## 2025-04-14 NOTE — TELEPHONE ENCOUNTER
Patient called and stated the soonest appointment she was able to schedule with GI is on 9/8.  She would like to know if Dr. Park could recommend any other gastroenterologists that she can try.  She has also been trying to schedule an appointment with Dermatology and the soonest available is in November.  Please advise of any recommendations for a dermatologist as well.  Thank you!

## 2025-04-15 ENCOUNTER — APPOINTMENT (OUTPATIENT)
Dept: PHYSICAL THERAPY | Facility: CLINIC | Age: 55
End: 2025-04-15
Payer: COMMERCIAL

## 2025-04-15 NOTE — PROGRESS NOTES
PT Re-Evaluation     Today's date: 4/15/2025  Patient name: Naty Olivas  : 1970  MRN: [id number]  Referring provider: Clifford Wyman MD  Dx:   No diagnosis found.                 Assessment  Impairments: abnormal coordination, abnormal gait, abnormal muscle firing, abnormal or restricted ROM, abnormal movement, activity intolerance, impaired balance, impaired physical strength, lacks appropriate home exercise program, pain with function, safety issue, weight-bearing intolerance and poor body mechanics    Assessment details: Naty Olivas is a pleasant 55 y.o. female presents with signs and symptoms consistent with:   R hip     Problem List:  1) hypomobility hip   2) decreased hip strength     Comparable signs:  1) Walking  2)     she has hip hypomobility, decreased hip strength  and decreased function resulting in worry over not knowing what's wrong and fear of not being able to keep active. These impairments listed above are preventing the patient from participating in functional activity. No further referral appears necessary at this time based upon examination results, and is negative for any red flags. Prognosis is good based off HEP compliance and attendance to physical therapy 2x a week.  Positive prognostic indicators include positive attitude toward recovery and good understanding of diagnosis and treatment plan options.  Negative prognostic indicators include chronicity of symptoms, high symptom irritability, and obesity.  Patent will benefit from skilled physical therapy at this time to address deficits to improve overall function and return to PLOF. Patient verbalized understanding of POC, HEP, and return demonstrated HEP. All questions were answered to patients satisfaction.     Please contact me if you have any questions or recommendations. Thank you for the referral and the opportunity to share in Naty Olivas's care.              Understanding of Dx/Px/POC:  good     Prognosis: good    Goals  Impairment Goals 4-6 weeks   In order to maximize function patient will be able to...   - Decrease intensity/duration/frequency of pain to 2/10  - Demonstrate symmetrical hip AROM without pain  - Increase core strength to 5/5 throughout  - Increase hip strength to 5/5 throughout      Functional Goals 6-8 weeks  In order to return to prior level of function patient will be able to...   - Participate in ADL's/IADL's/sport specific activities with no greater than 2/10 pain.    - Increase Functional Status Measure (FOTO) to predicted outcomes   - Demonstrate independence and compliant with HEP  - Demonstrate a squat and or sit to stand with good mechanics and eccentric control without pain/difficulty/compensation  - Ascend and descend stairs without increased pain/compensation/difficulty and a reciprocal gait pattern.  - Patient will be able to demonstrate good gait mechanics without compensations.     Plan  Patient would benefit from: skilled PT  Planned modality interventions: cryotherapy, electrical stimulation/Russian stimulation, TENS and thermotherapy: hydrocollator packs    Planned therapy interventions: joint mobilization, manual therapy, neuromuscular re-education, patient education, strengthening, stretching, therapeutic activities, therapeutic exercise, home exercise program, functional ROM exercises, Richey taping, postural training, gait training, balance, balance/weight bearing training, flexibility, graded exercise and motor coordination training    Frequency: 2x week  Duration in weeks: 8  Treatment plan discussed with: patient      Subjective Evaluation    History of Present Illness  Mechanism of injury: Patient presents to PT with R hip pain. She was standing and turned and had increased sharp pain in her hip and had to catch herself and increased pain. She states this happened two weeks ago. She did get an xray with arthritis. She states the ache is never gone.  She gets sharp pain and her leg gonzalez. She is walking with a cane to help offload. She had no hip issues prior. Pain on the outside, but usually deep in the bone in the groin. She went to a chiropractor with manipulation and calmed symptoms for a bit. She does have sciatica on her left side and was controlled. She was prescribed muscle relaxer for about a week but did not like how they made her feel. Stairs are fine    Difficulty with: walking   Patient Goals  Patient goals for therapy: decreased pain, independence with ADLs/IADLs and return to sport/leisure activities  Patient goal: get back to running  Pain  Current pain ratin  At worst pain rating: 10  Location: in front of hip  Quality: dull ache, knife-like and throbbing  Relieving factors: medications (ibuprofen)  Aggravating factors: walking  Progression: improved      Diagnostic Tests  Abnormal x-ray: see chart.  Treatments  Current treatment: chiropractic        Objective     Observations     Additional Observation Details  Hip hike, trendelenburg on R side    Palpation     Right   Tenderness of the gluteus medius.     Tenderness     Left Hip   No tenderness in the greater trochanter.     Right Hip   Tenderness in the greater trochanter.     Lumbar Screen  Lumbar range of motion within normal limits with the following exceptions:No change with repeated motions to this date.   Side glides reduced symptom slightly but no significant change    Neurological Testing     Sensation     Hip   Left Hip   Intact: light touch    Right Hip   Intact: light touch    Reflexes   Left   Patellar (L4): normal (2+)  Achilles (S1): normal (2+)  Babinski sign: negative  Clonus sign: negative    Right   Patellar (L4): normal (2+)  Achilles (S1): normal (2+)  Babinski sign: negative  Clonus sign: negative    Active Range of Motion   Left Hip   External rotation (prone): 50 degrees   Internal rotation (prone): 60 degrees     Right Hip   Flexion: WFL  External rotation  "(prone): 45 degrees   Internal rotation (prone): 40 degrees     Strength/Myotome Testing     Left Hip   Normal muscle strength    Right Hip   Planes of Motion   Flexion: 3+  Extension: 4-  Abduction: 3+  External rotation: 4-    Isolated Muscles   Gluteus maximums: 4  Gluteus medius: 4-    Tests     Right Hip   Positive PADMINI, FADIR and scour.   Negative long sit, SI compression and SI distraction.   Héctor: Negative.               POC Expires Auth Status Start Date Expiration Date PT Visit Limit    one monthtoday/date: 3/19/2025  3/19/2025     Date 3/19 3/26 4/2 4/9 4/15   Used        Remaining           Diagnosis:  L hip   Precautions:  See chart   Comparable signs 1) Walking  2) Stairs   Primary Impairments: 1) Hip hypomobility   2) Hip strength    Patient Goals Walk   Manual Therapy 3/19/2025 3/26 4/2 4/9 4/15    PROM    KM      LAD   KM      Hip mobs  SP  RS     MWM flexion, ER    RS     Re-evaluation      RS    Exercise Diary          Therapeutic Exercise         Bike  5 min 5 min 5'      Hip fall outs  5\"x15 latisha 20x5\" latisha D/c     Nerve glides  5\"x10 2x10 5\" latisha      Standing lumbar ext  2x10 2x10      PPU  2x10 2x10      clamshells    Pink 2x8 ea              Neuromuscular Re-education         Bridges    2x10 3\" 2x10 5\"      Sidelying hip abd         Héctor stretch  10\"x10 10x10\" 10x10\" with strap for OP     SLR flexion    2x8                                         Therapeutic Activities         Education  POC, diagnosis, expecations Updated and reviewed HEP                                           Modalities                            "

## 2025-04-16 ENCOUNTER — APPOINTMENT (OUTPATIENT)
Dept: PHYSICAL THERAPY | Facility: CLINIC | Age: 55
End: 2025-04-16
Payer: COMMERCIAL

## 2025-04-17 ENCOUNTER — APPOINTMENT (OUTPATIENT)
Dept: PHYSICAL THERAPY | Facility: CLINIC | Age: 55
End: 2025-04-17
Attending: STUDENT IN AN ORGANIZED HEALTH CARE EDUCATION/TRAINING PROGRAM
Payer: COMMERCIAL

## 2025-04-18 ENCOUNTER — OFFICE VISIT (OUTPATIENT)
Dept: OBGYN CLINIC | Facility: CLINIC | Age: 55
End: 2025-04-18
Payer: COMMERCIAL

## 2025-04-18 VITALS — WEIGHT: 180 LBS | HEIGHT: 68 IN | BODY MASS INDEX: 27.28 KG/M2

## 2025-04-18 DIAGNOSIS — M16.11 PRIMARY OSTEOARTHRITIS OF ONE HIP, RIGHT: Primary | ICD-10-CM

## 2025-04-18 PROCEDURE — 99213 OFFICE O/P EST LOW 20 MIN: CPT | Performed by: STUDENT IN AN ORGANIZED HEALTH CARE EDUCATION/TRAINING PROGRAM

## 2025-04-18 RX ORDER — IBUPROFEN 400 MG/1
TABLET, FILM COATED ORAL EVERY 6 HOURS PRN
COMMUNITY

## 2025-04-18 NOTE — PROGRESS NOTES
Ortho Sports Medicine Hip- Follow-Up Visit    Assesment:   55 y.o. female right hip osteoarthritis    Plan:    Discussed physical exam findings of the right hip at length with patient in the office today. Discussed findings consistent with osteoarthritis of the right hip. Patient notes 99.5 % relief with conservative management including physical therapy and OTC anti-inflammatories. Discussed continuation of conservative management. Patient should continue at home physical therapy with cessation of formal PT. Patient should continue rest, anti-inflammatories, and activity modification for persistent pain and inflammation. Discussed US guided steroid injections to be used as needed for persistent pain and inflammation. Discussed side effects and risks of injection. Patient expressed understanding and deferred in the office today as she is having significant relief of pain. Instructed to call office if she would like to pursue US guided CSI. Overall pleased with progress. She will follow up as needed for persistent pain and inflammation. All of her questions answered in the office today.     Conservative Treatment:     PT for ROM/strengthening to hip, back, legs and core      Imaging:    All imaging from today was reviewed by myself and explained to the patient.       Injection:      No Injection planned at this time.      Surgery:    No surgery is recommended at this point, continue with conservative treatment plan as noted.      Follow up:    No follow-ups on file.        Chief Complaint   Patient presents with    Right Hip - Follow-up       History of Present Illness:    The patient is returns for follow up of right hip.  Since the prior visit, She reports significant improvement. Patient notes 99.5 % improvement with conservative management such as physical therapy and OTC anti-inflammatories. Overall pleased with progress.       I have reviewed the past medical, surgical, social and family history, medications  and allergies as documented in the EMR.    Review of systems: ROS is negative other than that noted in the HPI.  Constitutional: Negative for fatigue and fever.       Past Medical, Social and Family History:  Past Medical History:   Diagnosis Date    Bacterial vaginitis     Brain lesion     Fibroids     Gastritis     GERD (gastroesophageal reflux disease)     Insomnia     Migraine     Otitis media     Pneumonia      Past Surgical History:   Procedure Laterality Date     SECTION      COLONOSCOPY      HAND SURGERY Left     Left pinky tendon repair    HYSTERECTOMY      age 52    IN COLONOSCOPY FLX DX W/COLLJ SPEC WHEN PFRMD N/A 2018    Procedure: COLONOSCOPY;  Surgeon: Crystal Lacey MD;  Location: [facility];  Service: Gastroenterology    IN CYSTOURETHROSCOPY N/A 2021    Procedure: CYSTOSCOPY;  Surgeon: Alysia Osorio DO;  [facility]: [facility];  Service: Gynecology    IN ESOPHAGOGASTRODUODENOSCOPY TRANSORAL DIAGNOSTIC N/A 2017    Procedure: ESOPHAGOGASTRODUODENOSCOPY (EGD);  Surgeon: Crystal Lacey MD;  [facility];  Service: Gastroenterology    IN LAPS TOTAL HYSTERECT 250 GM/< W/RMVL TUBE/OVARY N/A 2021    Procedure: HYSTERECTOMY LAPAROSCOPIC TOTAL (LTH) WITH BILATERAL SALPINGECTOMY, LYSIS OF ADHESIONS;  Surgeon: Alysia Osorio DO;  Location: [facility];  Service: Gynecology    ROOT CANAL      UPPER GASTROINTESTINAL ENDOSCOPY       Allergies   Allergen Reactions    Amoxicillin Itching     Current Outpatient Medications on File Prior to Visit   Medication Sig Dispense Refill    Biotin 1 MG CAPS Take by mouth      Cetirizine HCl (ZyrTEC Allergy) 10 MG CAPS Take by mouth      Cranberry 1000 MG CAPS Take by mouth      Echinacea Plus CAPS Take by mouth      estradiol (Climara) 0.025 mg/24 hr Place 1 patch on the skin over 7 days once a week 12 patch 1    famotidine (PEPCID) 40 MG tablet Take 1 tablet (40 mg total) by mouth daily at bedtime 30 tablet 6    fluticasone  (FLONASE) 50 mcg/act nasal spray 1 spray into each nostril daily 15.8 mL 0    ibuprofen (MOTRIN) 400 mg tablet Take by mouth every 6 (six) hours as needed for mild pain As needed      meclizine (ANTIVERT) 12.5 MG tablet Take 1 tablet (12.5 mg total) by mouth every 8 (eight) hours as needed for dizziness 90 tablet 0    montelukast (SINGULAIR) 10 mg tablet TAKE 1 TABLET BY MOUTH EVERY NIGHT AT BEDTIME 30 tablet 6    Multiple Vitamins-Minerals (CENTRUM SILVER 50+WOMEN PO)       Omega-3 Fatty Acids (FISH OIL PO) Take by mouth      rosuvastatin (CRESTOR) 5 mg tablet Take 1 tablet (5 mg total) by mouth daily 90 tablet 1    traZODone (DESYREL) 50 mg tablet TAKE 1 TABLET(50 MG) BY MOUTH DAILY AT BEDTIME 30 tablet 1    estradiol (CLIMARA) 0.025 mg/24 hr PLACE 1 PATCH ON THE SKIN OVER 7 DAYS ONCE A WEEK 4 patch 5    meloxicam (MOBIC) 15 mg tablet Take 1 tablet (15 mg total) by mouth daily (Patient not taking: Reported on 4/18/2025) 30 tablet 2    omeprazole (PriLOSEC) 20 mg delayed release capsule Take 1 capsule (20 mg total) by mouth daily for 14 days 14 capsule 0     No current facility-administered medications on file prior to visit.     Social History     Socioeconomic History    Marital status: /Civil Union     Spouse name: Not on file    Number of children: Not on file    Years of education: Not on file    Highest education level: Not on file   Occupational History    Not on file   Tobacco Use    Smoking status: Never    Smokeless tobacco: Never   Vaping Use    Vaping status: Never Used   Substance and Sexual Activity    Alcohol use: Yes     Alcohol/week: 3.0 standard drinks of alcohol     Types: 3 Glasses of wine per week     Comment: socially    Drug use: Never    Sexual activity: Yes     Partners: Male     Birth control/protection: Post-menopausal, Surgical     Comment: Hysterectomy   Other Topics Concern    Not on file   Social History Narrative    Not on file     Social Drivers of Health     Financial  "Resource Strain: Not on file   Food Insecurity: Not on file   Transportation Needs: Not on file   Physical Activity: Not on file   Stress: Not on file   Social Connections: Not on file   Intimate Partner Violence: Not on file   Housing Stability: Not on file         I have reviewed the past medical, surgical, social and family history, medications and allergies as documented in the EMR.    Review of systems: ROS is negative other than that noted in the HPI.  Constitutional: Negative for fatigue and fever.   HENT: Negative for sore throat.    Respiratory: Negative for shortness of breath.    Cardiovascular: Negative for chest pain.   Gastrointestinal: Negative for abdominal pain.   Endocrine: Negative for cold intolerance and heat intolerance.   Genitourinary: Negative for flank pain.   Musculoskeletal: Negative for back pain.   Skin: Negative for rash.   Allergic/Immunologic: Negative for immunocompromised state.   Neurological: Negative for dizziness.   Psychiatric/Behavioral: Negative for agitation.      Physical Exam:    Height 5' 8\" (1.727 m), weight 81.6 kg (180 lb), last menstrual period 10/05/2021, not currently breastfeeding.    General/Constitutional: NAD, well developed, well nourished  HENT: Normocephalic, atraumatic  CV: Intact distal pulses, regular rate  Resp: No respiratory distress or labored breathing  Abd: No abdominal distention  Lymphatic: No lymphadenopathy palpated  Neuro: Alert and Oriented x 3, no focal deficits noted  Psych: Normal mood, normal affect, normal judgement, normal behavior  Skin: Warm, dry, no rashes, no erythema     Hip Exam (focused):    right hip:     No dislocation/deformity  ROM: Decreased internal rotation  Greater troch:non-tender   SI joint: non-tender  Blanquita test: negative  Jovan's test:  negative  Abduction: 5/5  AT/GS intact  Agrawal's Test: negative  Héctor Test: negative    Back:    No TTP over lumbar spinous processes, paraspinal musculature  Negative SLR     No " calf tenderness to palpation bilaterally    LE NV Exam: +2 DP/PT pulses bilaterally  Sensation intact to light touch L2-S1 bilaterally      Hip Imaging:    X-rays of the right hip from previous visit were discussed and reviewed at length with patient again in the office today.       Scribe Attestation      I,:   am acting as a scribe while in the presence of the attending physician.:       I,:   personally performed the services described in this documentation    as scribed in my presence.:

## 2025-04-22 ENCOUNTER — EVALUATION (OUTPATIENT)
Dept: PHYSICAL THERAPY | Facility: CLINIC | Age: 55
End: 2025-04-22
Attending: STUDENT IN AN ORGANIZED HEALTH CARE EDUCATION/TRAINING PROGRAM
Payer: COMMERCIAL

## 2025-04-22 DIAGNOSIS — M16.11 PRIMARY OSTEOARTHRITIS OF ONE HIP, RIGHT: Primary | ICD-10-CM

## 2025-04-22 PROCEDURE — 97110 THERAPEUTIC EXERCISES: CPT | Performed by: PHYSICAL THERAPIST

## 2025-04-22 PROCEDURE — 97140 MANUAL THERAPY 1/> REGIONS: CPT | Performed by: PHYSICAL THERAPIST

## 2025-04-22 NOTE — PROGRESS NOTES
PT Discharge Summary    Today's date: 2025  Patient name: Naty Olivas  : 1970  MRN: [id number]  Referring provider: Clifford Wyman MD  Dx:   Encounter Diagnosis     ICD-10-CM    1. Primary osteoarthritis of one hip, right  M16.11                        Assessment  Impairments: lacks appropriate home exercise program    Assessment details: Naty Olivas is a pleasant 55 y.o. female who has improvements in overall hip mobility and strength. She has been able to self manage her symptoms. She was provided running program to trial return to running as tolerated. She has met her objective and functional goals and is appropriate for and agreeable to discharge to [facility].                 Goals  Impairment Goals 4-6 weeks   In order to maximize function patient will be able to...   - Decrease intensity/duration/frequency of pain to 2/10- met  - Demonstrate symmetrical hip AROM without pain- met  - Increase core strength to 5/5 throughout - not met  - Increase hip strength to 5/5 throughout - partially met      Functional Goals 6-8 weeks  In order to return to prior level of function patient will be able to...   - Participate in ADL's/IADL's/sport specific activities with no greater than 2/10 pain.    - Increase Functional Status Measure (FOTO) to predicted outcomes - met  - Demonstrate independence and compliant with HEP - met  - Demonstrate a squat and or sit to stand with good mechanics and eccentric control without pain/difficulty/compensation- met  - Ascend and descend stairs without increased pain/compensation/difficulty and a reciprocal gait pattern. - met  - Patient will be able to demonstrate good gait mechanics without compensations. - met    Plan    Planned therapy interventions: home exercise program    Treatment plan discussed with: patient  Plan details: Discharge to [facility].     Subjective Evaluation    History of Present Illness  Mechanism of injury: (IE) Patient presents to PT  with R hip pain. She was standing and turned and had increased sharp pain in her hip and had to catch herself and increased pain. She states this happened two weeks ago. She did get an xray with arthritis. She states the ache is never gone. She gets sharp pain and her leg gonzalez. She is walking with a cane to help offload. She had no hip issues prior. Pain on the outside, but usually deep in the bone in the groin. She went to a chiropractor with manipulation and calmed symptoms for a bit. She does have sciatica on her left side and was controlled. She was prescribed muscle relaxer for about a week but did not like how they made her feel. Stairs are fine    Difficulty with: walking   Patient Goals  Patient goals for therapy: decreased pain, independence with ADLs/IADLs and return to sport/leisure activities  Patient goal: return to running-has not tried  Pain  Current pain ratin  At worst pain ratin (10/10 when she ran down the stairs)  Location: in front of hip  Quality: dull ache, knife-like and throbbing  Relieving factors: medications (ibuprofen)  Aggravating factors: walking  Progression: improved      Diagnostic Tests  Abnormal x-ray: see chart.  Treatments  Current treatment: chiropractic  Patient reports 99.5% improvement since start of PT. She has seen her ortho and he was pleased with progress. She had a big event at her home on  and she woke up stiff today. She notes that she can now put weight on her R leg and it doesn't feel like it's going to break. She has better ROM and strength. She is keeping up with HEP. She would like to know what exercises to emphasize.     Objective     Observations     Additional Observation Details  Hip hike, trendelenburg on R side    Palpation     Right   Tenderness of the gluteus medius.     Tenderness     Left Hip   No tenderness in the greater trochanter.     Right Hip   Tenderness in the greater trochanter.     Lumbar Screen  Lumbar range of motion within  "normal limits with the following exceptions:No change with repeated motions to this date.   Side glides reduced symptom slightly but no significant change    Neurological Testing     Sensation     Hip   Left Hip   Intact: light touch    Right Hip   Intact: light touch    Reflexes   Left   Patellar (L4): normal (2+)  Achilles (S1): normal (2+)  Babinski sign: negative  Clonus sign: negative    Right   Patellar (L4): normal (2+)  Achilles (S1): normal (2+)  Babinski sign: negative  Clonus sign: negative    Active Range of Motion     Right Hip   Flexion: 130 degrees WFL    Strength/Myotome Testing     Left Hip   Normal muscle strength    Right Hip   Planes of Motion   Flexion: 4+  Extension: 4+  Abduction: 4  External rotation: 4    Isolated Muscles   Gluteus maximums: 4  Gluteus medius: 4-    Tests     Right Hip   Positive PADMINI, FADIR and scour.   Negative long sit, SI compression and SI distraction.   Héctor: Negative.             POC Expires Auth Status Start Date Expiration Date PT Visit Limit    one monthtoday/date: 3/19/2025  3/19/2025     Date 4/22 3/26 4/2 4/9 4/15   Used        Remaining           Diagnosis:  L hip   Precautions:  See chart   Comparable signs 1) Walking  2) Stairs   Primary Impairments: 1) Hip hypomobility   2) Hip strength    Patient Goals Walk   Manual Therapy 3/19/2025 3/26 4/2 4/9 4/15 4/22   PROM    KM      LAD   KM      Hip mobs  SP  RS     MWM flexion, ER    RS     Re-evaluation      RS RS   Exercise Diary          Therapeutic Exercise         Bike  5 min 5 min 5'   5'    Hip fall outs  5\"x15 latisha 20x5\" latisha D/c     Nerve glides  5\"x10 2x10 5\" latisha      Standing lumbar ext  2x10 2x10      Prone quad stretch      10x10\"    PPU  2x10 2x10      clamshells    Pink 2x8 ea     bridges      2x10 5\" iso reviewed marching form   Neuromuscular Re-education         Bridges    2x10 3\" 2x10 5\"      Sidelying hip abd         Héctor stretch  10\"x10 10x10\" 10x10\" with strap for OP  10x10\"    SLR flexion  "   2x8  2x8   X-walks      Pink 2x                              Therapeutic Activities         Education  POC, diagnosis, expecations Updated and reviewed HEP                                           Modalities

## 2025-04-23 ENCOUNTER — APPOINTMENT (OUTPATIENT)
Dept: PHYSICAL THERAPY | Facility: CLINIC | Age: 55
End: 2025-04-23
Payer: COMMERCIAL

## 2025-04-24 DIAGNOSIS — G47.00 INSOMNIA, UNSPECIFIED TYPE: ICD-10-CM

## 2025-04-24 DIAGNOSIS — E78.00 ELEVATED LDL CHOLESTEROL LEVEL: ICD-10-CM

## 2025-04-24 RX ORDER — ROSUVASTATIN CALCIUM 5 MG/1
5 TABLET, COATED ORAL DAILY
Qty: 90 TABLET | Refills: 1 | Status: SHIPPED | OUTPATIENT
Start: 2025-04-24

## 2025-04-24 RX ORDER — TRAZODONE HYDROCHLORIDE 50 MG/1
50 TABLET ORAL
Qty: 30 TABLET | Refills: 1 | Status: SHIPPED | OUTPATIENT
Start: 2025-04-24

## 2025-04-29 ENCOUNTER — APPOINTMENT (OUTPATIENT)
Dept: PHYSICAL THERAPY | Facility: CLINIC | Age: 55
End: 2025-04-29
Attending: STUDENT IN AN ORGANIZED HEALTH CARE EDUCATION/TRAINING PROGRAM
Payer: COMMERCIAL

## 2025-04-30 ENCOUNTER — APPOINTMENT (OUTPATIENT)
Dept: PHYSICAL THERAPY | Facility: CLINIC | Age: 55
End: 2025-04-30
Payer: COMMERCIAL

## 2025-05-07 ENCOUNTER — PATIENT MESSAGE (OUTPATIENT)
Age: 55
End: 2025-05-07

## 2025-05-09 NOTE — PATIENT COMMUNICATION
Please advise if patient can be scheduled for a virtual appointment. I will also place pt on wait list for a sooner appointment. Thank you!

## 2025-05-13 ENCOUNTER — PATIENT MESSAGE (OUTPATIENT)
Age: 55
End: 2025-05-13

## 2025-05-13 DIAGNOSIS — F40.240 CLAUSTROPHOBIA: Primary | ICD-10-CM

## 2025-05-13 RX ORDER — DIAZEPAM 5 MG/1
TABLET ORAL
Qty: 2 TABLET | Refills: 0 | Status: SHIPPED | OUTPATIENT
Start: 2025-05-13

## 2025-05-13 NOTE — PATIENT COMMUNICATION
Spoke with patient. Advised patient we do prefer to see pts in office, virtual appointment was not needed as patient is just trying to get a sooner appointment. Pt has been added to wait list for [facility]. Advised pt to check for messages for sooner appointments

## 2025-05-18 ENCOUNTER — HOSPITAL ENCOUNTER (OUTPATIENT)
Dept: MRI IMAGING | Facility: HOSPITAL | Age: 55
Discharge: HOME | End: 2025-05-18
Attending: PSYCHIATRY & NEUROLOGY
Payer: COMMERCIAL

## 2025-05-18 DIAGNOSIS — D32.9 MENINGIOMA (HCC): ICD-10-CM

## 2025-05-18 PROCEDURE — 70551 MRI BRAIN STEM W/O DYE: CPT

## 2025-05-19 ENCOUNTER — RESULTS FOLLOW-UP (OUTPATIENT)
Age: 55
End: 2025-05-19

## 2025-05-19 NOTE — RESULT ENCOUNTER NOTE
Please call the patient to state that her MRI brain shows stable 6mm meningioma as prior. No changes.

## 2025-05-20 NOTE — TELEPHONE ENCOUNTER
Spoke to the patient and informed her of the message below:    ----- Message from Ladarius Avina MD sent at 5/19/2025  6:24 PM EDT -----  Please call the patient to state that her MRI brain shows stable 6mm meningioma as prior. No changes.   ----- Message -----  From: Interface, Radiology Results In  Sent: 5/19/2025   4:16 PM EDT  To: Ladarius Avina MD

## 2025-05-23 ENCOUNTER — HOSPITAL ENCOUNTER (OUTPATIENT)
Dept: RADIOLOGY | Facility: HOSPITAL | Age: 55
End: 2025-05-23
Attending: STUDENT IN AN ORGANIZED HEALTH CARE EDUCATION/TRAINING PROGRAM
Payer: COMMERCIAL

## 2025-05-23 ENCOUNTER — OFFICE VISIT (OUTPATIENT)
Dept: OBGYN CLINIC | Facility: CLINIC | Age: 55
End: 2025-05-23

## 2025-05-23 VITALS — BODY MASS INDEX: 27.28 KG/M2 | WEIGHT: 180 LBS | HEIGHT: 68 IN

## 2025-05-23 DIAGNOSIS — M25.572 PAIN, JOINT, ANKLE AND FOOT, LEFT: ICD-10-CM

## 2025-05-23 DIAGNOSIS — M76.62 TENDONITIS, ACHILLES, LEFT: Primary | ICD-10-CM

## 2025-05-23 DIAGNOSIS — S82.842P: ICD-10-CM

## 2025-05-23 PROCEDURE — 73610 X-RAY EXAM OF ANKLE: CPT

## 2025-05-23 NOTE — ASSESSMENT & PLAN NOTE
Discussed physical exam and imaging findings consistent with achilles tendonitis of the left ankle. Secondary to findings, recommend continued conservative management with oral anti-inflammatories, physical therapy, night splinting, and activity modification as needed for persistent pain and inflammation.  Discussed night splinting to allow for stretching of the Achilles tendon.  Patient expressed understanding.  Patient agreeable and splints provided to patient in the office today.  Also discussed physical therapy for dedicated shockwave therapy secondary to underlying Achilles tendinitis.  Patient expressed understanding.  Patient agreeable and referral placed in the office today.  Patient agreeable to the above treatments.  She will follow-up in 6 weeks for reevaluation, if persistent pain and inflammation at this time can consider MRI.  All of her questions answered in the office today.

## 2025-05-23 NOTE — PROGRESS NOTES
Ortho Sports Medicine New Patient Ankle Visit    Assesment:  Naty Olivas is a 55 y.o. female who presents with left ankle achilles tendonitis, also possible slight ankle deformity related to prior fracture with some possibly early degenerative changes    Plan:  Assessment & Plan  Tendonitis, Achilles, left  Discussed physical exam and imaging findings consistent with achilles tendonitis of the left ankle. Secondary to findings, recommend continued conservative management with oral anti-inflammatories, physical therapy, night splinting, and activity modification as needed for persistent pain and inflammation.  Discussed night splinting to allow for stretching of the Achilles tendon.  Patient expressed understanding.  Patient agreeable and splints provided to patient in the office today.  Also discussed physical therapy for dedicated shockwave therapy secondary to underlying Achilles tendinitis.  Patient expressed understanding.  Patient agreeable and referral placed in the office today.  Patient agreeable to the above treatments.  She will follow-up in 6 weeks for reevaluation, if persistent pain and inflammation at this time can consider MRI.  All of her questions answered in the office today.  Bimalleolar ankle fracture, left, closed, with malunion, subsequent encounter    We discussed that while she does not have any significant arthritic changes, she does have some possibly early degenerative changes, and her fracture, especially the fibula, may be somewhat slightly short which could be affecting her ankle mechanics and causing her to have some effusions giving her the sensation of tightness.        Chief Complaint   Patient presents with    Left Ankle - Pain       History of Present Illness:  The patient is a 55 y.o. female who presents for evaluation of the left ankle.  Patient notes prior ankle injury as a child in the first grade, possible fracture dislocation of the left ankle.  No she was casted  at that time.  Patient notes onset of left ankle pain for several months with increased in the last couple of weeks.  Notes she has been doing physical therapy secondary to pain.  Notes pain is most prominent in the posterior and lateral aspect of the left ankle.  She notes general stiffness around the entirety of the left ankle.  She notes increased pain to the posterior aspect of the left ankle over the Achilles tendon when wearing heels.  She denies previous surgery or injections to the left ankle.  She denies numbness and tingling of the left foot or ankle.    Ankle Surgical History:  None    Past Medical, Social and Family History:  Past Medical History[1]  Past Surgical History[2]  Allergies[3]  Medications Ordered Prior to Encounter[4]  Social History     Socioeconomic History    Marital status: /Civil Union     Spouse name: Not on file    Number of children: Not on file    Years of education: Not on file    Highest education level: Not on file   Occupational History    Not on file   Tobacco Use    Smoking status: Never    Smokeless tobacco: Never   Vaping Use    Vaping status: Never Used   Substance and Sexual Activity    Alcohol use: Yes     Alcohol/week: 3.0 standard drinks of alcohol     Types: 3 Glasses of wine per week     Comment: socially    Drug use: Never    Sexual activity: Yes     Partners: Male     Birth control/protection: Post-menopausal, Surgical     Comment: Hysterectomy   Other Topics Concern    Not on file   Social History Narrative    Not on file     Social Drivers of Health     Financial Resource Strain: Not on file   Food Insecurity: Not on file   Transportation Needs: Not on file   Physical Activity: Not on file   Stress: Not on file   Social Connections: Not on file   Intimate Partner Violence: Not on file   Housing Stability: Not on file       I have reviewed the past medical, surgical, social and family history, medications and allergies as documented in the EMR.    Review of  systems: ROS is negative other than that noted in the HPI.  Constitutional: Negative for fatigue and fever.   HENT: Negative for sore throat.    Respiratory: Negative for shortness of breath.    Cardiovascular: Negative for chest pain.   Gastrointestinal: Negative for abdominal pain.   Endocrine: Negative for cold intolerance and heat intolerance.   Genitourinary: Negative for flank pain.   Musculoskeletal: Negative for back pain.   Skin: Negative for rash.   Allergic/Immunologic: Negative for immunocompromised state.   Neurological: Negative for dizziness.   Psychiatric/Behavioral: Negative for agitation.      Physical Exam:    There were no vitals filed for this visit.    General/Constitutional: NAD, well developed, well nourished  HENT: Normocephalic, atraumatic  CV: Intact distal pulses, regular rate  Resp: No respiratory distress or labored breathing  Lymphatic: No lymphadenopathy palpated  Neuro: Alert and Oriented x 3, no focal deficits  Psych: Normal mood, normal affect, normal judgement, normal behavior  Skin: Warm, dry, no rashes, no erythema      Ankle Examination (focused):     Skin intact  No Wounds  Swelling: None  ROM:    Dorsiflexion: Intact   Plantarflexion: Intact   Inversion/eversion: Intact  Negative circumduction  Negative squeeze test  Negative anterior drawer    Gait: Antalgic favoring the right lower extremity    No subluxation of the peroneal tendons or tenderness to palpation along the peroneal tendons    Tender to palpation over anterior fibulotalar ligament region, achilles tendon     No pain with palpation or range of motion of midfoot and forefoot bilaterally    No calf tenderness to palpation bilaterally    LE NV Exam: +2 DP/PT pulses bilaterally  Sensation intact to light touch L2-S1 bilaterally      Ankle Imaging:    I personally reviewed and interpreted 3 radiographs of the left foot/ankle which were obtained in the office today and reviewed in detail with the patient.  There is a  slight deformity of the distal tibia and distal fibula with some possible shortening of the distal fibula.  Some possibly very early joint space narrowing, however no obvious significant degenerative changes.  No acute fracture or dislocation.  Radiology report currently not available.    Scribe Attestation      I,:  Lupe Boyer PA-C am acting as a scribe while in the presence of the attending physician.:       I,:  Clifford Wyman MD personally performed the services described in this documentation    as scribed in my presence.:                   [1]   Past Medical History:  Diagnosis Date    Bacterial vaginitis     Brain lesion     Fibroids     Gastritis     GERD (gastroesophageal reflux disease)     Insomnia     Migraine     Otitis media     Pneumonia    [2]   Past Surgical History:  Procedure Laterality Date     SECTION      COLONOSCOPY      HAND SURGERY Left     Left pinky tendon repair    HYSTERECTOMY      age 52    LA COLONOSCOPY FLX DX W/COLLJ SPEC WHEN PFRMD N/A 2018    Procedure: COLONOSCOPY;  Surgeon: Crystal Lacey MD;  Location: [facility];  Service: Gastroenterology    LA CYSTOURETHROSCOPY N/A 2021    Procedure: CYSTOSCOPY;  Surgeon: Alysia Osorio DO;  [facility]: [facility];  Service: Gynecology    LA ESOPHAGOGASTRODUODENOSCOPY TRANSORAL DIAGNOSTIC N/A 2017    Procedure: ESOPHAGOGASTRODUODENOSCOPY (EGD);  Surgeon: Crystal Lacey MD;  [facility]: [facility];  Service: Gastroenterology    LA LAPS TOTAL HYSTERECT 250 GM/< W/RMVL TUBE/OVARY N/A 2021    Procedure: HYSTERECTOMY LAPAROSCOPIC TOTAL (LTH) WITH BILATERAL SALPINGECTOMY, LYSIS OF ADHESIONS;  Surgeon: Alysia Osorio DO;  Location: [facility];  Service: Gynecology    ROOT CANAL      UPPER GASTROINTESTINAL ENDOSCOPY     [3]   Allergies  Allergen Reactions    Amoxicillin Itching   [4]   Current Outpatient Medications on File Prior to Visit   Medication Sig Dispense Refill    Biotin 1 MG CAPS Take by  mouth      Cetirizine HCl (ZyrTEC Allergy) 10 MG CAPS Take by mouth      Cranberry 1000 MG CAPS Take by mouth      diazepam (VALIUM) 5 mg tablet Take 1 tab 1 hour prior to MRI. If still anxious and needed, then can take an additional 1/2 tablet. 2 tablet 0    Echinacea Plus CAPS Take by mouth      estradiol (Climara) 0.025 mg/24 hr Place 1 patch on the skin over 7 days once a week 12 patch 1    famotidine (PEPCID) 40 MG tablet Take 1 tablet (40 mg total) by mouth daily at bedtime 30 tablet 6    fluticasone (FLONASE) 50 mcg/act nasal spray 1 spray into each nostril daily 15.8 mL 0    ibuprofen (MOTRIN) 400 mg tablet Take by mouth every 6 (six) hours as needed for mild pain As needed      meclizine (ANTIVERT) 12.5 MG tablet Take 1 tablet (12.5 mg total) by mouth every 8 (eight) hours as needed for dizziness 90 tablet 0    montelukast (SINGULAIR) 10 mg tablet TAKE 1 TABLET BY MOUTH EVERY NIGHT AT BEDTIME 30 tablet 6    Multiple Vitamins-Minerals (CENTRUM SILVER 50+WOMEN PO)       Omega-3 Fatty Acids (FISH OIL PO) Take by mouth      rosuvastatin (CRESTOR) 5 mg tablet Take 1 tablet (5 mg total) by mouth daily 90 tablet 1    traZODone (DESYREL) 50 mg tablet Take 1 tablet (50 mg total) by mouth daily at bedtime 30 tablet 1    estradiol (CLIMARA) 0.025 mg/24 hr PLACE 1 PATCH ON THE SKIN OVER 7 DAYS ONCE A WEEK 4 patch 5    meloxicam (MOBIC) 15 mg tablet Take 1 tablet (15 mg total) by mouth daily (Patient not taking: Reported on 3/19/2025) 30 tablet 2    omeprazole (PriLOSEC) 20 mg delayed release capsule Take 1 capsule (20 mg total) by mouth daily for 14 days 14 capsule 0     No current facility-administered medications on file prior to visit.

## 2025-06-03 ENCOUNTER — APPOINTMENT (OUTPATIENT)
Dept: PHYSICAL THERAPY | Facility: CLINIC | Age: 55
End: 2025-06-03
Payer: COMMERCIAL

## 2025-06-04 ENCOUNTER — EVALUATION (OUTPATIENT)
Dept: PHYSICAL THERAPY | Facility: CLINIC | Age: 55
End: 2025-06-04
Payer: COMMERCIAL

## 2025-06-04 ENCOUNTER — OFFICE VISIT (OUTPATIENT)
Age: 55
End: 2025-06-04
Payer: COMMERCIAL

## 2025-06-04 VITALS
BODY MASS INDEX: 27.43 KG/M2 | SYSTOLIC BLOOD PRESSURE: 110 MMHG | DIASTOLIC BLOOD PRESSURE: 72 MMHG | HEART RATE: 74 BPM | HEIGHT: 68 IN | WEIGHT: 181 LBS

## 2025-06-04 DIAGNOSIS — R42 DIZZINESS: ICD-10-CM

## 2025-06-04 DIAGNOSIS — M76.62 TENDONITIS, ACHILLES, LEFT: Primary | ICD-10-CM

## 2025-06-04 DIAGNOSIS — H81.10 BPPV (BENIGN PAROXYSMAL POSITIONAL VERTIGO): Primary | ICD-10-CM

## 2025-06-04 DIAGNOSIS — D32.9 MENINGIOMA (HCC): ICD-10-CM

## 2025-06-04 DIAGNOSIS — J30.2 SEASONAL ALLERGIES: ICD-10-CM

## 2025-06-04 PROCEDURE — 97140 MANUAL THERAPY 1/> REGIONS: CPT

## 2025-06-04 PROCEDURE — 99214 OFFICE O/P EST MOD 30 MIN: CPT | Performed by: PSYCHIATRY & NEUROLOGY

## 2025-06-04 PROCEDURE — 97112 NEUROMUSCULAR REEDUCATION: CPT

## 2025-06-04 PROCEDURE — 97161 PT EVAL LOW COMPLEX 20 MIN: CPT

## 2025-06-04 RX ORDER — MECLIZINE HCL 12.5 MG 12.5 MG/1
12.5 TABLET ORAL EVERY 8 HOURS PRN
Qty: 90 TABLET | Refills: 0 | Status: SHIPPED | OUTPATIENT
Start: 2025-06-04

## 2025-06-04 NOTE — ASSESSMENT & PLAN NOTE
Orders:  •  meclizine (ANTIVERT) 12.5 MG tablet; Take 1 tablet (12.5 mg total) by mouth every 8 (eight) hours as needed for dizziness

## 2025-06-04 NOTE — PROGRESS NOTES
PT Evaluation     Today's date: 2025  Patient name: Naty Olivas  : 1970  MRN: [id number]  Referring provider: Lupe Boyer PA-C  Dx:   Encounter Diagnosis     ICD-10-CM    1. Tendonitis, Achilles, left  M76.62                      Assessment  Impairments: abnormal coordination, abnormal gait, abnormal muscle firing, abnormal muscle tone, abnormal or restricted ROM, abnormal movement, activity intolerance, impaired balance, impaired physical strength, lacks appropriate home exercise program, pain with function, weight-bearing intolerance, poor body mechanics, unable to perform ADL, participation limitations, activity limitations and endurance  Symptom irritability: moderate    Assessment details: Patient is a 55 y.o. female who presents with s/s of L ankle pain that is consistent with referral diagnosis of achilles tendinitis. Patient presents to evaluation with pain, decreased range of motion, decreased strength and decreased tolerance to activity. Patient is presenting with most deficits in L plantar flexion strength as well as DF/PF ROM. She states that she always wears heels that are at least 4 inches, so her foot is usually in this plantar flexed position. She also recently increased her physical activity, so this combined with the typical position of her ankle may be contributing to tendinitis present. Her left ankle also has minimal swelling when compared to contralateral side. Patient also presents with difficulty squatting d/t limited ROM of left ankle joint which makes it difficult for her to bend down and pick things up. Risks, benefits, and alternatives to treatment were discussed with patient and answered all patient questions to patient satisfaction. Patient is a good candidate for skilled PT, and would benefit from skilled PT services to address these impairments, achieve goals, and to maximize function.    Understanding of Dx/Px/POC: good     Prognosis: good    Goals  Short  Term Goals: to be achieved by 4 weeks  1) Patient to be independent with basic HEP.  2) Decrease pain to 2/10 at its worst.  3) Increase Left ankle ROM by 5-10 degrees   4) Increase LE strength by 1/2 MMT grade in all deficient planes.    Long Term Goals: to be achieved by discharge  1) FOTO equal to or greater than predicted outcome score.  2) Ambulation to improve to maximal level of function.  3) Stair negotiation will improve to reciprocal.  4) Sit to stand transfers will improve to maximal level of function.    Plan  Patient would benefit from: skilled physical therapy  Planned modality interventions: biofeedback, cryotherapy, electrical stimulation/Russian stimulation, low level laser therapy, manual electrical stimulation, microcurrent electrical stimulation, neuromuscular electric stimulation, shortwave diathermy, TENS and thermotherapy: hydrocollator packs    Planned therapy interventions: abdominal trunk stabilization, activity modification, ADL retraining, ADL training, balance/weight bearing training, body mechanics training, coordination, fine motor coordination training, flexibility, functional ROM exercises, gait training, graded activity, graded exercise, home exercise program, IADL retraining, IASTM, joint mobilization, kinesiology taping, manual therapy, massage, Richey taping, motor coordination training, muscle pump exercises, neuromuscular re-education, nerve gliding, patient/caregiver education, therapeutic activities, therapeutic exercise, strengthening and stretching    Frequency: 2x week  Duration in weeks: 8  Treatment plan discussed with: patient        Subjective Evaluation    History of Present Illness  Mechanism of injury: WORK/SCHOOL: , sits a lot  HOBBIES/EXERCISE: has always been active, uses under desk foot pedal but has recently increased her usage of the foot pedal  PLOF: independent  HISTORY OF CURRENT INJURY: Patient states that her L ankle started bothering  her about a month ago. She states that she wears heels a lot, typically around 4 inches. She states no mechanism of injury. She broke her L foot before in the 2nd grade but has not had issues up until recently. She noticed originally having swelling around the L ankle that has gotten better but is still present.  PAIN LOCATION/DESCRIPTORS: around L heel and ankle  AGGRAVATING FACTORS: wearing 4 inch heels  EASES: ice, massage  DAY PATTERN: no consistent pattern, always aching  IMAGING: LEFT ANKLE     INDICATION:   Pain in left ankle and joints of left foot.      COMPARISON:  None.     VIEWS:  XR ANKLE 3+ VW LEFT   Images: 3     FINDINGS:     There is no acute fracture or dislocation.     No significant degenerative changes.     Probable sessile osteochondroma or possibly posttraumatic remodeling at the medial tibial metaphysis without aggressive features.     Soft tissues are unremarkable.     IMPRESSION:        No acute osseous abnormality.    SPECIAL QUESTIONS:    Naty denies a new onset of Bladder incontinence, Bowel dysfunction, Dizziness, Recent unexplained weight loss, Clumsy or unsteadiness, Constant night pain, History of cancer, Tingling, Numbness, and Saddle anesthesia.  PELVIC FLOOR: Do you have pelvic pain or chronic constipation?  PATIENT GOALS: to be able to wear heels again without pain  Quality of life: good    Pain  Current pain ratin  At best pain ratin  At worst pain rating: 10 (when wearing heels)  Quality: dull ache and throbbing          Objective     Neurological Testing     Sensation     Ankle/Foot   Left Ankle/Foot   Intact: light touch    Right Ankle/Foot   Intact: light touch     Reflexes   Left   Patellar (L4): normal (2+)  Achilles (S1): normal (2+)  Clonus sign: negative    Right   Patellar (L4): normal (2+)  Achilles (S1): normal (2+)  Clonus sign: negative    Active Range of Motion   Left Ankle/Foot   Dorsiflexion (ke): 5 degrees   Plantar flexion: 24 degrees   Inversion:  19 degrees   Eversion: 20 degrees     Right Ankle/Foot   Normal active range of motion    Additional Active Range of Motion Details  Pain with motion into dorsiflexion, plantarflexion, and eversion.    Strength/Myotome Testing     Left Ankle/Foot   Dorsiflexion: 5  Plantar flexion: 3+  Inversion: 5  Eversion: 5  Great toe flexion: 5  Great toe extension: 5    Right Ankle/Foot   Normal strength    Additional Strength Details  Pain in L ankle with all strength testing     Tests   Left Ankle/Foot   Negative for eversion talar tilt, Homans' sign and inversion talar tilt.     Right Ankle/Foot   Negative for eversion talar tilt, Homans' sign and inversion talar tilt.     Functional assessment:  STS: patient presents with L ankle pain when performing sit to stands. When squatting, patient's heels lift off the ground d/t pain and limited ROM available at the ankle joint.         Precautions: N/A     POC Expires Auth Status Start Date Expiration Date PT Visit Limit    07/04/2025 No auth req   85 visits PCY   Date 06/04/2025       Used 1       Remaining 84          Diagnosis: left achilles tendinitis   Precautions: N/A   Primary Goals: wear heels again without pain   *asterisks by exercise = given for HEP   Manuals 6/4: IE            DO FOTO   PROM        TCJ mobs                        There Ex        Ankle 4 way        Calf stretch        Hamstring stretch        Heel raises        Great toe/lesser toe extension        Arch lift                                Neuro Re-Ed        Wobble board        Towel scrunches        [place]                                                                         Patient education Diagnosis, prognosis, activity modification        Re-evaluation              Ther Act             STS             Lunges        Heel tap downs                      Modalities             EPAT         Ice massage

## 2025-06-04 NOTE — PROGRESS NOTES
Return NeuroOutpatient Note        Naty Olivas  [id number]  55 y.o.  1970       Dizziness and Meningioma        History obtained from:  Patient     HPI/Subjective:    Naty Olivas is a 56 yo F with PMH of dizziness, meningoma presents as f/u. Patient had presented to reestablish care for incidental finding on MRI brain. Per my previous history, patient has been dealing with vertigo since 2020. She would  roll over in bed and had sudden onset room spinning sensation. Patient tried PT for 2 months and then her vertigo was only rare with certain position change. She didn't return since 2021 visit. At previous visit, we had discussed MRI brain IAC protocol if her sxs didn't subside. She recently did get MRI brain w/o contrast but was dehydrated and couldn't get line for contrast. It showed 6mm dural based lesion along anterior right frontal convexity compatible with meningioma.   She had MRI brain IAC protocol in May of 2024 which was negative for CPA lesion.      She was feeling better since 2021 after vestibular rehab until sept of 2023 when her sxs had returned.   Patient was sent for vestibular rehab again which nearly cured her vertigo. Meclizine works for her.     She says that seasonal allergies may contribute to her positional vertigo as well and taking daily zyrtec helps.    She's had ENT work up that was negative.    She reports rare headaches.   She denies any left sided sxs.   Meclizine does work when taken prn.          Past Medical History[1]  Social History     Socioeconomic History   • Marital status: /Civil Union     Spouse name: Not on file   • Number of children: Not on file   • Years of education: Not on file   • Highest education level: Not on file   Occupational History   • Not on file   Tobacco Use   • Smoking status: Never   • Smokeless tobacco: Never   Vaping Use   • Vaping status: Never Used   Substance and Sexual Activity   • Alcohol use: Yes     Alcohol/week:  "3.0 standard drinks of alcohol     Types: 3 Glasses of wine per week     Comment: socially   • Drug use: Never   • Sexual activity: Yes     Partners: Male     Birth control/protection: Post-menopausal, Surgical     Comment: Hysterectomy   Other Topics Concern   • Not on file   Social History Narrative   • Not on file     Social Drivers of Health     Financial Resource Strain: Not on file   Food Insecurity: Not on file   Transportation Needs: Not on file   Physical Activity: Not on file   Stress: Not on file   Social Connections: Not on file   Intimate Partner Violence: Not on file   Housing Stability: Not on file     Family History[2]  Allergies[3]  Medications Ordered Prior to Encounter[4]      Review of Systems   Refer to positive review of systems in HPI.   Review of Systems    Constitutional- No fever  Eyes- No visual change  ENT- Hearing normal  CV- No chest pain  Resp- No Shortness of breath  GI- No diarrhea  - Bladder normal  MS- No Arthritis   Skin- No rash  Psych- No depression  Endo- No DM  Heme- No nodes    Vitals:    06/04/25 1218   BP: 110/72   BP Location: Left arm   Patient Position: Sitting   Cuff Size: Large   Pulse: 74   Weight: 82.1 kg (181 lb)   Height: 5' 8\" (1.727 m)       PHYSICAL EXAM:  Appearance: No Acute Distress  Ophthalmoscopic: Disc Flat, Normal fundus  Mental status:  Orientation: Awake, Alert, and Orientedx3  Memory: Registation 3/3 Recall 3/3  Attention: normal  Knowledge: good  Language: No aphasia  Speech: No dysarthria  Cranial Nerves:  2 No Visual Defect on Confrontation, Pupils round, equal, reactive to light  3,4,6 Extraocular Movements Intact, no nystagmus  5 Facial Sensation Intact  7 No facial asymmetry  8 Intact hearing  9,10 Palate symmetric, normal gag  11 Good shoulder shrug  12 Tongue Midline  Gait: Stable  Coordination: No ataxia with finger to nose testing, and heel to shin  Sensory: Intact, Symmetric to pinprick, light touch, vibration, and joint position  Muscle " Tone: Normal              Muscle exam:  Arm Right Left Leg Right Left   Deltoid 5/5 5/5 Iliopsoas 5/5 5/5   Biceps 5/5 5/5 Quads 5/5 5/5   Triceps 5/5 5/5 Hamstrings 5/5 5/5   Wrist Extension 5/5 5/5 Ankle Dorsi Flexion 5/5 5/5   Wrist Flexion 5/5 5/5 Ankle Plantar Flexion 5/5 5/5   Interossei 5/5 5/5 Ankle Eversion 5/5 5/5   APB 5/5 5/5 Ankle Inversion 5/5 5/5       Reflexes   RJ BJ TJ KJ AJ Plantars Andrade's   Right 2+ 2+ 2+ 2+  Downgoing Not present   Left 2+ 2+ 2+ 2+  Downgoing Not present     Personal review of  Labs:                    Diagnoses and all orders for this visit:        Assessment & Plan  BPPV (benign paroxysmal positional vertigo)  Patient is currently doing well.  She's asked to do some home vertigo exercises.  She may resume prn meclizine.        Seasonal allergies  Cont daily zyrtec.        Meningioma (HCC)  It's been stable for 1 year. Will do surveillance imaging in 2 years.        Dizziness    Orders:  •  meclizine (ANTIVERT) 12.5 MG tablet; Take 1 tablet (12.5 mg total) by mouth every 8 (eight) hours as needed for dizziness                      Total time of encounter:  30 min  More than 50% of the time was used in counseling and/or coordination of care  Extent of counseling and/or coordination of care        Ladarius Avina MD  [facility]  [address] [place] [id number] [phone]           [1]  Past Medical History:  Diagnosis Date   • Bacterial vaginitis    • Brain lesion    • Fibroids    • Gastritis    • GERD (gastroesophageal reflux disease)    • Insomnia    • Migraine    • Otitis media    • Pneumonia 2009   [2]  Family History  Problem Relation Name Age of Onset   • Breast cancer Mother  72   • Dementia Mother     • Hyperlipidemia Mother     • Parkinsonism Mother     • Fibroids Mother     • Prostatitis Father     • Cataracts Father     • Premature birth Daughter     • No Known Problems Daughter     • No Known Problems Daughter     • Dementia  Maternal Grandmother     • Parkinsonism Maternal Grandmother     • Fibroids Maternal Grandmother     • No Known Problems Maternal Grandfather     • No Known Problems Paternal Grandmother     • No Known Problems Paternal Grandfather     • Prostate cancer Brother     • Substance Abuse Brother     • Dementia Maternal Aunt     • Dementia Maternal Aunt     • No Known Problems Maternal Aunt     • No Known Problems Maternal Aunt     • No Known Problems Maternal Aunt Apple    • No Known Problems Paternal Aunt     • No Known Problems Paternal Aunt     • No Known Problems Paternal Aunt     • Ovarian cancer Cousin Jair    • Breast cancer Cousin Extended cousin    • No Known Problems Half-Sister     • Hypertension Neg Hx     • Diabetes Neg Hx     • Colon cancer Neg Hx     • Uterine cancer Neg Hx     • Cervical cancer Neg Hx     [3]  Allergies  Allergen Reactions   • Amoxicillin Itching   [4]  Current Outpatient Medications on File Prior to Visit   Medication Sig Dispense Refill   • Cetirizine HCl (ZyrTEC Allergy) 10 MG CAPS Take by mouth     • Cranberry 1000 MG CAPS Take by mouth     • Echinacea Plus CAPS Take by mouth     • estradiol (CLIMARA) 0.025 mg/24 hr PLACE 1 PATCH ON THE SKIN OVER 7 DAYS ONCE A WEEK 4 patch 5   • famotidine (PEPCID) 40 MG tablet Take 1 tablet (40 mg total) by mouth daily at bedtime 30 tablet 6   • fluticasone (FLONASE) 50 mcg/act nasal spray 1 spray into each nostril daily 15.8 mL 0   • ibuprofen (MOTRIN) 400 mg tablet Take by mouth every 6 (six) hours as needed for mild pain As needed     • montelukast (SINGULAIR) 10 mg tablet TAKE 1 TABLET BY MOUTH EVERY NIGHT AT BEDTIME 30 tablet 6   • Multiple Vitamins-Minerals (CENTRUM SILVER 50+WOMEN PO)      • Omega-3 Fatty Acids (FISH OIL PO) Take by mouth     • rosuvastatin (CRESTOR) 5 mg tablet Take 1 tablet (5 mg total) by mouth daily 90 tablet 1   • traZODone (DESYREL) 50 mg tablet Take 1 tablet (50 mg total) by mouth daily at bedtime 30 tablet 1   •  Biotin 1 MG CAPS Take by mouth (Patient not taking: Reported on 6/4/2025)     • diazepam (VALIUM) 5 mg tablet Take 1 tab 1 hour prior to MRI. If still anxious and needed, then can take an additional 1/2 tablet. (Patient not taking: Reported on 6/4/2025) 2 tablet 0   • estradiol (Climara) 0.025 mg/24 hr Place 1 patch on the skin over 7 days once a week 12 patch 1   • meloxicam (MOBIC) 15 mg tablet Take 1 tablet (15 mg total) by mouth daily (Patient not taking: Reported on 6/4/2025) 30 tablet 2   • omeprazole (PriLOSEC) 20 mg delayed release capsule Take 1 capsule (20 mg total) by mouth daily for 14 days (Patient not taking: Reported on 6/4/2025) 14 capsule 0   • [DISCONTINUED] meclizine (ANTIVERT) 12.5 MG tablet Take 1 tablet (12.5 mg total) by mouth every 8 (eight) hours as needed for dizziness (Patient not taking: Reported on 6/4/2025) 90 tablet 0     No current facility-administered medications on file prior to visit.

## 2025-06-09 ENCOUNTER — OFFICE VISIT (OUTPATIENT)
Dept: PHYSICAL THERAPY | Facility: CLINIC | Age: 55
End: 2025-06-09
Payer: COMMERCIAL

## 2025-06-09 DIAGNOSIS — M76.62 TENDONITIS, ACHILLES, LEFT: Primary | ICD-10-CM

## 2025-06-09 PROCEDURE — 97110 THERAPEUTIC EXERCISES: CPT

## 2025-06-09 PROCEDURE — 97112 NEUROMUSCULAR REEDUCATION: CPT

## 2025-06-09 NOTE — PROGRESS NOTES
"Daily Note     Today's date: 2025  Patient name: Naty Olivas  : 1970  MRN: [id number]  Referring provider: Lupe Boyer PA-C  Dx:   Encounter Diagnosis     ICD-10-CM    1. Tendonitis, Achilles, left  M76.62                      Subjective: Patient reports being able to see her ankle bone this morning, commenting on reduced swelling.       Objective: See treatment diary below      Assessment: Tolerated treatment well.  Demonstrated insertional achilles sensitivity with theraband isotonic based plantar flexion today.  Instructed to perform movement to amplitude shy of pain and instill isometric contraction and slow eccentric control.  Attemped up 2 down 1 (L) for slow achilles eccentrics, painful so regressed to bilateral loading in this movement.  Employed EPAT and intrinsic strengthening as oultined below. Patient demonstrated fatigue post treatment, exhibited good technique with therapeutic exercises, and would benefit from continued PT      Plan: Continue per plan of care.      Precautions: N/A     POC Expires Auth Status Start Date Expiration Date PT Visit Limit    2025 No auth req   85 visits PCY   Date 2025       Used 1       Remaining 84          Diagnosis: left achilles tendinitis   Precautions: N/A   Primary Goals: wear heels again without pain   *asterisks by exercise = given for HEP   Manuals : IE            DO FOTO   PROM  DF,PF,Inversion,Eversion, JW 5 min      TCJ mobs  Gr: II JW                      There Ex        Ankle 4 way  X10   X10 blue      Calf stretch        Hamstring stretch  5\"x10 with strap      Heel raises  Deferred 2/2 P!      Great toe/lesser toe extension  2\" x10      Arch lift  x10                              Neuro Re-Ed        Wobble board        Towel scrunchlarry [place]                                                                         Patient education Diagnosis, prognosis, activity modification        Re-evaluation       "        Ther Act             STS             Lunges        Heel tap downs         Ecc Df on Leg press    BL 70# 2x12 P!         Modalities             EPAT         Ice massage

## 2025-06-11 ENCOUNTER — OFFICE VISIT (OUTPATIENT)
Dept: PHYSICAL THERAPY | Facility: CLINIC | Age: 55
End: 2025-06-11
Payer: COMMERCIAL

## 2025-06-11 DIAGNOSIS — M76.62 TENDONITIS, ACHILLES, LEFT: Primary | ICD-10-CM

## 2025-06-11 PROCEDURE — 97140 MANUAL THERAPY 1/> REGIONS: CPT

## 2025-06-11 PROCEDURE — 97112 NEUROMUSCULAR REEDUCATION: CPT

## 2025-06-11 PROCEDURE — 97110 THERAPEUTIC EXERCISES: CPT

## 2025-06-11 NOTE — PROGRESS NOTES
"Daily Note     Today's date: 2025  Patient name: Naty Olivas  : 1970  MRN: [id number]  Referring provider: Lupe Boyer PA-C  Dx:   Encounter Diagnosis     ICD-10-CM    1. Tendonitis, Achilles, left  M76.62         Start Time:   Stop Time:   Total time in clinic (min): 36 minutes    Subjective: Patient reports 5/20 pain in lateral ankle and in achilles tendon.       Objective: See treatment diary below      Assessment: Tolerated treatment well. IASTM of Achilles and calf greatly decreased Pt's symptom irritability levels. Continue to perform heavy manuals NV. Added SL stability exercises and ankle stability on BOSU. Pt challenged by new exercises, reported increased soreness post session but no pain. Patient demonstrated fatigue post treatment, exhibited good technique with therapeutic exercises, and would benefit from continued PT for increased mobility, increased strength/stability, and decreased symptom irritability.       Plan: Continue per plan of care.      Precautions: N/A     POC Expires Auth Status Start Date Expiration Date PT Visit Limit    2025 No auth req   85 visits PCY   Date 2025       Used 1       Remaining 84          Diagnosis: left achilles tendinitis   Precautions: N/A   Primary Goals: wear heels again without pain   *asterisks by exercise = given for HEP   Manuals : IE           DO FOTO   PROM  DF,PF,Inversion,Eversion, JW 5 min      TCJ mobs  Gr: II JW      IASTM achilles and calf   JMK             There Ex        Ankle 4 way  X10   X10 blue      Calf stretch   30\"x3     Hamstring stretch  5\"x10 with strap      Heel raises  Deferred 2/2 P!      Great toe/lesser toe extension  2\" x10      Arch lift  x10                      Bike   5'- upright     Neuro Re-Ed        Wobble board        Towel scrunches        [place]         Foam side step and tandem   X5 laps ea     BOSU ankle x4   2x10 ea                                           "           Patient education Diagnosis, prognosis, activity modification        Re-evaluation              Ther Act             STS             Lunges        Heel tap downs         Ecc Df on Leg press    BL 70# 2x12 P!         Modalities             EPAT         Ice massage

## 2025-06-20 ENCOUNTER — OFFICE VISIT (OUTPATIENT)
Dept: PHYSICAL THERAPY | Facility: CLINIC | Age: 55
End: 2025-06-20
Payer: COMMERCIAL

## 2025-06-20 DIAGNOSIS — M76.62 TENDONITIS, ACHILLES, LEFT: Primary | ICD-10-CM

## 2025-06-20 PROCEDURE — 97112 NEUROMUSCULAR REEDUCATION: CPT

## 2025-06-20 PROCEDURE — 97110 THERAPEUTIC EXERCISES: CPT

## 2025-06-20 PROCEDURE — 97140 MANUAL THERAPY 1/> REGIONS: CPT

## 2025-06-20 NOTE — PROGRESS NOTES
Daily Note     Today's date: 2025  Patient name: Naty Olivas  : 1970  MRN: [id number]  Referring provider: Lupe Boyer PA-C  Dx:   Encounter Diagnosis     ICD-10-CM    1. Tendonitis, Achilles, left  M76.62                      Subjective: Patient reports that she is starting to feel much better. She notices that the pain in her achilles is only bothersome when she's wearing heels, but she is still having pain in the lateral malleolus. The pain is not throbbing as much as it was before.      Objective: See treatment diary below      Assessment: Tolerated treatment well. Started with warmup on bike for ROM and endurance, followed by IASTM around achilles insertion. Patient continues to feel relief following IASTM, noticing decreased pain and improved ROM. Lowered weight of DF eccentric on leg press as high weight was too painful for patient. With decreased weight, patient still felt challenged but able to complete with better tolerance. Tandem walking on foam initially had more severe pain but after multiple laps the intensity decreased, though some pain still present. Pt challenged by exercises, reported increased soreness post session but no pain. Patient demonstrated fatigue post treatment, exhibited good technique with therapeutic exercises, and would benefit from continued PT for increased mobility, increased strength/stability, and decreased symptom irritability.       Plan: Continue per plan of care.      Precautions: N/A     POC Expires Auth Status Start Date Expiration Date PT Visit Limit    2025 No auth req   85 visits PCY   Date 2025    Used 1 2 3 4    Remaining [phone]       Diagnosis: left achilles tendinitis   Precautions: N/A   Primary Goals: wear heels again without pain   *asterisks by exercise = given for HEP   Manuals : IE           DO FOTO   PROM  DF,PF,Inversion,Eversion, JW 5 min  KG    TCJ mobs  Gr: II JW     "  IASTM achilles and calf   JMK KG            There Ex        Ankle 4 way  X10   X10 blue      Calf stretch   30\"x3 Calf stretch with towel 30\" x3-4    Hamstring stretch  5\"x10 with strap      Heel raises  Deferred 2/2 P!      Great toe/lesser toe extension  2\" x10      Arch lift  x10  2x10                    Bike   5'- upright 5'    Neuro Re-Ed        Wobble board        Towel scrunches        [place]     2x    Foam side step and tandem   X5 laps ea X5 laps ea    BOSU ankle x4   2x10 ea     Plantar fascia release with ball    Pink lacrosse ball  30x                                            Patient education Diagnosis, prognosis, activity modification        Re-evaluation              Ther Act             STS             Lunges        Heel tap downs         Ecc Df on Leg press    BL 70# 2x12 P!   BL 15# 2x8     Modalities             EPAT         Ice massage                                         "

## 2025-06-23 ENCOUNTER — OFFICE VISIT (OUTPATIENT)
Dept: PHYSICAL THERAPY | Facility: CLINIC | Age: 55
End: 2025-06-23
Payer: COMMERCIAL

## 2025-06-23 DIAGNOSIS — M76.62 TENDONITIS, ACHILLES, LEFT: Primary | ICD-10-CM

## 2025-06-23 PROCEDURE — 97140 MANUAL THERAPY 1/> REGIONS: CPT

## 2025-06-23 PROCEDURE — 97110 THERAPEUTIC EXERCISES: CPT

## 2025-06-23 PROCEDURE — 97112 NEUROMUSCULAR REEDUCATION: CPT

## 2025-06-23 NOTE — PROGRESS NOTES
Daily Note     Today's date: 2025  Patient name: Naty Olivas  : 1970  MRN: [id number]  Referring provider: Lupe Boyer PA-C  Dx:   Encounter Diagnosis     ICD-10-CM    1. Tendonitis, Achilles, left  M76.62             Start Time: 1530  Stop Time: 1615  Total time in clinic (min): 45 minutes    Subjective: Patient reports that she is starting to feel much better. She notices that the pain in her achilles is only bothersome when she's wearing heels, but she is still having pain in the lateral malleolus. The pain is not throbbing as much as it was before.      Objective: See treatment diary below      Assessment: Tolerated treatment well. Started with warmup on bike for ROM and endurance, followed by IASTM around achilles and gastroc with patient finding relief and decreased tightness following. Some pain with seated wobble board circles for ROM. Patient continues to have most pain with PF weight bearing. Sharp pain noted over lateral malleolus with heel raises on step after multiple reps. Calf stretch helped decrease this sharp pain and loosen ankle joint. Patient demonstrated fatigue post treatment, exhibited good technique with therapeutic exercises, and would benefit from continued PT for increased mobility, increased strength/stability, and decreased symptom irritability.       Plan: Continue per plan of care.      Precautions: N/A     POC Expires Auth Status Start Date Expiration Date PT Visit Limit    2025 No auth req   85 visits PCY   Date 2025   Used 1 2 3 4 5   Remaining [phone]      Diagnosis: left achilles tendinitis   Precautions: N/A   Primary Goals: wear heels again without pain   *asterisks by exercise = given for HEP   Manuals : IE         DO FOTO   PROM  DF,PF,Inversion,Eversion, JW 5 min  KG    TCJ mobs  Gr: II JW      IASTM achilles and calf   JMK KG KG           There Ex        Ankle 4 way  X10  "  X10 blue      Calf stretch   30\"x3 Calf stretch with towel 30\" x3-4 On 2\" step  30\"x4   Hamstring stretch  5\"x10 with strap   Seated  30\" x4   Heel raises  Deferred 2/2 P!   On 2\" step  7x, then p!   Great toe/lesser toe extension  2\" x10      Arch lift  x10  2x10                    Bike   5'- upright 5' 5'   Neuro Re-Ed        Wobble board     Seated  Circles, DF/PF 30x ea   Towel scrunches     30x   [place]     2x    Foam side step and tandem   X5 laps ea X5 laps ea    BOSU ankle x4   2x10 ea     Plantar fascia release with ball    Pink lacrosse ball  30x    Marches on foam     30x                                   Patient education Diagnosis, prognosis, activity modification        Re-evaluation              Ther Act             STS             Lunges        Ecc step down     2\" step  2x10    Ecc Df on Leg press    BL 70# 2x12 P!   BL 15# 2x8    Modalities             EPAT         Ice massage                                         "

## 2025-07-07 ENCOUNTER — EVALUATION (OUTPATIENT)
Dept: PHYSICAL THERAPY | Facility: CLINIC | Age: 55
End: 2025-07-07
Payer: COMMERCIAL

## 2025-07-07 ENCOUNTER — OFFICE VISIT (OUTPATIENT)
Dept: OBGYN CLINIC | Facility: CLINIC | Age: 55
End: 2025-07-07
Payer: COMMERCIAL

## 2025-07-07 VITALS — HEIGHT: 68 IN | BODY MASS INDEX: 27.43 KG/M2 | WEIGHT: 181 LBS

## 2025-07-07 DIAGNOSIS — M76.62 TENDONITIS, ACHILLES, LEFT: Primary | ICD-10-CM

## 2025-07-07 DIAGNOSIS — S93.332A SUBLUXATION OF PERONEAL TENDON OF LEFT FOOT: ICD-10-CM

## 2025-07-07 DIAGNOSIS — S86.312A PERONEAL TENDON TEAR, LEFT, INITIAL ENCOUNTER: Primary | ICD-10-CM

## 2025-07-07 PROCEDURE — 97112 NEUROMUSCULAR REEDUCATION: CPT

## 2025-07-07 PROCEDURE — 97140 MANUAL THERAPY 1/> REGIONS: CPT

## 2025-07-07 PROCEDURE — 99213 OFFICE O/P EST LOW 20 MIN: CPT | Performed by: STUDENT IN AN ORGANIZED HEALTH CARE EDUCATION/TRAINING PROGRAM

## 2025-07-07 NOTE — PROGRESS NOTES
"Daily Note     Today's date: 2025  Patient name: Naty Olivas  : 1970  MRN: [id number]  Referring provider: Lupe Boyer PA-C  Dx: No diagnosis found.               Subjective: ***      Objective: See treatment diary below      Assessment: Tolerated treatment {Tolerated treatment :4537685932}. Patient {assessment:8880457861}      Plan: {PLAN:8238066964}     Precautions: N/A     POC Expires Auth Status Start Date Expiration Date PT Visit Limit    2025 No auth req   85 visits PCY   Date 2025   Used 1 2 3 4 5   Remaining [phone]      Diagnosis: left achilles tendinitis   Precautions: N/A   Primary Goals: wear heels again without pain   *asterisks by exercise = given for HEP   Manuals : IE         DO FOTO   PROM  DF,PF,Inversion,Eversion, JW 5 min  KG    TCJ mobs  Gr: II JW      IASTM achilles and calf   JMK KG KG           There Ex        Ankle 4 way  X10   X10 blue      Calf stretch   30\"x3 Calf stretch with towel 30\" x3-4 On 2\" step  30\"x4   Hamstring stretch  5\"x10 with strap   Seated  30\" x4   Heel raises  Deferred / P!   On 2\" step  7x, then p!   Great toe/lesser toe extension  2\" x10      Arch lift  x10  2x10                    Bike   5'- upright 5' 5'   Neuro Re-Ed        Wobble board     Seated  Circles, DF/PF 30x ea   Towel scrunches     30x   [place]     2x    Foam side step and tandem   X5 laps ea X5 laps ea    BOSU ankle x4   2x10 ea     Plantar fascia release with ball    Pink lacrosse ball  30x    Marches on foam     30x                                   Patient education Diagnosis, prognosis, activity modification        Re-evaluation              Ther Act             STS             Lunges        Ecc step down     2\" step  2x10    Ecc Df on Leg press    BL 70# 2x12 P!   BL 15# 2x8    Modalities             EPAT         Ice massage                                           "

## 2025-07-07 NOTE — PROGRESS NOTES
PT Re-Evaluation     Today's date: 2025  Patient name: Naty Olivas  : 1970  MRN: [id number]  Referring provider: Lupe Boyer PA-C  Dx:   Encounter Diagnosis     ICD-10-CM    1. Tendonitis, Achilles, left  M76.62                        Assessment  Impairments: abnormal or restricted ROM, activity intolerance, impaired balance, impaired physical strength, lacks appropriate home exercise program, pain with function, poor body mechanics, participation limitations, activity limitations and endurance  Symptom irritability: low    Assessment details: RE : Patient presents to physical therapy for re-evaluation of L achilles tendinitis and ankle pain. Pt has been compliant with PT and HEP thus far. Pt has made improvements in overall pain, as well as ROM and strength. Patient still has deficits of ankle eversion ROM and strength and is feeling most pain around the lateral malleolus of ankle. Patient is still unable to wear higher heels without experiencing pain, or walk long distances without increase in pain as well. Patient would benefit from continued skilled PT services to continue addressing deficits noted above in order to improve QOL and return to PLOF.    Patient is a 55 y.o. female who presents with s/s of L ankle pain that is consistent with referral diagnosis of achilles tendinitis. Patient presents to evaluation with pain, decreased range of motion, decreased strength and decreased tolerance to activity. Patient is presenting with most deficits in L plantar flexion strength as well as DF/PF ROM. She states that she always wears heels that are at least 4 inches, so her foot is usually in this plantar flexed position. She also recently increased her physical activity, so this combined with the typical position of her ankle may be contributing to tendinitis present. Her left ankle also has minimal swelling when compared to contralateral side. Patient also presents with difficulty  "squatting d/t limited ROM of left ankle joint which makes it difficult for her to bend down and pick things up. Risks, benefits, and alternatives to treatment were discussed with patient and answered all patient questions to patient satisfaction. Patient is a good candidate for skilled PT, and would benefit from skilled PT services to address these impairments, achieve goals, and to maximize function.    Understanding of Dx/Px/POC: good     Prognosis: good    Goals  Short Term Goals: to be achieved by 4 weeks  1) Patient to be independent with basic HEP. - MET  2) Decrease pain to 2/10 at its worst. - progressing  3) Increase Left ankle ROM by 5-10 degrees - MET  4) Increase LE strength by 1/2 MMT grade in all deficient planes. - MET    Long Term Goals: to be achieved by discharge  1) FOTO equal to or greater than predicted outcome score. - progressing  2) Ambulation to improve to maximal level of function. - progressing  3) Stair negotiation will improve to reciprocal. - MET  4) Sit to stand transfers will improve to maximal level of function. - progressing  5) Patient will report ability to wear heels higher than 2\" without increased pain to return to PLOF.  6) Patient will report ability to walk at least 30 minutes without increase in ankle pain to return to PLOF.    Plan  Patient would benefit from: skilled physical therapy  Planned modality interventions: biofeedback, cryotherapy, electrical stimulation/Russian stimulation, low level laser therapy, manual electrical stimulation, microcurrent electrical stimulation, neuromuscular electric stimulation, shortwave diathermy, TENS and thermotherapy: hydrocollator packs    Planned therapy interventions: abdominal trunk stabilization, activity modification, ADL retraining, ADL training, balance/weight bearing training, body mechanics training, coordination, fine motor coordination training, flexibility, functional ROM exercises, gait training, graded activity, graded " exercise, home exercise program, IADL retraining, IASTM, joint mobilization, kinesiology taping, manual therapy, massage, Richey taping, motor coordination training, muscle pump exercises, neuromuscular re-education, nerve gliding, patient/caregiver education, therapeutic activities, therapeutic exercise, strengthening and stretching    Frequency: 2x week  Duration in weeks: 6  Treatment plan discussed with: patient      Subjective Evaluation    History of Present Illness  Mechanism of injury: RE 7/7: Patient presents for re-evaluation today. She reports she feels just about 100% improvement since the start of PT. Patient states her pain has significantly improved around the achilles tendon, now she only feels pain when doing heel raises. Pointing her toes into PF feels much better too as before she was unable to do this. Patient states her pain was initially in the achilles tendon region, but it has now shifted to the lateral malleolus. She has tried wearing shorter heels than what she normally would wear, but she has not been able to wear her 4 inch heels anymore.     WORK/SCHOOL: , sits a lot  HOBBIES/EXERCISE: has always been active, uses under desk foot pedal but has recently increased her usage of the foot pedal  PLOF: independent  HISTORY OF CURRENT INJURY: Patient states that her L ankle started bothering her about a month ago. She states that she wears heels a lot, typically around 4 inches. She states no mechanism of injury. She broke her L foot before in the 2nd grade but has not had issues up until recently. She noticed originally having swelling around the L ankle that has gotten better but is still present.  PAIN LOCATION/DESCRIPTORS: around L heel and ankle  AGGRAVATING FACTORS: wearing 4 inch heels  EASES: ice, massage  DAY PATTERN: no consistent pattern, always aching  IMAGING: LEFT ANKLE     INDICATION:   Pain in left ankle and joints of left foot.      COMPARISON:  None.      VIEWS:  XR ANKLE 3+ VW LEFT   Images: 3     FINDINGS:     There is no acute fracture or dislocation.     No significant degenerative changes.     Probable sessile osteochondroma or possibly posttraumatic remodeling at the medial tibial metaphysis without aggressive features.     Soft tissues are unremarkable.     IMPRESSION:        No acute osseous abnormality.    SPECIAL QUESTIONS:    Naty denies a new onset of Bladder incontinence, Bowel dysfunction, Dizziness, Recent unexplained weight loss, Clumsy or unsteadiness, Constant night pain, History of cancer, Tingling, Numbness, and Saddle anesthesia.  PELVIC FLOOR: Do you have pelvic pain or chronic constipation?  PATIENT GOALS: to be able to wear heels again without pain  Quality of life: good    Pain  Current pain ratin  At best pain ratin  At worst pain ratin (when wearing heels)  Quality: dull ache and throbbing        Objective     Neurological Testing     Sensation     Ankle/Foot   Left Ankle/Foot   Intact: light touch    Right Ankle/Foot   Intact: light touch     Reflexes   Left   Patellar (L4): normal (2+)  Achilles (S1): normal (2+)  Clonus sign: negative    Right   Patellar (L4): normal (2+)  Achilles (S1): normal (2+)  Clonus sign: negative    Active Range of Motion   Left Ankle/Foot   Dorsiflexion (ke): 11 degrees   Plantar flexion: 40 degrees   Inversion: 26 degrees   Eversion: 20 degrees     Right Ankle/Foot   Normal active range of motion    Additional Active Range of Motion Details  Pain with eversion only.    Strength/Myotome Testing     Left Ankle/Foot   Dorsiflexion: 5  Plantar flexion: 4  Inversion: 5  Eversion: 5  Great toe flexion: 5  Great toe extension: 5    Right Ankle/Foot   Normal strength    Additional Strength Details  Sharp pain with eversion.    Tests   Left Ankle/Foot   Negative for eversion talar tilt, Homans' sign and inversion talar tilt.     Right Ankle/Foot   Negative for eversion talar tilt, Homans' sign and  "inversion talar tilt.     Functional assessment:  STS: patient presents with L ankle pain when performing sit to stands. When squatting, patient's heels lift off the ground d/t pain and limited ROM available at the ankle joint.         Precautions: N/A       POC Expires Auth Status Start Date Expiration Date PT Visit Limit    07/04/2025 No auth req   85 visits PCY   Date 06/04/2025 06/09/2025 06/11/2025 6/20 06/23/25   Used 1 2 3 4 5   Remaining [phone]      Diagnosis: left achilles tendinitis   Precautions: N/A   Primary Goals: wear heels again without pain   *asterisks by exercise = given for HEP   Manuals 7/7 6/9 6/11 6/20 6/23        DO FOTO   PROM  DF,PF,Inversion,Eversion, JW 5 min  KG    TCJ mobs  Gr: II JW      IASTM achilles and calf   JMK KG KG           There Ex        Ankle 4 way  X10   X10 blue      Calf stretch Gastroc-soleus stretch standing 30\"x4  30\"x3 Calf stretch with towel 30\" x3-4 On 2\" step  30\"x4   Hamstring stretch  5\"x10 with strap   Seated  30\" x4   Heel raises  Deferred 2/2 P!   On 2\" step  7x, then p!   Great toe/lesser toe extension  2\" x10      Arch lift  x10  2x10    Post tib iso Green ball squeeze  With heel raise  2x10               Bike 5'  5'- upright 5' 5'   Neuro Re-Ed        Wobble board     Seated  Circles, DF/PF 30x ea   Towel scrunches     30x   [place]     2x    Foam side step and tandem 5 laps ea  X5 laps ea X5 laps ea    Tandem walk with post tib emphasis 5 laps       BOSU ankle x4   2x10 ea     Plantar fascia release with ball    Pink lacrosse ball  30x    Marches on foam     30x                                   Patient education Diagnosis, prognosis, activity modification        Re-evaluation              Ther Act             STS             Lunges                Ecc step down     2\" step  2x10    Ecc Df on Leg press    BL 70# 2x12 P!   BL 15# 2x8    Modalities             EPAT         Ice massage                                    "

## 2025-07-07 NOTE — PROGRESS NOTES
Ortho Sports Medicine Follow-Up Ankle Visit    Assesment:  55 y.o. female left ankle Achilles tendinitis and peroneal tendon tear versus tendinitis vs subluxation    Assessment & Plan  Peroneal tendon tear, left, initial encounter  We reviewed their current history, physical exam, and imaging in detail today with the patient.  We discussed since she has completed greater than 6 weeks of physical therapy, active location, and oral anti-inflammatories so we will order an MRI to further evaluate the structural integrity of the peroneal tendons.  Order placed today.  Reviewed continuing with physical therapy.  All of her questions were answered today, she is agreeable to this plan.  She will follow-up in clinic after MRI for further evaluation and treatment recommendations.  Orders:    MRI ankle/heel left  wo contrast; Future    US MSK limited; Future    Subluxation of peroneal tendon of left foot              Conservative treatment:    Ice to knee for 20 minutes at least 1-2 times daily.  PT for ROM/strengthening to knee, hip and core.  OTC NSAIDS prn for pain.    Imaging:    All imaging from today was reviewed by myself and explained to the patient.       Injection:    No Injection planned at this time.      Surgery:     No surgery is recommended at this point, continue with conservative treatment plan as noted.      Follow up:    No follow-ups on file.        Chief Complaint   Patient presents with    Left Ankle - Follow-up       History of Present Illness:      The patient is returns for follow up of left Achilles tendinitis.  Since the prior visit, She reports significant improvement.     She reports significant less pain in the Achilles at this time.  She has been working with physical therapy and doing a home exercise program with significant improvements.  She is continue to have pain over the lateral aspect of her ankle, localizes over the peroneal tendons.  She reports that this pain has been consistent, but  she has not been working on it with physical therapy.  She reports she is approximately 50% better at this time.  She presents to clinic today for repeat evaluation.  Her lateral ankle pain is affecting her function and quality of life significantly.    I have reviewed the past medical, surgical, social and family history, medications and allergies as documented in the EMR.    Ankle Surgical History:  None    Past Medical, Social and Family History:  Past Medical History[1]  Past Surgical History[2]  Allergies[3]  Medications Ordered Prior to Encounter[4]  Social History     Socioeconomic History    Marital status: /Civil Union     Spouse name: Not on file    Number of children: Not on file    Years of education: Not on file    Highest education level: Not on file   Occupational History    Not on file   Tobacco Use    Smoking status: Never    Smokeless tobacco: Never   Vaping Use    Vaping status: Never Used   Substance and Sexual Activity    Alcohol use: Yes     Alcohol/week: 3.0 standard drinks of alcohol     Types: 3 Glasses of wine per week     Comment: socially    Drug use: Never    Sexual activity: Yes     Partners: Male     Birth control/protection: Post-menopausal, Surgical     Comment: Hysterectomy   Other Topics Concern    Not on file   Social History Narrative    Not on file     Social Drivers of Health     Financial Resource Strain: Not on file   Food Insecurity: Not on file   Transportation Needs: Not on file   Physical Activity: Not on file   Stress: Not on file   Social Connections: Not on file   Intimate Partner Violence: Not on file   Housing Stability: Not on file         I have reviewed the past medical, surgical, social and family history, medications and allergies as documented in the EMR.    Review of systems: ROS is negative other than that noted in the HPI.  Constitutional: Negative for fatigue and fever.   HENT: Negative for sore throat.    Respiratory: Negative for shortness of  "breath.    Cardiovascular: Negative for chest pain.   Gastrointestinal: Negative for abdominal pain.   Endocrine: Negative for cold intolerance and heat intolerance.   Genitourinary: Negative for flank pain.   Musculoskeletal: Negative for back pain.   Skin: Negative for rash.   Allergic/Immunologic: Negative for immunocompromised state.   Neurological: Negative for dizziness.   Psychiatric/Behavioral: Negative for agitation.      Physical Exam:    Height 5' 8\" (1.727 m), weight 82.1 kg (181 lb), last menstrual period 10/05/2021, not currently breastfeeding.    General/Constitutional: NAD, well developed, well nourished  HENT: Normocephalic, atraumatic  CV: Intact distal pulses, regular rate  Resp: No respiratory distress or labored breathing  Lymphatic: No lymphadenopathy palpated  Neuro: Alert and Oriented x 3, no focal deficits  Psych: Normal mood, normal affect, normal judgement, normal behavior  Skin: Warm, dry, no rashes, no erythema       Ankle Examination (focused):   Skin intact  No Wounds  Swelling: None  ROM:    Dorsiflexion: Intact   Plantarflexion: Intact   Inversion/eversion: Intact  Negative circumduction  Negative squeeze test  Negative anterior drawer    Possible subluxation of the peroneal tendons  with ankle circumduction  Tenderness to palpation along the peroneal tendons  Pain with resisted eversion     No pain with palpation or range of motion of midfoot and forefoot bilaterally    No limp upon gait exam    No calf tenderness to palpation bilaterally    LE NV Exam: +2 DP/PT pulses bilaterally  Sensation intact to light touch L2-S1 bilaterally        Ankle Imaging:    No new imaging obtained today.   Previous imaging reviewed.       Scribe Attestation      I,:  Socorro Waller am acting as a scribe while in the presence of the attending physician.:       I,:  Clifford Wyman MD personally performed the services described in this documentation    as scribed in my presence.:                   [1] "   Past Medical History:  Diagnosis Date    Bacterial vaginitis     Brain lesion     Fibroids     Gastritis     GERD (gastroesophageal reflux disease)     Insomnia     Migraine     Otitis media     Pneumonia    [2]   Past Surgical History:  Procedure Laterality Date     SECTION      COLONOSCOPY      HAND SURGERY Left     Left pinky tendon repair    HYSTERECTOMY      age 52    LA COLONOSCOPY FLX DX W/COLLJ SPEC WHEN PFRMD N/A 2018    Procedure: COLONOSCOPY;  Surgeon: Crystal Lacey MD;  Location: [facility];  Service: Gastroenterology    LA CYSTOURETHROSCOPY N/A 2021    Procedure: CYSTOSCOPY;  Surgeon: Alysia Osorio DO;  Location: [facility];  Service: Gynecology    LA ESOPHAGOGASTRODUODENOSCOPY TRANSORAL DIAGNOSTIC N/A 2017    Procedure: ESOPHAGOGASTRODUODENOSCOPY (EGD);  Surgeon: Crystal Lacey MD;  Location: [facility];  Service: Gastroenterology    LA LAPS TOTAL HYSTERECT 250 GM/< W/RMVL TUBE/OVARY N/A 2021    Procedure: HYSTERECTOMY LAPAROSCOPIC TOTAL (LTH) WITH BILATERAL SALPINGECTOMY, LYSIS OF ADHESIONS;  Surgeon: Alysia Osorio DO;  Location: [facility];  Service: Gynecology    ROOT CANAL      UPPER GASTROINTESTINAL ENDOSCOPY     [3]   Allergies  Allergen Reactions    Amoxicillin Itching   [4]   Current Outpatient Medications on File Prior to Visit   Medication Sig Dispense Refill    Cetirizine HCl (ZyrTEC Allergy) 10 MG CAPS Take by mouth      Cranberry 1000 MG CAPS Take by mouth      Echinacea Plus CAPS Take by mouth      estradiol (Climara) 0.025 mg/24 hr Place 1 patch on the skin over 7 days once a week 12 patch 1    famotidine (PEPCID) 40 MG tablet Take 1 tablet (40 mg total) by mouth daily at bedtime 30 tablet 6    fluticasone (FLONASE) 50 mcg/act nasal spray 1 spray into each nostril daily 15.8 mL 0    ibuprofen (MOTRIN) 400 mg tablet Take by mouth every 6 (six) hours as needed for mild pain As needed      meclizine (ANTIVERT) 12.5 MG tablet Take 1 tablet  (12.5 mg total) by mouth every 8 (eight) hours as needed for dizziness 90 tablet 0    montelukast (SINGULAIR) 10 mg tablet TAKE 1 TABLET BY MOUTH EVERY NIGHT AT BEDTIME 30 tablet 6    Multiple Vitamins-Minerals (CENTRUM SILVER 50+WOMEN PO)       Omega-3 Fatty Acids (FISH OIL PO) Take by mouth      rosuvastatin (CRESTOR) 5 mg tablet Take 1 tablet (5 mg total) by mouth daily 90 tablet 1    traZODone (DESYREL) 50 mg tablet Take 1 tablet (50 mg total) by mouth daily at bedtime 30 tablet 1    Biotin 1 MG CAPS Take by mouth (Patient not taking: Reported on 6/4/2025)      diazepam (VALIUM) 5 mg tablet Take 1 tab 1 hour prior to MRI. If still anxious and needed, then can take an additional 1/2 tablet. (Patient not taking: Reported on 7/7/2025) 2 tablet 0    estradiol (CLIMARA) 0.025 mg/24 hr PLACE 1 PATCH ON THE SKIN OVER 7 DAYS ONCE A WEEK 4 patch 5    meloxicam (MOBIC) 15 mg tablet Take 1 tablet (15 mg total) by mouth daily (Patient not taking: Reported on 6/4/2025) 30 tablet 2    omeprazole (PriLOSEC) 20 mg delayed release capsule Take 1 capsule (20 mg total) by mouth daily for 14 days (Patient not taking: Reported on 6/4/2025) 14 capsule 0     No current facility-administered medications on file prior to visit.

## 2025-07-18 ENCOUNTER — APPOINTMENT (OUTPATIENT)
Dept: PHYSICAL THERAPY | Facility: CLINIC | Age: 55
End: 2025-07-18
Payer: COMMERCIAL

## 2025-07-25 ENCOUNTER — HOSPITAL ENCOUNTER (EMERGENCY)
Facility: HOSPITAL | Age: 55
Discharge: HOME | End: 2025-07-25
Payer: COMMERCIAL

## 2025-07-25 ENCOUNTER — APPOINTMENT (EMERGENCY)
Dept: RADIOLOGY | Facility: HOSPITAL | Age: 55
End: 2025-07-25
Payer: COMMERCIAL

## 2025-07-25 VITALS
DIASTOLIC BLOOD PRESSURE: 58 MMHG | RESPIRATION RATE: 20 BRPM | TEMPERATURE: 97.3 F | SYSTOLIC BLOOD PRESSURE: 111 MMHG | HEART RATE: 70 BPM | OXYGEN SATURATION: 98 %

## 2025-07-25 DIAGNOSIS — R07.9 CHEST PAIN: Primary | ICD-10-CM

## 2025-07-25 DIAGNOSIS — K21.9 GASTROESOPHAGEAL REFLUX DISEASE, UNSPECIFIED WHETHER ESOPHAGITIS PRESENT: ICD-10-CM

## 2025-07-25 LAB
ANION GAP SERPL CALCULATED.3IONS-SCNC: 7 MMOL/L (ref 4–13)
BASOPHILS # BLD AUTO: 0.02 THOUSANDS/ÂΜL (ref 0–0.1)
BASOPHILS NFR BLD AUTO: 0 % (ref 0–1)
BUN SERPL-MCNC: 20 MG/DL (ref 5–25)
CALCIUM SERPL-MCNC: 9.3 MG/DL (ref 8.4–10.2)
CARDIAC TROPONIN I PNL SERPL HS: <2 NG/L (ref ?–50)
CHLORIDE SERPL-SCNC: 103 MMOL/L (ref 96–108)
CO2 SERPL-SCNC: 27 MMOL/L (ref 21–32)
CREAT SERPL-MCNC: 0.8 MG/DL (ref 0.6–1.3)
D DIMER PPP FEU-MCNC: <0.27 UG/ML FEU
EOSINOPHIL # BLD AUTO: 0.17 THOUSAND/ÂΜL (ref 0–0.61)
EOSINOPHIL NFR BLD AUTO: 3 % (ref 0–6)
ERYTHROCYTE [DISTWIDTH] IN BLOOD BY AUTOMATED COUNT: 12.3 % (ref 11.6–15.1)
GFR SERPL CREATININE-BSD FRML MDRD: 82 ML/MIN/1.73SQ M
GLUCOSE SERPL-MCNC: 86 MG/DL (ref 65–140)
HCT VFR BLD AUTO: 38.7 % (ref 34.8–46.1)
HGB BLD-MCNC: 12.2 G/DL (ref 11.5–15.4)
IMM GRANULOCYTES # BLD AUTO: 0 THOUSAND/UL (ref 0–0.2)
IMM GRANULOCYTES NFR BLD AUTO: 0 % (ref 0–2)
LYMPHOCYTES # BLD AUTO: 2.18 THOUSANDS/ÂΜL (ref 0.6–4.47)
LYMPHOCYTES NFR BLD AUTO: 42 % (ref 14–44)
MCH RBC QN AUTO: 28.4 PG (ref 26.8–34.3)
MCHC RBC AUTO-ENTMCNC: 31.5 G/DL (ref 31.4–37.4)
MCV RBC AUTO: 90 FL (ref 82–98)
MONOCYTES # BLD AUTO: 0.62 THOUSAND/ÂΜL (ref 0.17–1.22)
MONOCYTES NFR BLD AUTO: 12 % (ref 4–12)
NEUTROPHILS # BLD AUTO: 2.19 THOUSANDS/ÂΜL (ref 1.85–7.62)
NEUTS SEG NFR BLD AUTO: 43 % (ref 43–75)
NRBC BLD AUTO-RTO: 0 /100 WBCS
PLATELET # BLD AUTO: 221 THOUSANDS/UL (ref 149–390)
PMV BLD AUTO: 12.2 FL (ref 8.9–12.7)
POTASSIUM SERPL-SCNC: 4.2 MMOL/L (ref 3.5–5.3)
RBC # BLD AUTO: 4.29 MILLION/UL (ref 3.81–5.12)
SODIUM SERPL-SCNC: 137 MMOL/L (ref 135–147)
WBC # BLD AUTO: 5.18 THOUSAND/UL (ref 4.31–10.16)

## 2025-07-25 PROCEDURE — 80048 BASIC METABOLIC PNL TOTAL CA: CPT

## 2025-07-25 PROCEDURE — 85379 FIBRIN DEGRADATION QUANT: CPT

## 2025-07-25 PROCEDURE — 93005 ELECTROCARDIOGRAM TRACING: CPT

## 2025-07-25 PROCEDURE — 71046 X-RAY EXAM CHEST 2 VIEWS: CPT

## 2025-07-25 PROCEDURE — 85025 COMPLETE CBC W/AUTO DIFF WBC: CPT

## 2025-07-25 PROCEDURE — 99285 EMERGENCY DEPT VISIT HI MDM: CPT

## 2025-07-25 PROCEDURE — 84484 ASSAY OF TROPONIN QUANT: CPT

## 2025-07-25 PROCEDURE — 36415 COLL VENOUS BLD VENIPUNCTURE: CPT

## 2025-07-25 RX ORDER — SUCRALFATE 1 G/1
1 TABLET ORAL ONCE
Status: COMPLETED | OUTPATIENT
Start: 2025-07-25 | End: 2025-07-25

## 2025-07-25 RX ORDER — SUCRALFATE 1 G/1
1 TABLET ORAL 4 TIMES DAILY
Qty: 20 TABLET | Refills: 0 | Status: SHIPPED | OUTPATIENT
Start: 2025-07-25 | End: 2025-07-30

## 2025-07-25 RX ORDER — FAMOTIDINE 20 MG/1
20 TABLET, FILM COATED ORAL ONCE
Status: COMPLETED | OUTPATIENT
Start: 2025-07-25 | End: 2025-07-25

## 2025-07-25 RX ORDER — MAGNESIUM HYDROXIDE/ALUMINUM HYDROXICE/SIMETHICONE 120; 1200; 1200 MG/30ML; MG/30ML; MG/30ML
30 SUSPENSION ORAL ONCE
Status: COMPLETED | OUTPATIENT
Start: 2025-07-25 | End: 2025-07-25

## 2025-07-25 RX ADMIN — SUCRALFATE 1 G: 1 TABLET ORAL at 18:59

## 2025-07-25 RX ADMIN — FAMOTIDINE 20 MG: 20 TABLET, FILM COATED ORAL at 19:00

## 2025-07-25 RX ADMIN — ALUMINUM HYDROXIDE, MAGNESIUM HYDROXIDE, AND DIMETHICONE 30 ML: 200; 20; 200 SUSPENSION ORAL at 18:59

## 2025-07-25 NOTE — DISCHARGE INSTRUCTIONS
You were evaluated in the Emergency Department today for chest pain. Your evaluation has shown no signs of medical conditions requiring emergent intervention at this time, however we recommend that you follow up with your primary care physician or your cardiologist as soon as able for further testing as an outpatient.    Please schedule an appointment for follow up with your primary care physician as soon as possible.    Return to the Emergency Department if you experience worsening or uncontrolled chest pain, shortness of breath, light headedness, feeling faint, nausea, vomiting, or any other concerning symptoms.    Thank you for choosing us for your care.

## 2025-07-25 NOTE — ED PROVIDER NOTES
"Time reflects when diagnosis was documented in both MDM as applicable and the Disposition within this note       Time User Action Codes Description Comment    7/25/2025  7:29 PM Sylvia Sesay Add [R07.9] Chest pain     7/25/2025  7:34 PM Sylvia Sesay Add [K21.9] Gastroesophageal reflux disease, unspecified whether esophagitis present           ED Disposition       ED Disposition   Discharge    Condition   Stable    Date/Time   Fri Jul 25, 2025  7:29 PM    Comment   Naty Olivas discharge to home/self care.                   Assessment & Plan       Medical Decision Making  Amount and/or Complexity of Data Reviewed  Labs: ordered. Decision-making details documented in ED Course.  Radiology: ordered and independent interpretation performed.    Risk  OTC drugs.  Prescription drug management.      Patient is a 55 y.o. female with PMH of hyperlipidemia, GERD who presents to the ED with chest pain.    Vital signs stable, afebrile.     Differential diagnosis included but not limited to ACS, pneumothorax, PE, pleural effusion, pericarditis, myocarditis, costochondritis, GERD, anxiety, PNA.    Plan: CBC, BMP, troponin, D-dimer, EKG, chest x-ray.     View ED course for further discussion on patient workup.     All labs reviewed and utilized in the medical decision making process  All radiology studies independently viewed by me and interpreted by the radiologist.  I reviewed all testing with the patient.     Upon re-evaluation patient remained well-appearing and hemodynamically stable.    Disposition: Stable for discharge. Strict return to ED precautions provided. Advised to follow up with PCP within 1 week and with cardiology as soon as able for re-evaluation and further management. Patient verbalized understanding and agrees with the plan of care.       Portions of the record may have been created with voice recognition software. Occasional wrong word or \"sound a like\" substitutions may have occurred due to the " inherent limitations of voice recognition software. Read the chart carefully and recognize, using context, where substitutions have occurred.    ED Course as of 07/25/25 2337 Fri Jul 25, 2025 1814 Procedure Note: EKG  Date/Time: 07/25/25 6:14 PM   Interpreted by: SHAINA PALOMO  Indications / Diagnosis: cp  ECG reviewed by me, the ED Provider: yes   The EKG demonstrates: normal sinus rhythm  Rhythm: normal sinus  Intervals: normal intervals  Axis: left axis  QRS/Blocks: normal QRS  ST Changes: No acute ST Changes, no STD/MARANDA.  T wave inversion III on prior ECG from Dec 2023     1926 CBC and differential  No leukocytosis or anemia   1926 Basic metabolic panel  No actionable electrolyte derangement or HUMA   1926 hs TnI 0hr: <2   1927 D-Dimer, Quant: <0.27   1934 Patient re-evaluated, sxs much improved        Medications   famotidine (PEPCID) tablet 20 mg (20 mg Oral Given 7/25/25 1900)   sucralfate (CARAFATE) tablet 1 g (1 g Oral Given 7/25/25 1859)   aluminum-magnesium hydroxide-simethicone (MAALOX) oral suspension 30 mL (30 mL Oral Given 7/25/25 1859)       ED Risk Strat Scores   HEART Risk Score      Flowsheet Row Most Recent Value   Heart Score Risk Calculator    History 0 Filed at: 07/25/2025 1928   ECG 1 Filed at: 07/25/2025 1928   Age 1 Filed at: 07/25/2025 1928   Risk Factors 1 Filed at: 07/25/2025 1928   Troponin 0 Filed at: 07/25/2025 1928   HEART Score 3 Filed at: 07/25/2025 1928          HEART Risk Score      Flowsheet Row Most Recent Value   Heart Score Risk Calculator    History 0 Filed at: 07/25/2025 1928   ECG 1 Filed at: 07/25/2025 1928   Age 1 Filed at: 07/25/2025 1928   Risk Factors 1 Filed at: 07/25/2025 1928   Troponin 0 Filed at: 07/25/2025 1928   HEART Score 3 Filed at: 07/25/2025 1928                      No data recorded                            History of Present Illness       Chief Complaint   Patient presents with    Chest Pain     Mid chest discomfort for a few days. Sometimes up to  neck. Has nx of gastrits. Recent friend that  of heart prob. Can't rest, afraid something may be wrong       Past Medical History[1]   Past Surgical History[2]   Family History[3]   Social History[4]   E-Cigarette/Vaping    E-Cigarette Use Never User       E-Cigarette/Vaping Substances    Nicotine No     THC No     CBD No     Flavoring No     Other No     Unknown No       I have reviewed and agree with the history as documented.     HPI  Patient is a 55 y.o. female with PMHx HLD, GERD who presents to the ED for evaluation of chest pain.    Reports 3 days of chest discomfort, constant, left chest, nonradiating, dull type of pain  Nausea for a few hours yesterday, has since resolved   No cardiac history  Has seen Cardiology in the past for CP  No h/o VTE, no recent travel, no recent surgery   Gastritis history, takes gasx, famotidine, hot tea/hot water with lemon   Chest pain feels similar to gastritis   Niece just recently  from sudden heart attack and pt just attended   No recent illness  No fever, chills, cough, congestion, sore throat, SOB, abdominal pain, vomiting, diarrhea, dysuria, hematuria, dizziness   No rash   No trauma to chest     Review of Systems   Cardiovascular:  Positive for chest pain.           Objective       ED Triage Vitals [25 175]   Temperature Pulse Blood Pressure Respirations SpO2 Patient Position - Orthostatic VS   (!) 97.3 °F (36.3 °C) 74 112/54 20 98 % Sitting      Temp Source Heart Rate Source BP Location FiO2 (%) Pain Score    Tympanic Monitor Right arm -- 1      Vitals      Date and Time Temp Pulse SpO2 Resp BP Pain Score FACES Pain Rating User   25 1930 -- 70 98 % 20 111/58 -- -- LL   250 97.3 °F (36.3 °C) 74 98 % 20 112/54 1 -- LS            Physical Exam  Vitals and nursing note reviewed.   Constitutional:       General: She is not in acute distress.     Appearance: Normal appearance. She is well-developed. She is not ill-appearing,  toxic-appearing or diaphoretic.   HENT:      Head: Normocephalic and atraumatic.      Mouth/Throat:      Mouth: Mucous membranes are moist.      Pharynx: Oropharynx is clear.     Eyes:      Extraocular Movements: Extraocular movements intact.      Conjunctiva/sclera: Conjunctivae normal.      Pupils: Pupils are equal, round, and reactive to light.       Cardiovascular:      Rate and Rhythm: Normal rate and regular rhythm.      Pulses: Normal pulses.      Heart sounds: Normal heart sounds. No murmur heard.  Pulmonary:      Effort: Pulmonary effort is normal. No respiratory distress.      Breath sounds: Normal breath sounds. No stridor. No wheezing, rhonchi or rales.   Chest:      Chest wall: No tenderness.   Abdominal:      Palpations: Abdomen is soft.      Tenderness: There is no abdominal tenderness.     Musculoskeletal:         General: No swelling. Normal range of motion.      Cervical back: Normal range of motion and neck supple. No rigidity.      Right lower leg: No edema.      Left lower leg: No edema.     Skin:     General: Skin is warm and dry.      Capillary Refill: Capillary refill takes less than 2 seconds.      Findings: No rash.     Neurological:      General: No focal deficit present.      Mental Status: She is alert and oriented to person, place, and time.     Psychiatric:         Mood and Affect: Mood normal.         Results Reviewed       Procedure Component Value Units Date/Time    HS Troponin 0hr (reflex protocol) [[id number]]  (Normal) Collected: 07/25/25 1850    Lab Status: Final result Specimen: Blood from Arm, Right Updated: 07/25/25 1920     hs TnI 0hr <2 ng/L     Basic metabolic panel [[id number]] Collected: 07/25/25 1850    Lab Status: Final result Specimen: Blood from Arm, Right Updated: 07/25/25 1913     Sodium 137 mmol/L      Potassium 4.2 mmol/L      Chloride 103 mmol/L      CO2 27 mmol/L      ANION GAP 7 mmol/L      BUN 20 mg/dL      Creatinine 0.80 mg/dL      Glucose 86 mg/dL       Calcium 9.3 mg/dL      eGFR 82 ml/min/1.73sq m     Narrative:      [employer] Kid[employer] guidelines for Chronic Kidney Disease (CKD):     Stage 1 with normal or high GFR (GFR > 90 mL/min/1.73 square meters)    Stage 2 Mild CKD (GFR = 60-89 mL/min/1.73 square meters)    Stage 3A Moderate CKD (GFR = 45-59 mL/min/1.73 square meters)    Stage 3B Moderate CKD (GFR = 30-44 mL/min/1.73 square meters)    Stage 4 Severe CKD (GFR = 15-29 mL/min/1.73 square meters)    Stage 5 End Stage CKD (GFR <15 mL/min/1.73 square meters)  Note: GFR calculation is accurate only with a steady state creatinine    D-dimer, quantitative [[id number]]  (Normal) Collected: 07/25/25 1850    Lab Status: Final result Specimen: Blood from Arm, Right Updated: 07/25/25 1911     D-Dimer, Quant <0.27 ug/ml FEU     Narrative:      In the evaluation for possible pulmonary embolism, in the appropriate (Well's Score of 4 or less) patient, the age adjusted d-dimer cutoff for this patient can be calculated as:    Age x 0.01 (in ug/mL) for Age-adjusted D-dimer exclusion threshold for a patient over 50 years.    CBC and differential [[id number]] Collected: 07/25/25 1850    Lab Status: Final result Specimen: Blood from Arm, Right Updated: 07/25/25 1857     WBC 5.18 Thousand/uL      RBC 4.29 Million/uL      Hemoglobin 12.2 g/dL      Hematocrit 38.7 %      MCV 90 fL      MCH 28.4 pg      MCHC 31.5 g/dL      RDW 12.3 %      MPV 12.2 fL      Platelets 221 Thousands/uL      nRBC 0 /100 WBCs      Segmented % 43 %      Immature Grans % 0 %      Lymphocytes % 42 %      Monocytes % 12 %      Eosinophils Relative 3 %      Basophils Relative 0 %      Absolute Neutrophils 2.19 Thousands/µL      Absolute Immature Grans 0.00 Thousand/uL      Absolute Lymphocytes 2.18 Thousands/µL      Absolute Monocytes 0.62 Thousand/µL      Eosinophils Absolute 0.17 Thousand/µL      Basophils Absolute 0.02 Thousands/µL             XR chest 2 views   ED Interpretation by Sylvia Sesay,  MD ([id number])   No acute cardiopulmonary process      Final Interpretation by Harpreet Chun MD ([id number])      No acute cardiopulmonary disease.            Workstation performed: [id number]             Procedures    ED Medication and Procedure Management   Prior to Admission Medications   Prescriptions Last Dose Informant Patient Reported? Taking?   Biotin 1 MG CAPS  Self Yes No   Sig: Take by mouth   Patient not taking: Reported on 2025   Cetirizine HCl (ZyrTEC Allergy) 10 MG CAPS  Self Yes No   Sig: Take by mouth   Cranberry 1000 MG CAPS  Self Yes No   Sig: Take by mouth   Echinacea Plus CAPS  Self Yes No   Sig: Take by mouth   Multiple Vitamins-Minerals (CENTRUM SILVER 50+WOMEN PO)  Self Yes No   Omega-3 Fatty Acids (FISH OIL PO)  Self Yes No   Sig: Take by mouth   diazepam (VALIUM) 5 mg tablet   No No   Sig: Take 1 tab 1 hour prior to MRI. If still anxious and needed, then can take an additional 1/2 tablet.   Patient not taking: Reported on 2025   estradiol (CLIMARA) 0.025 mg/24 hr   No No   Sig: PLACE 1 PATCH ON THE SKIN OVER 7 DAYS ONCE A WEEK   estradiol (Climara) 0.025 mg/24 hr   No No   Sig: Place 1 patch on the skin over 7 days once a week   famotidine (PEPCID) 40 MG tablet  Self No No   Sig: Take 1 tablet (40 mg total) by mouth daily at bedtime   fluticasone (FLONASE) 50 mcg/act nasal spray  Self No No   Si spray into each nostril daily   ibuprofen (MOTRIN) 400 mg tablet   Yes No   Sig: Take by mouth every 6 (six) hours as needed for mild pain As needed   meclizine (ANTIVERT) 12.5 MG tablet   No No   Sig: Take 1 tablet (12.5 mg total) by mouth every 8 (eight) hours as needed for dizziness   meloxicam (MOBIC) 15 mg tablet   No No   Sig: Take 1 tablet (15 mg total) by mouth daily   Patient not taking: Reported on 2025   montelukast (SINGULAIR) 10 mg tablet   No No   Sig: TAKE 1 TABLET BY MOUTH EVERY NIGHT AT BEDTIME   omeprazole (PriLOSEC) 20 mg delayed release capsule   No No   Sig:  Take 1 capsule (20 mg total) by mouth daily for 14 days   Patient not taking: Reported on 6/4/2025   rosuvastatin (CRESTOR) 5 mg tablet   No No   Sig: Take 1 tablet (5 mg total) by mouth daily   traZODone (DESYREL) 50 mg tablet   No No   Sig: Take 1 tablet (50 mg total) by mouth daily at bedtime      Facility-Administered Medications: None     Discharge Medication List as of 7/25/2025  8:06 PM        START taking these medications    Details   sucralfate (CARAFATE) 1 g tablet Take 1 tablet (1 g total) by mouth 4 (four) times a day for 5 days, Starting Fri 7/25/2025, Until Wed 7/30/2025, Normal           CONTINUE these medications which have NOT CHANGED    Details   Biotin 1 MG CAPS Take by mouth, Historical Med      Cetirizine HCl (ZyrTEC Allergy) 10 MG CAPS Take by mouth, Historical Med      Cranberry 1000 MG CAPS Take by mouth, Historical Med      diazepam (VALIUM) 5 mg tablet Take 1 tab 1 hour prior to MRI. If still anxious and needed, then can take an additional 1/2 tablet., Normal      Echinacea Plus CAPS Take by mouth, Historical Med      estradiol (CLIMARA) 0.025 mg/24 hr PLACE 1 PATCH ON THE SKIN OVER 7 DAYS ONCE A WEEK, Starting Wed 10/30/2024, Until Wed 6/4/2025, Normal      estradiol (Climara) 0.025 mg/24 hr Place 1 patch on the skin over 7 days once a week, Starting Thu 4/10/2025, Until Wed 7/9/2025, Normal      famotidine (PEPCID) 40 MG tablet Take 1 tablet (40 mg total) by mouth daily at bedtime, Starting Tue 3/26/2024, Normal      fluticasone (FLONASE) 50 mcg/act nasal spray 1 spray into each nostril daily, Starting Thu 3/21/2024, Normal      ibuprofen (MOTRIN) 400 mg tablet Take by mouth every 6 (six) hours as needed for mild pain As needed, Historical Med      meclizine (ANTIVERT) 12.5 MG tablet Take 1 tablet (12.5 mg total) by mouth every 8 (eight) hours as needed for dizziness, Starting Wed 6/4/2025, Normal      meloxicam (MOBIC) 15 mg tablet Take 1 tablet (15 mg total) by mouth daily, Starting  Fri 3/7/2025, Until 2025, Normal      montelukast (SINGULAIR) 10 mg tablet TAKE 1 TABLET BY MOUTH EVERY NIGHT AT BEDTIME, Starting Thu 3/6/2025, Normal      Multiple Vitamins-Minerals (CENTRUM SILVER 50+WOMEN PO) Starting 2021, Historical Med      Omega-3 Fatty Acids (FISH OIL PO) Take by mouth, Historical Med      omeprazole (PriLOSEC) 20 mg delayed release capsule Take 1 capsule (20 mg total) by mouth daily for 14 days, Starting 2025, Until 2025, Normal      rosuvastatin (CRESTOR) 5 mg tablet Take 1 tablet (5 mg total) by mouth daily, Starting 2025, Normal      traZODone (DESYREL) 50 mg tablet Take 1 tablet (50 mg total) by mouth daily at bedtime, Starting 2025, Normal           No discharge procedures on file.  ED SEPSIS DOCUMENTATION   Time reflects when diagnosis was documented in both MDM as applicable and the Disposition within this note       Time User Action Codes Description Comment    2025  7:29 PM Sylvia Sesay Add [R07.9] Chest pain     2025  7:34 PM Sylvia Sesay Add [K21.9] Gastroesophageal reflux disease, unspecified whether esophagitis present                      [1]   Past Medical History:  Diagnosis Date    Bacterial vaginitis     Brain lesion     Fibroids     Gastritis     GERD (gastroesophageal reflux disease)     Insomnia     Migraine     Otitis media     Pneumonia    [2]   Past Surgical History:  Procedure Laterality Date     SECTION      COLONOSCOPY      HAND SURGERY Left     Left pinky tendon repair    HYSTERECTOMY      age 52    [place] COLONOSCOPY FLX DX W/COLLJ SPEC WHEN PFRMD N/A 2018    Procedure: COLONOSCOPY;  Surgeon: Crystal Lacey MD;  Location: [facility];  Service: Gastroenterology    [place] CYSTOURETHROSCOPY N/A 2021    Procedure: CYSTOSCOPY;  Surgeon: Alysia Osorio DO;  Location: [facility];  Service: Gynecology    [place] ESOPHAGOGASTRODUODENOSCOPY TRANSORAL DIAGNOSTIC N/A 2017    Procedure:  ESOPHAGOGASTRODUODENOSCOPY (EGD);  Surgeon: Crystal Lacey MD;  Location: [facility];  Service: Gastroenterology    [place] LAPS TOTAL HYSTERECT 250 GM/< W/RMVL TUBE/OVARY N/A 11/08/2021    Procedure: HYSTERECTOMY LAPAROSCOPIC TOTAL (LTH) WITH BILATERAL SALPINGECTOMY, LYSIS OF ADHESIONS;  Surgeon: Alysia Osorio DO;  Location: [facility];  Service: Gynecology    ROOT CANAL      UPPER GASTROINTESTINAL ENDOSCOPY     [3]   Family History  Problem Relation Name Age of Onset    Breast cancer Mother  72    Dementia Mother      Hyperlipidemia Mother      Parkinsonism Mother      Fibroids Mother      Prostatitis Father      Cataracts Father      Premature birth Daughter      No Known Problems Daughter      No Known Problems Daughter      Dementia Maternal Grandmother      Parkinsonism Maternal Grandmother      Fibroids Maternal Grandmother      No Known Problems Maternal Grandfather      No Known Problems Paternal Grandmother      No Known Problems Paternal Grandfather      Prostate cancer Brother      Substance Abuse Brother      Dementia Maternal Aunt      Dementia Maternal Aunt      No Known Problems Maternal Aunt      No Known Problems Maternal Aunt      No Known Problems Maternal Aunt Apple     No Known Problems Paternal Aunt      No Known Problems Paternal Aunt      No Known Problems Paternal Aunt      Ovarian cancer Cousin Voncia     Breast cancer Cousin Extended cousin     No Known Problems Half-Sister      Hypertension Neg Hx      Diabetes Neg Hx      Colon cancer Neg Hx      Uterine cancer Neg Hx      Cervical cancer Neg Hx     [4]   Social History  Tobacco Use    Smoking status: Never    Smokeless tobacco: Never   Vaping Use    Vaping status: Never Used   Substance Use Topics    Alcohol use: Yes     Alcohol/week: 3.0 standard drinks of alcohol     Types: 3 Glasses of wine per week     Comment: socially    Drug use: Never        Sylvia Sesay MD  07/25/25 [id number]

## 2025-07-27 LAB
ATRIAL RATE: 74 BPM
P AXIS: 57 DEGREES
PR INTERVAL: 144 MS
QRS AXIS: -2 DEGREES
QRSD INTERVAL: 74 MS
QT INTERVAL: 364 MS
QTC INTERVAL: 404 MS
T WAVE AXIS: 1 DEGREES
VENTRICULAR RATE: 74 BPM

## 2025-07-27 PROCEDURE — 93010 ELECTROCARDIOGRAM REPORT: CPT | Performed by: INTERNAL MEDICINE

## 2025-07-29 ENCOUNTER — HOSPITAL ENCOUNTER (OUTPATIENT)
Dept: RADIOLOGY | Facility: HOSPITAL | Age: 55
Discharge: HOME | End: 2025-07-29
Attending: STUDENT IN AN ORGANIZED HEALTH CARE EDUCATION/TRAINING PROGRAM
Payer: COMMERCIAL

## 2025-07-29 DIAGNOSIS — S86.312A PERONEAL TENDON TEAR, LEFT, INITIAL ENCOUNTER: ICD-10-CM

## 2025-07-29 PROCEDURE — 76882 US LMTD JT/FCL EVL NVASC XTR: CPT

## 2025-07-30 ENCOUNTER — TELEPHONE (OUTPATIENT)
Age: 55
End: 2025-07-30

## 2025-07-31 ENCOUNTER — ANNUAL EXAM (OUTPATIENT)
Dept: OBGYN CLINIC | Facility: CLINIC | Age: 55
End: 2025-07-31
Payer: COMMERCIAL

## 2025-07-31 VITALS — SYSTOLIC BLOOD PRESSURE: 112 MMHG | WEIGHT: 180 LBS | BODY MASS INDEX: 27.37 KG/M2 | DIASTOLIC BLOOD PRESSURE: 80 MMHG

## 2025-07-31 DIAGNOSIS — Z12.31 ENCOUNTER FOR SCREENING MAMMOGRAM FOR MALIGNANT NEOPLASM OF BREAST: ICD-10-CM

## 2025-07-31 DIAGNOSIS — R19.00 PELVIC MASS: ICD-10-CM

## 2025-07-31 DIAGNOSIS — Z01.419 WELL WOMAN EXAM WITH ROUTINE GYNECOLOGICAL EXAM: Primary | ICD-10-CM

## 2025-07-31 PROCEDURE — S0612 ANNUAL GYNECOLOGICAL EXAMINA: HCPCS | Performed by: OBSTETRICS & GYNECOLOGY

## 2025-08-03 ENCOUNTER — HOSPITAL ENCOUNTER (OUTPATIENT)
Dept: MRI IMAGING | Facility: HOSPITAL | Age: 55
Discharge: HOME | End: 2025-08-03
Attending: STUDENT IN AN ORGANIZED HEALTH CARE EDUCATION/TRAINING PROGRAM
Payer: COMMERCIAL

## 2025-08-03 DIAGNOSIS — S86.312A PERONEAL TENDON TEAR, LEFT, INITIAL ENCOUNTER: ICD-10-CM

## 2025-08-03 PROCEDURE — 73721 MRI JNT OF LWR EXTRE W/O DYE: CPT

## 2025-08-18 ENCOUNTER — OFFICE VISIT (OUTPATIENT)
Dept: OBGYN CLINIC | Facility: CLINIC | Age: 55
End: 2025-08-18
Payer: COMMERCIAL

## 2025-08-18 VITALS — BODY MASS INDEX: 27.28 KG/M2 | HEIGHT: 68 IN | WEIGHT: 180 LBS

## 2025-08-18 DIAGNOSIS — M76.62 TENDONITIS, ACHILLES, LEFT: ICD-10-CM

## 2025-08-18 DIAGNOSIS — M76.72 PERONEAL TENDONITIS OF LEFT LOWER LEG: Primary | ICD-10-CM

## 2025-08-18 PROCEDURE — 99214 OFFICE O/P EST MOD 30 MIN: CPT | Performed by: STUDENT IN AN ORGANIZED HEALTH CARE EDUCATION/TRAINING PROGRAM

## (undated) DEVICE — TUBING SMOKE EVAC W/FILTRATION DEVICE PLUMEPORT ACTIV

## (undated) DEVICE — CHLORAPREP HI-LITE 26ML ORANGE

## (undated) DEVICE — TOWEL SURG XR DETECT GREEN STRL RFD

## (undated) DEVICE — GLOVE PI ULTRA TOUCH SZ.6.5

## (undated) DEVICE — BETHLEHEM UNIVERSAL GYN LAP PK: Brand: CARDINAL HEALTH

## (undated) DEVICE — SUT VICRYL 2-0 CT-2 18 IN J726D

## (undated) DEVICE — INTENDED FOR TISSUE SEPARATION, AND OTHER PROCEDURES THAT REQUIRE A SHARP SURGICAL BLADE TO PUNCTURE OR CUT.: Brand: BARD-PARKER SAFETY BLADES SIZE 11, STERILE

## (undated) DEVICE — 40595 XL TRENDELENBURG POSITIONING KIT: Brand: 40595 XL TRENDELENBURG POSITIONING KIT

## (undated) DEVICE — ARTHROSCOPY FLOOR MAT

## (undated) DEVICE — BLUE HEAT SCOPE WARMER

## (undated) DEVICE — CHLORHEXIDINE 4PCT 4 OZ

## (undated) DEVICE — SYRINGE 50ML LL

## (undated) DEVICE — STERILE SURGICAL LUBRICANT,  TUBE: Brand: SURGILUBE

## (undated) DEVICE — PENCIL ELECTROSURG E-Z CLEAN -0035H

## (undated) DEVICE — DRAPE LAPAROTOMY W/POUCHES

## (undated) DEVICE — LIGHT HANDLE COVER SLEEVE DISP BLUE STELLAR

## (undated) DEVICE — TRAY FOLEY 16FR URIMETER SILICONE SURESTEP

## (undated) DEVICE — SUT STRATAFIX SPIRAL 2-0 CT-1 30 CM SXPP1B410

## (undated) DEVICE — ENSEAL LAPAROSCOPIC TISSUE SEALER G2 ARTICULATING  CURVED JAW FOR USE WITH G2 GENERATOR 5MM DIAMETER 35CM SHAFT LENGTH: Brand: ENSEAL

## (undated) DEVICE — TROCAR: Brand: KII® SLEEVE

## (undated) DEVICE — 40583 XL ADVANCED TRENDELENBURG POSITIONING KIT: Brand: 40583 XL ADVANCED TRENDELENBURG POSITIONING KIT

## (undated) DEVICE — PREMIUM DRY TRAY LF: Brand: MEDLINE INDUSTRIES, INC.

## (undated) DEVICE — GLOVE INDICATOR PI UNDERGLOVE SZ 8 BLUE

## (undated) DEVICE — CYSTO TUBING SINGLE IRRIGATION

## (undated) DEVICE — SPONGE 4 X 4 XRAY 16 PLY STRL LF RFD

## (undated) DEVICE — MAYO STAND COVER: Brand: CONVERTORS

## (undated) DEVICE — VIAL DECANTER

## (undated) DEVICE — OCCLUDER COLPO-PNEUMO

## (undated) DEVICE — ELECTRODE LAP SPATULA CRV E-Z CLEAN 33CM -0018C

## (undated) DEVICE — TROCAR: Brand: KII FIOS FIRST ENTRY

## (undated) DEVICE — ENSEAL X1 TISSUE SEALER, CURVED JAW, 37 CM SHAFT LENGTH: Brand: ENSEAL

## (undated) DEVICE — ADHESIVE SKIN HIGH VISCOSITY EXOFIN 1ML

## (undated) DEVICE — HEAVY DUTY TABLE COVER: Brand: CONVERTORS

## (undated) DEVICE — TUBING SUCTION 5MM X 12 FT

## (undated) DEVICE — 1820 FOAM BLOCK NEEDLE COUNTER: Brand: DEVON

## (undated) DEVICE — SUT MONOCRYL 4-0 PS-2 18 IN Y496G

## (undated) DEVICE — GLOVE INDICATOR PI UNDERGLOVE SZ 7 BLUE